# Patient Record
Sex: FEMALE | Race: BLACK OR AFRICAN AMERICAN | Employment: PART TIME | URBAN - METROPOLITAN AREA
[De-identification: names, ages, dates, MRNs, and addresses within clinical notes are randomized per-mention and may not be internally consistent; named-entity substitution may affect disease eponyms.]

---

## 2017-02-16 ENCOUNTER — APPOINTMENT (OUTPATIENT)
Dept: CT IMAGING | Facility: HOSPITAL | Age: 27
DRG: 149 | End: 2017-02-16
Attending: EMERGENCY MEDICINE
Payer: MEDICAID

## 2017-02-16 ENCOUNTER — APPOINTMENT (OUTPATIENT)
Dept: GENERAL RADIOLOGY | Facility: HOSPITAL | Age: 27
DRG: 149 | End: 2017-02-16
Attending: EMERGENCY MEDICINE
Payer: MEDICAID

## 2017-02-16 ENCOUNTER — HOSPITAL ENCOUNTER (INPATIENT)
Facility: HOSPITAL | Age: 27
LOS: 1 days | Discharge: HOME | DRG: 149 | End: 2017-02-17
Attending: EMERGENCY MEDICINE | Admitting: HOSPITALIST
Payer: MEDICAID

## 2017-02-16 DIAGNOSIS — R77.8 ELEVATED TROPONIN: Primary | ICD-10-CM

## 2017-02-16 DIAGNOSIS — R42 DIZZINESS: ICD-10-CM

## 2017-02-16 PROBLEM — R79.89 ELEVATED TROPONIN: Status: ACTIVE | Noted: 2017-02-16

## 2017-02-16 LAB
ANION GAP SERPL CALC-SCNC: 13 MMOL/L (ref 0–18)
APTT PPP: 25 SECONDS (ref 23.2–35.3)
B-HCG UR QL: NEGATIVE
BASOPHILS # BLD: 0.1 K/UL (ref 0–0.2)
BASOPHILS NFR BLD: 1 %
BILIRUB UR QL: NEGATIVE
BUN SERPL-MCNC: 18 MG/DL (ref 8–20)
BUN/CREAT SERPL: 13.2 (ref 10–20)
CALCIUM SERPL-MCNC: 10.2 MG/DL (ref 8.5–10.5)
CHLORIDE SERPL-SCNC: 99 MMOL/L (ref 95–110)
CHOLEST SERPL-MCNC: 209 MG/DL (ref 110–200)
CO2 SERPL-SCNC: 28 MMOL/L (ref 22–32)
COLOR UR: YELLOW
CREAT SERPL-MCNC: 1.36 MG/DL (ref 0.5–1.5)
EOSINOPHIL # BLD: 0.2 K/UL (ref 0–0.7)
EOSINOPHIL NFR BLD: 2 %
ERYTHROCYTE [DISTWIDTH] IN BLOOD BY AUTOMATED COUNT: 14.5 % (ref 11–15)
GLUCOSE SERPL-MCNC: 150 MG/DL (ref 70–99)
GLUCOSE UR-MCNC: NEGATIVE MG/DL
HCT VFR BLD AUTO: 40.9 % (ref 35–48)
HDLC SERPL-MCNC: 39 MG/DL
HGB BLD-MCNC: 13.4 G/DL (ref 12–16)
HGB UR QL STRIP.AUTO: NEGATIVE
KETONES UR-MCNC: NEGATIVE MG/DL
LDLC SERPL CALC-MCNC: 99 MG/DL (ref 0–99)
LEUKOCYTE ESTERASE UR QL STRIP.AUTO: NEGATIVE
LYMPHOCYTES # BLD: 2.2 K/UL (ref 1–4)
LYMPHOCYTES NFR BLD: 28 %
MCH RBC QN AUTO: 26.4 PG (ref 27–32)
MCHC RBC AUTO-ENTMCNC: 32.7 G/DL (ref 32–37)
MCV RBC AUTO: 80.6 FL (ref 80–100)
MONOCYTES # BLD: 0.4 K/UL (ref 0–1)
MONOCYTES NFR BLD: 5 %
NEUTROPHILS # BLD AUTO: 5.1 K/UL (ref 1.8–7.7)
NEUTROPHILS NFR BLD: 64 %
NITRITE UR QL STRIP.AUTO: NEGATIVE
NONHDLC SERPL-MCNC: 170 MG/DL
OSMOLALITY UR CALC.SUM OF ELEC: 295 MOSM/KG (ref 275–295)
PH UR: 6 [PH] (ref 5–8)
PLATELET # BLD AUTO: 324 K/UL (ref 140–400)
PMV BLD AUTO: 8.2 FL (ref 7.4–10.3)
POTASSIUM SERPL-SCNC: 3 MMOL/L (ref 3.3–5.1)
PROT UR-MCNC: 100 MG/DL
RBC # BLD AUTO: 5.08 M/UL (ref 3.7–5.4)
RBC #/AREA URNS AUTO: 2 /HPF
SODIUM SERPL-SCNC: 140 MMOL/L (ref 136–144)
SP GR UR STRIP: 1.01 (ref 1–1.03)
TRIGL SERPL-MCNC: 357 MG/DL (ref 1–149)
TROPONIN I SERPL-MCNC: 0.06 NG/ML (ref ?–0.03)
TROPONIN I SERPL-MCNC: 0.07 NG/ML (ref ?–0.03)
UROBILINOGEN UR STRIP-ACNC: <2
VIT C UR-MCNC: NEGATIVE MG/DL
WBC # BLD AUTO: 8.1 K/UL (ref 4–11)
WBC #/AREA URNS AUTO: 3 /HPF

## 2017-02-16 PROCEDURE — 99223 1ST HOSP IP/OBS HIGH 75: CPT | Performed by: HOSPITALIST

## 2017-02-16 PROCEDURE — 71020 XR CHEST PA + LAT CHEST (CPT=71020): CPT

## 2017-02-16 RX ORDER — MORPHINE SULFATE 2 MG/ML
2 INJECTION, SOLUTION INTRAMUSCULAR; INTRAVENOUS EVERY 2 HOUR PRN
Status: DISCONTINUED | OUTPATIENT
Start: 2017-02-16 | End: 2017-02-17

## 2017-02-16 RX ORDER — ATORVASTATIN CALCIUM 20 MG/1
20 TABLET, FILM COATED ORAL NIGHTLY
Status: DISCONTINUED | OUTPATIENT
Start: 2017-02-16 | End: 2017-02-17

## 2017-02-16 RX ORDER — ARIPIPRAZOLE 2 MG/1
2 TABLET ORAL NIGHTLY
Status: DISCONTINUED | OUTPATIENT
Start: 2017-02-16 | End: 2017-02-17

## 2017-02-16 RX ORDER — KETOROLAC TROMETHAMINE 30 MG/ML
30 INJECTION, SOLUTION INTRAMUSCULAR; INTRAVENOUS ONCE
Status: COMPLETED | OUTPATIENT
Start: 2017-02-16 | End: 2017-02-16

## 2017-02-16 RX ORDER — NIFEDIPINE 10 MG/1
10 CAPSULE ORAL DAILY
Status: ON HOLD | COMMUNITY
End: 2018-03-29

## 2017-02-16 RX ORDER — POTASSIUM CHLORIDE 20 MEQ/1
40 TABLET, EXTENDED RELEASE ORAL ONCE
Status: COMPLETED | OUTPATIENT
Start: 2017-02-16 | End: 2017-02-16

## 2017-02-16 RX ORDER — LOSARTAN POTASSIUM 50 MG/1
50 TABLET ORAL DAILY
Status: DISCONTINUED | OUTPATIENT
Start: 2017-02-17 | End: 2017-02-17

## 2017-02-16 RX ORDER — ASPIRIN 325 MG
325 TABLET ORAL ONCE
Status: COMPLETED | OUTPATIENT
Start: 2017-02-16 | End: 2017-02-16

## 2017-02-16 RX ORDER — ONDANSETRON 2 MG/ML
4 INJECTION INTRAMUSCULAR; INTRAVENOUS ONCE
Status: COMPLETED | OUTPATIENT
Start: 2017-02-16 | End: 2017-02-16

## 2017-02-16 RX ORDER — POTASSIUM CHLORIDE 1.5 G/1.77G
20 POWDER, FOR SOLUTION ORAL DAILY
Status: DISCONTINUED | OUTPATIENT
Start: 2017-02-16 | End: 2017-02-17

## 2017-02-16 RX ORDER — MORPHINE SULFATE 4 MG/ML
4 INJECTION, SOLUTION INTRAMUSCULAR; INTRAVENOUS EVERY 2 HOUR PRN
Status: DISCONTINUED | OUTPATIENT
Start: 2017-02-16 | End: 2017-02-17

## 2017-02-16 RX ORDER — HEPARIN SODIUM 1000 [USP'U]/ML
5000 INJECTION, SOLUTION INTRAVENOUS; SUBCUTANEOUS ONCE
Status: COMPLETED | OUTPATIENT
Start: 2017-02-16 | End: 2017-02-16

## 2017-02-16 RX ORDER — AMLODIPINE BESYLATE 10 MG/1
10 TABLET ORAL DAILY
Status: DISCONTINUED | OUTPATIENT
Start: 2017-02-17 | End: 2017-02-17

## 2017-02-16 RX ORDER — ACETAMINOPHEN 325 MG/1
650 TABLET ORAL EVERY 6 HOURS PRN
Status: DISCONTINUED | OUTPATIENT
Start: 2017-02-16 | End: 2017-02-17

## 2017-02-16 RX ORDER — DEXTROSE MONOHYDRATE 25 G/50ML
50 INJECTION, SOLUTION INTRAVENOUS AS NEEDED
Status: DISCONTINUED | OUTPATIENT
Start: 2017-02-16 | End: 2017-02-17

## 2017-02-16 RX ORDER — HEPARIN SODIUM AND DEXTROSE 10000; 5 [USP'U]/100ML; G/100ML
INJECTION INTRAVENOUS CONTINUOUS
Status: DISCONTINUED | OUTPATIENT
Start: 2017-02-17 | End: 2017-02-17

## 2017-02-16 RX ORDER — HEPARIN SODIUM AND DEXTROSE 10000; 5 [USP'U]/100ML; G/100ML
1000 INJECTION INTRAVENOUS ONCE
Status: COMPLETED | OUTPATIENT
Start: 2017-02-16 | End: 2017-02-17

## 2017-02-16 RX ORDER — ONDANSETRON 2 MG/ML
4 INJECTION INTRAMUSCULAR; INTRAVENOUS EVERY 6 HOURS PRN
Status: DISCONTINUED | OUTPATIENT
Start: 2017-02-16 | End: 2017-02-17

## 2017-02-16 NOTE — ED INITIAL ASSESSMENT (HPI)
Pt reports nasuea x3 days. Pt denies emesis/ diarrhea. Pt denies urinary complaints. Pt denies abd pain. Pt reports dizziness when standing. Pt denies cp. Pt reports sob with exertion.

## 2017-02-17 ENCOUNTER — APPOINTMENT (OUTPATIENT)
Dept: NUCLEAR MEDICINE | Facility: HOSPITAL | Age: 27
DRG: 149 | End: 2017-02-17
Attending: HOSPITALIST
Payer: MEDICAID

## 2017-02-17 VITALS
TEMPERATURE: 98 F | SYSTOLIC BLOOD PRESSURE: 141 MMHG | HEART RATE: 79 BPM | OXYGEN SATURATION: 97 % | RESPIRATION RATE: 20 BRPM | BODY MASS INDEX: 45.35 KG/M2 | WEIGHT: 272.19 LBS | DIASTOLIC BLOOD PRESSURE: 86 MMHG | HEIGHT: 65 IN

## 2017-02-17 LAB
ANION GAP SERPL CALC-SCNC: 7 MMOL/L (ref 0–18)
APTT PPP: 31.8 SECONDS (ref 23.2–35.3)
BUN SERPL-MCNC: 18 MG/DL (ref 8–20)
BUN/CREAT SERPL: 14.1 (ref 10–20)
CALCIUM SERPL-MCNC: 9.1 MG/DL (ref 8.5–10.5)
CHLORIDE SERPL-SCNC: 100 MMOL/L (ref 95–110)
CO2 SERPL-SCNC: 29 MMOL/L (ref 22–32)
CREAT SERPL-MCNC: 1.28 MG/DL (ref 0.5–1.5)
GLUCOSE SERPL-MCNC: 148 MG/DL (ref 70–99)
HBA1C MFR BLD: 7.2 % (ref 4–6)
OSMOLALITY UR CALC.SUM OF ELEC: 287 MOSM/KG (ref 275–295)
POTASSIUM SERPL-SCNC: 2.6 MMOL/L (ref 3.3–5.1)
SODIUM SERPL-SCNC: 136 MMOL/L (ref 136–144)
TROPONIN I SERPL-MCNC: 0.06 NG/ML (ref ?–0.03)

## 2017-02-17 PROCEDURE — 99239 HOSP IP/OBS DSCHRG MGMT >30: CPT | Performed by: HOSPITALIST

## 2017-02-17 PROCEDURE — 78582 LUNG VENTILAT&PERFUS IMAGING: CPT

## 2017-02-17 RX ORDER — HEPARIN SODIUM 1000 [USP'U]/ML
3000 INJECTION, SOLUTION INTRAVENOUS; SUBCUTANEOUS ONCE
Status: COMPLETED | OUTPATIENT
Start: 2017-02-17 | End: 2017-02-17

## 2017-02-17 RX ORDER — POTASSIUM CHLORIDE 20 MEQ/1
40 TABLET, EXTENDED RELEASE ORAL DAILY
Status: DISCONTINUED | OUTPATIENT
Start: 2017-02-17 | End: 2017-02-17

## 2017-02-17 NOTE — PLAN OF CARE
CARDIOVASCULAR - ADULT    • Maintains optimal cardiac output and hemodynamic stability Adequate for Discharge    • Absence of cardiac arrhythmias or at baseline Adequate for Discharge        Patient/Family Goals    • Patient/Family Long Term Goal Adequate

## 2017-02-17 NOTE — CONSULTS
[facility]    PATIENT'S NAME: Savannah Blandon   ATTENDING PHYSICIAN: Hilary Duffy. Davonte Mesa MD   CONSULTING PHYSICIAN: Jonel Loredo MD   PATIENT ACCOUNT#:   [id number]    LOCATION:  [address] [place] [address] #:   [id number]       DATE OF BIRTH: arthritis. There are no new allergies. PHYSICAL EXAMINATION:    GENERAL:  She is a female, currently appears comfortable. VITAL SIGNS:  Temperature 98.2 degrees Fahrenheit, heart rate of 74, respiratory rate 18, blood pressure 133/72.   HEENT:  Scl 09:19:18  t: 02/17/2017 10:11:37  Job [id number]  [facility]/

## 2017-02-17 NOTE — H&P
CHRISTIANO/ Lilliam Chino Patient Status:  Emergency    1990 MRN [id number]   Location [address] Attending Dami Velásquez MD   Hosp Day # 0 PCP PHYSICIAN NONSTAFF     Date:  2 [employer]-Derived P*    Tightness in Throat    Home Medications:  Prior to Admission Medications   Prescriptions Last Dose Informant Patient Reported? Taking? ARIPiprazole 2 MG Oral Tab   No No   Sig: Take 1 tablet (2 mg total) by mouth nightly.    AmLO respirations are non-labored, breath sounds are equal, symmetrical chest wall expansion. Cardiovascular:  Normal rate, regular rhythm, no murmur, no edema. Gastrointestinal:  Soft, non-tender, non-distended, normal bowel sounds, no organomegaly.   Lenny Pride

## 2017-02-17 NOTE — PAYOR COMM NOTE
Analy Lepeean #[id number]  (31 year old F)       [facility] [id number]         Dami Velásquez MD Physician Signed  ED Provider Notes 2/16/2017  7:34 PM      Expand All Collapse All      Patient Seen in: [facility] AND [address] Relation  Age of Onset    •  Hypertension  Father      •  Lipids  Father      •  Obesity  Father      •  Diabetes  Mother      •  Hypertension  Mother      •  Lipids  Mother      •  Obesity  Mother      •  Psychiatric  Sister      •  Psychiatric  Brother  Urine  Hazy (*)        Protein Urine  100  (*)        Bacteria Urine  Few (*)        All other components within normal limits    TROPONIN I, 0 HOUR - Abnormal; Notable for the following:       Troponin  0.06 (*)        All other components within normal l and Plan      Clinical Impression:  Elevated troponin  (primary encounter diagnosis)  Dizziness    Disposition:  Admit    Follow-up:  No follow-up provider specified.     Medications Prescribed:  Current Discharge Medication List          Present on Admissi •  Depression      •  Anxiety state              Past Surgical History      APPENDECTOMY          Family History    Problem  Relation  Age of Onset    •  Hypertension  Father      •  Lipids  Father      •  Obesity  Father      •  Diabetes  Mother      •  Negative. Musculoskeletal:  Negative. Integumentary:  Negative. Neurologic: Headache and dizziness  Psychiatric:  Negative.   ROS reviewed as documented in chart    Physical Exam:  Temp:  [97.3 °F (36.3 °C)] 97.3 °F (36.3 °C)  Pulse:  [77-88] 88  Resp:  drip started per cardiology    Shortness of breath  Saturating well however considering the rather abrupt onset, will get VQ scan to rule out PE, currently on heparin drip    Benign hypertension  Well-controlled at this time, will resume home medication. that was abnormal and subsequently underwent a coronary angiogram, which showed normal coronary arteries.     PAST MEDICAL HISTORY:  Diabetes mellitus, hypertension, hypercholesterolemia.  A coronary angiogram done in October 2016 showed normal coronary art syndrome. 3.      Abnormal EKG, unchanged from those of October 2016.  At that time, she had normal coronary arteries by cardiac catheterization. 4.      Hypertension, currently controlled.   5.      Hypercholesterolemia, on statin, with elevated triglyce

## 2017-02-21 NOTE — DISCHARGE SUMMARY
[facility] HOSPITALIST  DISCHARGE SUMMARY     Rober Atkins Patient Status:  Inpatient    1990 MRN [id number]   Location [facility] 3W/SW Attending No att. providers found   [facility] Day # 1 PCP PHYSICIAN NONSTAFF     DATE OF ADMISSION: 2017 rebound/guarding. Neuro: Normal reflexes, CN. Sensory/motor exams grossly normal deficit. Coordination  and gait normal.   MS: No joint effusions. No peripheral edema. Skin: Skin is warm and dry. No rashes, erythema, diaphoresis.    Psych: Normal mood a Last time this was given:  20 mEq on 2/17/2017 11:35 AM   Commonly known as:  KLOR-CON        Take 20 mEq by mouth daily. Quantity:  30 tablet   Refills:  1       UNKNOWN TO PATIENT - BIRTH CONTROL        daily.     Refills:  0             CONSULTANTS

## 2018-03-28 ENCOUNTER — APPOINTMENT (OUTPATIENT)
Dept: CT IMAGING | Facility: HOSPITAL | Age: 28
End: 2018-03-28
Attending: EMERGENCY MEDICINE
Payer: MEDICAID

## 2018-03-28 ENCOUNTER — HOSPITAL ENCOUNTER (OUTPATIENT)
Facility: HOSPITAL | Age: 28
Setting detail: OBSERVATION
Discharge: HOME | End: 2018-03-30
Attending: EMERGENCY MEDICINE | Admitting: INTERNAL MEDICINE
Payer: MEDICAID

## 2018-03-28 DIAGNOSIS — G43.909 MIGRAINE WITHOUT STATUS MIGRAINOSUS, NOT INTRACTABLE, UNSPECIFIED MIGRAINE TYPE: ICD-10-CM

## 2018-03-28 DIAGNOSIS — R77.8 ELEVATED TROPONIN: ICD-10-CM

## 2018-03-28 DIAGNOSIS — I10 ESSENTIAL HYPERTENSION: ICD-10-CM

## 2018-03-28 DIAGNOSIS — R07.9 ACUTE CHEST PAIN: Primary | ICD-10-CM

## 2018-03-28 PROCEDURE — 70450 CT HEAD/BRAIN W/O DYE: CPT | Performed by: EMERGENCY MEDICINE

## 2018-03-28 RX ORDER — ASPIRIN 81 MG/1
324 TABLET, CHEWABLE ORAL ONCE
Status: COMPLETED | OUTPATIENT
Start: 2018-03-28 | End: 2018-03-28

## 2018-03-28 RX ORDER — DIPHENHYDRAMINE HYDROCHLORIDE 50 MG/ML
25 INJECTION INTRAMUSCULAR; INTRAVENOUS ONCE
Status: COMPLETED | OUTPATIENT
Start: 2018-03-28 | End: 2018-03-28

## 2018-03-28 RX ORDER — LABETALOL HYDROCHLORIDE 5 MG/ML
20 INJECTION, SOLUTION INTRAVENOUS ONCE
Status: COMPLETED | OUTPATIENT
Start: 2018-03-28 | End: 2018-03-28

## 2018-03-28 RX ORDER — METOCLOPRAMIDE HYDROCHLORIDE 5 MG/ML
5 INJECTION INTRAMUSCULAR; INTRAVENOUS ONCE
Status: COMPLETED | OUTPATIENT
Start: 2018-03-28 | End: 2018-03-28

## 2018-03-28 RX ORDER — METHYLPREDNISOLONE SODIUM SUCCINATE 125 MG/2ML
125 INJECTION, POWDER, LYOPHILIZED, FOR SOLUTION INTRAMUSCULAR; INTRAVENOUS ONCE
Status: COMPLETED | OUTPATIENT
Start: 2018-03-28 | End: 2018-03-28

## 2018-03-28 NOTE — ED NOTES
Headache with light and noise sensitivity which worsened over the last week. Reports having a hx of headaches but states this headache feels worse. No other neuro complaints.

## 2018-03-28 NOTE — ED PROVIDER NOTES
Patient Seen in: [facility] AND [facility] Emergency Department    History   Patient presents with:  Headache (neurologic)    Stated Complaint: HA x4 days    HPI    The patient is a 53-year-old female with a history of hypertension who presents with headache cezar appears well-developed and well-nourished. Obese   HENT:   Head: Normocephalic and atraumatic. Mouth/Throat: Oropharynx is clear and moist.   Eyes: Conjunctivae and EOM are normal. Pupils are equal, round, and reactive to light.    Neck: Normal range of  (*)     All other components within normal limits   TROPONIN I - Abnormal; Notable for the following:     Troponin 0.09 (*)     All other components within normal limits   EM POCT PREGNANCY URINE - Normal   CBC WITH DIFFERENTIAL WITH PLATELET    N cardiac rule out. Elevated troponin possibly secondary to blood pressure elevation.   Chest pain resolved aspirin given vital signs stable    Admission disposition: 3/28/2018 11:43 PM           Disposition and Plan     Clinical Impression:  Acute chest chivo

## 2018-03-29 ENCOUNTER — APPOINTMENT (OUTPATIENT)
Dept: GENERAL RADIOLOGY | Facility: HOSPITAL | Age: 28
End: 2018-03-29
Attending: EMERGENCY MEDICINE
Payer: MEDICAID

## 2018-03-29 ENCOUNTER — APPOINTMENT (OUTPATIENT)
Dept: CV DIAGNOSTICS | Facility: HOSPITAL | Age: 28
End: 2018-03-29
Attending: INTERNAL MEDICINE
Payer: MEDICAID

## 2018-03-29 PROBLEM — I10 ESSENTIAL HYPERTENSION: Status: ACTIVE | Noted: 2018-03-29

## 2018-03-29 PROBLEM — G43.909 MIGRAINE WITHOUT STATUS MIGRAINOSUS, NOT INTRACTABLE, UNSPECIFIED MIGRAINE TYPE: Status: ACTIVE | Noted: 2018-03-29

## 2018-03-29 PROCEDURE — 90792 PSYCH DIAG EVAL W/MED SRVCS: CPT | Performed by: OTHER

## 2018-03-29 PROCEDURE — 93306 TTE W/DOPPLER COMPLETE: CPT | Performed by: INTERNAL MEDICINE

## 2018-03-29 PROCEDURE — 71045 X-RAY EXAM CHEST 1 VIEW: CPT | Performed by: EMERGENCY MEDICINE

## 2018-03-29 RX ORDER — POTASSIUM CHLORIDE 20 MEQ/1
20 TABLET, EXTENDED RELEASE ORAL DAILY
COMMUNITY
End: 2021-02-16 | Stop reason: DRUGHIGH

## 2018-03-29 RX ORDER — ATORVASTATIN CALCIUM 40 MG/1
40 TABLET, FILM COATED ORAL NIGHTLY
Status: DISCONTINUED | OUTPATIENT
Start: 2018-03-29 | End: 2018-03-30

## 2018-03-29 RX ORDER — HYDROCHLOROTHIAZIDE 25 MG/1
50 TABLET ORAL DAILY
Status: DISCONTINUED | OUTPATIENT
Start: 2018-03-29 | End: 2018-03-30

## 2018-03-29 RX ORDER — DESOGESTREL AND ETHINYL ESTRADIOL 0.15-0.03
1 KIT ORAL DAILY
COMMUNITY
End: 2021-02-16

## 2018-03-29 RX ORDER — ENOXAPARIN SODIUM 100 MG/ML
30 INJECTION SUBCUTANEOUS 2 TIMES DAILY
Status: DISCONTINUED | OUTPATIENT
Start: 2018-03-29 | End: 2018-03-30

## 2018-03-29 RX ORDER — ESCITALOPRAM OXALATE 10 MG/1
10 TABLET ORAL DAILY
Status: DISCONTINUED | OUTPATIENT
Start: 2018-03-29 | End: 2018-03-29

## 2018-03-29 RX ORDER — LOSARTAN POTASSIUM 100 MG/1
100 TABLET ORAL DAILY
Status: DISCONTINUED | OUTPATIENT
Start: 2018-03-29 | End: 2018-03-30

## 2018-03-29 RX ORDER — AMLODIPINE BESYLATE 10 MG/1
10 TABLET ORAL DAILY
Status: DISCONTINUED | OUTPATIENT
Start: 2018-03-29 | End: 2018-03-30

## 2018-03-29 RX ORDER — CLONAZEPAM 0.5 MG/1
0.5 TABLET ORAL NIGHTLY
Status: DISCONTINUED | OUTPATIENT
Start: 2018-03-29 | End: 2018-03-30

## 2018-03-29 RX ORDER — ESCITALOPRAM OXALATE 10 MG/1
10 TABLET ORAL DAILY
Status: ON HOLD | COMMUNITY
End: 2018-03-30

## 2018-03-29 RX ORDER — SPIRONOLACTONE 25 MG/1
12.5 TABLET ORAL DAILY
Status: DISCONTINUED | OUTPATIENT
Start: 2018-03-29 | End: 2018-03-30

## 2018-03-29 RX ORDER — POTASSIUM CHLORIDE 20 MEQ/1
40 TABLET, EXTENDED RELEASE ORAL EVERY 4 HOURS
Status: DISCONTINUED | OUTPATIENT
Start: 2018-03-29 | End: 2018-03-29

## 2018-03-29 RX ORDER — POTASSIUM CHLORIDE 20 MEQ/1
40 TABLET, EXTENDED RELEASE ORAL EVERY 4 HOURS
Status: COMPLETED | OUTPATIENT
Start: 2018-03-29 | End: 2018-03-30

## 2018-03-29 RX ORDER — POTASSIUM CHLORIDE 20 MEQ/1
40 TABLET, EXTENDED RELEASE ORAL EVERY 4 HOURS
Status: COMPLETED | OUTPATIENT
Start: 2018-03-29 | End: 2018-03-29

## 2018-03-29 RX ORDER — ACETAMINOPHEN 325 MG/1
650 TABLET ORAL EVERY 6 HOURS PRN
Status: DISCONTINUED | OUTPATIENT
Start: 2018-03-29 | End: 2018-03-30

## 2018-03-29 NOTE — CM/SW NOTE
CTL regarding checking price for RX Latuda 20mg po QHS with dinner    CTL called [facility] in [address] [place] [address] p [phone] to ask for price     Per [facility] they will call CTL back with price    [facility] returned call stated \"no charge\" for medication under apple

## 2018-03-29 NOTE — CONSULTS
[facility]    PATIENT'S NAME: Janette Flakito   ATTENDING PHYSICIAN: Shawn Pearson MD   CONSULTING PHYSICIAN: Jonel Holloway MD   PATIENT ACCOUNT#:   [id number]    LOCATION:  [address] [place] [address] [address] #:   [id number]       DATE OF BI headaches. There are no recent vision changes. There is no recent change in hearing. There is no sore throat. There is no shortness of breath. There is chest discomfort as described above. There is some nausea.   There is no abdominal pain, melena, or couple years. 4.   Elevated troponin, likely due to significantly elevated blood pressures. 5.   Hypertension, that certainly may be somewhat exacerbated by her headaches. 6.   Hypercholesterolemia, with markedly elevated triglycerides.   LDL cannot be c

## 2018-03-29 NOTE — PROGRESS NOTES
[facility] Pharmacy Note:  Weight Dose Adjustment for: Enoxaparin     Saint Nailer is a 28year old female who has been prescribed Enoxaparin   40 mg every 24 hours. CrCl is estimated creatinine clearance is 58 mL/min (based on SCr of 1.3 mg/dL).  and pt has a

## 2018-03-30 VITALS
OXYGEN SATURATION: 98 % | TEMPERATURE: 98 F | RESPIRATION RATE: 20 BRPM | HEIGHT: 65 IN | WEIGHT: 268.69 LBS | HEART RATE: 82 BPM | BODY MASS INDEX: 44.77 KG/M2 | DIASTOLIC BLOOD PRESSURE: 95 MMHG | SYSTOLIC BLOOD PRESSURE: 162 MMHG

## 2018-03-30 PROCEDURE — 99233 SBSQ HOSP IP/OBS HIGH 50: CPT | Performed by: OTHER

## 2018-03-30 RX ORDER — LOSARTAN POTASSIUM 100 MG/1
100 TABLET ORAL DAILY
Qty: 30 TABLET | Refills: 0 | Status: SHIPPED | OUTPATIENT
Start: 2018-03-31

## 2018-03-30 RX ORDER — POTASSIUM CHLORIDE 20 MEQ/1
40 TABLET, EXTENDED RELEASE ORAL EVERY 4 HOURS
Status: DISCONTINUED | OUTPATIENT
Start: 2018-03-30 | End: 2018-03-30

## 2018-03-30 RX ORDER — SPIRONOLACTONE 25 MG/1
12.5 TABLET ORAL DAILY
Qty: 30 TABLET | Refills: 0 | Status: SHIPPED | OUTPATIENT
Start: 2018-03-31

## 2018-03-30 RX ORDER — ATORVASTATIN CALCIUM 40 MG/1
40 TABLET, FILM COATED ORAL NIGHTLY
Qty: 30 TABLET | Refills: 0 | Status: SHIPPED | OUTPATIENT
Start: 2018-03-30

## 2018-03-30 NOTE — CONSULTS
[facility]    Report of Consultation    Carl Garcia Patient Status:  Observation    1990 MRN [id number]   Location [facility] 3W/SW Attending [address], [address] [facility] Day # 0 PCP No primary care provider on file. 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   The patient agreed on the need to utilize mood stabilizer foc Oral Nightly   [START ON 3/30/2018] MetFORMIN HCl (GLUCOPHAGE) tab 1,000 mg 1,000 mg Oral BID with meals   Lurasidone HCl (LATUDA) tab 20 mg 20 mg Oral Daily with dinner       Prescriptions Prior to Admission:  escitalopram 10 MG Oral Tab Take 10 mg by sammi Xr Chest Ap Portable  (cpt=71045)    Result Date: 3/29/2018  CONCLUSION:  1. Unchanged chest. No acute appearing disease. Heart size upper limits of normal to mildly prominent. Correlate clinically.      Dictated by (CST): Francesca Vidales MD on 3/29/ CBC With Differential With Platelet      Urinalysis with Culture Reflex STAT      Troponin I      Lipid Panel      Direct LDL      Troponin I      Troponin I      Basic Metabolic Panel (8)      Magnesium      Basic Metabolic Panel (8)      Potassium

## 2018-03-30 NOTE — PLAN OF CARE
Patient Centered Care    • Patient preferences are identified and integrated in the patient's plan of care Progressing        Patient/Family Goals    • Patient/Family Short Term Goal Progressing

## 2018-03-30 NOTE — PROGRESS NOTES
[facility]  Progress Note    Lakisha Primer Patient Status:  Observation    1990 MRN [id number]   Location [facility] 3W/SW Attending [address], [address] Day # 0 PCP No primary care provider on file.      Impr Min:97.8 °F (36.6 °C), Max:98.5 °F (36.9 °C)      Intake/Output Summary (Last 24 hours) at 03/30/18 0916  Last data filed at 03/30/18 0915   Gross per 24 hour   Intake             2220 ml   Output              800 ml   Net             1420 ml     Wt Readin increased in a pattern of moderate LVH. Systolic function was     normal. The estimated ejection fraction was 60%. Wall motion was     normal; there were no regional wall motion abnormalities. CXR:  CONCLUSION:   1.  Unchanged chest. No acute appeari

## 2018-03-30 NOTE — PROGRESS NOTES
[facility] - [facility]    Progress Note    Madalyn Almendarez Patient Status:  Observation    1990 MRN [id number]   Location [facility] 3W/SW Attending [address], [address] [facility] Day # 0 PCP No primary care provider on file.        Ronen Gan HGB 14.1 03/28/2018   HCT 42.3 03/28/2018    03/28/2018   CREATSERUM 1.34 03/30/2018   BUN 23 (H) 03/30/2018    03/30/2018   K 3.3 03/30/2018   K 3.3 03/30/2018    03/30/2018   CO2 25 03/30/2018    (H) 03/30/2018   CA 9.2 03/30/ type  Improved       Severe depressed bipolar I disorder without psychotic features ([facility] [address]  Started on Latuda     DM   Continue metformin     DVT prophylaxis   On Lovenox daily             Leia Zambrano MD  3/30/2018

## 2018-03-30 NOTE — BH PROGRESS NOTE
Behavioral Health SOAP Note  SUBJECTIVE   Patient is a 29 y/o female with bipolar disorder. Patient has a h/o regular suicidal ideation and prior attempts. Patient was seen at bedside today. She is alert and oriented x3 with no cognitive impairment.  To

## 2018-03-31 NOTE — PROGRESS NOTES
Patient is a 29 y/o female with bipolar disorder. Patient has a h/o passive suicidal ideation and prior attempts. Patient seen yesterday and started BahScrogginss.   The patient has been cooperative the treatment plan but interested to be discharge  The patien

## 2018-04-01 NOTE — PROGRESS NOTES
The patient presented calm cooperative reporting \"I am not suicidal and I did not try to kill myself\". The patient reported that she believed people who killed himself and up in [place] and she does not want to go to how she want to go to [place].   The patie received our outpatient program phone number to communicate on Monday. Communicating with RN and social service and proceed into the treatment plan.

## 2018-11-08 ENCOUNTER — APPOINTMENT (OUTPATIENT)
Dept: GENERAL RADIOLOGY | Facility: HOSPITAL | Age: 28
End: 2018-11-08
Attending: EMERGENCY MEDICINE
Payer: MEDICAID

## 2018-11-08 ENCOUNTER — HOSPITAL ENCOUNTER (EMERGENCY)
Facility: HOSPITAL | Age: 28
Discharge: HOME | End: 2018-11-08
Attending: EMERGENCY MEDICINE
Payer: MEDICAID

## 2018-11-08 VITALS
WEIGHT: 260 LBS | TEMPERATURE: 99 F | HEART RATE: 91 BPM | RESPIRATION RATE: 24 BRPM | HEIGHT: 65 IN | OXYGEN SATURATION: 96 % | SYSTOLIC BLOOD PRESSURE: 114 MMHG | BODY MASS INDEX: 43.32 KG/M2 | DIASTOLIC BLOOD PRESSURE: 48 MMHG

## 2018-11-08 DIAGNOSIS — E87.6 HYPOKALEMIA: Primary | ICD-10-CM

## 2018-11-08 DIAGNOSIS — R00.2 PALPITATIONS: ICD-10-CM

## 2018-11-08 DIAGNOSIS — R07.89 CHEST PAIN, ATYPICAL: ICD-10-CM

## 2018-11-08 DIAGNOSIS — T50.905A ADVERSE EFFECT OF DRUG, INITIAL ENCOUNTER: ICD-10-CM

## 2018-11-08 PROCEDURE — 84484 ASSAY OF TROPONIN QUANT: CPT | Performed by: EMERGENCY MEDICINE

## 2018-11-08 PROCEDURE — 93010 ELECTROCARDIOGRAM REPORT: CPT | Performed by: EMERGENCY MEDICINE

## 2018-11-08 PROCEDURE — 93005 ELECTROCARDIOGRAM TRACING: CPT

## 2018-11-08 PROCEDURE — 99285 EMERGENCY DEPT VISIT HI MDM: CPT

## 2018-11-08 PROCEDURE — 80048 BASIC METABOLIC PNL TOTAL CA: CPT | Performed by: EMERGENCY MEDICINE

## 2018-11-08 PROCEDURE — 83880 ASSAY OF NATRIURETIC PEPTIDE: CPT | Performed by: EMERGENCY MEDICINE

## 2018-11-08 PROCEDURE — 85379 FIBRIN DEGRADATION QUANT: CPT | Performed by: EMERGENCY MEDICINE

## 2018-11-08 PROCEDURE — 85025 COMPLETE CBC W/AUTO DIFF WBC: CPT | Performed by: EMERGENCY MEDICINE

## 2018-11-08 PROCEDURE — 71045 X-RAY EXAM CHEST 1 VIEW: CPT | Performed by: EMERGENCY MEDICINE

## 2018-11-08 PROCEDURE — 36415 COLL VENOUS BLD VENIPUNCTURE: CPT

## 2018-11-08 RX ORDER — ONDANSETRON 4 MG/1
4 TABLET, ORALLY DISINTEGRATING ORAL EVERY 4 HOURS PRN
Qty: 10 TABLET | Refills: 0 | Status: SHIPPED | OUTPATIENT
Start: 2018-11-08 | End: 2018-11-15

## 2018-11-08 RX ORDER — ONDANSETRON 4 MG/1
4 TABLET, ORALLY DISINTEGRATING ORAL ONCE
Status: COMPLETED | OUTPATIENT
Start: 2018-11-08 | End: 2018-11-08

## 2018-11-08 RX ORDER — POTASSIUM CHLORIDE 20 MEQ/1
40 TABLET, EXTENDED RELEASE ORAL ONCE
Status: COMPLETED | OUTPATIENT
Start: 2018-11-08 | End: 2018-11-08

## 2018-11-08 RX ORDER — ONDANSETRON 2 MG/ML
4 INJECTION INTRAMUSCULAR; INTRAVENOUS ONCE
Status: DISCONTINUED | OUTPATIENT
Start: 2018-11-08 | End: 2018-11-08

## 2018-11-08 NOTE — ED PROVIDER NOTES
Patient Seen in: [facility] AND [facility] Emergency Department    History   Patient presents with:  Chest Pain Angina (cardiovascular)    Stated Complaint: chest pain     HPI    Pt is 34 yo F who p/w left sided sharp chest pain that started at 130.  No radiation nontender, no distension  Extremities: FROM of all extremities, no cyanosis/clubbing/edema  Neuro: CN intact, normal speech, normal gait, 5/5 motor strength in all extremities, no focal deficits  SKIN: warm, dry, no rashes        ED Course     Labs Reviewe states she always feels nauseous and did feel better after zofran. Will give Rx for home and advised close f/u with her nephrologist to discuss changing her medications given the side effects. Potassium repleted.                Disposition and Plan     Cl

## 2020-03-17 ENCOUNTER — OFFICE VISIT (OUTPATIENT)
Dept: SURGERY | Facility: CLINIC | Age: 30
End: 2020-03-17
Payer: COMMERCIAL

## 2020-03-17 VITALS — HEIGHT: 66 IN | TEMPERATURE: 98 F | BODY MASS INDEX: 41.46 KG/M2 | WEIGHT: 258 LBS

## 2020-03-17 DIAGNOSIS — L73.2 HIDRADENITIS AXILLARIS: Primary | ICD-10-CM

## 2020-03-17 PROCEDURE — 99203 OFFICE O/P NEW LOW 30 MIN: CPT | Performed by: SURGERY

## 2020-03-17 RX ORDER — OXCARBAZEPINE 300 MG/1
300 TABLET, FILM COATED ORAL 2 TIMES DAILY
COMMUNITY
Start: 2019-10-05 | End: 2021-02-16

## 2020-03-17 RX ORDER — CARVEDILOL 6.25 MG/1
6.25 TABLET ORAL 2 TIMES DAILY
COMMUNITY
Start: 2020-03-11

## 2020-03-17 RX ORDER — EPINEPHRINE 0.3 MG/.3ML
INJECTION SUBCUTANEOUS
COMMUNITY
Start: 2020-03-09

## 2020-03-17 RX ORDER — SULFAMETHOXAZOLE AND TRIMETHOPRIM 800; 160 MG/1; MG/1
TABLET ORAL
COMMUNITY
Start: 2020-02-21 | End: 2021-02-16

## 2020-03-17 RX ORDER — POTASSIUM CHLORIDE 1500 MG/1
TABLET, FILM COATED, EXTENDED RELEASE ORAL
COMMUNITY
Start: 2020-02-05

## 2020-03-17 RX ORDER — ATORVASTATIN CALCIUM 40 MG/1
40 TABLET, FILM COATED ORAL
COMMUNITY
End: 2021-02-16

## 2020-03-17 RX ORDER — NIFEDIPINE 90 MG/1
90 TABLET, FILM COATED, EXTENDED RELEASE ORAL NIGHTLY
COMMUNITY
Start: 2019-10-25

## 2020-03-17 RX ORDER — FLUCONAZOLE 150 MG/1
TABLET ORAL
COMMUNITY
Start: 2020-03-11 | End: 2021-02-16

## 2020-03-17 RX ORDER — LOSARTAN POTASSIUM 100 MG/1
100 TABLET ORAL
COMMUNITY
Start: 2016-07-07 | End: 2021-02-16

## 2020-03-17 NOTE — PATIENT INSTRUCTIONS
Use warm compresses to the axilla as needed for pain, ibuprofen.     Plan wide excision axillary hidradenitis at [facility] Thursday, April 2

## 2020-03-17 NOTE — H&P
History and Physical      HPI   Patient presents with:  Referral: Liza Pretty referred from [facility], Dr. Henrry Summers for recurrent skin cyst.  Patient reports she has recurrent skin abscesses and skin tags Bilat axilla.         HPI  Lucie Alexander is a 26year old MG Oral Tab Take 1 tablet (40 mg total) by mouth nightly. 30 tablet 0   • losartan 100 MG Oral Tab Take 1 tablet (100 mg total) by mouth daily. 30 tablet 0   • spironolactone 25 MG Oral Tab Take 0.5 tablets (12.5 mg total) by mouth daily.  30 tablet 0   • D palate is intact mucous membranes are moist no oral lesions are noted  Neck/Thyroid: neck is supple without adenopathy  Respiratory: normal to inspection lungs are clear to auscultation bilaterally normal respiratory effort  Cardiovascular: regular rate an

## 2020-05-05 ENCOUNTER — TELEPHONE (OUTPATIENT)
Dept: SURGERY | Facility: CLINIC | Age: 30
End: 2020-05-05

## 2020-05-06 NOTE — TELEPHONE ENCOUNTER
Phone call to patient. Surgery has been rescheduled to 5-. Patient informed. All instructions provided. Patient will go get PAT, orders are in. Clarified details.     Kinga for Dr. Suresh Valentin

## 2020-05-11 ENCOUNTER — LAB ENCOUNTER (OUTPATIENT)
Dept: LAB | Facility: HOSPITAL | Age: 30
End: 2020-05-11
Attending: SURGERY
Payer: COMMERCIAL

## 2020-05-11 DIAGNOSIS — L73.2 HIDRADENITIS AXILLARIS: ICD-10-CM

## 2020-05-11 PROCEDURE — 81025 URINE PREGNANCY TEST: CPT | Performed by: SURGERY

## 2020-05-11 PROCEDURE — 36415 COLL VENOUS BLD VENIPUNCTURE: CPT

## 2020-05-11 PROCEDURE — 85025 COMPLETE CBC W/AUTO DIFF WBC: CPT

## 2020-05-11 PROCEDURE — 80053 COMPREHEN METABOLIC PANEL: CPT

## 2020-05-12 ENCOUNTER — NURSE ONLY (OUTPATIENT)
Dept: LAB | Facility: HOSPITAL | Age: 30
End: 2020-05-12
Attending: SURGERY
Payer: COMMERCIAL

## 2020-05-12 DIAGNOSIS — Z01.818 PRE-OP TESTING: ICD-10-CM

## 2020-05-12 LAB — SARS-COV-2 RNA RESP QL NAA+PROBE: NOT DETECTED

## 2020-05-13 RX ORDER — CEFAZOLIN SODIUM/WATER 2 G/20 ML
2 SYRINGE (ML) INTRAVENOUS ONCE
Status: CANCELLED | OUTPATIENT
Start: 2020-05-13 | End: 2020-05-13

## 2020-05-14 ENCOUNTER — ANESTHESIA (OUTPATIENT)
Dept: SURGERY | Facility: HOSPITAL | Age: 30
End: 2020-05-14
Payer: COMMERCIAL

## 2020-05-14 ENCOUNTER — HOSPITAL ENCOUNTER (OUTPATIENT)
Facility: HOSPITAL | Age: 30
Setting detail: HOSPITAL OUTPATIENT SURGERY
Discharge: HOME | End: 2020-05-14
Attending: SURGERY | Admitting: SURGERY
Payer: COMMERCIAL

## 2020-05-14 ENCOUNTER — ANESTHESIA EVENT (OUTPATIENT)
Dept: SURGERY | Facility: HOSPITAL | Age: 30
End: 2020-05-14
Payer: COMMERCIAL

## 2020-05-14 VITALS
SYSTOLIC BLOOD PRESSURE: 129 MMHG | HEART RATE: 87 BPM | OXYGEN SATURATION: 94 % | TEMPERATURE: 98 F | BODY MASS INDEX: 42.43 KG/M2 | RESPIRATION RATE: 20 BRPM | HEIGHT: 66 IN | DIASTOLIC BLOOD PRESSURE: 66 MMHG | WEIGHT: 264 LBS

## 2020-05-14 DIAGNOSIS — Z01.818 PRE-OP TESTING: Primary | ICD-10-CM

## 2020-05-14 DIAGNOSIS — L73.2 HIDRADENITIS AXILLARIS: ICD-10-CM

## 2020-05-14 PROCEDURE — 0JBF0ZZ EXCISION OF LEFT UPPER ARM SUBCUTANEOUS TISSUE AND FASCIA, OPEN APPROACH: ICD-10-PCS | Performed by: SURGERY

## 2020-05-14 PROCEDURE — 11450 EXC SKN HDRDNT AX SMPL/NTRM: CPT | Performed by: SURGERY

## 2020-05-14 PROCEDURE — 0JBD0ZZ EXCISION OF RIGHT UPPER ARM SUBCUTANEOUS TISSUE AND FASCIA, OPEN APPROACH: ICD-10-PCS | Performed by: SURGERY

## 2020-05-14 RX ORDER — ROCURONIUM BROMIDE 10 MG/ML
INJECTION, SOLUTION INTRAVENOUS AS NEEDED
Status: DISCONTINUED | OUTPATIENT
Start: 2020-05-14 | End: 2020-05-14 | Stop reason: SURG

## 2020-05-14 RX ORDER — HALOPERIDOL 5 MG/ML
0.25 INJECTION INTRAMUSCULAR ONCE AS NEEDED
Status: DISCONTINUED | OUTPATIENT
Start: 2020-05-14 | End: 2020-05-14

## 2020-05-14 RX ORDER — LIDOCAINE HYDROCHLORIDE 10 MG/ML
INJECTION, SOLUTION EPIDURAL; INFILTRATION; INTRACAUDAL; PERINEURAL AS NEEDED
Status: DISCONTINUED | OUTPATIENT
Start: 2020-05-14 | End: 2020-05-14 | Stop reason: SURG

## 2020-05-14 RX ORDER — METOCLOPRAMIDE 10 MG/1
10 TABLET ORAL ONCE
Status: COMPLETED | OUTPATIENT
Start: 2020-05-14 | End: 2020-05-14

## 2020-05-14 RX ORDER — MORPHINE SULFATE 4 MG/ML
2 INJECTION, SOLUTION INTRAMUSCULAR; INTRAVENOUS EVERY 10 MIN PRN
Status: DISCONTINUED | OUTPATIENT
Start: 2020-05-14 | End: 2020-05-14

## 2020-05-14 RX ORDER — MORPHINE SULFATE 4 MG/ML
4 INJECTION, SOLUTION INTRAMUSCULAR; INTRAVENOUS EVERY 10 MIN PRN
Status: DISCONTINUED | OUTPATIENT
Start: 2020-05-14 | End: 2020-05-14

## 2020-05-14 RX ORDER — CEPHALEXIN 500 MG/1
500 CAPSULE ORAL 2 TIMES DAILY
Qty: 14 CAPSULE | Refills: 0 | Status: SHIPPED | OUTPATIENT
Start: 2020-05-14 | End: 2020-05-21

## 2020-05-14 RX ORDER — CEFAZOLIN SODIUM/WATER 2 G/20 ML
2 SYRINGE (ML) INTRAVENOUS ONCE
Status: COMPLETED | OUTPATIENT
Start: 2020-05-14 | End: 2020-05-14

## 2020-05-14 RX ORDER — MIDAZOLAM HYDROCHLORIDE 1 MG/ML
INJECTION INTRAMUSCULAR; INTRAVENOUS AS NEEDED
Status: DISCONTINUED | OUTPATIENT
Start: 2020-05-14 | End: 2020-05-14 | Stop reason: SURG

## 2020-05-14 RX ORDER — BUPIVACAINE HYDROCHLORIDE AND EPINEPHRINE 2.5; 5 MG/ML; UG/ML
INJECTION, SOLUTION INFILTRATION; PERINEURAL AS NEEDED
Status: DISCONTINUED | OUTPATIENT
Start: 2020-05-14 | End: 2020-05-14 | Stop reason: HOSPADM

## 2020-05-14 RX ORDER — ACETAMINOPHEN 500 MG
1000 TABLET ORAL ONCE
Status: DISCONTINUED | OUTPATIENT
Start: 2020-05-14 | End: 2020-05-14 | Stop reason: HOSPADM

## 2020-05-14 RX ORDER — HYDROMORPHONE HYDROCHLORIDE 1 MG/ML
0.6 INJECTION, SOLUTION INTRAMUSCULAR; INTRAVENOUS; SUBCUTANEOUS EVERY 5 MIN PRN
Status: DISCONTINUED | OUTPATIENT
Start: 2020-05-14 | End: 2020-05-14

## 2020-05-14 RX ORDER — ONDANSETRON 2 MG/ML
4 INJECTION INTRAMUSCULAR; INTRAVENOUS ONCE AS NEEDED
Status: DISCONTINUED | OUTPATIENT
Start: 2020-05-14 | End: 2020-05-14

## 2020-05-14 RX ORDER — GLYCOPYRROLATE 0.2 MG/ML
INJECTION, SOLUTION INTRAMUSCULAR; INTRAVENOUS AS NEEDED
Status: DISCONTINUED | OUTPATIENT
Start: 2020-05-14 | End: 2020-05-14 | Stop reason: SURG

## 2020-05-14 RX ORDER — METOPROLOL TARTRATE 5 MG/5ML
2.5 INJECTION INTRAVENOUS ONCE
Status: COMPLETED | OUTPATIENT
Start: 2020-05-14 | End: 2020-05-14

## 2020-05-14 RX ORDER — MORPHINE SULFATE 10 MG/ML
6 INJECTION, SOLUTION INTRAMUSCULAR; INTRAVENOUS EVERY 10 MIN PRN
Status: DISCONTINUED | OUTPATIENT
Start: 2020-05-14 | End: 2020-05-14

## 2020-05-14 RX ORDER — DEXTROSE MONOHYDRATE 25 G/50ML
50 INJECTION, SOLUTION INTRAVENOUS
Status: DISCONTINUED | OUTPATIENT
Start: 2020-05-14 | End: 2020-05-14

## 2020-05-14 RX ORDER — ONDANSETRON 2 MG/ML
INJECTION INTRAMUSCULAR; INTRAVENOUS AS NEEDED
Status: DISCONTINUED | OUTPATIENT
Start: 2020-05-14 | End: 2020-05-14 | Stop reason: SURG

## 2020-05-14 RX ORDER — SODIUM CHLORIDE, SODIUM LACTATE, POTASSIUM CHLORIDE, CALCIUM CHLORIDE 600; 310; 30; 20 MG/100ML; MG/100ML; MG/100ML; MG/100ML
INJECTION, SOLUTION INTRAVENOUS CONTINUOUS
Status: DISCONTINUED | OUTPATIENT
Start: 2020-05-14 | End: 2020-05-14

## 2020-05-14 RX ORDER — NEOSTIGMINE METHYLSULFATE 1 MG/ML
INJECTION INTRAVENOUS AS NEEDED
Status: DISCONTINUED | OUTPATIENT
Start: 2020-05-14 | End: 2020-05-14 | Stop reason: SURG

## 2020-05-14 RX ORDER — INSULIN ASPART 100 [IU]/ML
6 INJECTION, SOLUTION INTRAVENOUS; SUBCUTANEOUS ONCE
Status: COMPLETED | OUTPATIENT
Start: 2020-05-14 | End: 2020-05-14

## 2020-05-14 RX ORDER — NALOXONE HYDROCHLORIDE 0.4 MG/ML
80 INJECTION, SOLUTION INTRAMUSCULAR; INTRAVENOUS; SUBCUTANEOUS AS NEEDED
Status: DISCONTINUED | OUTPATIENT
Start: 2020-05-14 | End: 2020-05-14

## 2020-05-14 RX ORDER — HYDROMORPHONE HYDROCHLORIDE 1 MG/ML
0.4 INJECTION, SOLUTION INTRAMUSCULAR; INTRAVENOUS; SUBCUTANEOUS EVERY 5 MIN PRN
Status: DISCONTINUED | OUTPATIENT
Start: 2020-05-14 | End: 2020-05-14

## 2020-05-14 RX ORDER — HYDROCODONE BITARTRATE AND ACETAMINOPHEN 5; 325 MG/1; MG/1
1 TABLET ORAL AS NEEDED
Status: COMPLETED | OUTPATIENT
Start: 2020-05-14 | End: 2020-05-14

## 2020-05-14 RX ORDER — FAMOTIDINE 20 MG/1
20 TABLET ORAL ONCE
Status: COMPLETED | OUTPATIENT
Start: 2020-05-14 | End: 2020-05-14

## 2020-05-14 RX ORDER — PROCHLORPERAZINE EDISYLATE 5 MG/ML
5 INJECTION INTRAMUSCULAR; INTRAVENOUS ONCE AS NEEDED
Status: DISCONTINUED | OUTPATIENT
Start: 2020-05-14 | End: 2020-05-14

## 2020-05-14 RX ORDER — HYDROCODONE BITARTRATE AND ACETAMINOPHEN 5; 325 MG/1; MG/1
2 TABLET ORAL AS NEEDED
Status: COMPLETED | OUTPATIENT
Start: 2020-05-14 | End: 2020-05-14

## 2020-05-14 RX ORDER — HYDROMORPHONE HYDROCHLORIDE 1 MG/ML
0.2 INJECTION, SOLUTION INTRAMUSCULAR; INTRAVENOUS; SUBCUTANEOUS EVERY 5 MIN PRN
Status: DISCONTINUED | OUTPATIENT
Start: 2020-05-14 | End: 2020-05-14

## 2020-05-14 RX ORDER — HYDROCODONE BITARTRATE AND ACETAMINOPHEN 5; 325 MG/1; MG/1
1 TABLET ORAL EVERY 4 HOURS PRN
Qty: 15 TABLET | Refills: 0 | Status: SHIPPED | OUTPATIENT
Start: 2020-05-14 | End: 2021-02-16

## 2020-05-14 RX ADMIN — ROCURONIUM BROMIDE 15 MG: 10 INJECTION, SOLUTION INTRAVENOUS at 08:00:00

## 2020-05-14 RX ADMIN — LIDOCAINE HYDROCHLORIDE 50 MG: 10 INJECTION, SOLUTION EPIDURAL; INFILTRATION; INTRACAUDAL; PERINEURAL at 07:49:00

## 2020-05-14 RX ADMIN — NEOSTIGMINE METHYLSULFATE 3 MG: 1 INJECTION INTRAVENOUS at 08:25:00

## 2020-05-14 RX ADMIN — CEFAZOLIN SODIUM/WATER 3 G: 2 G/20 ML SYRINGE (ML) INTRAVENOUS at 08:00:00

## 2020-05-14 RX ADMIN — SODIUM CHLORIDE, SODIUM LACTATE, POTASSIUM CHLORIDE, CALCIUM CHLORIDE: 600; 310; 30; 20 INJECTION, SOLUTION INTRAVENOUS at 08:35:00

## 2020-05-14 RX ADMIN — ONDANSETRON 4 MG: 2 INJECTION INTRAMUSCULAR; INTRAVENOUS at 07:49:00

## 2020-05-14 RX ADMIN — GLYCOPYRROLATE 0.2 MG: 0.2 INJECTION, SOLUTION INTRAMUSCULAR; INTRAVENOUS at 07:49:00

## 2020-05-14 RX ADMIN — GLYCOPYRROLATE 0.4 MG: 0.2 INJECTION, SOLUTION INTRAMUSCULAR; INTRAVENOUS at 08:25:00

## 2020-05-14 RX ADMIN — MIDAZOLAM HYDROCHLORIDE 2 MG: 1 INJECTION INTRAMUSCULAR; INTRAVENOUS at 07:46:00

## 2020-05-14 NOTE — ANESTHESIA POSTPROCEDURE EVALUATION
Patient: Aj Sr    Procedure Summary     Date:  05/14/20 Room / Location:  [facility] OR  / [facility] OR    Anesthesia Start:  6173 Anesthesia Stop:      Procedure:  AXILLARY HIDRADENITIS EXCISION (Bilateral ) Diagnosis:       Hidradenitis axillar

## 2020-05-14 NOTE — H&P
History and Physical      HPI   No chief complaint on file. HPI  Masood Ruiz is a 26year old female who presents with bilateral axillary hidradenitis, limited.   She has tunneled fistula tract and granulation tissue with underlying cyst formation 98.2 °F (36.8 °C) (Oral)   Resp 22   Ht 5' 6\" (1.676 m)   Wt 264 lb (119.7 kg)   LMP 05/09/2020   SpO2 97%   BMI 42.61 kg/m²  Patient's last menstrual period was 05/09/2020. obese  Constitutional: appears well hydrated alert and responsive no acute distre

## 2020-05-14 NOTE — ANESTHESIA PROCEDURE NOTES
Airway  Urgency: Elective      General Information and Staff    Patient location during procedure: OR  Anesthesiologist: Mike Cooley MD  Resident/CRNA: Roshan Holliday CRNA  Performed: CRNA     Indications and Patient Condition  Indications for

## 2020-05-14 NOTE — INTERVAL H&P NOTE
Pre-op Diagnosis: Hidradenitis axillaris [L73.2]    The above referenced H&P was reviewed by Braulio Leonardo MD on 5/14/2020, the patient was examined and no significant changes have occurred in the patient's condition since the H&P was performed.   I discuss

## 2020-05-14 NOTE — ANESTHESIA PREPROCEDURE EVALUATION
Anesthesia PreOp Note    HPI:     Annie Cantor is a [address] old female who presents for preoperative consultation requested by: Dottie Schulte MD    Date of Surgery: 5/14/2020    Procedure(s):  AXILLARY HIDRADENITIS EXCISION  Indication: Hidradenitis a Unknown time  atorvastatin 40 MG Oral Tab, Take 1 tablet (40 mg total) by mouth nightly., Disp: 30 tablet, Rfl: 0, 5/13/2020 at Unknown time  losartan 100 MG Oral Tab, Take 1 tablet (100 mg total) by mouth daily. , Disp: 30 tablet, Rfl: 0, [facility]13/[facility]) tab 1,000 mg, 1,000 mg, Oral, Once, Chong Canales MD  metoprolol Tartrate (LOPRESSOR) tab 25 mg, 25 mg, Oral, Once PRN, Chong Canales MD  ceFAZolin sodium (ANCEF/KEFZOL) 2 GM/20ML premix IV syringe 2 g, 2 g, Intravenous, Once, Chong Canales MD partner violence:        Fear of current or ex partner: Not on file        Emotionally abused: Not on file        Physically abused: Not on file        Forced sexual activity: Not on file    Other Topics      Concerns:        Not on file    Social History Management: IV analgesics and Oral pain medication  Plan Comments: BS-284, will use Humalog SQ, recheck  Informed Consent Plan and Risks Discussed With:  Patient  Use of Blood Products Discussed With:  Patient  Blood Product Use Consented    Discussed plan

## 2020-05-15 ENCOUNTER — TELEPHONE (OUTPATIENT)
Dept: SURGERY | Facility: CLINIC | Age: 30
End: 2020-05-15

## 2020-05-15 NOTE — OPERATIVE REPORT
[facility]    PATIENT'S NAME: Montana Reddy   ATTENDING PHYSICIAN: Hailee Guillen MD   OPERATING PHYSICIAN: Hailee Guillen MD   PATIENT ACCOUNT#:   [de-identified]    LOCATION:  [place] [address]  MEDICAL RECORD #:   [id number]       DATE OF DANICA

## 2020-05-15 NOTE — TELEPHONE ENCOUNTER
PC to patient, appointment made for Tues 5-. Patient asking about taking a shower. No addl action required by provider.     NICKOLAS

## 2020-05-19 ENCOUNTER — OFFICE VISIT (OUTPATIENT)
Dept: SURGERY | Facility: CLINIC | Age: 30
End: 2020-05-19
Payer: COMMERCIAL

## 2020-05-19 VITALS — WEIGHT: 264 LBS | BODY MASS INDEX: 42.43 KG/M2 | HEIGHT: 66 IN

## 2020-05-19 DIAGNOSIS — Z98.890 POST-OPERATIVE STATE: Primary | ICD-10-CM

## 2020-05-19 PROCEDURE — 99024 POSTOP FOLLOW-UP VISIT: CPT | Performed by: SURGERY

## 2020-05-19 NOTE — PROGRESS NOTES
Postoperative Patient Follow-up      5/19/2020    HPI  No chief complaint on file. Isabella Baker is a 26year old female post wide excision bilateral axillary hidradenitis. Drainage of right axillary abscess. Wounds are clean dry and intact.   Sca

## 2020-05-26 ENCOUNTER — OFFICE VISIT (OUTPATIENT)
Dept: SURGERY | Facility: CLINIC | Age: 30
End: 2020-05-26
Payer: COMMERCIAL

## 2020-05-26 VITALS — WEIGHT: 264 LBS | BODY MASS INDEX: 42.43 KG/M2 | HEIGHT: 66 IN | TEMPERATURE: 98 F

## 2020-05-26 DIAGNOSIS — L76.34 POSTOPERATIVE SEROMA OF SKIN AFTER NON-DERMATOLOGIC PROCEDURE: Primary | ICD-10-CM

## 2020-05-26 PROCEDURE — 99024 POSTOP FOLLOW-UP VISIT: CPT | Performed by: SURGERY

## 2020-05-26 NOTE — PATIENT INSTRUCTIONS
Apply topical antibiotic to right axilla and cover with clean gauze every day for next week. Expect scant drainage.

## 2020-05-26 NOTE — PROGRESS NOTES
[facility] [address] [facility] [address] [facility]    Progress Note    Ardyth Heimlich Patient Status:  Outpatient    1990 MRN [id number]   [facility] [address] [facility] No att. providers found   [facility]

## 2020-09-01 ENCOUNTER — OFFICE VISIT (OUTPATIENT)
Dept: SURGERY | Facility: CLINIC | Age: 30
End: 2020-09-01
Payer: COMMERCIAL

## 2020-09-01 DIAGNOSIS — L73.2 HIDRADENITIS AXILLARIS: Primary | ICD-10-CM

## 2020-09-01 PROCEDURE — 99213 OFFICE O/P EST LOW 20 MIN: CPT | Performed by: SURGERY

## 2020-09-01 RX ORDER — CEPHALEXIN 500 MG/1
500 CAPSULE ORAL 2 TIMES DAILY
Qty: 14 CAPSULE | Refills: 0 | Status: SHIPPED | OUTPATIENT
Start: 2020-09-01 | End: 2020-09-08

## 2020-09-01 NOTE — PATIENT INSTRUCTIONS
Instructions given regarding deodorant use. Start Keflex 500 mg p.o. twice daily.   Follow-up as needed

## 2020-09-27 ENCOUNTER — HOSPITAL ENCOUNTER (EMERGENCY)
Facility: HOSPITAL | Age: 30
Discharge: HOME | End: 2020-09-27
Payer: COMMERCIAL

## 2020-09-27 ENCOUNTER — APPOINTMENT (OUTPATIENT)
Dept: GENERAL RADIOLOGY | Facility: HOSPITAL | Age: 30
End: 2020-09-27
Attending: NURSE PRACTITIONER
Payer: COMMERCIAL

## 2020-09-27 VITALS
WEIGHT: 260 LBS | SYSTOLIC BLOOD PRESSURE: 170 MMHG | OXYGEN SATURATION: 99 % | HEART RATE: 88 BPM | DIASTOLIC BLOOD PRESSURE: 98 MMHG | BODY MASS INDEX: 43.32 KG/M2 | RESPIRATION RATE: 22 BRPM | TEMPERATURE: 99 F | HEIGHT: 65 IN

## 2020-09-27 DIAGNOSIS — M54.9 MID BACK PAIN: Primary | ICD-10-CM

## 2020-09-27 LAB
ANION GAP SERPL CALC-SCNC: 10 MMOL/L (ref 0–18)
B-HCG UR QL: NEGATIVE
BASOPHILS # BLD AUTO: 0.05 X10(3) UL (ref 0–0.2)
BASOPHILS NFR BLD AUTO: 0.6 %
BUN BLD-MCNC: 20 MG/DL (ref 7–18)
BUN/CREAT SERPL: 12.3 (ref 10–20)
CALCIUM BLD-MCNC: 9.2 MG/DL (ref 8.5–10.1)
CHLORIDE SERPL-SCNC: 101 MMOL/L (ref 98–112)
CO2 SERPL-SCNC: 28 MMOL/L (ref 21–32)
CREAT BLD-MCNC: 1.62 MG/DL
D DIMER PPP FEU-MCNC: 0.27 UG/ML FEU (ref ?–0.5)
DEPRECATED RDW RBC AUTO: 37.3 FL (ref 35.1–46.3)
EOSINOPHIL # BLD AUTO: 0.19 X10(3) UL (ref 0–0.7)
EOSINOPHIL NFR BLD AUTO: 2.1 %
ERYTHROCYTE [DISTWIDTH] IN BLOOD BY AUTOMATED COUNT: 13 % (ref 11–15)
GLUCOSE BLD-MCNC: 173 MG/DL (ref 70–99)
HCT VFR BLD AUTO: 36.7 %
HGB BLD-MCNC: 12.7 G/DL
IMM GRANULOCYTES # BLD AUTO: 0.04 X10(3) UL (ref 0–1)
IMM GRANULOCYTES NFR BLD: 0.4 %
LYMPHOCYTES # BLD AUTO: 2.65 X10(3) UL (ref 1–4)
LYMPHOCYTES NFR BLD AUTO: 29.2 %
MCH RBC QN AUTO: 27.9 PG (ref 26–34)
MCHC RBC AUTO-ENTMCNC: 34.6 G/DL (ref 31–37)
MCV RBC AUTO: 80.5 FL
MONOCYTES # BLD AUTO: 0.54 X10(3) UL (ref 0.1–1)
MONOCYTES NFR BLD AUTO: 6 %
NEUTROPHILS # BLD AUTO: 5.6 X10 (3) UL (ref 1.5–7.7)
NEUTROPHILS # BLD AUTO: 5.6 X10(3) UL (ref 1.5–7.7)
NEUTROPHILS NFR BLD AUTO: 61.7 %
OSMOLALITY SERPL CALC.SUM OF ELEC: 295 MOSM/KG (ref 275–295)
PLATELET # BLD AUTO: 370 10(3)UL (ref 150–450)
POTASSIUM SERPL-SCNC: 3.2 MMOL/L (ref 3.5–5.1)
RBC # BLD AUTO: 4.56 X10(6)UL
SODIUM SERPL-SCNC: 139 MMOL/L (ref 136–145)
TROPONIN I SERPL-MCNC: <0.045 NG/ML (ref ?–0.04)
WBC # BLD AUTO: 9.1 X10(3) UL (ref 4–11)

## 2020-09-27 PROCEDURE — 84484 ASSAY OF TROPONIN QUANT: CPT | Performed by: NURSE PRACTITIONER

## 2020-09-27 PROCEDURE — 93005 ELECTROCARDIOGRAM TRACING: CPT

## 2020-09-27 PROCEDURE — 36415 COLL VENOUS BLD VENIPUNCTURE: CPT

## 2020-09-27 PROCEDURE — 99284 EMERGENCY DEPT VISIT MOD MDM: CPT

## 2020-09-27 PROCEDURE — 93010 ELECTROCARDIOGRAM REPORT: CPT | Performed by: NURSE PRACTITIONER

## 2020-09-27 PROCEDURE — 80048 BASIC METABOLIC PNL TOTAL CA: CPT | Performed by: NURSE PRACTITIONER

## 2020-09-27 PROCEDURE — 81025 URINE PREGNANCY TEST: CPT

## 2020-09-27 PROCEDURE — 85379 FIBRIN DEGRADATION QUANT: CPT | Performed by: NURSE PRACTITIONER

## 2020-09-27 PROCEDURE — 71045 X-RAY EXAM CHEST 1 VIEW: CPT | Performed by: NURSE PRACTITIONER

## 2020-09-27 PROCEDURE — 85025 COMPLETE CBC W/AUTO DIFF WBC: CPT | Performed by: NURSE PRACTITIONER

## 2020-09-27 RX ORDER — HYDROCODONE BITARTRATE AND ACETAMINOPHEN 5; 325 MG/1; MG/1
1 TABLET ORAL ONCE
Status: COMPLETED | OUTPATIENT
Start: 2020-09-27 | End: 2020-09-27

## 2020-09-27 RX ORDER — HYDROCODONE BITARTRATE AND ACETAMINOPHEN 5; 325 MG/1; MG/1
1-2 TABLET ORAL EVERY 6 HOURS PRN
Qty: 10 TABLET | Refills: 0 | Status: SHIPPED | OUTPATIENT
Start: 2020-09-27 | End: 2020-10-04

## 2020-09-27 NOTE — ED PROVIDER NOTES
Patient Seen in: [facility] AND [facility] Emergency Department      History   Patient presents with:  Back Pain    Stated Complaint: Back Pain    31yo/f with hx of CKDIII, HTN, DM, Anxiety, derpession HLD, PCOS reports to the ED with atraumatic, bilateral upper Constitutional:       General: She is not in acute distress. Appearance: She is well-developed. HENT:      Head: Normocephalic and atraumatic.    Eyes:      Conjunctiva/sclera: Conjunctivae normal.      Pupils: Pupils are equal, round, and reactive orders.    RAINBOW DRAW BLUE   RAINBOW DRAW LAVENDER   RAINBOW DRAW LIGHT GREEN   RAINBOW DRAW GOLD   CBC W/ DIFFERENTIAL     EKG    NSR no kimberly no ectopy normal axis           COMPARISON: [facility], XR CHEST AP PORTABLE (CPT=71045), 11/08/2 tablets by mouth every 6 (six) hours as needed for Pain. Qty: 10 tablet Refills: 0    !! - Potential duplicate medications found. Please discuss with provider.

## 2020-09-27 NOTE — ED NOTES
PATIENT APPROVED FOR DISCHARGE PER ER PROVIDER. patient GIVEN VERBAL AND WRITTEN DISCHARGE INSTRUCTIONS. GIVEN INSTRUCTIONS ON RX X 1, FOLLOW UP WITH pp. VERBALIZES UNDERSTANDING AND SIGNED DISCHARGE INSTRUCTIONS.      PATIENT AOX3 OUT OF ED with steady gai

## 2020-09-27 NOTE — ED NOTES
Patient presents with:  Back Pain    BP (!) 176/110   Pulse 96   Temp 98.7 °F (37.1 °C) (Oral)   Resp 22   Ht 165.1 cm (5' 5\")   Wt 117.9 kg   LMP 06/09/2020 (Approximate)   SpO2 97%   BMI 43.27 kg/m²     PATIENT AOX3 TO ED VIA SELF PATIENT CO OF MID BACK

## 2020-09-27 NOTE — ED INITIAL ASSESSMENT (HPI)
Patient c/o atraumatic upper back pain for 5 days. States she noticed the pain after flying in from [place]. Denies cough, sob.

## 2020-12-14 NOTE — PROGRESS NOTES
Surgery progress note    Patient complains of recurrent boils under both axilla. Currently they are limited on the left outside the area of excision. On the right they are within the lateral aspect. She complains of excessive moisture in this area.   I o Doxepin Counseling:  Patient advised that the medication is sedating and not to drive a car after taking this medication. Patient informed of potential adverse effects including but not limited to dry mouth, urinary retention, and blurry vision.  The patient verbalized understanding of the proper use and possible adverse effects of doxepin.  All of the patient's questions and concerns were addressed.

## 2021-01-05 ENCOUNTER — HOSPITAL ENCOUNTER (EMERGENCY)
Facility: HOSPITAL | Age: 31
Discharge: HOME | End: 2021-01-05
Attending: EMERGENCY MEDICINE
Payer: COMMERCIAL

## 2021-01-05 VITALS
DIASTOLIC BLOOD PRESSURE: 72 MMHG | SYSTOLIC BLOOD PRESSURE: 113 MMHG | WEIGHT: 260 LBS | TEMPERATURE: 97 F | HEIGHT: 65 IN | OXYGEN SATURATION: 97 % | RESPIRATION RATE: 20 BRPM | BODY MASS INDEX: 43.32 KG/M2 | HEART RATE: 87 BPM

## 2021-01-05 DIAGNOSIS — K62.5 RECTAL BLEEDING: Primary | ICD-10-CM

## 2021-01-05 PROCEDURE — 82272 OCCULT BLD FECES 1-3 TESTS: CPT

## 2021-01-05 PROCEDURE — 99283 EMERGENCY DEPT VISIT LOW MDM: CPT

## 2021-01-05 RX ORDER — EMPAGLIFLOZIN 25 MG/1
25 TABLET, FILM COATED ORAL EVERY MORNING
COMMUNITY

## 2021-01-05 NOTE — ED PROVIDER NOTES
Patient Seen in: [facility] AND [facility] Emergency Department      History   Patient presents with:  Anal Problem  GI Bleeding    Stated Complaint: Eval-G / Bleeding    HPI/Subjective:   HPI    Patient is a 65-year-old female presents with rectal bleeding.   Sh SpO2 99 %   O2 Device None (Room air)       Current:/72   Pulse 95   Temp 97.5 °F (36.4 °C) (Temporal)   Resp 18   Ht 165.1 cm (5' 5\")   Wt 117.9 kg   LMP 12/18/2020 (Exact Date)   SpO2 99%   BMI 43.27 kg/m²         Physical Exam    GENERAL: No ac

## 2021-01-05 NOTE — ED INITIAL ASSESSMENT (HPI)
Pt c/o of painful rectal bleeding when wiping while having a bowel movement x a few months. States it has been happening more frequently. Has been seen at [address] x 2 for this issue.  States they did a rectal exam and did not see any blood on exam. To

## 2021-01-05 NOTE — ED NOTES
Patient is here with complain of bleeding with wiping  x 2 months. Patient denied hemorrhoids. Patient states that today she noticed more blood and states that it was dripping from her rectal area.

## 2021-02-09 ENCOUNTER — TELEPHONE (OUTPATIENT)
Dept: SURGERY | Facility: CLINIC | Age: 31
End: 2021-02-09

## 2021-02-09 NOTE — TELEPHONE ENCOUNTER
Spoke to patient she states that she has been developing boils in her axilla. I advised her that hidradenitis can be a chronic condition, and I scheduled her an apt to RTC for evaluation with Dr. Mikhail Man.  Denies fever/chills & I advised her to apply warm com

## 2021-02-09 NOTE — TELEPHONE ENCOUNTER
Per pt she still feels like she has not fully healed from surgery last year, requesting to speak to RN. Please call thank you.

## 2021-02-10 ENCOUNTER — OFFICE VISIT (OUTPATIENT)
Dept: SURGERY | Facility: CLINIC | Age: 31
End: 2021-02-10
Payer: COMMERCIAL

## 2021-02-10 VITALS — BODY MASS INDEX: 42.65 KG/M2 | WEIGHT: 256 LBS | HEIGHT: 65 IN

## 2021-02-10 DIAGNOSIS — L73.2 HIDRADENITIS AXILLARIS: Primary | ICD-10-CM

## 2021-02-10 PROCEDURE — 99213 OFFICE O/P EST LOW 20 MIN: CPT | Performed by: SURGERY

## 2021-02-10 PROCEDURE — 3008F BODY MASS INDEX DOCD: CPT | Performed by: SURGERY

## 2021-02-10 NOTE — PROGRESS NOTES
[facility], [address], [facility] SURGERY    Progress Note    Valerie Salcedo Patient Status:  Specimen    1990 MRN [id number]   Location [facility] Attending No att. providers found   [facility]osp [facility]

## 2021-02-11 ENCOUNTER — TELEPHONE (OUTPATIENT)
Dept: SURGERY | Facility: CLINIC | Age: 31
End: 2021-02-11

## 2021-02-11 NOTE — TELEPHONE ENCOUNTER
Contact with patient. She states she is getting a hard time at work as she works for [employer] and she is going back. She would like to apply for [place]. Patient will bring forms in to the office. Patient does not need a letter.       NICKOLAS

## 2021-02-11 NOTE — TELEPHONE ENCOUNTER
Pt asking for note to be sent to Veronica for her work stating that pt is having surgery on 2/18 and how long she needs to be out of work for

## 2021-02-15 ENCOUNTER — TELEPHONE (OUTPATIENT)
Dept: SURGERY | Facility: CLINIC | Age: 31
End: 2021-02-15

## 2021-02-15 NOTE — TELEPHONE ENCOUNTER
Per pt is needing to speak to Rn. States was supposed to come in to clinic today, but not given time.  Please advise

## 2021-02-16 ENCOUNTER — LABORATORY ENCOUNTER (OUTPATIENT)
Dept: LAB | Age: 31
End: 2021-02-16
Attending: SURGERY
Payer: COMMERCIAL

## 2021-02-16 ENCOUNTER — LAB ENCOUNTER (OUTPATIENT)
Dept: LAB | Age: 31
End: 2021-02-16
Attending: SURGERY
Payer: COMMERCIAL

## 2021-02-16 DIAGNOSIS — Z01.818 PREOP TESTING: ICD-10-CM

## 2021-02-16 DIAGNOSIS — L73.2 HIDRADENITIS AXILLARIS: ICD-10-CM

## 2021-02-16 LAB
ALBUMIN SERPL-MCNC: 3.7 G/DL (ref 3.4–5)
ALBUMIN/GLOB SERPL: 0.8 {RATIO} (ref 1–2)
ALP LIVER SERPL-CCNC: 74 U/L
ALT SERPL-CCNC: 29 U/L
ANION GAP SERPL CALC-SCNC: 10 MMOL/L (ref 0–18)
AST SERPL-CCNC: 18 U/L (ref 15–37)
BASOPHILS # BLD AUTO: 0.07 X10(3) UL (ref 0–0.2)
BASOPHILS NFR BLD AUTO: 0.8 %
BILIRUB SERPL-MCNC: 0.4 MG/DL (ref 0.1–2)
BUN BLD-MCNC: 24 MG/DL (ref 7–18)
BUN/CREAT SERPL: 15.2 (ref 10–20)
CALCIUM BLD-MCNC: 10.3 MG/DL (ref 8.5–10.1)
CHLORIDE SERPL-SCNC: 100 MMOL/L (ref 98–112)
CO2 SERPL-SCNC: 29 MMOL/L (ref 21–32)
CREAT BLD-MCNC: 1.58 MG/DL
DEPRECATED RDW RBC AUTO: 37.8 FL (ref 35.1–46.3)
EOSINOPHIL # BLD AUTO: 0.21 X10(3) UL (ref 0–0.7)
EOSINOPHIL NFR BLD AUTO: 2.3 %
ERYTHROCYTE [DISTWIDTH] IN BLOOD BY AUTOMATED COUNT: 13.2 % (ref 11–15)
GLOBULIN PLAS-MCNC: 4.8 G/DL (ref 2.8–4.4)
GLUCOSE BLD-MCNC: 238 MG/DL (ref 70–99)
HCT VFR BLD AUTO: 41 %
HGB BLD-MCNC: 13.3 G/DL
IMM GRANULOCYTES # BLD AUTO: 0.06 X10(3) UL (ref 0–1)
IMM GRANULOCYTES NFR BLD: 0.6 %
LYMPHOCYTES # BLD AUTO: 2.56 X10(3) UL (ref 1–4)
LYMPHOCYTES NFR BLD AUTO: 27.6 %
M PROTEIN MFR SERPL ELPH: 8.5 G/DL (ref 6.4–8.2)
MCH RBC QN AUTO: 26.3 PG (ref 26–34)
MCHC RBC AUTO-ENTMCNC: 32.4 G/DL (ref 31–37)
MCV RBC AUTO: 81.2 FL
MONOCYTES # BLD AUTO: 0.36 X10(3) UL (ref 0.1–1)
MONOCYTES NFR BLD AUTO: 3.9 %
NEUTROPHILS # BLD AUTO: 6 X10 (3) UL (ref 1.5–7.7)
NEUTROPHILS # BLD AUTO: 6 X10(3) UL (ref 1.5–7.7)
NEUTROPHILS NFR BLD AUTO: 64.8 %
OSMOLALITY SERPL CALC.SUM OF ELEC: 300 MOSM/KG (ref 275–295)
PATIENT FASTING Y/N/NP: YES
PLATELET # BLD AUTO: 411 10(3)UL (ref 150–450)
POTASSIUM SERPL-SCNC: 3.3 MMOL/L (ref 3.5–5.1)
RBC # BLD AUTO: 5.05 X10(6)UL
SARS-COV-2 RNA RESP QL NAA+PROBE: NOT DETECTED
SODIUM SERPL-SCNC: 139 MMOL/L (ref 136–145)
WBC # BLD AUTO: 9.3 X10(3) UL (ref 4–11)

## 2021-02-16 PROCEDURE — 85025 COMPLETE CBC W/AUTO DIFF WBC: CPT

## 2021-02-16 PROCEDURE — 36415 COLL VENOUS BLD VENIPUNCTURE: CPT

## 2021-02-16 PROCEDURE — 80053 COMPREHEN METABOLIC PANEL: CPT

## 2021-02-17 ENCOUNTER — TELEPHONE (OUTPATIENT)
Dept: SURGERY | Facility: CLINIC | Age: 31
End: 2021-02-17

## 2021-02-18 ENCOUNTER — HOSPITAL ENCOUNTER (OUTPATIENT)
Facility: HOSPITAL | Age: 31
Setting detail: HOSPITAL OUTPATIENT SURGERY
Discharge: HOME | End: 2021-02-18
Attending: SURGERY | Admitting: SURGERY
Payer: COMMERCIAL

## 2021-02-18 ENCOUNTER — ANESTHESIA EVENT (OUTPATIENT)
Dept: SURGERY | Facility: HOSPITAL | Age: 31
End: 2021-02-18
Payer: COMMERCIAL

## 2021-02-18 ENCOUNTER — ANESTHESIA (OUTPATIENT)
Dept: SURGERY | Facility: HOSPITAL | Age: 31
End: 2021-02-18
Payer: COMMERCIAL

## 2021-02-18 VITALS
HEART RATE: 84 BPM | HEIGHT: 65 IN | BODY MASS INDEX: 42.9 KG/M2 | SYSTOLIC BLOOD PRESSURE: 134 MMHG | DIASTOLIC BLOOD PRESSURE: 75 MMHG | TEMPERATURE: 98 F | WEIGHT: 257.5 LBS | OXYGEN SATURATION: 94 % | RESPIRATION RATE: 18 BRPM

## 2021-02-18 DIAGNOSIS — L73.2 HIDRADENITIS AXILLARIS: ICD-10-CM

## 2021-02-18 DIAGNOSIS — Z01.818 PREOP TESTING: Primary | ICD-10-CM

## 2021-02-18 LAB
B-HCG UR QL: NEGATIVE
GLUCOSE BLDC GLUCOMTR-MCNC: 161 MG/DL (ref 70–99)
GLUCOSE BLDC GLUCOMTR-MCNC: 162 MG/DL (ref 70–99)

## 2021-02-18 PROCEDURE — 0JBD0ZZ EXCISION OF RIGHT UPPER ARM SUBCUTANEOUS TISSUE AND FASCIA, OPEN APPROACH: ICD-10-PCS | Performed by: SURGERY

## 2021-02-18 PROCEDURE — 11450 EXC SKN HDRDNT AX SMPL/NTRM: CPT | Performed by: SURGERY

## 2021-02-18 RX ORDER — MORPHINE SULFATE 10 MG/ML
6 INJECTION, SOLUTION INTRAMUSCULAR; INTRAVENOUS EVERY 10 MIN PRN
Status: DISCONTINUED | OUTPATIENT
Start: 2021-02-18 | End: 2021-02-18

## 2021-02-18 RX ORDER — GLYCOPYRROLATE 0.2 MG/ML
INJECTION, SOLUTION INTRAMUSCULAR; INTRAVENOUS AS NEEDED
Status: DISCONTINUED | OUTPATIENT
Start: 2021-02-18 | End: 2021-02-18 | Stop reason: SURG

## 2021-02-18 RX ORDER — METOPROLOL TARTRATE 5 MG/5ML
2.5 INJECTION INTRAVENOUS ONCE
Status: DISCONTINUED | OUTPATIENT
Start: 2021-02-18 | End: 2021-02-18

## 2021-02-18 RX ORDER — ONDANSETRON 2 MG/ML
INJECTION INTRAMUSCULAR; INTRAVENOUS AS NEEDED
Status: DISCONTINUED | OUTPATIENT
Start: 2021-02-18 | End: 2021-02-18 | Stop reason: SURG

## 2021-02-18 RX ORDER — CEFAZOLIN SODIUM/WATER 2 G/20 ML
2 SYRINGE (ML) INTRAVENOUS ONCE
Status: COMPLETED | OUTPATIENT
Start: 2021-02-18 | End: 2021-02-18

## 2021-02-18 RX ORDER — ONDANSETRON 2 MG/ML
4 INJECTION INTRAMUSCULAR; INTRAVENOUS ONCE AS NEEDED
Status: DISCONTINUED | OUTPATIENT
Start: 2021-02-18 | End: 2021-02-18

## 2021-02-18 RX ORDER — LIDOCAINE HYDROCHLORIDE 10 MG/ML
INJECTION, SOLUTION EPIDURAL; INFILTRATION; INTRACAUDAL; PERINEURAL AS NEEDED
Status: DISCONTINUED | OUTPATIENT
Start: 2021-02-18 | End: 2021-02-18 | Stop reason: SURG

## 2021-02-18 RX ORDER — HYDROCODONE BITARTRATE AND ACETAMINOPHEN 5; 325 MG/1; MG/1
1 TABLET ORAL AS NEEDED
Status: DISCONTINUED | OUTPATIENT
Start: 2021-02-18 | End: 2021-02-18

## 2021-02-18 RX ORDER — NALOXONE HYDROCHLORIDE 0.4 MG/ML
80 INJECTION, SOLUTION INTRAMUSCULAR; INTRAVENOUS; SUBCUTANEOUS AS NEEDED
Status: DISCONTINUED | OUTPATIENT
Start: 2021-02-18 | End: 2021-02-18

## 2021-02-18 RX ORDER — MORPHINE SULFATE 4 MG/ML
2 INJECTION, SOLUTION INTRAMUSCULAR; INTRAVENOUS EVERY 10 MIN PRN
Status: DISCONTINUED | OUTPATIENT
Start: 2021-02-18 | End: 2021-02-18

## 2021-02-18 RX ORDER — ROCURONIUM BROMIDE 10 MG/ML
INJECTION, SOLUTION INTRAVENOUS AS NEEDED
Status: DISCONTINUED | OUTPATIENT
Start: 2021-02-18 | End: 2021-02-18 | Stop reason: SURG

## 2021-02-18 RX ORDER — IBUPROFEN 600 MG/1
600 TABLET ORAL EVERY 6 HOURS PRN
Qty: 15 TABLET | Refills: 1 | Status: SHIPPED | OUTPATIENT
Start: 2021-02-18 | End: 2021-02-25

## 2021-02-18 RX ORDER — HYDROCODONE BITARTRATE AND ACETAMINOPHEN 5; 325 MG/1; MG/1
1 TABLET ORAL EVERY 6 HOURS PRN
Qty: 20 TABLET | Refills: 0 | Status: SHIPPED | OUTPATIENT
Start: 2021-02-18

## 2021-02-18 RX ORDER — PROCHLORPERAZINE EDISYLATE 5 MG/ML
5 INJECTION INTRAMUSCULAR; INTRAVENOUS ONCE AS NEEDED
Status: DISCONTINUED | OUTPATIENT
Start: 2021-02-18 | End: 2021-02-18

## 2021-02-18 RX ORDER — FAMOTIDINE 20 MG/1
20 TABLET ORAL ONCE
Status: COMPLETED | OUTPATIENT
Start: 2021-02-18 | End: 2021-02-18

## 2021-02-18 RX ORDER — BUPIVACAINE HYDROCHLORIDE AND EPINEPHRINE 2.5; 5 MG/ML; UG/ML
INJECTION, SOLUTION INFILTRATION; PERINEURAL AS NEEDED
Status: DISCONTINUED | OUTPATIENT
Start: 2021-02-18 | End: 2021-02-18 | Stop reason: HOSPADM

## 2021-02-18 RX ORDER — SODIUM CHLORIDE, SODIUM LACTATE, POTASSIUM CHLORIDE, CALCIUM CHLORIDE 600; 310; 30; 20 MG/100ML; MG/100ML; MG/100ML; MG/100ML
INJECTION, SOLUTION INTRAVENOUS CONTINUOUS
Status: DISCONTINUED | OUTPATIENT
Start: 2021-02-18 | End: 2021-02-18

## 2021-02-18 RX ORDER — ACETAMINOPHEN 500 MG
1000 TABLET ORAL ONCE
Status: COMPLETED | OUTPATIENT
Start: 2021-02-18 | End: 2021-02-18

## 2021-02-18 RX ORDER — HYDROMORPHONE HYDROCHLORIDE 1 MG/ML
0.6 INJECTION, SOLUTION INTRAMUSCULAR; INTRAVENOUS; SUBCUTANEOUS EVERY 5 MIN PRN
Status: DISCONTINUED | OUTPATIENT
Start: 2021-02-18 | End: 2021-02-18

## 2021-02-18 RX ORDER — HYDROMORPHONE HYDROCHLORIDE 1 MG/ML
0.2 INJECTION, SOLUTION INTRAMUSCULAR; INTRAVENOUS; SUBCUTANEOUS EVERY 5 MIN PRN
Status: DISCONTINUED | OUTPATIENT
Start: 2021-02-18 | End: 2021-02-18

## 2021-02-18 RX ORDER — HYDROMORPHONE HYDROCHLORIDE 1 MG/ML
0.4 INJECTION, SOLUTION INTRAMUSCULAR; INTRAVENOUS; SUBCUTANEOUS EVERY 5 MIN PRN
Status: DISCONTINUED | OUTPATIENT
Start: 2021-02-18 | End: 2021-02-18

## 2021-02-18 RX ORDER — METOCLOPRAMIDE 10 MG/1
10 TABLET ORAL ONCE
Status: COMPLETED | OUTPATIENT
Start: 2021-02-18 | End: 2021-02-18

## 2021-02-18 RX ORDER — HYDROCODONE BITARTRATE AND ACETAMINOPHEN 5; 325 MG/1; MG/1
2 TABLET ORAL AS NEEDED
Status: DISCONTINUED | OUTPATIENT
Start: 2021-02-18 | End: 2021-02-18

## 2021-02-18 RX ORDER — DEXTROSE MONOHYDRATE 25 G/50ML
50 INJECTION, SOLUTION INTRAVENOUS
Status: DISCONTINUED | OUTPATIENT
Start: 2021-02-18 | End: 2021-02-18

## 2021-02-18 RX ORDER — MORPHINE SULFATE 4 MG/ML
4 INJECTION, SOLUTION INTRAMUSCULAR; INTRAVENOUS EVERY 10 MIN PRN
Status: DISCONTINUED | OUTPATIENT
Start: 2021-02-18 | End: 2021-02-18

## 2021-02-18 RX ORDER — EPHEDRINE SULFATE 50 MG/ML
INJECTION, SOLUTION INTRAVENOUS AS NEEDED
Status: DISCONTINUED | OUTPATIENT
Start: 2021-02-18 | End: 2021-02-18 | Stop reason: SURG

## 2021-02-18 RX ORDER — MIDAZOLAM HYDROCHLORIDE 1 MG/ML
INJECTION INTRAMUSCULAR; INTRAVENOUS AS NEEDED
Status: DISCONTINUED | OUTPATIENT
Start: 2021-02-18 | End: 2021-02-18 | Stop reason: SURG

## 2021-02-18 RX ADMIN — CEFAZOLIN SODIUM/WATER 2 G: 2 G/20 ML SYRINGE (ML) INTRAVENOUS at 14:11:00

## 2021-02-18 RX ADMIN — ONDANSETRON 4 MG: 2 INJECTION INTRAMUSCULAR; INTRAVENOUS at 14:30:00

## 2021-02-18 RX ADMIN — SODIUM CHLORIDE, SODIUM LACTATE, POTASSIUM CHLORIDE, CALCIUM CHLORIDE: 600; 310; 30; 20 INJECTION, SOLUTION INTRAVENOUS at 14:39:00

## 2021-02-18 RX ADMIN — GLYCOPYRROLATE 0.1 MG: 0.2 INJECTION, SOLUTION INTRAMUSCULAR; INTRAVENOUS at 14:07:00

## 2021-02-18 RX ADMIN — EPHEDRINE SULFATE 5 MG: 50 INJECTION, SOLUTION INTRAVENOUS at 14:30:00

## 2021-02-18 RX ADMIN — LIDOCAINE HYDROCHLORIDE 50 MG: 10 INJECTION, SOLUTION EPIDURAL; INFILTRATION; INTRACAUDAL; PERINEURAL at 14:15:00

## 2021-02-18 RX ADMIN — ROCURONIUM BROMIDE 5 MG: 10 INJECTION, SOLUTION INTRAVENOUS at 14:15:00

## 2021-02-18 RX ADMIN — MIDAZOLAM HYDROCHLORIDE 2 MG: 1 INJECTION INTRAMUSCULAR; INTRAVENOUS at 14:07:00

## 2021-02-18 RX ADMIN — GLYCOPYRROLATE 0.1 MG: 0.2 INJECTION, SOLUTION INTRAMUSCULAR; INTRAVENOUS at 14:14:00

## 2021-02-18 NOTE — ANESTHESIA PROCEDURE NOTES
Airway  Urgency: Elective      General Information and Staff    Patient location during procedure: OR  Anesthesiologist: Eulis Boast, MD  Resident/CRNA: Ace Pap, CRNA  Performed: CRNA     Indications and Patient Condition  Indications for ai

## 2021-02-18 NOTE — ANESTHESIA POSTPROCEDURE EVALUATION
Patient: Bhavesh Godfrey    Procedure Summary     Date: 02/18/21 Room / Location: [facility] MAIN OR 02 / [address] [facility] MAIN OR    Anesthesia Start: 6974 Anesthesia Stop: 0097    Procedure: AXILLARY HIDRADENITIS EXCISION (Right Axilla) Diagnosis:       Hidradenitis axilla

## 2021-02-18 NOTE — INTERVAL H&P NOTE
Pre-op Diagnosis: Hidradenitis axillaris [L73.2]    The above referenced H&P was reviewed by Hermes Spicer MD on 2/18/2021, the patient was examined and no significant changes have occurred in the patient's condition since the H&P was performed.   I discuss

## 2021-02-18 NOTE — H&P
Masood Ruiz  2/10/2021 10:15 AM   Office Visit  MRN:  [id number]  Description: 31year old female Provider: Gurmeet Flanagan MD Department: [facility]   Scanning Cover Sheet    Click to print Nicolas Circe 966 for scanning   Offic 09/27/2020     HCT 36.7 09/27/2020     .0 09/27/2020     CREATSERUM 1.62 (H) 09/27/2020     BUN 20 (H) 09/27/2020      09/27/2020     K 3.2 (L) 09/27/2020      09/27/2020     CO2 28.0 09/27/2020      (H) 09/27/2020     CA 9.2 09/2

## 2021-02-18 NOTE — ANESTHESIA PREPROCEDURE EVALUATION
Anesthesia PreOp Note    HPI:     Gunnar Severe is a 31year old female who presents for preoperative consultation requested by: Marisol Ponce MD    Date of Surgery: 2/18/2021    Procedure(s):  AXILLARY HIDRADENITIS EXCISION  Indication: Hidradenitis a 1200    •  carvedilol 6.25 MG Oral Tab, Take 6.25 mg by mouth 2 (two) times daily. , Disp: , Rfl: , 2/17/2021 at 2200    •  EPINEPHrine 0.3 MG/0.3ML Injection Solution Auto-injector, TAKE 1 AUTO PEN AS NEEDED BY INJECTION AS DIRECTED, Disp: , Rfl:     •  NI Throat    Family History   Problem Relation Age of Onset   • Hypertension Father    • Lipids Father    • Obesity Father    • Diabetes Mother    • Hypertension Mother    • Lipids Mother    • Obesity Mother    • Psychiatric Sister    • Psychiatric Brother 411.0 02/16/2021    URINEPREG Negative 02/18/2021     Lab Results   Component Value Date     02/16/2021    K 3.3 (L) 02/16/2021     02/16/2021    CO2 29.0 02/16/2021    BUN 24 (H) 02/16/2021    CREATSERUM 1.58 (H) 02/16/2021     (H) 02/1 Baltazar LAGUNAS  2/18/2021 12:52 PM

## 2021-02-19 ENCOUNTER — TELEPHONE (OUTPATIENT)
Dept: SURGERY | Facility: CLINIC | Age: 31
End: 2021-02-19

## 2021-02-19 NOTE — TELEPHONE ENCOUNTER
Pt is unsure if she is needing a 1 or 2 week post op. Pt has questions in regards to changing dressing.  Please advise

## 2021-02-19 NOTE — OPERATIVE REPORT
[facility]    PATIENT'S NAME: Shawnee Corbin   ATTENDING PHYSICIAN: Albino Pradhan MD   OPERATING PHYSICIAN: Albino Pradhan MD   PATIENT ACCOUNT#:   [id number]    LOCATION:  [place] [address]  MEDICAL RECORD #:   [id number]       DATE OF DANICA

## 2021-02-24 ENCOUNTER — OFFICE VISIT (OUTPATIENT)
Dept: SURGERY | Facility: CLINIC | Age: 31
End: 2021-02-24
Payer: COMMERCIAL

## 2021-02-24 VITALS — TEMPERATURE: 98 F

## 2021-02-24 DIAGNOSIS — Z98.890 POST-OPERATIVE STATE: Primary | ICD-10-CM

## 2021-02-24 PROCEDURE — 99024 POSTOP FOLLOW-UP VISIT: CPT | Performed by: SURGERY

## 2021-02-24 NOTE — PROGRESS NOTES
Postoperative Patient Follow-up      2/24/2021    HPI  Post excision of of axillary hidradenitis    Magui Mei is a 31year old female postop visit #1 after right axillary hidradenitis excision      Exam  Wound is clean dry intact      Assessment/Lorrie

## 2021-03-04 ENCOUNTER — TELEPHONE (OUTPATIENT)
Dept: SURGERY | Facility: CLINIC | Age: 31
End: 2021-03-04

## 2021-03-04 NOTE — TELEPHONE ENCOUNTER
Patient states she wants to see Dr. Noemi Rasmussen. Appointment given for 3/9/2021 at the [facility] office.

## 2021-03-04 NOTE — TELEPHONE ENCOUNTER
Dr. Noni Rodrigez - Had surgery on 2/18/2021. Noticed a hole at the surgical site with a white/yellow substance draining. Pain down to elbow. Incision has a large lump. No fevers. Please advise.

## 2021-03-09 ENCOUNTER — OFFICE VISIT (OUTPATIENT)
Dept: SURGERY | Facility: CLINIC | Age: 31
End: 2021-03-09
Payer: COMMERCIAL

## 2021-03-09 DIAGNOSIS — T14.8XXA OPEN WOUND: Primary | ICD-10-CM

## 2021-03-09 PROCEDURE — 99024 POSTOP FOLLOW-UP VISIT: CPT | Performed by: SURGERY

## 2021-03-09 NOTE — PROGRESS NOTES
Surgery progress note    Patient presents for second visit after excision of recurrent axillary hidradenitis. She complains the wound has open and is draining. Exam-  There is some induration within the incision but overall looks pretty healthy.   Mild

## 2021-03-12 DIAGNOSIS — Z23 NEED FOR VACCINATION: ICD-10-CM

## 2021-12-03 ENCOUNTER — HOSPITAL ENCOUNTER (EMERGENCY)
Facility: HOSPITAL | Age: 31
Discharge: LEFT BEFORE DISCHARGE | End: 2021-12-03
Payer: COMMERCIAL

## 2021-12-03 VITALS
HEIGHT: 65 IN | WEIGHT: 264 LBS | DIASTOLIC BLOOD PRESSURE: 93 MMHG | TEMPERATURE: 98 F | OXYGEN SATURATION: 97 % | SYSTOLIC BLOOD PRESSURE: 167 MMHG | HEART RATE: 95 BPM | RESPIRATION RATE: 18 BRPM | BODY MASS INDEX: 43.99 KG/M2

## 2021-12-03 PROCEDURE — 93005 ELECTROCARDIOGRAM TRACING: CPT

## 2021-12-03 PROCEDURE — 84484 ASSAY OF TROPONIN QUANT: CPT

## 2021-12-03 PROCEDURE — 93010 ELECTROCARDIOGRAM REPORT: CPT | Performed by: INTERNAL MEDICINE

## 2021-12-03 PROCEDURE — 85025 COMPLETE CBC W/AUTO DIFF WBC: CPT

## 2021-12-03 PROCEDURE — 80048 BASIC METABOLIC PNL TOTAL CA: CPT

## 2021-12-03 NOTE — ED INITIAL ASSESSMENT (HPI)
Pt c/o CP a few days ago, swelling in the hands and legs that started a week ago. Pt also reports CP when laying down since having COVID in September this year.

## 2022-02-21 ENCOUNTER — APPOINTMENT (OUTPATIENT)
Dept: CT IMAGING | Facility: HOSPITAL | Age: 32
DRG: 682 | End: 2022-02-21
Attending: INTERNAL MEDICINE
Payer: COMMERCIAL

## 2022-02-21 ENCOUNTER — APPOINTMENT (OUTPATIENT)
Dept: GENERAL RADIOLOGY | Facility: HOSPITAL | Age: 32
DRG: 682 | End: 2022-02-21
Attending: STUDENT IN AN ORGANIZED HEALTH CARE EDUCATION/TRAINING PROGRAM
Payer: COMMERCIAL

## 2022-02-21 ENCOUNTER — HOSPITAL ENCOUNTER (INPATIENT)
Facility: HOSPITAL | Age: 32
LOS: 3 days | Discharge: HOME | DRG: 682 | End: 2022-02-24
Attending: STUDENT IN AN ORGANIZED HEALTH CARE EDUCATION/TRAINING PROGRAM | Admitting: INTERNAL MEDICINE
Payer: COMMERCIAL

## 2022-02-21 DIAGNOSIS — I21.4 NSTEMI (NON-ST ELEVATED MYOCARDIAL INFARCTION) (HCC): ICD-10-CM

## 2022-02-21 DIAGNOSIS — I10 HYPERTENSION, UNSPECIFIED TYPE: ICD-10-CM

## 2022-02-21 DIAGNOSIS — N17.9 AKI (ACUTE KIDNEY INJURY) (HCC): Primary | ICD-10-CM

## 2022-02-21 DIAGNOSIS — E87.6 HYPOKALEMIA: ICD-10-CM

## 2022-02-21 LAB
ANION GAP SERPL CALC-SCNC: 7 MMOL/L (ref 0–18)
B-HCG UR QL: NEGATIVE
BASOPHILS # BLD AUTO: 0.04 X10(3) UL (ref 0–0.2)
BASOPHILS NFR BLD AUTO: 0.4 %
BILIRUB UR QL: NEGATIVE
BUN BLD-MCNC: 31 MG/DL (ref 7–18)
BUN/CREAT SERPL: 14.4 (ref 10–20)
CALCIUM BLD-MCNC: 10.7 MG/DL (ref 8.5–10.1)
CHLORIDE SERPL-SCNC: 107 MMOL/L (ref 98–112)
CHOLEST SERPL-MCNC: 224 MG/DL (ref ?–200)
CO2 SERPL-SCNC: 23 MMOL/L (ref 21–32)
COLOR UR: YELLOW
CREAT BLD-MCNC: 2.15 MG/DL
D DIMER PPP FEU-MCNC: 0.37 UG/ML FEU (ref ?–0.5)
DEPRECATED RDW RBC AUTO: 39.8 FL (ref 35.1–46.3)
EOSINOPHIL # BLD AUTO: 0.2 X10(3) UL (ref 0–0.7)
EOSINOPHIL NFR BLD AUTO: 1.8 %
ERYTHROCYTE [DISTWIDTH] IN BLOOD BY AUTOMATED COUNT: 13.2 % (ref 11–15)
EST. AVERAGE GLUCOSE BLD GHB EST-MCNC: 163 MG/DL (ref 68–126)
GLUCOSE BLD-MCNC: 131 MG/DL (ref 70–99)
GLUCOSE BLDC GLUCOMTR-MCNC: 205 MG/DL (ref 70–99)
GLUCOSE UR-MCNC: >=500 MG/DL
HBA1C MFR BLD: 7.3 % (ref ?–5.7)
HCT VFR BLD AUTO: 38 %
HDLC SERPL-MCNC: 45 MG/DL (ref 40–59)
HGB BLD-MCNC: 12.5 G/DL
HGB UR QL STRIP.AUTO: NEGATIVE
IMM GRANULOCYTES # BLD AUTO: 0.04 X10(3) UL (ref 0–1)
IMM GRANULOCYTES NFR BLD: 0.4 %
KETONES UR-MCNC: NEGATIVE MG/DL
LDLC SERPL CALC-MCNC: 92 MG/DL (ref ?–100)
LEUKOCYTE ESTERASE UR QL STRIP.AUTO: NEGATIVE
LYMPHOCYTES # BLD AUTO: 3.57 X10(3) UL (ref 1–4)
LYMPHOCYTES NFR BLD AUTO: 31.8 %
MAGNESIUM SERPL-MCNC: 2.3 MG/DL (ref 1.6–2.6)
MCH RBC QN AUTO: 27.5 PG (ref 26–34)
MCHC RBC AUTO-ENTMCNC: 32.9 G/DL (ref 31–37)
MCV RBC AUTO: 83.7 FL
MONOCYTES # BLD AUTO: 0.66 X10(3) UL (ref 0.1–1)
MONOCYTES NFR BLD AUTO: 5.9 %
NEUTROPHILS # BLD AUTO: 6.72 X10 (3) UL (ref 1.5–7.7)
NEUTROPHILS # BLD AUTO: 6.72 X10(3) UL (ref 1.5–7.7)
NEUTROPHILS NFR BLD AUTO: 59.7 %
NITRITE UR QL STRIP.AUTO: NEGATIVE
NONHDLC SERPL-MCNC: 179 MG/DL (ref ?–130)
NT-PROBNP SERPL-MCNC: 3566 PG/ML (ref ?–125)
OSMOLALITY SERPL CALC.SUM OF ELEC: 292 MOSM/KG (ref 275–295)
PH UR: 6 [PH] (ref 5–8)
PLATELET # BLD AUTO: 370 10(3)UL (ref 150–450)
POTASSIUM SERPL-SCNC: 2.9 MMOL/L (ref 3.5–5.1)
PROT UR-MCNC: >=500 MG/DL
RBC # BLD AUTO: 4.54 X10(6)UL
SARS-COV-2 RNA RESP QL NAA+PROBE: NOT DETECTED
SODIUM SERPL-SCNC: 137 MMOL/L (ref 136–145)
SP GR UR STRIP: 1.01 (ref 1–1.03)
TROPONIN I HIGH SENSITIVITY: 571 NG/L
TSI SER-ACNC: 2.91 MIU/ML (ref 0.36–3.74)
UROBILINOGEN UR STRIP-ACNC: <2
VLDLC SERPL CALC-MCNC: 88 MG/DL (ref 0–30)
WBC # BLD AUTO: 11.2 X10(3) UL (ref 4–11)

## 2022-02-21 PROCEDURE — 84443 ASSAY THYROID STIM HORMONE: CPT | Performed by: INTERNAL MEDICINE

## 2022-02-21 PROCEDURE — 36415 COLL VENOUS BLD VENIPUNCTURE: CPT

## 2022-02-21 PROCEDURE — 99285 EMERGENCY DEPT VISIT HI MDM: CPT

## 2022-02-21 PROCEDURE — 80061 LIPID PANEL: CPT | Performed by: STUDENT IN AN ORGANIZED HEALTH CARE EDUCATION/TRAINING PROGRAM

## 2022-02-21 PROCEDURE — 81001 URINALYSIS AUTO W/SCOPE: CPT | Performed by: INTERNAL MEDICINE

## 2022-02-21 PROCEDURE — 80048 BASIC METABOLIC PNL TOTAL CA: CPT | Performed by: STUDENT IN AN ORGANIZED HEALTH CARE EDUCATION/TRAINING PROGRAM

## 2022-02-21 PROCEDURE — 85025 COMPLETE CBC W/AUTO DIFF WBC: CPT | Performed by: STUDENT IN AN ORGANIZED HEALTH CARE EDUCATION/TRAINING PROGRAM

## 2022-02-21 PROCEDURE — 71046 X-RAY EXAM CHEST 2 VIEWS: CPT | Performed by: STUDENT IN AN ORGANIZED HEALTH CARE EDUCATION/TRAINING PROGRAM

## 2022-02-21 PROCEDURE — 84540 ASSAY OF URINE/UREA-N: CPT | Performed by: INTERNAL MEDICINE

## 2022-02-21 PROCEDURE — 83735 ASSAY OF MAGNESIUM: CPT | Performed by: STUDENT IN AN ORGANIZED HEALTH CARE EDUCATION/TRAINING PROGRAM

## 2022-02-21 PROCEDURE — 70490 CT SOFT TISSUE NECK W/O DYE: CPT | Performed by: INTERNAL MEDICINE

## 2022-02-21 PROCEDURE — 93010 ELECTROCARDIOGRAM REPORT: CPT | Performed by: STUDENT IN AN ORGANIZED HEALTH CARE EDUCATION/TRAINING PROGRAM

## 2022-02-21 PROCEDURE — 83880 ASSAY OF NATRIURETIC PEPTIDE: CPT | Performed by: STUDENT IN AN ORGANIZED HEALTH CARE EDUCATION/TRAINING PROGRAM

## 2022-02-21 PROCEDURE — 81025 URINE PREGNANCY TEST: CPT

## 2022-02-21 PROCEDURE — 82570 ASSAY OF URINE CREATININE: CPT | Performed by: INTERNAL MEDICINE

## 2022-02-21 PROCEDURE — 83036 HEMOGLOBIN GLYCOSYLATED A1C: CPT | Performed by: INTERNAL MEDICINE

## 2022-02-21 PROCEDURE — 93005 ELECTROCARDIOGRAM TRACING: CPT

## 2022-02-21 PROCEDURE — 82962 GLUCOSE BLOOD TEST: CPT

## 2022-02-21 PROCEDURE — 84484 ASSAY OF TROPONIN QUANT: CPT | Performed by: STUDENT IN AN ORGANIZED HEALTH CARE EDUCATION/TRAINING PROGRAM

## 2022-02-21 PROCEDURE — 85379 FIBRIN DEGRADATION QUANT: CPT | Performed by: STUDENT IN AN ORGANIZED HEALTH CARE EDUCATION/TRAINING PROGRAM

## 2022-02-21 PROCEDURE — 84156 ASSAY OF PROTEIN URINE: CPT | Performed by: INTERNAL MEDICINE

## 2022-02-21 PROCEDURE — 84300 ASSAY OF URINE SODIUM: CPT | Performed by: INTERNAL MEDICINE

## 2022-02-21 RX ORDER — LOSARTAN POTASSIUM 100 MG/1
100 TABLET ORAL DAILY
Status: DISCONTINUED | OUTPATIENT
Start: 2022-02-22 | End: 2022-02-24

## 2022-02-21 RX ORDER — PROCHLORPERAZINE EDISYLATE 5 MG/ML
5 INJECTION INTRAMUSCULAR; INTRAVENOUS EVERY 8 HOURS PRN
Status: DISCONTINUED | OUTPATIENT
Start: 2022-02-21 | End: 2022-02-24

## 2022-02-21 RX ORDER — ONDANSETRON 2 MG/ML
4 INJECTION INTRAMUSCULAR; INTRAVENOUS EVERY 6 HOURS PRN
Status: DISCONTINUED | OUTPATIENT
Start: 2022-02-21 | End: 2022-02-24

## 2022-02-21 RX ORDER — NIFEDIPINE 30 MG/1
90 TABLET, EXTENDED RELEASE ORAL NIGHTLY
Status: DISCONTINUED | OUTPATIENT
Start: 2022-02-21 | End: 2022-02-24

## 2022-02-21 RX ORDER — ASPIRIN 81 MG/1
81 TABLET ORAL DAILY
Status: DISCONTINUED | OUTPATIENT
Start: 2022-02-21 | End: 2022-02-24

## 2022-02-21 RX ORDER — FUROSEMIDE 40 MG/1
40 TABLET ORAL DAILY
COMMUNITY

## 2022-02-21 RX ORDER — HEPARIN SODIUM 5000 [USP'U]/ML
5000 INJECTION, SOLUTION INTRAVENOUS; SUBCUTANEOUS EVERY 8 HOURS SCHEDULED
Status: DISCONTINUED | OUTPATIENT
Start: 2022-02-21 | End: 2022-02-23

## 2022-02-21 RX ORDER — NICOTINE POLACRILEX 4 MG
15 LOZENGE BUCCAL
Status: DISCONTINUED | OUTPATIENT
Start: 2022-02-21 | End: 2022-02-24

## 2022-02-21 RX ORDER — POLYETHYLENE GLYCOL 3350 17 G/17G
17 POWDER, FOR SOLUTION ORAL DAILY PRN
Status: DISCONTINUED | OUTPATIENT
Start: 2022-02-21 | End: 2022-02-24

## 2022-02-21 RX ORDER — ACETAMINOPHEN 500 MG
TABLET ORAL
Status: COMPLETED
Start: 2022-02-21 | End: 2022-02-21

## 2022-02-21 RX ORDER — DEXTROSE MONOHYDRATE 25 G/50ML
50 INJECTION, SOLUTION INTRAVENOUS
Status: DISCONTINUED | OUTPATIENT
Start: 2022-02-21 | End: 2022-02-24

## 2022-02-21 RX ORDER — ACETAMINOPHEN 325 MG/1
650 TABLET ORAL EVERY 6 HOURS PRN
Status: DISCONTINUED | OUTPATIENT
Start: 2022-02-21 | End: 2022-02-24

## 2022-02-21 RX ORDER — BISACODYL 10 MG
10 SUPPOSITORY, RECTAL RECTAL
Status: DISCONTINUED | OUTPATIENT
Start: 2022-02-21 | End: 2022-02-24

## 2022-02-21 RX ORDER — SODIUM PHOSPHATE, DIBASIC AND SODIUM PHOSPHATE, MONOBASIC 7; 19 G/133ML; G/133ML
1 ENEMA RECTAL ONCE AS NEEDED
Status: DISCONTINUED | OUTPATIENT
Start: 2022-02-21 | End: 2022-02-24

## 2022-02-21 RX ORDER — NICOTINE POLACRILEX 4 MG
30 LOZENGE BUCCAL
Status: DISCONTINUED | OUTPATIENT
Start: 2022-02-21 | End: 2022-02-24

## 2022-02-21 RX ORDER — ACETAMINOPHEN 500 MG
1000 TABLET ORAL ONCE
Status: COMPLETED | OUTPATIENT
Start: 2022-02-21 | End: 2022-02-21

## 2022-02-21 RX ORDER — AMLODIPINE BESYLATE 10 MG/1
10 TABLET ORAL DAILY
Status: DISCONTINUED | OUTPATIENT
Start: 2022-02-22 | End: 2022-02-21

## 2022-02-21 RX ORDER — ASPIRIN 81 MG/1
324 TABLET, CHEWABLE ORAL ONCE
Status: COMPLETED | OUTPATIENT
Start: 2022-02-21 | End: 2022-02-21

## 2022-02-21 RX ORDER — SPIRONOLACTONE 25 MG/1
12.5 TABLET ORAL DAILY
Status: DISCONTINUED | OUTPATIENT
Start: 2022-02-22 | End: 2022-02-21

## 2022-02-21 RX ORDER — POTASSIUM CHLORIDE 20 MEQ/1
40 TABLET, EXTENDED RELEASE ORAL ONCE
Status: COMPLETED | OUTPATIENT
Start: 2022-02-21 | End: 2022-02-21

## 2022-02-21 RX ORDER — SENNOSIDES 8.6 MG
17.2 TABLET ORAL NIGHTLY PRN
Status: DISCONTINUED | OUTPATIENT
Start: 2022-02-21 | End: 2022-02-24

## 2022-02-21 RX ORDER — HYDROCHLOROTHIAZIDE 25 MG/1
50 TABLET ORAL DAILY
Status: DISCONTINUED | OUTPATIENT
Start: 2022-02-22 | End: 2022-02-24

## 2022-02-21 RX ORDER — FUROSEMIDE 40 MG/1
40 TABLET ORAL DAILY
Status: DISCONTINUED | OUTPATIENT
Start: 2022-02-22 | End: 2022-02-22

## 2022-02-21 RX ORDER — ATORVASTATIN CALCIUM 40 MG/1
40 TABLET, FILM COATED ORAL NIGHTLY
Status: DISCONTINUED | OUTPATIENT
Start: 2022-02-21 | End: 2022-02-24

## 2022-02-21 RX ORDER — POTASSIUM CHLORIDE 20 MEQ/1
40 TABLET, EXTENDED RELEASE ORAL DAILY
Status: DISCONTINUED | OUTPATIENT
Start: 2022-02-22 | End: 2022-02-24

## 2022-02-21 RX ORDER — CARVEDILOL 12.5 MG/1
12.5 TABLET ORAL 2 TIMES DAILY
Status: DISCONTINUED | OUTPATIENT
Start: 2022-02-21 | End: 2022-02-24

## 2022-02-21 RX ORDER — SPIRONOLACTONE 25 MG/1
25 TABLET ORAL DAILY
Status: DISCONTINUED | OUTPATIENT
Start: 2022-02-22 | End: 2022-02-24

## 2022-02-21 NOTE — ED INITIAL ASSESSMENT (HPI)
patient states she had to sit up to sleep all night because she was short of breath. States since she has had covid she has been short of breath. Denies chest pain.

## 2022-02-21 NOTE — ED QUICK NOTES
Orders for admission, patient is aware of plan and ready to go upstairs. Any questions, please call ED RN Liborio Mcnamara  at [address] [place] [address].    Type of COVID test sent:Rapid  COVID Suspicion level: Low    Titratable drug(s) infusing:  Rate:none    LOC at time of transport:A/O x4    Other pertinent information    CIWA score=  NIH score=

## 2022-02-22 ENCOUNTER — APPOINTMENT (OUTPATIENT)
Dept: ULTRASOUND IMAGING | Facility: HOSPITAL | Age: 32
DRG: 682 | End: 2022-02-22
Attending: INTERNAL MEDICINE
Payer: COMMERCIAL

## 2022-02-22 ENCOUNTER — APPOINTMENT (OUTPATIENT)
Dept: CV DIAGNOSTICS | Facility: HOSPITAL | Age: 32
DRG: 682 | End: 2022-02-22
Attending: INTERNAL MEDICINE
Payer: COMMERCIAL

## 2022-02-22 LAB
ALBUMIN SERPL-MCNC: 2.9 G/DL (ref 3.4–5)
ALP LIVER SERPL-CCNC: 79 U/L
ALT SERPL-CCNC: 20 U/L
ANION GAP SERPL CALC-SCNC: 7 MMOL/L (ref 0–18)
ANION GAP SERPL CALC-SCNC: 7 MMOL/L (ref 0–18)
AST SERPL-CCNC: 24 U/L (ref 15–37)
BILIRUB SERPL-MCNC: 0.3 MG/DL (ref 0.1–2)
BUN BLD-MCNC: 26 MG/DL (ref 7–18)
BUN BLD-MCNC: 26 MG/DL (ref 7–18)
BUN/CREAT SERPL: 12.9 (ref 10–20)
BUN/CREAT SERPL: 12.9 (ref 10–20)
CALCIUM BLD-MCNC: 9.4 MG/DL (ref 8.5–10.1)
CALCIUM BLD-MCNC: 9.4 MG/DL (ref 8.5–10.1)
CHLORIDE SERPL-SCNC: 109 MMOL/L (ref 98–112)
CHLORIDE SERPL-SCNC: 109 MMOL/L (ref 98–112)
CO2 SERPL-SCNC: 23 MMOL/L (ref 21–32)
CO2 SERPL-SCNC: 23 MMOL/L (ref 21–32)
CREAT BLD-MCNC: 2.01 MG/DL
CREAT BLD-MCNC: 2.01 MG/DL
CREAT UR-SCNC: 63.9 MG/DL
DEPRECATED RDW RBC AUTO: 39.5 FL (ref 35.1–46.3)
ERYTHROCYTE [DISTWIDTH] IN BLOOD BY AUTOMATED COUNT: 13.2 % (ref 11–15)
GLOBULIN PLAS-MCNC: 4.3 G/DL (ref 2.8–4.4)
GLUCOSE BLD-MCNC: 163 MG/DL (ref 70–99)
GLUCOSE BLD-MCNC: 163 MG/DL (ref 70–99)
GLUCOSE BLDC GLUCOMTR-MCNC: 163 MG/DL (ref 70–99)
GLUCOSE BLDC GLUCOMTR-MCNC: 163 MG/DL (ref 70–99)
GLUCOSE BLDC GLUCOMTR-MCNC: 191 MG/DL (ref 70–99)
GLUCOSE BLDC GLUCOMTR-MCNC: 214 MG/DL (ref 70–99)
HCT VFR BLD AUTO: 37.7 %
HGB BLD-MCNC: 12.4 G/DL
MAGNESIUM SERPL-MCNC: 2.2 MG/DL (ref 1.6–2.6)
MCH RBC QN AUTO: 27.3 PG (ref 26–34)
MCHC RBC AUTO-ENTMCNC: 32.9 G/DL (ref 31–37)
OSMOLALITY SERPL CALC.SUM OF ELEC: 296 MOSM/KG (ref 275–295)
OSMOLALITY SERPL CALC.SUM OF ELEC: 296 MOSM/KG (ref 275–295)
PHOSPHATE SERPL-MCNC: 3 MG/DL (ref 2.5–4.9)
PLATELET # BLD AUTO: 368 10(3)UL (ref 150–450)
POTASSIUM SERPL-SCNC: 3 MMOL/L (ref 3.5–5.1)
POTASSIUM SERPL-SCNC: 3 MMOL/L (ref 3.5–5.1)
POTASSIUM SERPL-SCNC: 3.1 MMOL/L (ref 3.5–5.1)
POTASSIUM SERPL-SCNC: 3.7 MMOL/L (ref 3.5–5.1)
PROT SERPL-MCNC: 7.2 G/DL (ref 6.4–8.2)
PROT UR-MCNC: 346.6 MG/DL
RBC # BLD AUTO: 4.55 X10(6)UL
SODIUM SERPL-SCNC: 139 MMOL/L (ref 136–145)
SODIUM SERPL-SCNC: 139 MMOL/L (ref 136–145)
TROPONIN I HIGH SENSITIVITY: 1204 NG/L
TROPONIN I HIGH SENSITIVITY: 1269 NG/L
UUN UR-MCNC: 498 MG/DL
WBC # BLD AUTO: 9 X10(3) UL (ref 4–11)

## 2022-02-22 PROCEDURE — 80053 COMPREHEN METABOLIC PANEL: CPT | Performed by: INTERNAL MEDICINE

## 2022-02-22 PROCEDURE — 83735 ASSAY OF MAGNESIUM: CPT | Performed by: INTERNAL MEDICINE

## 2022-02-22 PROCEDURE — 93306 TTE W/DOPPLER COMPLETE: CPT | Performed by: INTERNAL MEDICINE

## 2022-02-22 PROCEDURE — 85027 COMPLETE CBC AUTOMATED: CPT | Performed by: INTERNAL MEDICINE

## 2022-02-22 PROCEDURE — 82962 GLUCOSE BLOOD TEST: CPT

## 2022-02-22 PROCEDURE — 76770 US EXAM ABDO BACK WALL COMP: CPT | Performed by: INTERNAL MEDICINE

## 2022-02-22 PROCEDURE — 84100 ASSAY OF PHOSPHORUS: CPT | Performed by: INTERNAL MEDICINE

## 2022-02-22 PROCEDURE — 84484 ASSAY OF TROPONIN QUANT: CPT | Performed by: INTERNAL MEDICINE

## 2022-02-22 PROCEDURE — 84132 ASSAY OF SERUM POTASSIUM: CPT | Performed by: INTERNAL MEDICINE

## 2022-02-22 RX ORDER — FUROSEMIDE 40 MG/5ML
40 SOLUTION ORAL ONCE
Status: DISCONTINUED | OUTPATIENT
Start: 2022-02-22 | End: 2022-02-22

## 2022-02-22 RX ORDER — FUROSEMIDE 10 MG/ML
40 INJECTION INTRAMUSCULAR; INTRAVENOUS ONCE
Status: COMPLETED | OUTPATIENT
Start: 2022-02-22 | End: 2022-02-22

## 2022-02-22 RX ORDER — FUROSEMIDE 40 MG/1
40 TABLET ORAL ONCE
Status: COMPLETED | OUTPATIENT
Start: 2022-02-22 | End: 2022-02-22

## 2022-02-22 RX ORDER — POTASSIUM CHLORIDE 20 MEQ/1
40 TABLET, EXTENDED RELEASE ORAL ONCE
Status: COMPLETED | OUTPATIENT
Start: 2022-02-22 | End: 2022-02-22

## 2022-02-22 NOTE — PLAN OF CARE
Abisai Armendariz, cardiologist on call informed about troponin result 1.269, and another troponin was ordered for this am.

## 2022-02-22 NOTE — PLAN OF CARE
Tylenol for right neck pain with relief. Ct of neck done. MD aware of Troponin results. 2 d echo, US kidney/ bladder today. Lungs with crackles. Placed o2 at 2 l n/c- sats 94%. No sob nor chest pain. Lasix p.o given. Will continue to monitor. Problem: Diabetes/Glucose Control  Goal: Glucose maintained within prescribed range  Description: INTERVENTIONS:  - Monitor Blood Glucose as ordered  - Assess for signs and symptoms of hyperglycemia and hypoglycemia  - Administer ordered medications to maintain glucose within target range  - Assess barriers to adequate nutritional intake and initiate nutrition consult as needed  - Instruct patient on self management of diabetes  Outcome: Progressing     Problem: Patient Centered Care  Goal: Patient preferences are identified and integrated in the patient's plan of care  Description: Interventions:  - What would you like us to know as we care for you?  Home alone    - Provide timely, complete, and accurate information to patient/family  - Incorporate patient and family knowledge, values, beliefs, and cultural backgrounds into the planning and delivery of care  - Encourage patient/family to participate in care and decision-making at the level they choose  - Honor patient and family perspectives and choices  Outcome: Progressing     Problem: PAIN - ADULT  Goal: Verbalizes/displays adequate comfort level or patient's stated pain goal  Description: INTERVENTIONS:  - Encourage pt to monitor pain and request assistance  - Assess pain using appropriate pain scale  - Administer analgesics based on type and severity of pain and evaluate response  - Implement non-pharmacological measures as appropriate and evaluate response  - Consider cultural and social influences on pain and pain management  - Manage/alleviate anxiety  - Utilize distraction and/or relaxation techniques  - Monitor for opioid side effects  - Notify MD/LIP if interventions unsuccessful or patient reports new pain  - Anticipate increased pain with activity and pre-medicate as appropriate  Outcome: Progressing     Problem: SAFETY ADULT - FALL  Goal: Free from fall injury  Description: INTERVENTIONS:  - Assess pt frequently for physical needs  - Identify cognitive and physical deficits and behaviors that affect risk of falls.   - [place] fall precautions as indicated by assessment.  - Educate pt/family on patient safety including physical limitations  - Instruct pt to call for assistance with activity based on assessment  - Modify environment to reduce risk of injury  - Provide assistive devices as appropriate  - Consider OT/PT consult to assist with strengthening/mobility  - Encourage toileting schedule  Outcome: Progressing     Problem: DISCHARGE PLANNING  Goal: Discharge to home or other facility with appropriate resources  Description: INTERVENTIONS:  - Identify barriers to discharge w/pt and caregiver  - Include patient/family/discharge partner in discharge planning  - Arrange for needed discharge resources and transportation as appropriate  - Identify discharge learning needs (meds, wound care, etc)  - Arrange for interpreters to assist at discharge as needed  - Consider post-discharge preferences of patient/family/discharge partner  - Complete POLST form as appropriate  - Assess patient's ability to be responsible for managing their own health  - Refer to Case Management Department for coordinating discharge planning if the patient needs post-hospital services based on physician/LIP order or complex needs related to functional status, cognitive ability or social support system  Outcome: Progressing     Problem: CARDIOVASCULAR - ADULT  Goal: Maintains optimal cardiac output and hemodynamic stability  Description: INTERVENTIONS:  - Monitor vital signs, rhythm, and trends  - Monitor for bleeding, hypotension and signs of decreased cardiac output  - Evaluate effectiveness of vasoactive medications to optimize hemodynamic stability  - Monitor arterial and/or venous puncture sites for bleeding and/or hematoma  - Assess quality of pulses, skin color and temperature  - Assess for signs of decreased coronary artery perfusion - ex.  Angina  - Evaluate fluid balance, assess for edema, trend weights  Outcome: Progressing  Goal: Absence of cardiac arrhythmias or at baseline  Description: INTERVENTIONS:  - Continuous cardiac monitoring, monitor vital signs, obtain 12 lead EKG if indicated  - Evaluate effectiveness of antiarrhythmic and heart rate control medications as ordered  - Initiate emergency measures for life threatening arrhythmias  - Monitor electrolytes and administer replacement therapy as ordered  Outcome: Progressing

## 2022-02-22 NOTE — SPIRITUAL CARE NOTE
[facility]- Pt was lying in upright position in a darkened room, where she was on her phone.  introduced herself and SCT, while offering radical hospitality, where the pt requested prayer and the  provided. The  provided a compassionate presence as the pt shared her fears and anxiety in being hospitalized with a heart ailment (she stated). She was sad that she would miss her Mom's birthday today.  left pt with a blessing and will revisit tomorrow.

## 2022-02-22 NOTE — PLAN OF CARE
Pt alert and oriented x4 on 2L NC. On IV lasix and po aldactone. 2D ECHO  completed as well as US of Kidney and bladder. Potassium replaced. Refuses heparin subcutaneous. Good urine output. Call button and pt belongings within reach.      Problem: Diabetes/Glucose Control  Goal: Glucose maintained within prescribed range  Description: INTERVENTIONS:  - Monitor Blood Glucose as ordered  - Assess for signs and symptoms of hyperglycemia and hypoglycemia  - Administer ordered medications to maintain glucose within target range  - Assess barriers to adequate nutritional intake and initiate nutrition consult as needed  - Instruct patient on self management of diabetes  Outcome: Progressing     Problem: Patient Centered Care  Goal: Patient preferences are identified and integrated in the patient's plan of care  Description: Interventions:  - What would you like us to know as we care for you?   - Provide timely, complete, and accurate information to patient/family  - Incorporate patient and family knowledge, values, beliefs, and cultural backgrounds into the planning and delivery of care  - Encourage patient/family to participate in care and decision-making at the level they choose  - Honor patient and family perspectives and choices  Outcome: Progressing     Problem: PAIN - ADULT  Goal: Verbalizes/displays adequate comfort level or patient's stated pain goal  Description: INTERVENTIONS:  - Encourage pt to monitor pain and request assistance  - Assess pain using appropriate pain scale  - Administer analgesics based on type and severity of pain and evaluate response  - Implement non-pharmacological measures as appropriate and evaluate response  - Consider cultural and social influences on pain and pain management  - Manage/alleviate anxiety  - Utilize distraction and/or relaxation techniques  - Monitor for opioid side effects  - Notify MD/LIP if interventions unsuccessful or patient reports new pain  - Anticipate increased pain with activity and pre-medicate as appropriate  Outcome: Progressing     Problem: SAFETY ADULT - FALL  Goal: Free from fall injury  Description: INTERVENTIONS:  - Assess pt frequently for physical needs  - Identify cognitive and physical deficits and behaviors that affect risk of falls.   - [place] fall precautions as indicated by assessment.  - Educate pt/family on patient safety including physical limitations  - Instruct pt to call for assistance with activity based on assessment  - Modify environment to reduce risk of injury  - Provide assistive devices as appropriate  - Consider OT/PT consult to assist with strengthening/mobility  - Encourage toileting schedule  Outcome: Progressing     Problem: DISCHARGE PLANNING  Goal: Discharge to home or other facility with appropriate resources  Description: INTERVENTIONS:  - Identify barriers to discharge w/pt and caregiver  - Include patient/family/discharge partner in discharge planning  - Arrange for needed discharge resources and transportation as appropriate  - Identify discharge learning needs (meds, wound care, etc)  - Arrange for interpreters to assist at discharge as needed  - Consider post-discharge preferences of patient/family/discharge partner  - Complete POLST form as appropriate  - Assess patient's ability to be responsible for managing their own health  - Refer to Case Management Department for coordinating discharge planning if the patient needs post-hospital services based on physician/LIP order or complex needs related to functional status, cognitive ability or social support system  Outcome: Progressing     Problem: CARDIOVASCULAR - ADULT  Goal: Maintains optimal cardiac output and hemodynamic stability  Description: INTERVENTIONS:  - Monitor vital signs, rhythm, and trends  - Monitor for bleeding, hypotension and signs of decreased cardiac output  - Evaluate effectiveness of vasoactive medications to optimize hemodynamic stability  - Monitor arterial and/or venous puncture sites for bleeding and/or hematoma  - Assess quality of pulses, skin color and temperature  - Assess for signs of decreased coronary artery perfusion - ex.  Angina  - Evaluate fluid balance, assess for edema, trend weights  Outcome: Progressing  Goal: Absence of cardiac arrhythmias or at baseline  Description: INTERVENTIONS:  - Continuous cardiac monitoring, monitor vital signs, obtain 12 lead EKG if indicated  - Evaluate effectiveness of antiarrhythmic and heart rate control medications as ordered  - Initiate emergency measures for life threatening arrhythmias  - Monitor electrolytes and administer replacement therapy as ordered  Outcome: Progressing

## 2022-02-23 LAB
ALBUMIN SERPL-MCNC: 2.8 G/DL (ref 3.4–5)
ANION GAP SERPL CALC-SCNC: 9 MMOL/L (ref 0–18)
BUN BLD-MCNC: 27 MG/DL (ref 7–18)
BUN/CREAT SERPL: 12.9 (ref 10–20)
BUN/CREAT SERPL: 12.9 (ref 10–20)
CALCIUM BLD-MCNC: 8.7 MG/DL (ref 8.5–10.1)
CALCIUM BLD-MCNC: 8.7 MG/DL (ref 8.5–10.1)
CHLORIDE SERPL-SCNC: 106 MMOL/L (ref 98–112)
CO2 SERPL-SCNC: 24 MMOL/L (ref 21–32)
CO2 SERPL-SCNC: 24 MMOL/L (ref 21–32)
CREAT BLD-MCNC: 2.09 MG/DL
CREAT BLD-MCNC: 2.09 MG/DL
DEPRECATED RDW RBC AUTO: 39.7 FL (ref 35.1–46.3)
ERYTHROCYTE [DISTWIDTH] IN BLOOD BY AUTOMATED COUNT: 13.2 % (ref 11–15)
GLUCOSE BLD-MCNC: 182 MG/DL (ref 70–99)
GLUCOSE BLD-MCNC: 182 MG/DL (ref 70–99)
GLUCOSE BLDC GLUCOMTR-MCNC: 174 MG/DL (ref 70–99)
GLUCOSE BLDC GLUCOMTR-MCNC: 183 MG/DL (ref 70–99)
GLUCOSE BLDC GLUCOMTR-MCNC: 271 MG/DL (ref 70–99)
HCT VFR BLD AUTO: 35.7 %
HGB BLD-MCNC: 11.6 G/DL
MAGNESIUM SERPL-MCNC: 2.1 MG/DL (ref 1.6–2.6)
MCH RBC QN AUTO: 27.2 PG (ref 26–34)
MCHC RBC AUTO-ENTMCNC: 32.5 G/DL (ref 31–37)
MCV RBC AUTO: 83.8 FL
OSMOLALITY SERPL CALC.SUM OF ELEC: 298 MOSM/KG (ref 275–295)
OSMOLALITY SERPL CALC.SUM OF ELEC: 298 MOSM/KG (ref 275–295)
PHOSPHATE SERPL-MCNC: 3.8 MG/DL (ref 2.5–4.9)
PLATELET # BLD AUTO: 340 10(3)UL (ref 150–450)
POTASSIUM SERPL-SCNC: 3.2 MMOL/L (ref 3.5–5.1)
POTASSIUM SERPL-SCNC: 3.2 MMOL/L (ref 3.5–5.1)
POTASSIUM SERPL-SCNC: 3.4 MMOL/L (ref 3.5–5.1)
POTASSIUM SERPL-SCNC: 3.6 MMOL/L (ref 3.5–5.1)
RBC # BLD AUTO: 4.26 X10(6)UL
SODIUM SERPL-SCNC: 139 MMOL/L (ref 136–145)
SODIUM SERPL-SCNC: 139 MMOL/L (ref 136–145)
WBC # BLD AUTO: 9 X10(3) UL (ref 4–11)

## 2022-02-23 PROCEDURE — 85027 COMPLETE CBC AUTOMATED: CPT | Performed by: INTERNAL MEDICINE

## 2022-02-23 PROCEDURE — 80069 RENAL FUNCTION PANEL: CPT | Performed by: INTERNAL MEDICINE

## 2022-02-23 PROCEDURE — 84132 ASSAY OF SERUM POTASSIUM: CPT | Performed by: INTERNAL MEDICINE

## 2022-02-23 PROCEDURE — 83735 ASSAY OF MAGNESIUM: CPT | Performed by: INTERNAL MEDICINE

## 2022-02-23 PROCEDURE — 82962 GLUCOSE BLOOD TEST: CPT

## 2022-02-23 RX ORDER — POTASSIUM CHLORIDE 20 MEQ/1
40 TABLET, EXTENDED RELEASE ORAL ONCE
Status: DISCONTINUED | OUTPATIENT
Start: 2022-02-23 | End: 2022-02-23

## 2022-02-23 RX ORDER — HEPARIN SODIUM 5000 [USP'U]/ML
7500 INJECTION, SOLUTION INTRAVENOUS; SUBCUTANEOUS EVERY 8 HOURS SCHEDULED
Status: DISCONTINUED | OUTPATIENT
Start: 2022-02-23 | End: 2022-02-24

## 2022-02-23 RX ORDER — POTASSIUM CHLORIDE 20 MEQ/1
40 TABLET, EXTENDED RELEASE ORAL ONCE
Status: COMPLETED | OUTPATIENT
Start: 2022-02-23 | End: 2022-02-23

## 2022-02-23 NOTE — PLAN OF CARE
Patient A&Ox4, self care. K replaced per protocol. Plan for stress test in AM and hopeful DC tomorrow.       Problem: Diabetes/Glucose Control  Goal: Glucose maintained within prescribed range  Description: INTERVENTIONS:  - Monitor Blood Glucose as ordered  - Assess for signs and symptoms of hyperglycemia and hypoglycemia  - Administer ordered medications to maintain glucose within target range  - Assess barriers to adequate nutritional intake and initiate nutrition consult as needed  - Instruct patient on self management of diabetes  Outcome: Progressing     Problem: Patient Centered Care  Goal: Patient preferences are identified and integrated in the patient's plan of care  Description: Interventions:  - What would you like us to know as we care for you?   - Provide timely, complete, and accurate information to patient/family  - Incorporate patient and family knowledge, values, beliefs, and cultural backgrounds into the planning and delivery of care  - Encourage patient/family to participate in care and decision-making at the level they choose  - Honor patient and family perspectives and choices  Outcome: Progressing     Problem: PAIN - ADULT  Goal: Verbalizes/displays adequate comfort level or patient's stated pain goal  Description: INTERVENTIONS:  - Encourage pt to monitor pain and request assistance  - Assess pain using appropriate pain scale  - Administer analgesics based on type and severity of pain and evaluate response  - Implement non-pharmacological measures as appropriate and evaluate response  - Consider cultural and social influences on pain and pain management  - Manage/alleviate anxiety  - Utilize distraction and/or relaxation techniques  - Monitor for opioid side effects  - Notify MD/LIP if interventions unsuccessful or patient reports new pain  - Anticipate increased pain with activity and pre-medicate as appropriate  Outcome: Progressing     Problem: SAFETY ADULT - FALL  Goal: Free from fall injury  Description: INTERVENTIONS:  - Assess pt frequently for physical needs  - Identify cognitive and physical deficits and behaviors that affect risk of falls.   - [place] fall precautions as indicated by assessment.  - Educate pt/family on patient safety including physical limitations  - Instruct pt to call for assistance with activity based on assessment  - Modify environment to reduce risk of injury  - Provide assistive devices as appropriate  - Consider OT/PT consult to assist with strengthening/mobility  - Encourage toileting schedule  Outcome: Progressing     Problem: DISCHARGE PLANNING  Goal: Discharge to home or other facility with appropriate resources  Description: INTERVENTIONS:  - Identify barriers to discharge w/pt and caregiver  - Include patient/family/discharge partner in discharge planning  - Arrange for needed discharge resources and transportation as appropriate  - Identify discharge learning needs (meds, wound care, etc)  - Arrange for interpreters to assist at discharge as needed  - Consider post-discharge preferences of patient/family/discharge partner  - Complete POLST form as appropriate  - Assess patient's ability to be responsible for managing their own health  - Refer to Case Management Department for coordinating discharge planning if the patient needs post-hospital services based on physician/LIP order or complex needs related to functional status, cognitive ability or social support system  Outcome: Progressing     Problem: CARDIOVASCULAR - ADULT  Goal: Maintains optimal cardiac output and hemodynamic stability  Description: INTERVENTIONS:  - Monitor vital signs, rhythm, and trends  - Monitor for bleeding, hypotension and signs of decreased cardiac output  - Evaluate effectiveness of vasoactive medications to optimize hemodynamic stability  - Monitor arterial and/or venous puncture sites for bleeding and/or hematoma  - Assess quality of pulses, skin color and temperature  - Assess for signs of decreased coronary artery perfusion - ex.  Angina  - Evaluate fluid balance, assess for edema, trend weights  Outcome: Progressing  Goal: Absence of cardiac arrhythmias or at baseline  Description: INTERVENTIONS:  - Continuous cardiac monitoring, monitor vital signs, obtain 12 lead EKG if indicated  - Evaluate effectiveness of antiarrhythmic and heart rate control medications as ordered  - Initiate emergency measures for life threatening arrhythmias  - Monitor electrolytes and administer replacement therapy as ordered  Outcome: Progressing

## 2022-02-24 ENCOUNTER — APPOINTMENT (OUTPATIENT)
Dept: CV DIAGNOSTICS | Facility: HOSPITAL | Age: 32
DRG: 682 | End: 2022-02-24
Attending: INTERNAL MEDICINE
Payer: COMMERCIAL

## 2022-02-24 ENCOUNTER — APPOINTMENT (OUTPATIENT)
Dept: NUCLEAR MEDICINE | Facility: HOSPITAL | Age: 32
DRG: 682 | End: 2022-02-24
Attending: INTERNAL MEDICINE
Payer: COMMERCIAL

## 2022-02-24 VITALS
HEART RATE: 95 BPM | SYSTOLIC BLOOD PRESSURE: 135 MMHG | TEMPERATURE: 98 F | RESPIRATION RATE: 18 BRPM | BODY MASS INDEX: 42 KG/M2 | WEIGHT: 254.19 LBS | DIASTOLIC BLOOD PRESSURE: 82 MMHG | OXYGEN SATURATION: 93 %

## 2022-02-24 LAB
ALBUMIN SERPL-MCNC: 2.8 G/DL (ref 3.4–5)
ANION GAP SERPL CALC-SCNC: 7 MMOL/L (ref 0–18)
ANION GAP SERPL CALC-SCNC: 7 MMOL/L (ref 0–18)
BUN BLD-MCNC: 30 MG/DL (ref 7–18)
BUN BLD-MCNC: 30 MG/DL (ref 7–18)
BUN/CREAT SERPL: 14.2 (ref 10–20)
BUN/CREAT SERPL: 14.2 (ref 10–20)
CALCIUM BLD-MCNC: 8.7 MG/DL (ref 8.5–10.1)
CALCIUM BLD-MCNC: 8.7 MG/DL (ref 8.5–10.1)
CHLORIDE SERPL-SCNC: 105 MMOL/L (ref 98–112)
CHLORIDE SERPL-SCNC: 105 MMOL/L (ref 98–112)
CO2 SERPL-SCNC: 25 MMOL/L (ref 21–32)
CO2 SERPL-SCNC: 25 MMOL/L (ref 21–32)
CREAT BLD-MCNC: 2.11 MG/DL
CREAT BLD-MCNC: 2.11 MG/DL
DEPRECATED RDW RBC AUTO: 39 FL (ref 35.1–46.3)
ERYTHROCYTE [DISTWIDTH] IN BLOOD BY AUTOMATED COUNT: 12.9 % (ref 11–15)
GLUCOSE BLD-MCNC: 182 MG/DL (ref 70–99)
GLUCOSE BLD-MCNC: 182 MG/DL (ref 70–99)
GLUCOSE BLDC GLUCOMTR-MCNC: 175 MG/DL (ref 70–99)
HCT VFR BLD AUTO: 36.6 %
HGB BLD-MCNC: 12 G/DL
MAGNESIUM SERPL-MCNC: 2.2 MG/DL (ref 1.6–2.6)
MCH RBC QN AUTO: 27.4 PG (ref 26–34)
MCHC RBC AUTO-ENTMCNC: 32.8 G/DL (ref 31–37)
MCV RBC AUTO: 83.6 FL
OSMOLALITY SERPL CALC.SUM OF ELEC: 295 MOSM/KG (ref 275–295)
OSMOLALITY SERPL CALC.SUM OF ELEC: 295 MOSM/KG (ref 275–295)
PHOSPHATE SERPL-MCNC: 3.8 MG/DL (ref 2.5–4.9)
PLATELET # BLD AUTO: 325 10(3)UL (ref 150–450)
POTASSIUM SERPL-SCNC: 3.3 MMOL/L (ref 3.5–5.1)
POTASSIUM SERPL-SCNC: 3.3 MMOL/L (ref 3.5–5.1)
RBC # BLD AUTO: 4.38 X10(6)UL
SODIUM SERPL-SCNC: 137 MMOL/L (ref 136–145)
SODIUM SERPL-SCNC: 137 MMOL/L (ref 136–145)
WBC # BLD AUTO: 9 X10(3) UL (ref 4–11)

## 2022-02-24 PROCEDURE — 93016 CV STRESS TEST SUPVJ ONLY: CPT | Performed by: INTERNAL MEDICINE

## 2022-02-24 PROCEDURE — 80069 RENAL FUNCTION PANEL: CPT | Performed by: INTERNAL MEDICINE

## 2022-02-24 PROCEDURE — 85027 COMPLETE CBC AUTOMATED: CPT | Performed by: INTERNAL MEDICINE

## 2022-02-24 PROCEDURE — 93017 CV STRESS TEST TRACING ONLY: CPT | Performed by: INTERNAL MEDICINE

## 2022-02-24 PROCEDURE — 93018 CV STRESS TEST I&R ONLY: CPT | Performed by: INTERNAL MEDICINE

## 2022-02-24 PROCEDURE — 82962 GLUCOSE BLOOD TEST: CPT

## 2022-02-24 PROCEDURE — 78452 HT MUSCLE IMAGE SPECT MULT: CPT | Performed by: INTERNAL MEDICINE

## 2022-02-24 PROCEDURE — 83735 ASSAY OF MAGNESIUM: CPT | Performed by: INTERNAL MEDICINE

## 2022-02-24 RX ORDER — POTASSIUM CHLORIDE 20 MEQ/1
40 TABLET, EXTENDED RELEASE ORAL ONCE
Status: COMPLETED | OUTPATIENT
Start: 2022-02-24 | End: 2022-02-24

## 2022-02-24 NOTE — PLAN OF CARE
Stress test came back negative. Cleared for DC. Instructions discussed at the bedside. All questions addressed and answered. Tele and IV removed.      Problem: Diabetes/Glucose Control  Goal: Glucose maintained within prescribed range  Description: INTERVENTIONS:  - Monitor Blood Glucose as ordered  - Assess for signs and symptoms of hyperglycemia and hypoglycemia  - Administer ordered medications to maintain glucose within target range  - Assess barriers to adequate nutritional intake and initiate nutrition consult as needed  - Instruct patient on self management of diabetes  Outcome: Adequate for Discharge     Problem: Patient Centered Care  Goal: Patient preferences are identified and integrated in the patient's plan of care  Description: Interventions:  - What would you like us to know as we care for you?  - Provide timely, complete, and accurate information to patient/family  - Incorporate patient and family knowledge, values, beliefs, and cultural backgrounds into the planning and delivery of care  - Encourage patient/family to participate in care and decision-making at the level they choose  - Honor patient and family perspectives and choices  Outcome: Adequate for Discharge     Problem: PAIN - ADULT  Goal: Verbalizes/displays adequate comfort level or patient's stated pain goal  Description: INTERVENTIONS:  - Encourage pt to monitor pain and request assistance  - Assess pain using appropriate pain scale  - Administer analgesics based on type and severity of pain and evaluate response  - Implement non-pharmacological measures as appropriate and evaluate response  - Consider cultural and social influences on pain and pain management  - Manage/alleviate anxiety  - Utilize distraction and/or relaxation techniques  - Monitor for opioid side effects  - Notify MD/LIP if interventions unsuccessful or patient reports new pain  - Anticipate increased pain with activity and pre-medicate as appropriate  Outcome: Adequate for Discharge     Problem: SAFETY ADULT - FALL  Goal: Free from fall injury  Description: INTERVENTIONS:  - Assess pt frequently for physical needs  - Identify cognitive and physical deficits and behaviors that affect risk of falls.   - [place] fall precautions as indicated by assessment.  - Educate pt/family on patient safety including physical limitations  - Instruct pt to call for assistance with activity based on assessment  - Modify environment to reduce risk of injury  - Provide assistive devices as appropriate  - Consider OT/PT consult to assist with strengthening/mobility  - Encourage toileting schedule  Outcome: Adequate for Discharge     Problem: DISCHARGE PLANNING  Goal: Discharge to home or other facility with appropriate resources  Description: INTERVENTIONS:  - Identify barriers to discharge w/pt and caregiver  - Include patient/family/discharge partner in discharge planning  - Arrange for needed discharge resources and transportation as appropriate  - Identify discharge learning needs (meds, wound care, etc)  - Arrange for interpreters to assist at discharge as needed  - Consider post-discharge preferences of patient/family/discharge partner  - Complete POLST form as appropriate  - Assess patient's ability to be responsible for managing their own health  - Refer to Case Management Department for coordinating discharge planning if the patient needs post-hospital services based on physician/LIP order or complex needs related to functional status, cognitive ability or social support system  Outcome: Adequate for Discharge     Problem: CARDIOVASCULAR - ADULT  Goal: Maintains optimal cardiac output and hemodynamic stability  Description: INTERVENTIONS:  - Monitor vital signs, rhythm, and trends  - Monitor for bleeding, hypotension and signs of decreased cardiac output  - Evaluate effectiveness of vasoactive medications to optimize hemodynamic stability  - Monitor arterial and/or venous puncture sites for bleeding and/or hematoma  - Assess quality of pulses, skin color and temperature  - Assess for signs of decreased coronary artery perfusion - ex.  Angina  - Evaluate fluid balance, assess for edema, trend weights  Outcome: Adequate for Discharge  Goal: Absence of cardiac arrhythmias or at baseline  Description: INTERVENTIONS:  - Continuous cardiac monitoring, monitor vital signs, obtain 12 lead EKG if indicated  - Evaluate effectiveness of antiarrhythmic and heart rate control medications as ordered  - Initiate emergency measures for life threatening arrhythmias  - Monitor electrolytes and administer replacement therapy as ordered  Outcome: Adequate for Discharge     Problem: METABOLIC/FLUID AND ELECTROLYTES - ADULT  Goal: Hemodynamic stability and optimal renal function maintained  Description: INTERVENTIONS:  - Monitor labs and assess for signs and symptoms of volume excess or deficit  - Monitor intake, output and patient weight  - Monitor urine specific gravity, serum osmolarity and serum sodium as indicated or ordered  - Monitor response to interventions for patient's volume status, including labs, urine output, blood pressure (other measures as available)  - Encourage oral intake as appropriate  - Instruct patient on fluid and nutrition restrictions as appropriate  Outcome: Adequate for Discharge

## 2022-02-24 NOTE — PLAN OF CARE
Problem: CARDIOVASCULAR - ADULT  Goal: Maintains optimal cardiac output and hemodynamic stability  Description: INTERVENTIONS:  - Monitor vital signs, rhythm, and trends  - Monitor for bleeding, hypotension and signs of decreased cardiac output  - Evaluate effectiveness of vasoactive medications to optimize hemodynamic stability  - Monitor arterial and/or venous puncture sites for bleeding and/or hematoma  - Assess quality of pulses, skin color and temperature  - Assess for signs of decreased coronary artery perfusion - ex.  Angina  - Evaluate fluid balance, assess for edema, trend weights  Outcome: Progressing  Goal: Absence of cardiac arrhythmias or at baseline  Description: INTERVENTIONS:  - Continuous cardiac monitoring, monitor vital signs, obtain 12 lead EKG if indicated  - Evaluate effectiveness of antiarrhythmic and heart rate control medications as ordered  - Initiate emergency measures for life threatening arrhythmias  - Monitor electrolytes and administer replacement therapy as ordered  Outcome: Progressing     Problem: METABOLIC/FLUID AND ELECTROLYTES - ADULT  Goal: Hemodynamic stability and optimal renal function maintained  Description: INTERVENTIONS:  - Monitor labs and assess for signs and symptoms of volume excess or deficit  - Monitor intake, output and patient weight  - Monitor urine specific gravity, serum osmolarity and serum sodium as indicated or ordered  - Monitor response to interventions for patient's volume status, including labs, urine output, blood pressure (other measures as available)  - Encourage oral intake as appropriate  - Instruct patient on fluid and nutrition restrictions as appropriate  Outcome: Progressing     Problem: Diabetes/Glucose Control  Goal: Glucose maintained within prescribed range  Description: INTERVENTIONS:  - Monitor Blood Glucose as ordered  - Assess for signs and symptoms of hyperglycemia and hypoglycemia  - Administer ordered medications to maintain glucose within target range  - Assess barriers to adequate nutritional intake and initiate nutrition consult as needed  - Instruct patient on self management of diabetes  Outcome: Progressing     Problem: PAIN - ADULT  Goal: Verbalizes/displays adequate comfort level or patient's stated pain goal  Description: INTERVENTIONS:  - Encourage pt to monitor pain and request assistance  - Assess pain using appropriate pain scale  - Administer analgesics based on type and severity of pain and evaluate response  - Implement non-pharmacological measures as appropriate and evaluate response  - Consider cultural and social influences on pain and pain management  - Manage/alleviate anxiety  - Utilize distraction and/or relaxation techniques  - Monitor for opioid side effects  - Notify MD/LIP if interventions unsuccessful or patient reports new pain  - Anticipate increased pain with activity and pre-medicate as appropriate  Outcome: Progressing

## 2022-03-16 NOTE — CDS QUERY
Heart Failure  CLINICAL DOCUMENTATION CLARIFICATION FORM    How To Answer This Query:  1)  Click \"Edit Button\" on the toolbar. 2)  Type an \"X\" in the bracket for the diagnosis that applies. (You may also add additional clinical details as you feel necessary to substantiate your response). 3)  Finally, click \"Sign\" to complete response. Thank you! Dear Doctor:  Lili Cox information (provided below) in Cardiology Consult includes a diagnosis of HFpEF. For accurate ICD-10-CM code assignment to reflect severity of illness and risk of mortality, please specify acuity as:  PLEASE (X) ALL DIAGNOSES THAT APPLY. (  X  ) Acute Diastolic Heart Failure    (    ) Acute on Chronic Diastolic Heart Failure     (    ) Chronic Diastolic Heart Failure       (    ) Other - please specify:     (    ) Unable to determine - please comment:         Documentation from the Medical Record:     02/21 Card Consult:  HPI:   Sherry Levine is a 27year old female who is referred by Dr. Raúl Crurie for evaluation and management of HFpEF, mildly elevated troponin. She is morbidly obese with hypertension, hypertensive heart disease, chronic kidney disease.e on CKD    02/21 Card Assessment & Plan:  HFpEF/? Volume overload  Difficult to assess volume status clinically  CXR does not look like congestion  Mildly elevated troponin  Acute on CKD    02/21 pro-BNP 3,566    02/21 CXR - CONCLUSION:   1. No acute cardiopulmonary disease     02/2 Echo - Study Conclusions   1. Left ventricle: The cavity size was normal. There was concentric      hypertrophy. Systolic function was normal. The estimated      ejection fraction was 60-65%, by biplane method of disks. Wall      motion was normal; there were no regional wall motion      abnormalities. Left ventricular diastolic function parameters      were indeterminate. 2. Aortic valve: Peak velocity (S): 1.73m/sec. Mean gradient (S):      7mm Hg. Peak gradient (S): 12mm Hg.  Valve area (VTI): 4.12cm^2. Valve area (Vmax): 3.56cm^2. 3. Left atrium: The atrium was mildly dilated. 4. Pericardium, extracardiac: A small pericardial effusion was      identified. No significant change since prior study. 02/22 - Lasix 40 mg IV once  02/22 - Lasix 40 mg po once  Discharge Medication:  Lasix 40 mg    PMH:  HTN, CKD, Morbid Obesity      If you have any questions, please contact Clinical :  Carla Reece at [phone]     Thank You!      THIS FORM IS A PERMANENT PART OF THE MEDICAL RECORD

## 2022-04-21 ENCOUNTER — HOSPITAL ENCOUNTER (EMERGENCY)
Facility: HOSPITAL | Age: 32
Discharge: HOME | End: 2022-04-21
Attending: EMERGENCY MEDICINE
Payer: COMMERCIAL

## 2022-04-21 ENCOUNTER — APPOINTMENT (OUTPATIENT)
Dept: GENERAL RADIOLOGY | Facility: HOSPITAL | Age: 32
End: 2022-04-21
Attending: EMERGENCY MEDICINE
Payer: COMMERCIAL

## 2022-04-21 VITALS
DIASTOLIC BLOOD PRESSURE: 74 MMHG | RESPIRATION RATE: 23 BRPM | BODY MASS INDEX: 40.82 KG/M2 | SYSTOLIC BLOOD PRESSURE: 132 MMHG | HEART RATE: 81 BPM | HEIGHT: 66 IN | TEMPERATURE: 98 F | OXYGEN SATURATION: 97 % | WEIGHT: 254 LBS

## 2022-04-21 DIAGNOSIS — N30.00 ACUTE CYSTITIS WITHOUT HEMATURIA: ICD-10-CM

## 2022-04-21 DIAGNOSIS — R07.89 CHEST PAIN, ATYPICAL: Primary | ICD-10-CM

## 2022-04-21 LAB
ANION GAP SERPL CALC-SCNC: 8 MMOL/L (ref 0–18)
B-HCG UR QL: NEGATIVE
BASOPHILS # BLD AUTO: 0.04 X10(3) UL (ref 0–0.2)
BASOPHILS NFR BLD AUTO: 0.5 %
BILIRUB UR QL: NEGATIVE
BUN BLD-MCNC: 30 MG/DL (ref 7–18)
BUN/CREAT SERPL: 12.4 (ref 10–20)
CALCIUM BLD-MCNC: 9.7 MG/DL (ref 8.5–10.1)
CHLORIDE SERPL-SCNC: 107 MMOL/L (ref 98–112)
CO2 SERPL-SCNC: 25 MMOL/L (ref 21–32)
COLOR UR: YELLOW
CREAT BLD-MCNC: 2.41 MG/DL
DEPRECATED RDW RBC AUTO: 39.6 FL (ref 35.1–46.3)
EOSINOPHIL # BLD AUTO: 0.17 X10(3) UL (ref 0–0.7)
EOSINOPHIL NFR BLD AUTO: 2.1 %
ERYTHROCYTE [DISTWIDTH] IN BLOOD BY AUTOMATED COUNT: 13 % (ref 11–15)
GLUCOSE BLD-MCNC: 147 MG/DL (ref 70–99)
GLUCOSE UR-MCNC: >=500 MG/DL
HCT VFR BLD AUTO: 38.9 %
HGB BLD-MCNC: 12.4 G/DL
HGB UR QL STRIP.AUTO: NEGATIVE
HYALINE CASTS #/AREA URNS AUTO: PRESENT /LPF
IMM GRANULOCYTES # BLD AUTO: 0.02 X10(3) UL (ref 0–1)
IMM GRANULOCYTES NFR BLD: 0.2 %
KETONES UR-MCNC: NEGATIVE MG/DL
LEUKOCYTE ESTERASE UR QL STRIP.AUTO: NEGATIVE
LYMPHOCYTES # BLD AUTO: 2.51 X10(3) UL (ref 1–4)
LYMPHOCYTES NFR BLD AUTO: 30.6 %
MCH RBC QN AUTO: 26.8 PG (ref 26–34)
MCHC RBC AUTO-ENTMCNC: 31.9 G/DL (ref 31–37)
MCV RBC AUTO: 84.2 FL
MONOCYTES # BLD AUTO: 0.38 X10(3) UL (ref 0.1–1)
MONOCYTES NFR BLD AUTO: 4.6 %
NEUTROPHILS # BLD AUTO: 5.09 X10 (3) UL (ref 1.5–7.7)
NEUTROPHILS # BLD AUTO: 5.09 X10(3) UL (ref 1.5–7.7)
NEUTROPHILS NFR BLD AUTO: 62 %
NITRITE UR QL STRIP.AUTO: NEGATIVE
OSMOLALITY SERPL CALC.SUM OF ELEC: 299 MOSM/KG (ref 275–295)
PH UR: 5 [PH] (ref 5–8)
PLATELET # BLD AUTO: 393 10(3)UL (ref 150–450)
POTASSIUM SERPL-SCNC: 4 MMOL/L (ref 3.5–5.1)
PROT UR-MCNC: >=500 MG/DL
RBC # BLD AUTO: 4.62 X10(6)UL
SODIUM SERPL-SCNC: 140 MMOL/L (ref 136–145)
SP GR UR STRIP: 1.02 (ref 1–1.03)
TROPONIN I HIGH SENSITIVITY: 22 NG/L
UROBILINOGEN UR STRIP-ACNC: <2
VIT C UR-MCNC: NEGATIVE MG/DL
WBC # BLD AUTO: 8.2 X10(3) UL (ref 4–11)

## 2022-04-21 PROCEDURE — 85025 COMPLETE CBC W/AUTO DIFF WBC: CPT

## 2022-04-21 PROCEDURE — 99284 EMERGENCY DEPT VISIT MOD MDM: CPT

## 2022-04-21 PROCEDURE — 85025 COMPLETE CBC W/AUTO DIFF WBC: CPT | Performed by: EMERGENCY MEDICINE

## 2022-04-21 PROCEDURE — 96375 TX/PRO/DX INJ NEW DRUG ADDON: CPT

## 2022-04-21 PROCEDURE — 80048 BASIC METABOLIC PNL TOTAL CA: CPT | Performed by: EMERGENCY MEDICINE

## 2022-04-21 PROCEDURE — 81025 URINE PREGNANCY TEST: CPT

## 2022-04-21 PROCEDURE — 81001 URINALYSIS AUTO W/SCOPE: CPT | Performed by: EMERGENCY MEDICINE

## 2022-04-21 PROCEDURE — 93005 ELECTROCARDIOGRAM TRACING: CPT

## 2022-04-21 PROCEDURE — 96374 THER/PROPH/DIAG INJ IV PUSH: CPT

## 2022-04-21 PROCEDURE — 93010 ELECTROCARDIOGRAM REPORT: CPT | Performed by: INTERNAL MEDICINE

## 2022-04-21 PROCEDURE — 71045 X-RAY EXAM CHEST 1 VIEW: CPT | Performed by: EMERGENCY MEDICINE

## 2022-04-21 PROCEDURE — 84484 ASSAY OF TROPONIN QUANT: CPT | Performed by: EMERGENCY MEDICINE

## 2022-04-21 RX ORDER — CEPHALEXIN 500 MG/1
500 CAPSULE ORAL 2 TIMES DAILY
Qty: 10 CAPSULE | Refills: 0 | Status: SHIPPED | OUTPATIENT
Start: 2022-04-21 | End: 2022-04-26

## 2022-04-21 RX ORDER — MORPHINE SULFATE 4 MG/ML
4 INJECTION, SOLUTION INTRAMUSCULAR; INTRAVENOUS ONCE
Status: COMPLETED | OUTPATIENT
Start: 2022-04-21 | End: 2022-04-21

## 2022-04-21 RX ORDER — ONDANSETRON 2 MG/ML
4 INJECTION INTRAMUSCULAR; INTRAVENOUS ONCE
Status: COMPLETED | OUTPATIENT
Start: 2022-04-21 | End: 2022-04-21

## 2022-04-21 NOTE — ED INITIAL ASSESSMENT (HPI)
Right sided chest pains intermittent over the last few days. She states on her way to work today , she developed nausea. Denies vomiting. Describes chest pain as \"pressure. \"  Patient with extensive medical hx.

## 2022-04-21 NOTE — ED QUICK NOTES
Patient given discharge instructions, work note and prescriptions sent to pharmacy. Patient verbalized understanding. Ambulated out of ED with steady gait.

## 2022-05-13 ENCOUNTER — APPOINTMENT (OUTPATIENT)
Dept: ULTRASOUND IMAGING | Facility: HOSPITAL | Age: 32
End: 2022-05-13
Attending: EMERGENCY MEDICINE
Payer: COMMERCIAL

## 2022-05-13 ENCOUNTER — APPOINTMENT (OUTPATIENT)
Dept: GENERAL RADIOLOGY | Facility: HOSPITAL | Age: 32
End: 2022-05-13
Attending: EMERGENCY MEDICINE
Payer: COMMERCIAL

## 2022-05-13 ENCOUNTER — HOSPITAL ENCOUNTER (EMERGENCY)
Facility: HOSPITAL | Age: 32
Discharge: HOME | End: 2022-05-13
Attending: EMERGENCY MEDICINE
Payer: COMMERCIAL

## 2022-05-13 VITALS
OXYGEN SATURATION: 95 % | TEMPERATURE: 98 F | HEIGHT: 65 IN | BODY MASS INDEX: 40.32 KG/M2 | RESPIRATION RATE: 20 BRPM | SYSTOLIC BLOOD PRESSURE: 175 MMHG | DIASTOLIC BLOOD PRESSURE: 96 MMHG | HEART RATE: 81 BPM | WEIGHT: 242 LBS

## 2022-05-13 DIAGNOSIS — R60.0 BILATERAL LEG EDEMA: Primary | ICD-10-CM

## 2022-05-13 LAB
ANION GAP SERPL CALC-SCNC: 8 MMOL/L (ref 0–18)
B-HCG UR QL: NEGATIVE
BASOPHILS # BLD AUTO: 0.05 X10(3) UL (ref 0–0.2)
BASOPHILS NFR BLD AUTO: 0.6 %
BILIRUB UR QL: NEGATIVE
BUN BLD-MCNC: 29 MG/DL (ref 7–18)
BUN/CREAT SERPL: 15.3 (ref 10–20)
CALCIUM BLD-MCNC: 9 MG/DL (ref 8.5–10.1)
CHLORIDE SERPL-SCNC: 111 MMOL/L (ref 98–112)
CLARITY UR: CLEAR
CO2 SERPL-SCNC: 21 MMOL/L (ref 21–32)
CREAT BLD-MCNC: 1.89 MG/DL
DEPRECATED RDW RBC AUTO: 38.4 FL (ref 35.1–46.3)
EOSINOPHIL # BLD AUTO: 0.18 X10(3) UL (ref 0–0.7)
EOSINOPHIL NFR BLD AUTO: 2.3 %
ERYTHROCYTE [DISTWIDTH] IN BLOOD BY AUTOMATED COUNT: 12.7 % (ref 11–15)
GLUCOSE BLD-MCNC: 101 MG/DL (ref 70–99)
GLUCOSE UR-MCNC: 500 MG/DL
HCT VFR BLD AUTO: 34.6 %
HGB BLD-MCNC: 11.3 G/DL
IMM GRANULOCYTES # BLD AUTO: 0.02 X10(3) UL (ref 0–1)
IMM GRANULOCYTES NFR BLD: 0.3 %
KETONES UR-MCNC: NEGATIVE MG/DL
LEUKOCYTE ESTERASE UR QL STRIP.AUTO: NEGATIVE
LYMPHOCYTES # BLD AUTO: 2.75 X10(3) UL (ref 1–4)
LYMPHOCYTES NFR BLD AUTO: 35.7 %
MCH RBC QN AUTO: 27.1 PG (ref 26–34)
MCHC RBC AUTO-ENTMCNC: 32.7 G/DL (ref 31–37)
MCV RBC AUTO: 83 FL
MONOCYTES # BLD AUTO: 0.48 X10(3) UL (ref 0.1–1)
MONOCYTES NFR BLD AUTO: 6.2 %
NEUTROPHILS # BLD AUTO: 4.22 X10 (3) UL (ref 1.5–7.7)
NEUTROPHILS # BLD AUTO: 4.22 X10(3) UL (ref 1.5–7.7)
NEUTROPHILS NFR BLD AUTO: 54.9 %
NITRITE UR QL STRIP.AUTO: NEGATIVE
NT-PROBNP SERPL-MCNC: 236 PG/ML (ref ?–125)
OSMOLALITY SERPL CALC.SUM OF ELEC: 296 MOSM/KG (ref 275–295)
PH UR: 5.5 [PH] (ref 5–8)
PLATELET # BLD AUTO: 373 10(3)UL (ref 150–450)
POTASSIUM SERPL-SCNC: 3.9 MMOL/L (ref 3.5–5.1)
PROT UR-MCNC: >=300 MG/DL
RBC # BLD AUTO: 4.17 X10(6)UL
SARS-COV-2 RNA RESP QL NAA+PROBE: NOT DETECTED
SODIUM SERPL-SCNC: 140 MMOL/L (ref 136–145)
SP GR UR STRIP: 1.02 (ref 1–1.03)
TROPONIN I HIGH SENSITIVITY: 20 NG/L
UROBILINOGEN UR STRIP-ACNC: 0.2
WBC # BLD AUTO: 7.7 X10(3) UL (ref 4–11)

## 2022-05-13 PROCEDURE — 85025 COMPLETE CBC W/AUTO DIFF WBC: CPT | Performed by: EMERGENCY MEDICINE

## 2022-05-13 PROCEDURE — 71045 X-RAY EXAM CHEST 1 VIEW: CPT | Performed by: EMERGENCY MEDICINE

## 2022-05-13 PROCEDURE — 93005 ELECTROCARDIOGRAM TRACING: CPT

## 2022-05-13 PROCEDURE — 80048 BASIC METABOLIC PNL TOTAL CA: CPT | Performed by: EMERGENCY MEDICINE

## 2022-05-13 PROCEDURE — 93010 ELECTROCARDIOGRAM REPORT: CPT | Performed by: EMERGENCY MEDICINE

## 2022-05-13 PROCEDURE — 84484 ASSAY OF TROPONIN QUANT: CPT | Performed by: EMERGENCY MEDICINE

## 2022-05-13 PROCEDURE — 81025 URINE PREGNANCY TEST: CPT

## 2022-05-13 PROCEDURE — 94640 AIRWAY INHALATION TREATMENT: CPT

## 2022-05-13 PROCEDURE — 81015 MICROSCOPIC EXAM OF URINE: CPT | Performed by: EMERGENCY MEDICINE

## 2022-05-13 PROCEDURE — 93970 EXTREMITY STUDY: CPT | Performed by: EMERGENCY MEDICINE

## 2022-05-13 PROCEDURE — 83880 ASSAY OF NATRIURETIC PEPTIDE: CPT | Performed by: EMERGENCY MEDICINE

## 2022-05-13 PROCEDURE — 99285 EMERGENCY DEPT VISIT HI MDM: CPT

## 2022-05-13 PROCEDURE — 96374 THER/PROPH/DIAG INJ IV PUSH: CPT

## 2022-05-13 RX ORDER — IPRATROPIUM BROMIDE AND ALBUTEROL SULFATE 2.5; .5 MG/3ML; MG/3ML
3 SOLUTION RESPIRATORY (INHALATION) ONCE
Status: COMPLETED | OUTPATIENT
Start: 2022-05-13 | End: 2022-05-13

## 2022-05-13 RX ORDER — FUROSEMIDE 10 MG/ML
20 INJECTION INTRAMUSCULAR; INTRAVENOUS ONCE
Status: COMPLETED | OUTPATIENT
Start: 2022-05-13 | End: 2022-05-13

## 2022-05-13 RX ORDER — ALBUTEROL SULFATE 90 UG/1
2 AEROSOL, METERED RESPIRATORY (INHALATION) EVERY 4 HOURS PRN
Qty: 1 EACH | Refills: 0 | Status: SHIPPED | OUTPATIENT
Start: 2022-05-13 | End: 2022-06-12

## 2022-05-13 NOTE — ED QUICK NOTES
Pt states yesterday and today had episode of shortness of breath. States was given breathing treatment which seemed to help. States today began having swelling to bilateral legs. Pt has h/o diabetes, heart failure, and hypertension. No resp distress noted at this time.

## 2022-05-13 NOTE — ED INITIAL ASSESSMENT (HPI)
Patient here with bilateral leg swelling. Patient states she was having chest pain and difficulty breathing yesterday. Patient had a breathing treatment while at work yesterday and felt that symptoms improved.

## 2022-07-08 ENCOUNTER — APPOINTMENT (OUTPATIENT)
Dept: GENERAL RADIOLOGY | Facility: HOSPITAL | Age: 32
End: 2022-07-08
Attending: EMERGENCY MEDICINE
Payer: COMMERCIAL

## 2022-07-08 ENCOUNTER — HOSPITAL ENCOUNTER (INPATIENT)
Facility: HOSPITAL | Age: 32
LOS: 3 days | Discharge: HOME | End: 2022-07-11
Attending: EMERGENCY MEDICINE | Admitting: INTERNAL MEDICINE
Payer: COMMERCIAL

## 2022-07-08 DIAGNOSIS — N28.9 ACUTE ON CHRONIC RENAL INSUFFICIENCY: Primary | ICD-10-CM

## 2022-07-08 DIAGNOSIS — N18.9 ACUTE ON CHRONIC RENAL INSUFFICIENCY: Primary | ICD-10-CM

## 2022-07-08 DIAGNOSIS — R60.9 PERIPHERAL EDEMA: ICD-10-CM

## 2022-07-08 PROBLEM — R60.0 PERIPHERAL EDEMA: Status: ACTIVE | Noted: 2022-07-08

## 2022-07-08 LAB
ALBUMIN SERPL-MCNC: 3.5 G/DL (ref 3.4–5)
ALBUMIN/GLOB SERPL: 0.9 {RATIO} (ref 1–2)
ALP LIVER SERPL-CCNC: 71 U/L
ALT SERPL-CCNC: 14 U/L
ANION GAP SERPL CALC-SCNC: 11 MMOL/L (ref 0–18)
AST SERPL-CCNC: 16 U/L (ref 15–37)
B-HCG UR QL: NEGATIVE
BASOPHILS # BLD AUTO: 0.05 X10(3) UL (ref 0–0.2)
BASOPHILS NFR BLD AUTO: 0.6 %
BILIRUB SERPL-MCNC: 0.3 MG/DL (ref 0.1–2)
BILIRUB UR QL: NEGATIVE
BUN BLD-MCNC: 32 MG/DL (ref 7–18)
BUN/CREAT SERPL: 12.5 (ref 10–20)
CALCIUM BLD-MCNC: 8.7 MG/DL (ref 8.5–10.1)
CHLORIDE SERPL-SCNC: 107 MMOL/L (ref 98–112)
CO2 SERPL-SCNC: 21 MMOL/L (ref 21–32)
COLOR UR: YELLOW
CREAT BLD-MCNC: 2.57 MG/DL
DEPRECATED RDW RBC AUTO: 39.5 FL (ref 35.1–46.3)
EOSINOPHIL # BLD AUTO: 0.2 X10(3) UL (ref 0–0.7)
EOSINOPHIL NFR BLD AUTO: 2.2 %
ERYTHROCYTE [DISTWIDTH] IN BLOOD BY AUTOMATED COUNT: 13 % (ref 11–15)
GLOBULIN PLAS-MCNC: 4.1 G/DL (ref 2.8–4.4)
GLUCOSE BLD-MCNC: 192 MG/DL (ref 70–99)
GLUCOSE BLDC GLUCOMTR-MCNC: 149 MG/DL (ref 70–99)
GLUCOSE UR-MCNC: >=500 MG/DL
HCT VFR BLD AUTO: 34.7 %
HGB BLD-MCNC: 11.2 G/DL
IMM GRANULOCYTES # BLD AUTO: 0.04 X10(3) UL (ref 0–1)
IMM GRANULOCYTES NFR BLD: 0.4 %
KETONES UR-MCNC: NEGATIVE MG/DL
LEUKOCYTE ESTERASE UR QL STRIP.AUTO: NEGATIVE
LYMPHOCYTES # BLD AUTO: 2.77 X10(3) UL (ref 1–4)
LYMPHOCYTES NFR BLD AUTO: 30.8 %
MCH RBC QN AUTO: 27 PG (ref 26–34)
MCHC RBC AUTO-ENTMCNC: 32.3 G/DL (ref 31–37)
MCV RBC AUTO: 83.6 FL
MONOCYTES # BLD AUTO: 0.51 X10(3) UL (ref 0.1–1)
MONOCYTES NFR BLD AUTO: 5.7 %
NEUTROPHILS # BLD AUTO: 5.43 X10 (3) UL (ref 1.5–7.7)
NEUTROPHILS # BLD AUTO: 5.43 X10(3) UL (ref 1.5–7.7)
NEUTROPHILS NFR BLD AUTO: 60.3 %
NITRITE UR QL STRIP.AUTO: NEGATIVE
NT-PROBNP SERPL-MCNC: 281 PG/ML (ref ?–125)
OSMOLALITY SERPL CALC.SUM OF ELEC: 300 MOSM/KG (ref 275–295)
PH UR: 5 [PH] (ref 5–8)
PLATELET # BLD AUTO: 362 10(3)UL (ref 150–450)
POTASSIUM SERPL-SCNC: 3 MMOL/L (ref 3.5–5.1)
PROT SERPL-MCNC: 7.6 G/DL (ref 6.4–8.2)
PROT UR-MCNC: >=500 MG/DL
RBC # BLD AUTO: 4.15 X10(6)UL
SARS-COV-2 RNA RESP QL NAA+PROBE: NOT DETECTED
SODIUM SERPL-SCNC: 139 MMOL/L (ref 136–145)
SODIUM SERPL-SCNC: 30 MMOL/L
SP GR UR STRIP: 1.01 (ref 1–1.03)
TROPONIN I HIGH SENSITIVITY: 16 NG/L
UROBILINOGEN UR STRIP-ACNC: <2
UUN UR-MCNC: 501 MG/DL
VIT C UR-MCNC: NEGATIVE MG/DL
WBC # BLD AUTO: 9 X10(3) UL (ref 4–11)

## 2022-07-08 PROCEDURE — 94640 AIRWAY INHALATION TREATMENT: CPT

## 2022-07-08 PROCEDURE — 81025 URINE PREGNANCY TEST: CPT

## 2022-07-08 PROCEDURE — 81001 URINALYSIS AUTO W/SCOPE: CPT | Performed by: EMERGENCY MEDICINE

## 2022-07-08 PROCEDURE — S0171 BUMETANIDE 0.5 MG: HCPCS | Performed by: EMERGENCY MEDICINE

## 2022-07-08 PROCEDURE — 71045 X-RAY EXAM CHEST 1 VIEW: CPT | Performed by: EMERGENCY MEDICINE

## 2022-07-08 PROCEDURE — 85025 COMPLETE CBC W/AUTO DIFF WBC: CPT | Performed by: EMERGENCY MEDICINE

## 2022-07-08 PROCEDURE — 84300 ASSAY OF URINE SODIUM: CPT | Performed by: EMERGENCY MEDICINE

## 2022-07-08 PROCEDURE — 82570 ASSAY OF URINE CREATININE: CPT | Performed by: EMERGENCY MEDICINE

## 2022-07-08 PROCEDURE — 82962 GLUCOSE BLOOD TEST: CPT

## 2022-07-08 PROCEDURE — 93005 ELECTROCARDIOGRAM TRACING: CPT

## 2022-07-08 PROCEDURE — 93010 ELECTROCARDIOGRAM REPORT: CPT | Performed by: EMERGENCY MEDICINE

## 2022-07-08 PROCEDURE — 80053 COMPREHEN METABOLIC PANEL: CPT | Performed by: EMERGENCY MEDICINE

## 2022-07-08 PROCEDURE — 84540 ASSAY OF URINE/UREA-N: CPT | Performed by: EMERGENCY MEDICINE

## 2022-07-08 PROCEDURE — 96374 THER/PROPH/DIAG INJ IV PUSH: CPT

## 2022-07-08 PROCEDURE — 93010 ELECTROCARDIOGRAM REPORT: CPT

## 2022-07-08 PROCEDURE — 99285 EMERGENCY DEPT VISIT HI MDM: CPT

## 2022-07-08 PROCEDURE — 84484 ASSAY OF TROPONIN QUANT: CPT | Performed by: EMERGENCY MEDICINE

## 2022-07-08 PROCEDURE — 83880 ASSAY OF NATRIURETIC PEPTIDE: CPT | Performed by: EMERGENCY MEDICINE

## 2022-07-08 RX ORDER — BUMETANIDE 0.25 MG/ML
2 INJECTION, SOLUTION INTRAMUSCULAR; INTRAVENOUS ONCE
Status: COMPLETED | OUTPATIENT
Start: 2022-07-08 | End: 2022-07-08

## 2022-07-08 RX ORDER — POTASSIUM CHLORIDE 20 MEQ/1
40 TABLET, EXTENDED RELEASE ORAL ONCE
Status: COMPLETED | OUTPATIENT
Start: 2022-07-08 | End: 2022-07-08

## 2022-07-08 RX ORDER — IPRATROPIUM BROMIDE AND ALBUTEROL SULFATE 2.5; .5 MG/3ML; MG/3ML
3 SOLUTION RESPIRATORY (INHALATION) ONCE
Status: COMPLETED | OUTPATIENT
Start: 2022-07-08 | End: 2022-07-08

## 2022-07-09 LAB
ALBUMIN SERPL-MCNC: 3.4 G/DL (ref 3.4–5)
ANION GAP SERPL CALC-SCNC: 10 MMOL/L (ref 0–18)
BUN BLD-MCNC: 28 MG/DL (ref 7–18)
BUN/CREAT SERPL: 13 (ref 10–20)
CALCIUM BLD-MCNC: 9.1 MG/DL (ref 8.5–10.1)
CHLORIDE SERPL-SCNC: 111 MMOL/L (ref 98–112)
CO2 SERPL-SCNC: 21 MMOL/L (ref 21–32)
CREAT BLD-MCNC: 2.15 MG/DL
CREAT UR-SCNC: 44.5 MG/DL
CREAT UR-SCNC: 44.5 MG/DL
CREAT UR-SCNC: 94.8 MG/DL
EST. AVERAGE GLUCOSE BLD GHB EST-MCNC: 146 MG/DL (ref 68–126)
GLUCOSE BLD-MCNC: 146 MG/DL (ref 70–99)
GLUCOSE BLDC GLUCOMTR-MCNC: 130 MG/DL (ref 70–99)
GLUCOSE BLDC GLUCOMTR-MCNC: 136 MG/DL (ref 70–99)
GLUCOSE BLDC GLUCOMTR-MCNC: 157 MG/DL (ref 70–99)
GLUCOSE BLDC GLUCOMTR-MCNC: 179 MG/DL (ref 70–99)
HBA1C MFR BLD: 6.7 % (ref ?–5.7)
MAGNESIUM SERPL-MCNC: 2.5 MG/DL (ref 1.6–2.6)
MICROALBUMIN UR-MCNC: 161 MG/DL
MICROALBUMIN/CREAT 24H UR-RTO: 3618 UG/MG (ref ?–30)
OSMOLALITY SERPL CALC.SUM OF ELEC: 302 MOSM/KG (ref 275–295)
PHOSPHATE SERPL-MCNC: 3.5 MG/DL (ref 2.5–4.9)
POTASSIUM SERPL-SCNC: 3.3 MMOL/L (ref 3.5–5.1)
POTASSIUM SERPL-SCNC: 3.6 MMOL/L (ref 3.5–5.1)
PROT UR-MCNC: 193.1 MG/DL
PROT/CREAT UR-RTO: 4.34
SODIUM SERPL-SCNC: 142 MMOL/L (ref 136–145)

## 2022-07-09 PROCEDURE — 84156 ASSAY OF PROTEIN URINE: CPT | Performed by: INTERNAL MEDICINE

## 2022-07-09 PROCEDURE — 83036 HEMOGLOBIN GLYCOSYLATED A1C: CPT | Performed by: INTERNAL MEDICINE

## 2022-07-09 PROCEDURE — 82570 ASSAY OF URINE CREATININE: CPT | Performed by: INTERNAL MEDICINE

## 2022-07-09 PROCEDURE — 80069 RENAL FUNCTION PANEL: CPT | Performed by: INTERNAL MEDICINE

## 2022-07-09 PROCEDURE — 82043 UR ALBUMIN QUANTITATIVE: CPT | Performed by: INTERNAL MEDICINE

## 2022-07-09 PROCEDURE — 84132 ASSAY OF SERUM POTASSIUM: CPT | Performed by: INTERNAL MEDICINE

## 2022-07-09 PROCEDURE — 82962 GLUCOSE BLOOD TEST: CPT

## 2022-07-09 PROCEDURE — 83735 ASSAY OF MAGNESIUM: CPT | Performed by: INTERNAL MEDICINE

## 2022-07-09 PROCEDURE — S0171 BUMETANIDE 0.5 MG: HCPCS | Performed by: INTERNAL MEDICINE

## 2022-07-09 RX ORDER — SPIRONOLACTONE 50 MG/1
50 TABLET, FILM COATED ORAL DAILY
Status: DISCONTINUED | OUTPATIENT
Start: 2022-07-10 | End: 2022-07-11

## 2022-07-09 RX ORDER — SPIRONOLACTONE 25 MG/1
25 TABLET ORAL DAILY
Status: DISCONTINUED | OUTPATIENT
Start: 2022-07-09 | End: 2022-07-09

## 2022-07-09 RX ORDER — BUMETANIDE 0.25 MG/ML
2 INJECTION, SOLUTION INTRAMUSCULAR; INTRAVENOUS ONCE
Status: COMPLETED | OUTPATIENT
Start: 2022-07-09 | End: 2022-07-09

## 2022-07-09 RX ORDER — AMLODIPINE BESYLATE 10 MG/1
10 TABLET ORAL DAILY
Status: DISCONTINUED | OUTPATIENT
Start: 2022-07-09 | End: 2022-07-09

## 2022-07-09 RX ORDER — SPIRONOLACTONE 25 MG/1
25 TABLET ORAL ONCE
Status: COMPLETED | OUTPATIENT
Start: 2022-07-09 | End: 2022-07-09

## 2022-07-09 RX ORDER — POTASSIUM CHLORIDE 20 MEQ/1
40 TABLET, EXTENDED RELEASE ORAL ONCE
Status: COMPLETED | OUTPATIENT
Start: 2022-07-09 | End: 2022-07-09

## 2022-07-09 RX ORDER — FUROSEMIDE 40 MG/1
40 TABLET ORAL DAILY
Status: DISCONTINUED | OUTPATIENT
Start: 2022-07-09 | End: 2022-07-10

## 2022-07-09 RX ORDER — ACETAMINOPHEN 325 MG/1
650 TABLET ORAL EVERY 6 HOURS PRN
Status: DISCONTINUED | OUTPATIENT
Start: 2022-07-09 | End: 2022-07-11

## 2022-07-09 RX ORDER — CARVEDILOL 12.5 MG/1
12.5 TABLET ORAL 2 TIMES DAILY
Status: DISCONTINUED | OUTPATIENT
Start: 2022-07-09 | End: 2022-07-11

## 2022-07-09 RX ORDER — NICOTINE POLACRILEX 4 MG
30 LOZENGE BUCCAL
Status: DISCONTINUED | OUTPATIENT
Start: 2022-07-09 | End: 2022-07-11

## 2022-07-09 RX ORDER — DEXTROSE MONOHYDRATE 25 G/50ML
50 INJECTION, SOLUTION INTRAVENOUS
Status: DISCONTINUED | OUTPATIENT
Start: 2022-07-09 | End: 2022-07-11

## 2022-07-09 RX ORDER — ALBUTEROL SULFATE 90 UG/1
2 AEROSOL, METERED RESPIRATORY (INHALATION) EVERY 4 HOURS PRN
Status: DISCONTINUED | OUTPATIENT
Start: 2022-07-09 | End: 2022-07-11

## 2022-07-09 RX ORDER — NICOTINE POLACRILEX 4 MG
15 LOZENGE BUCCAL
Status: DISCONTINUED | OUTPATIENT
Start: 2022-07-09 | End: 2022-07-11

## 2022-07-09 RX ORDER — ATORVASTATIN CALCIUM 40 MG/1
40 TABLET, FILM COATED ORAL NIGHTLY
Status: DISCONTINUED | OUTPATIENT
Start: 2022-07-09 | End: 2022-07-11

## 2022-07-09 RX ORDER — NIFEDIPINE 90 MG/1
90 TABLET, EXTENDED RELEASE ORAL NIGHTLY
Status: DISCONTINUED | OUTPATIENT
Start: 2022-07-09 | End: 2022-07-11

## 2022-07-09 RX ORDER — HYDRALAZINE HYDROCHLORIDE 20 MG/ML
10 INJECTION INTRAMUSCULAR; INTRAVENOUS ONCE
Status: DISCONTINUED | OUTPATIENT
Start: 2022-07-09 | End: 2022-07-11

## 2022-07-09 NOTE — ED QUICK NOTES
Breathing easy post duo neb, iv inserted, blood and covid swab  and urine sent to lab. ekg  And cxr done after ucg neg, afebrile , vs wnl.  Pt talking in full sentences, le edema noted, on lasix, hx niddm, htn, asthma, per pt

## 2022-07-09 NOTE — PLAN OF CARE
Pt came to the ER for edema and complaint of SOB. Alert and oriented x4. RA. Denies pain overnight. Nifedipine was given for bp management. Plan of care was discussed by Dr Jovan [address]. Pt complained of headache and had elevated bp. MD notified. Order for tylenol prn and given amlodipine early. Report was given to the day nurse. Nephrologist and Cardiologist and consult. Call light within each and monitoring will continue. Problem: Patient Centered Care  Goal: Patient preferences are identified and integrated in the patient's plan of care  Description: Interventions:  - What would you like us to know as we care for you?  Lives home alone  - Provide timely, complete, and accurate information to patient/family  - Incorporate patient and family knowledge, values, beliefs, and cultural backgrounds into the planning and delivery of care  - Encourage patient/family to participate in care and decision-making at the level they choose  - Honor patient and family perspectives and choices  Outcome: Progressing     Problem: PAIN - ADULT  Goal: Verbalizes/displays adequate comfort level or patient's stated pain goal  Description: INTERVENTIONS:  - Encourage pt to monitor pain and request assistance  - Assess pain using appropriate pain scale  - Administer analgesics based on type and severity of pain and evaluate response  - Implement non-pharmacological measures as appropriate and evaluate response  - Consider cultural and social influences on pain and pain management  - Manage/alleviate anxiety  - Utilize distraction and/or relaxation techniques  - Monitor for opioid side effects  - Notify MD/LIP if interventions unsuccessful or patient reports new pain  - Anticipate increased pain with activity and pre-medicate as appropriate  Outcome: Progressing     Problem: RISK FOR INFECTION - ADULT  Goal: Absence of fever/infection during anticipated neutropenic period  Description: INTERVENTIONS  - Monitor WBC  - Administer growth factors as ordered  - Implement neutropenic guidelines  Outcome: Progressing     Problem: SAFETY ADULT - FALL  Goal: Free from fall injury  Description: INTERVENTIONS:  - Assess pt frequently for physical needs  - Identify cognitive and physical deficits and behaviors that affect risk of falls.   - [place] fall precautions as indicated by assessment.  - Educate pt/family on patient safety including physical limitations  - Instruct pt to call for assistance with activity based on assessment  - Modify environment to reduce risk of injury  - Provide assistive devices as appropriate  - Consider OT/PT consult to assist with strengthening/mobility  - Encourage toileting schedule  Outcome: Progressing     Problem: DISCHARGE PLANNING  Goal: Discharge to home or other facility with appropriate resources  Description: INTERVENTIONS:  - Identify barriers to discharge w/pt and caregiver  - Include patient/family/discharge partner in discharge planning  - Arrange for needed discharge resources and transportation as appropriate  - Identify discharge learning needs (meds, wound care, etc)  - Arrange for interpreters to assist at discharge as needed  - Consider post-discharge preferences of patient/family/discharge partner  - Complete POLST form as appropriate  - Assess patient's ability to be responsible for managing their own health  - Refer to Case Management Department for coordinating discharge planning if the patient needs post-hospital services based on physician/LIP order or complex needs related to functional status, cognitive ability or social support system  Outcome: Progressing     Problem: Altered Communication/Language Barrier  Goal: Patient/Family is able to understand and participate in their care  Description: Interventions:  - Assess communication ability and preferred communication style  - Implement communication aides and strategies  - Use visual cues when possible  - Listen attentively, be patient, do not interrupt  - Minimize distractions  - Allow time for understanding and response  - Establish method for patient to ask for assistance (call light)  - Provide an  as needed  - Communicate barriers and strategies to overcome with those who interact with patient  Outcome: Progressing     Problem: Patient/Family Goals  Goal: Patient/Family Long Term Goal  Description: Patient's Long Term Goal:     Interventions:  -   - See additional Care Plan goals for specific interventions  Outcome: Progressing  Goal: Patient/Family Short Term Goal  Description: Patient's Short Term Goal:     Interventions:   -  - See additional Care Plan goals for specific interventions  Outcome: Progressing

## 2022-07-09 NOTE — ED QUICK NOTES
Orders for admission, patient is aware of plan and ready to go upstairs. Any questions, please call ED darwin ROCHA at extension [phone]  Patient Covid vaccination status: Unvaccinated     COVID Test Ordered in ED: Rapid SARS-CoV-2 by PCR    COVID Suspicion at Admission: Low clinical suspicion for COVID    Running Infusions:  None    Mental Status/LOC at time of transport: x 4    Other pertinent information:   bumex iv  and kdur  Po given 2115  . ..   CIWA score: N/A   NIH score:  N/A

## 2022-07-09 NOTE — H&P
[facility]    PATIENT'S NAME: Fernando Delgado   ATTENDING PHYSICIAN: Elisa Lemus MD   PATIENT ACCOUNT#:   [de-identified]    LOCATION:  [address] [place] [address] #:   [id number]       YOB: 1990  ADMISSION DATE:       07/08/2022    HISTORY AND PHYSICAL EXAMINATION    HISTORY OF PRESENT ILLNESS:  The patient is a 40-year-old female with a history of hypertension, hyperlipidemia, type 2 diabetes mellitus, chronic kidney disease, presented to the emergency room with a chief complaint of progressive lower extremity swelling for the last few days and also with exertional shortness of breath. Denies chest pain, palpitations. Denies fever, chills, cough. Patient was on Lasix daily. Because of the worsening of the symptoms, she came to the ER and was given a dose of diuretic and admitted for further treatment. Nephrology and Cardiology were consulted. PAST MEDICAL HISTORY:  Significant for hypertension, hyperlipidemia, type 2 diabetes mellitus, obesity, chronic kidney disease. PAST SURGICAL HISTORY:  Nothing significant. MEDICATIONS:  See MAR. ALLERGIES:  Iodine, latex, shellfish. FAMILY HISTORY:  Nothing significant. SOCIAL HISTORY:  No history of smoking, alcohol, or drugs. REVIEW OF SYSTEMS:  Denies any chest pain. Denies any abdominal pain, nausea, vomiting. Complains of shortness of breath. Denies any fever or chills. Denies any headache, dizziness, or syncope. PHYSICAL EXAMINATION:    GENERAL:  Awake and alert, not in acute distress. VITAL SIGNS:  Afebrile, pulse 89, blood pressure 167/88. HEENT:  Normocephalic. Pupils reactive to light. NECK:  Supple. No JVD. LUNGS:    Coarse breath sounds bilaterally. Basilar crackles. HEART:  S1 and S2 heard normally. ABDOMEN:  Soft, nondistended. Bowel sounds present. EXTREMITIES:  Edema 1 to 2+ of bilateral lower extremity. LABORATORY DATA:  Reviewed. ASSESSMENT AND PLAN:    1.    Acute kidney injury on chronic kidney disease. Continue diuretics. Nephrology was consulted. 2.   Hypertension, uncontrolled. Continue with the blood pressure medications and titrate the dose. Increase Coreg. Continue Coreg, and we will titrate the dose of blood pressure medications. 3.   Type 2 diabetes mellitus. Do Accu-Cheks before meals and at bedtime and sliding scale insulin. 4.   Morbid obesity. BMI of 42.  5.   DVT prophylaxis. Heparin subcutaneously q.8 h.  6.   Hypokalemia. Potassium is supplemented. 7.   Nutrition. On cardiac and renal diet. Restrict the carbohydrate diet.     Dictated By Sonal Armenta MD  d: 07/09/2022 00:05:48  t: 07/09/2022 00:24:44  Fadi March [id number]  WG/

## 2022-07-09 NOTE — ED INITIAL ASSESSMENT (HPI)
Patient presents to ED with bilat ankle swelling that started 5 days ago. Per patient she is SOB on and off.  Patient does take lasix and took a double dose per her PCP and the swelling has not gone down
Statement Selected

## 2022-07-10 LAB
ALBUMIN SERPL-MCNC: 3.1 G/DL (ref 3.4–5)
ANION GAP SERPL CALC-SCNC: 10 MMOL/L (ref 0–18)
BUN BLD-MCNC: 26 MG/DL (ref 7–18)
BUN/CREAT SERPL: 12.5 (ref 10–20)
CALCIUM BLD-MCNC: 8.7 MG/DL (ref 8.5–10.1)
CHLORIDE SERPL-SCNC: 110 MMOL/L (ref 98–112)
CO2 SERPL-SCNC: 22 MMOL/L (ref 21–32)
CREAT BLD-MCNC: 2.08 MG/DL
EST. AVERAGE GLUCOSE BLD GHB EST-MCNC: 146 MG/DL (ref 68–126)
GLUCOSE BLD-MCNC: 141 MG/DL (ref 70–99)
GLUCOSE BLDC GLUCOMTR-MCNC: 137 MG/DL (ref 70–99)
GLUCOSE BLDC GLUCOMTR-MCNC: 155 MG/DL (ref 70–99)
GLUCOSE BLDC GLUCOMTR-MCNC: 159 MG/DL (ref 70–99)
GLUCOSE BLDC GLUCOMTR-MCNC: 238 MG/DL (ref 70–99)
HBA1C MFR BLD: 6.7 % (ref ?–5.7)
MAGNESIUM SERPL-MCNC: 2.5 MG/DL (ref 1.6–2.6)
OSMOLALITY SERPL CALC.SUM OF ELEC: 301 MOSM/KG (ref 275–295)
PHOSPHATE SERPL-MCNC: 3.3 MG/DL (ref 2.5–4.9)
POTASSIUM SERPL-SCNC: 3.4 MMOL/L (ref 3.5–5.1)
POTASSIUM SERPL-SCNC: 3.5 MMOL/L (ref 3.5–5.1)
SODIUM SERPL-SCNC: 142 MMOL/L (ref 136–145)

## 2022-07-10 PROCEDURE — 83036 HEMOGLOBIN GLYCOSYLATED A1C: CPT | Performed by: INTERNAL MEDICINE

## 2022-07-10 PROCEDURE — 82962 GLUCOSE BLOOD TEST: CPT

## 2022-07-10 PROCEDURE — 84132 ASSAY OF SERUM POTASSIUM: CPT | Performed by: INTERNAL MEDICINE

## 2022-07-10 PROCEDURE — 83735 ASSAY OF MAGNESIUM: CPT | Performed by: INTERNAL MEDICINE

## 2022-07-10 PROCEDURE — 80069 RENAL FUNCTION PANEL: CPT | Performed by: INTERNAL MEDICINE

## 2022-07-10 RX ORDER — FUROSEMIDE 40 MG/1
40 TABLET ORAL
Status: DISCONTINUED | OUTPATIENT
Start: 2022-07-10 | End: 2022-07-11

## 2022-07-10 NOTE — PLAN OF CARE
According to patient the swelling of the legs is \"much better\" - no edema noted. Possible discharge tomorrow. Cardiology monitoring elevated BP, see epic. Problem: Patient Centered Care  Goal: Patient preferences are identified and integrated in the patient's plan of care  Description: Interventions:  - What would you like us to know as we care for you?  From home alone  - Provide timely, complete, and accurate information to patient/family  - Incorporate patient and family knowledge, values, beliefs, and cultural backgrounds into the planning and delivery of care  - Encourage patient/family to participate in care and decision-making at the level they choose  - Honor patient and family perspectives and choices  Outcome: Progressing

## 2022-07-10 NOTE — DIETARY NOTE
NUTRITION EDUCATION NOTE     Received consult for \"education and ptwas discussing gastric sleeve\" per nephrology. Pt reports has been following with RD at [facility] where she will be having a gastric sleeve placed next month. Pt reports she has to follow a strict regimen in preparation for her surgery. 2 weeks pre-op she will switch over to an all liquid diet of High protein Slim Fast.     Briefly reviewed current renal diet restrictions. Noted K low on 7/9 requiring replacement and phosphorus level 3.5 WNL - renal restrictions not warranted at this time. Reviewed recommendations to prevent further progression of kidney disease with emphasis on good glycemic control. Diet adjusted appropriately. Would benefit from outpt f/u with her RD at [facility]. Receptive to instruction. Expect good compliance. Orders Placed This Encounter      Carbohydrate controlled diet 1800 kcal/60 grams; Sodium Restriction: 2 GM NA; Is Patient on Accuchecks?  Yes     [facility] [address], [address] [place] [address], [address] [facility], [address] [facility]

## 2022-07-10 NOTE — PLAN OF CARE
Bed low and locked, side rails up x2, call light within reach and pt educated to call when needing assistance. Pt denied any complaints today. Will continue to monitor. Problem: Patient Centered Care  Goal: Patient preferences are identified and integrated in the patient's plan of care  Description: Interventions:  - What would you like us to know as we care for you? I live alone at home.    - Provide timely, complete, and accurate information to patient/family  - Incorporate patient and family knowledge, values, beliefs, and cultural backgrounds into the planning and delivery of care  - Encourage patient/family to participate in care and decision-making at the level they choose  - Honor patient and family perspectives and choices  Outcome: Progressing     Problem: PAIN - ADULT  Goal: Verbalizes/displays adequate comfort level or patient's stated pain goal  Description: INTERVENTIONS:  - Encourage pt to monitor pain and request assistance  - Assess pain using appropriate pain scale  - Administer analgesics based on type and severity of pain and evaluate response  - Implement non-pharmacological measures as appropriate and evaluate response  - Consider cultural and social influences on pain and pain management  - Manage/alleviate anxiety  - Utilize distraction and/or relaxation techniques  - Monitor for opioid side effects  - Notify MD/LIP if interventions unsuccessful or patient reports new pain  - Anticipate increased pain with activity and pre-medicate as appropriate  Outcome: Progressing     Problem: RISK FOR INFECTION - ADULT  Goal: Absence of fever/infection during anticipated neutropenic period  Description: INTERVENTIONS  - Monitor WBC  - Administer growth factors as ordered  - Implement neutropenic guidelines  Outcome: Progressing     Problem: SAFETY ADULT - FALL  Goal: Free from fall injury  Description: INTERVENTIONS:  - Assess pt frequently for physical needs  - Identify cognitive and physical deficits and behaviors that affect risk of falls.   - [place] fall precautions as indicated by assessment.  - Educate pt/family on patient safety including physical limitations  - Instruct pt to call for assistance with activity based on assessment  - Modify environment to reduce risk of injury  - Provide assistive devices as appropriate  - Consider OT/PT consult to assist with strengthening/mobility  - Encourage toileting schedule  Outcome: Progressing     Problem: DISCHARGE PLANNING  Goal: Discharge to home or other facility with appropriate resources  Description: INTERVENTIONS:  - Identify barriers to discharge w/pt and caregiver  - Include patient/family/discharge partner in discharge planning  - Arrange for needed discharge resources and transportation as appropriate  - Identify discharge learning needs (meds, wound care, etc)  - Arrange for interpreters to assist at discharge as needed  - Consider post-discharge preferences of patient/family/discharge partner  - Complete POLST form as appropriate  - Assess patient's ability to be responsible for managing their own health  - Refer to Case Management Department for coordinating discharge planning if the patient needs post-hospital services based on physician/LIP order or complex needs related to functional status, cognitive ability or social support system  Outcome: Progressing     Problem: Altered Communication/Language Barrier  Goal: Patient/Family is able to understand and participate in their care  Description: Interventions:  - Assess communication ability and preferred communication style  - Implement communication aides and strategies  - Use visual cues when possible  - Listen attentively, be patient, do not interrupt  - Minimize distractions  - Allow time for understanding and response  - Establish method for patient to ask for assistance (call light)  - Provide an  as needed  - Communicate barriers and strategies to overcome with those who interact with patient  Outcome: Progressing     Problem: Patient/Family Goals  Goal: Patient/Family Long Term Goal  Description: Patient's Long Term Goal: Not come back    Interventions:  - See additional Care Plan goals for specific interventions  Outcome: Progressing  Goal: Patient/Family Short Term Goal  Description: Patient's Short Term Goal: Get the fluid off    Interventions:   - See additional Care Plan goals for specific interventions  Outcome: Progressing

## 2022-07-10 NOTE — PLAN OF CARE
Pt A&Ox4, on RA. PO Lasix to start this AM. Safety measures in place, call light w/in reach. Problem: Patient Centered Care  Goal: Patient preferences are identified and integrated in the patient's plan of care  Description: Interventions:  - What would you like us to know as we care for you?  From home alone  - Provide timely, complete, and accurate information to patient/family  - Incorporate patient and family knowledge, values, beliefs, and cultural backgrounds into the planning and delivery of care  - Encourage patient/family to participate in care and decision-making at the level they choose  - Honor patient and family perspectives and choices  7/9/2022 2242 by Genet Noe RN  Outcome: Progressing  7/9/2022 2241 by Genet Noe RN  Outcome: Progressing     Problem: PAIN - ADULT  Goal: Verbalizes/displays adequate comfort level or patient's stated pain goal  Description: INTERVENTIONS:  - Encourage pt to monitor pain and request assistance  - Assess pain using appropriate pain scale  - Administer analgesics based on type and severity of pain and evaluate response  - Implement non-pharmacological measures as appropriate and evaluate response  - Consider cultural and social influences on pain and pain management  - Manage/alleviate anxiety  - Utilize distraction and/or relaxation techniques  - Monitor for opioid side effects  - Notify MD/LIP if interventions unsuccessful or patient reports new pain  - Anticipate increased pain with activity and pre-medicate as appropriate  7/9/2022 2242 by Genet Noe RN  Outcome: Progressing  7/9/2022 2241 by Genet Noe RN  Outcome: Progressing     Problem: RISK FOR INFECTION - ADULT  Goal: Absence of fever/infection during anticipated neutropenic period  Description: INTERVENTIONS  - Monitor WBC  - Administer growth factors as ordered  - Implement neutropenic guidelines  7/9/2022 2242 by Genet Noe RN  Outcome: Progressing  7/9/2022 2241 by Conor Fu RN  Outcome: Progressing     Problem: SAFETY ADULT - FALL  Goal: Free from fall injury  Description: INTERVENTIONS:  - Assess pt frequently for physical needs  - Identify cognitive and physical deficits and behaviors that affect risk of falls.   - [place] fall precautions as indicated by assessment.  - Educate pt/family on patient safety including physical limitations  - Instruct pt to call for assistance with activity based on assessment  - Modify environment to reduce risk of injury  - Provide assistive devices as appropriate  - Consider OT/PT consult to assist with strengthening/mobility  - Encourage toileting schedule  7/9/2022 2242 by Conor Fu RN  Outcome: Progressing  7/9/2022 2241 by Conor Fu RN  Outcome: Progressing     Problem: DISCHARGE PLANNING  Goal: Discharge to home or other facility with appropriate resources  Description: INTERVENTIONS:  - Identify barriers to discharge w/pt and caregiver  - Include patient/family/discharge partner in discharge planning  - Arrange for needed discharge resources and transportation as appropriate  - Identify discharge learning needs (meds, wound care, etc)  - Arrange for interpreters to assist at discharge as needed  - Consider post-discharge preferences of patient/family/discharge partner  - Complete POLST form as appropriate  - Assess patient's ability to be responsible for managing their own health  - Refer to Case Management Department for coordinating discharge planning if the patient needs post-hospital services based on physician/LIP order or complex needs related to functional status, cognitive ability or social support system  7/9/2022 2242 by Conor Fu RN  Outcome: Progressing  7/9/2022 2241 by Conor Fu RN  Outcome: Progressing     Problem: Altered Communication/Language Barrier  Goal: Patient/Family is able to understand and participate in their care  Description: Interventions:  - Assess communication ability and preferred communication style  - Implement communication aides and strategies  - Use visual cues when possible  - Listen attentively, be patient, do not interrupt  - Minimize distractions  - Allow time for understanding and response  - Establish method for patient to ask for assistance (call light)  - Provide an  as needed  - Communicate barriers and strategies to overcome with those who interact with patient  7/9/2022 2242 by Cesar Rodríguez RN  Outcome: Progressing  7/9/2022 2241 by Cesar Rodríguez RN  Outcome: Progressing     Problem: Patient/Family Goals  Goal: Patient/Family Long Term Goal  Description: Patient's Long Term Goal: To go home    Interventions:  - Monitor VS  - Monitor labs  - Diurese  - Follow POC  - See additional Care Plan goals for specific interventions  7/9/2022 2242 by Cesar Rodríguez RN  Outcome: Progressing  7/9/2022 2241 by Cesar Rodríguez RN  Outcome: Progressing  Goal: Patient/Family Short Term Goal  Description: Patient's Short Term Goal: To feel better    Interventions:   - Monitor VS  - Monitor labs  - Diurese  - Follow POC  - See additional Care Plan goals for specific interventions  7/9/2022 2242 by Cesar Rodríguez RN  Outcome: Progressing  7/9/2022 2241 by Cesar Rodríguez RN  Outcome: Progressing     Problem: METABOLIC/FLUID AND ELECTROLYTES - ADULT  Goal: Glucose maintained within prescribed range  Description: INTERVENTIONS:  - Monitor Blood Glucose as ordered  - Assess for signs and symptoms of hyperglycemia and hypoglycemia  - Administer ordered medications to maintain glucose within target range  - Assess barriers to adequate nutritional intake and initiate nutrition consult as needed  - Instruct patient on self management of diabetes  Outcome: Progressing  Goal: Electrolytes maintained within normal limits  Description: INTERVENTIONS:  - Monitor labs and rhythm and assess patient for signs and symptoms of electrolyte imbalances  - Administer electrolyte replacement as ordered  - Monitor response to electrolyte replacements, including rhythm and repeat lab results as appropriate  - Fluid restriction as ordered  - Instruct patient on fluid and nutrition restrictions as appropriate  Outcome: Progressing  Goal: Hemodynamic stability and optimal renal function maintained  Description: INTERVENTIONS:  - Monitor labs and assess for signs and symptoms of volume excess or deficit  - Monitor intake, output and patient weight  - Monitor urine specific gravity, serum osmolarity and serum sodium as indicated or ordered  - Monitor response to interventions for patient's volume status, including labs, urine output, blood pressure (other measures as available)  - Encourage oral intake as appropriate  - Instruct patient on fluid and nutrition restrictions as appropriate  Outcome: Progressing

## 2022-07-11 VITALS
WEIGHT: 257 LBS | OXYGEN SATURATION: 99 % | RESPIRATION RATE: 18 BRPM | HEART RATE: 84 BPM | SYSTOLIC BLOOD PRESSURE: 143 MMHG | DIASTOLIC BLOOD PRESSURE: 87 MMHG | HEIGHT: 66 IN | BODY MASS INDEX: 41.3 KG/M2 | TEMPERATURE: 98 F

## 2022-07-11 LAB
ALBUMIN SERPL-MCNC: 3 G/DL (ref 3.4–5)
ANION GAP SERPL CALC-SCNC: 9 MMOL/L (ref 0–18)
BUN BLD-MCNC: 27 MG/DL (ref 7–18)
BUN/CREAT SERPL: 12.4 (ref 10–20)
CALCIUM BLD-MCNC: 8.5 MG/DL (ref 8.5–10.1)
CHLORIDE SERPL-SCNC: 108 MMOL/L (ref 98–112)
CO2 SERPL-SCNC: 23 MMOL/L (ref 21–32)
CREAT BLD-MCNC: 2.18 MG/DL
GLUCOSE BLD-MCNC: 150 MG/DL (ref 70–99)
GLUCOSE BLDC GLUCOMTR-MCNC: 153 MG/DL (ref 70–99)
GLUCOSE BLDC GLUCOMTR-MCNC: 170 MG/DL (ref 70–99)
MAGNESIUM SERPL-MCNC: 2.5 MG/DL (ref 1.6–2.6)
OSMOLALITY SERPL CALC.SUM OF ELEC: 298 MOSM/KG (ref 275–295)
PHOSPHATE SERPL-MCNC: 3.5 MG/DL (ref 2.5–4.9)
POTASSIUM SERPL-SCNC: 3.3 MMOL/L (ref 3.5–5.1)
POTASSIUM SERPL-SCNC: 3.6 MMOL/L (ref 3.5–5.1)
SODIUM SERPL-SCNC: 140 MMOL/L (ref 136–145)

## 2022-07-11 PROCEDURE — 84132 ASSAY OF SERUM POTASSIUM: CPT | Performed by: INTERNAL MEDICINE

## 2022-07-11 PROCEDURE — 82962 GLUCOSE BLOOD TEST: CPT

## 2022-07-11 PROCEDURE — 80069 RENAL FUNCTION PANEL: CPT | Performed by: INTERNAL MEDICINE

## 2022-07-11 PROCEDURE — 83735 ASSAY OF MAGNESIUM: CPT | Performed by: INTERNAL MEDICINE

## 2022-07-11 RX ORDER — LOSARTAN POTASSIUM 100 MG/1
100 TABLET ORAL EVERY MORNING
Status: DISCONTINUED | OUTPATIENT
Start: 2022-07-11 | End: 2022-07-11

## 2022-07-11 RX ORDER — POTASSIUM CHLORIDE 20 MEQ/1
40 TABLET, EXTENDED RELEASE ORAL ONCE
Status: COMPLETED | OUTPATIENT
Start: 2022-07-11 | End: 2022-07-11

## 2022-07-11 NOTE — PLAN OF CARE
Pt A&Ox4, on RA. Pt denies pain. Plan: PO lasix BID, possibly DC back home today. Safety measures in place, call llight w/in reach. Problem: Patient Centered Care  Goal: Patient preferences are identified and integrated in the patient's plan of care  Description: Interventions:  - What would you like us to know as we care for you? From home alone  - Provide timely, complete, and accurate information to patient/family  - Incorporate patient and family knowledge, values, beliefs, and cultural backgrounds into the planning and delivery of care  - Encourage patient/family to participate in care and decision-making at the level they choose  - Honor patient and family perspectives and choices  Outcome: Progressing     Problem: PAIN - ADULT  Goal: Verbalizes/displays adequate comfort level or patient's stated pain goal  Description: INTERVENTIONS:  - Encourage pt to monitor pain and request assistance  - Assess pain using appropriate pain scale  - Administer analgesics based on type and severity of pain and evaluate response  - Implement non-pharmacological measures as appropriate and evaluate response  - Consider cultural and social influences on pain and pain management  - Manage/alleviate anxiety  - Utilize distraction and/or relaxation techniques  - Monitor for opioid side effects  - Notify MD/LIP if interventions unsuccessful or patient reports new pain  - Anticipate increased pain with activity and pre-medicate as appropriate  Outcome: Progressing     Problem: RISK FOR INFECTION - ADULT  Goal: Absence of fever/infection during anticipated neutropenic period  Description: INTERVENTIONS  - Monitor WBC  - Administer growth factors as ordered  - Implement neutropenic guidelines  Outcome: Progressing     Problem: SAFETY ADULT - FALL  Goal: Free from fall injury  Description: INTERVENTIONS:  - Assess pt frequently for physical needs  - Identify cognitive and physical deficits and behaviors that affect risk of falls.   - [place] fall precautions as indicated by assessment.  - Educate pt/family on patient safety including physical limitations  - Instruct pt to call for assistance with activity based on assessment  - Modify environment to reduce risk of injury  - Provide assistive devices as appropriate  - Consider OT/PT consult to assist with strengthening/mobility  - Encourage toileting schedule  Outcome: Progressing     Problem: DISCHARGE PLANNING  Goal: Discharge to home or other facility with appropriate resources  Description: INTERVENTIONS:  - Identify barriers to discharge w/pt and caregiver  - Include patient/family/discharge partner in discharge planning  - Arrange for needed discharge resources and transportation as appropriate  - Identify discharge learning needs (meds, wound care, etc)  - Arrange for interpreters to assist at discharge as needed  - Consider post-discharge preferences of patient/family/discharge partner  - Complete POLST form as appropriate  - Assess patient's ability to be responsible for managing their own health  - Refer to Case Management Department for coordinating discharge planning if the patient needs post-hospital services based on physician/LIP order or complex needs related to functional status, cognitive ability or social support system  Outcome: Progressing     Problem: Altered Communication/Language Barrier  Goal: Patient/Family is able to understand and participate in their care  Description: Interventions:  - Assess communication ability and preferred communication style  - Implement communication aides and strategies  - Use visual cues when possible  - Listen attentively, be patient, do not interrupt  - Minimize distractions  - Allow time for understanding and response  - Establish method for patient to ask for assistance (call light)  - Provide an  as needed  - Communicate barriers and strategies to overcome with those who interact with patient  Outcome: Progressing Problem: Patient/Family Goals  Goal: Patient/Family Long Term Goal  Description: Patient's Long Term Goal: To go home    Interventions:  - Monitor VS  - Monitor labs  - Diurese  - Follow POC  - See additional Care Plan goals for specific interventions  Outcome: Progressing     Problem: Patient/Family Goals  Goal: Patient/Family Short Term Goal  Description: Patient's Short Term Goal: To feel better    Interventions:   - Monitor VS  - Monitor labs  - Diurese  - Follow POC  - See additional Care Plan goals for specific interventions  Outcome: Progressing     Problem: METABOLIC/FLUID AND ELECTROLYTES - ADULT  Goal: Glucose maintained within prescribed range  Description: INTERVENTIONS:  - Monitor Blood Glucose as ordered  - Assess for signs and symptoms of hyperglycemia and hypoglycemia  - Administer ordered medications to maintain glucose within target range  - Assess barriers to adequate nutritional intake and initiate nutrition consult as needed  - Instruct patient on self management of diabetes  Outcome: Progressing  Goal: Electrolytes maintained within normal limits  Description: INTERVENTIONS:  - Monitor labs and rhythm and assess patient for signs and symptoms of electrolyte imbalances  - Administer electrolyte replacement as ordered  - Monitor response to electrolyte replacements, including rhythm and repeat lab results as appropriate  - Fluid restriction as ordered  - Instruct patient on fluid and nutrition restrictions as appropriate  Outcome: Progressing  Goal: Hemodynamic stability and optimal renal function maintained  Description: INTERVENTIONS:  - Monitor labs and assess for signs and symptoms of volume excess or deficit  - Monitor intake, output and patient weight  - Monitor urine specific gravity, serum osmolarity and serum sodium as indicated or ordered  - Monitor response to interventions for patient's volume status, including labs, urine output, blood pressure (other measures as available)  - Encourage oral intake as appropriate  - Instruct patient on fluid and nutrition restrictions as appropriate  Outcome: Progressing

## 2022-07-11 NOTE — PLAN OF CARE
ID band, tele and IV removed. Discharge papers handed to patient and explained, patient verbalized understanding. Problem: Patient Centered Care  Goal: Patient preferences are identified and integrated in the patient's plan of care  Description: Interventions:  - What would you like us to know as we care for you?  From home alone  - Provide timely, complete, and accurate information to patient/family  - Incorporate patient and family knowledge, values, beliefs, and cultural backgrounds into the planning and delivery of care  - Encourage patient/family to participate in care and decision-making at the level they choose  - Honor patient and family perspectives and choices  Outcome: Completed

## 2022-07-12 NOTE — PAYOR COMM NOTE
Discharge Notification    Patient Name: Rem January: Katlyn P[address]  Subscriber #: [id number]  Authorization Number: [id number]  Admit Date/Time: 7/8/2022 7:27 PM  Discharge Date/Time: 7/11/2022 5:15 PM

## 2023-10-18 ENCOUNTER — HOSPITAL ENCOUNTER (INPATIENT)
Facility: HOSPITAL | Age: 33
LOS: 2 days | Discharge: HOME | DRG: 683 | End: 2023-10-20
Attending: EMERGENCY MEDICINE | Admitting: HOSPITALIST

## 2023-10-18 ENCOUNTER — APPOINTMENT (OUTPATIENT)
Dept: CT IMAGING | Facility: HOSPITAL | Age: 33
DRG: 683 | End: 2023-10-18
Attending: EMERGENCY MEDICINE

## 2023-10-18 DIAGNOSIS — N17.9 AKI (ACUTE KIDNEY INJURY) (HCC): Primary | ICD-10-CM

## 2023-10-18 LAB
ALBUMIN SERPL-MCNC: 3.1 G/DL (ref 3.4–5)
ALP LIVER SERPL-CCNC: 67 U/L
ALT SERPL-CCNC: 25 U/L
ANION GAP SERPL CALC-SCNC: 9 MMOL/L (ref 0–18)
AST SERPL-CCNC: 19 U/L (ref 15–37)
B-HCG UR QL: NEGATIVE
BASOPHILS # BLD AUTO: 0.04 X10(3) UL (ref 0–0.2)
BASOPHILS NFR BLD AUTO: 0.6 %
BILIRUB DIRECT SERPL-MCNC: <0.1 MG/DL (ref 0–0.2)
BILIRUB SERPL-MCNC: 0.2 MG/DL (ref 0.1–2)
BILIRUB UR QL: NEGATIVE
BUN BLD-MCNC: 58 MG/DL (ref 7–18)
BUN/CREAT SERPL: 9.9 (ref 10–20)
CALCIUM BLD-MCNC: 8.2 MG/DL (ref 8.5–10.1)
CHLORIDE SERPL-SCNC: 110 MMOL/L (ref 98–112)
CLARITY UR: CLEAR
CO2 SERPL-SCNC: 23 MMOL/L (ref 21–32)
CREAT BLD-MCNC: 5.84 MG/DL
DEPRECATED RDW RBC AUTO: 39.7 FL (ref 35.1–46.3)
EGFRCR SERPLBLD CKD-EPI 2021: 9 ML/MIN/1.73M2 (ref 60–?)
EOSINOPHIL # BLD AUTO: 0.21 X10(3) UL (ref 0–0.7)
EOSINOPHIL NFR BLD AUTO: 3.3 %
ERYTHROCYTE [DISTWIDTH] IN BLOOD BY AUTOMATED COUNT: 13.2 % (ref 11–15)
GLUCOSE BLD-MCNC: 113 MG/DL (ref 70–99)
GLUCOSE BLDC GLUCOMTR-MCNC: 119 MG/DL (ref 70–99)
GLUCOSE UR-MCNC: 1000 MG/DL
HCT VFR BLD AUTO: 28.6 %
HGB BLD-MCNC: 9.8 G/DL
IMM GRANULOCYTES # BLD AUTO: 0.01 X10(3) UL (ref 0–1)
IMM GRANULOCYTES NFR BLD: 0.2 %
KETONES UR-MCNC: NEGATIVE MG/DL
LEUKOCYTE ESTERASE UR QL STRIP.AUTO: NEGATIVE
LYMPHOCYTES # BLD AUTO: 2.5 X10(3) UL (ref 1–4)
LYMPHOCYTES NFR BLD AUTO: 39.4 %
MCH RBC QN AUTO: 28.2 PG (ref 26–34)
MCHC RBC AUTO-ENTMCNC: 34.3 G/DL (ref 31–37)
MCV RBC AUTO: 82.4 FL
MONOCYTES # BLD AUTO: 0.34 X10(3) UL (ref 0.1–1)
MONOCYTES NFR BLD AUTO: 5.4 %
NEUTROPHILS # BLD AUTO: 3.25 X10 (3) UL (ref 1.5–7.7)
NEUTROPHILS # BLD AUTO: 3.25 X10(3) UL (ref 1.5–7.7)
NEUTROPHILS NFR BLD AUTO: 51.1 %
NITRITE UR QL STRIP.AUTO: NEGATIVE
OSMOLALITY SERPL CALC.SUM OF ELEC: 311 MOSM/KG (ref 275–295)
PH UR: 6 [PH] (ref 5–8)
PLATELET # BLD AUTO: 278 10(3)UL (ref 150–450)
POTASSIUM SERPL-SCNC: 3.5 MMOL/L (ref 3.5–5.1)
PROT SERPL-MCNC: 7.3 G/DL (ref 6.4–8.2)
PROT UR-MCNC: 600 MG/DL
RBC # BLD AUTO: 3.47 X10(6)UL
SODIUM SERPL-SCNC: 142 MMOL/L (ref 136–145)
SP GR UR STRIP: 1.01 (ref 1–1.03)
UROBILINOGEN UR STRIP-ACNC: NORMAL
WBC # BLD AUTO: 6.4 X10(3) UL (ref 4–11)

## 2023-10-18 PROCEDURE — 74176 CT ABD & PELVIS W/O CONTRAST: CPT | Performed by: EMERGENCY MEDICINE

## 2023-10-18 PROCEDURE — 99285 EMERGENCY DEPT VISIT HI MDM: CPT

## 2023-10-18 PROCEDURE — 81025 URINE PREGNANCY TEST: CPT

## 2023-10-18 PROCEDURE — 81001 URINALYSIS AUTO W/SCOPE: CPT | Performed by: EMERGENCY MEDICINE

## 2023-10-18 PROCEDURE — 80048 BASIC METABOLIC PNL TOTAL CA: CPT

## 2023-10-18 PROCEDURE — 80048 BASIC METABOLIC PNL TOTAL CA: CPT | Performed by: EMERGENCY MEDICINE

## 2023-10-18 PROCEDURE — 82962 GLUCOSE BLOOD TEST: CPT

## 2023-10-18 PROCEDURE — 80076 HEPATIC FUNCTION PANEL: CPT | Performed by: EMERGENCY MEDICINE

## 2023-10-18 PROCEDURE — 85025 COMPLETE CBC W/AUTO DIFF WBC: CPT

## 2023-10-18 PROCEDURE — 96360 HYDRATION IV INFUSION INIT: CPT

## 2023-10-18 PROCEDURE — 85025 COMPLETE CBC W/AUTO DIFF WBC: CPT | Performed by: EMERGENCY MEDICINE

## 2023-10-18 PROCEDURE — 83036 HEMOGLOBIN GLYCOSYLATED A1C: CPT | Performed by: HOSPITALIST

## 2023-10-18 RX ORDER — SODIUM CHLORIDE, SODIUM LACTATE, POTASSIUM CHLORIDE, CALCIUM CHLORIDE 600; 310; 30; 20 MG/100ML; MG/100ML; MG/100ML; MG/100ML
INJECTION, SOLUTION INTRAVENOUS CONTINUOUS
Status: DISCONTINUED | OUTPATIENT
Start: 2023-10-18 | End: 2023-10-20

## 2023-10-18 RX ORDER — SENNOSIDES 8.6 MG
17.2 TABLET ORAL NIGHTLY PRN
Status: DISCONTINUED | OUTPATIENT
Start: 2023-10-18 | End: 2023-10-20

## 2023-10-18 RX ORDER — ONDANSETRON 2 MG/ML
4 INJECTION INTRAMUSCULAR; INTRAVENOUS EVERY 6 HOURS PRN
Status: DISCONTINUED | OUTPATIENT
Start: 2023-10-18 | End: 2023-10-20

## 2023-10-18 RX ORDER — FINERENONE 10 MG/1
10 TABLET, FILM COATED ORAL DAILY
COMMUNITY
End: 2023-10-20

## 2023-10-18 RX ORDER — MORPHINE SULFATE 2 MG/ML
2 INJECTION, SOLUTION INTRAMUSCULAR; INTRAVENOUS EVERY 2 HOUR PRN
Status: DISCONTINUED | OUTPATIENT
Start: 2023-10-18 | End: 2023-10-20

## 2023-10-18 RX ORDER — MORPHINE SULFATE 2 MG/ML
1 INJECTION, SOLUTION INTRAMUSCULAR; INTRAVENOUS EVERY 2 HOUR PRN
Status: DISCONTINUED | OUTPATIENT
Start: 2023-10-18 | End: 2023-10-20

## 2023-10-18 RX ORDER — PROCHLORPERAZINE EDISYLATE 5 MG/ML
5 INJECTION INTRAMUSCULAR; INTRAVENOUS EVERY 8 HOURS PRN
Status: DISCONTINUED | OUTPATIENT
Start: 2023-10-18 | End: 2023-10-20

## 2023-10-18 RX ORDER — POLYETHYLENE GLYCOL 3350 17 G/17G
17 POWDER, FOR SOLUTION ORAL DAILY PRN
Status: DISCONTINUED | OUTPATIENT
Start: 2023-10-18 | End: 2023-10-20

## 2023-10-18 RX ORDER — ATORVASTATIN CALCIUM 40 MG/1
40 TABLET, FILM COATED ORAL NIGHTLY
Status: DISCONTINUED | OUTPATIENT
Start: 2023-10-18 | End: 2023-10-20

## 2023-10-18 RX ORDER — SODIUM CHLORIDE, SODIUM LACTATE, POTASSIUM CHLORIDE, CALCIUM CHLORIDE 600; 310; 30; 20 MG/100ML; MG/100ML; MG/100ML; MG/100ML
INJECTION, SOLUTION INTRAVENOUS CONTINUOUS
Status: DISCONTINUED | OUTPATIENT
Start: 2023-10-18 | End: 2023-10-18

## 2023-10-18 RX ORDER — NIFEDIPINE 30 MG/1
90 TABLET, EXTENDED RELEASE ORAL NIGHTLY
Status: DISCONTINUED | OUTPATIENT
Start: 2023-10-18 | End: 2023-10-20

## 2023-10-18 RX ORDER — BISACODYL 10 MG
10 SUPPOSITORY, RECTAL RECTAL
Status: DISCONTINUED | OUTPATIENT
Start: 2023-10-18 | End: 2023-10-20

## 2023-10-18 RX ORDER — HEPARIN SODIUM 5000 [USP'U]/ML
5000 INJECTION, SOLUTION INTRAVENOUS; SUBCUTANEOUS EVERY 8 HOURS SCHEDULED
Status: DISCONTINUED | OUTPATIENT
Start: 2023-10-19 | End: 2023-10-20

## 2023-10-18 RX ORDER — CARVEDILOL 12.5 MG/1
12.5 TABLET ORAL 2 TIMES DAILY
Status: DISCONTINUED | OUTPATIENT
Start: 2023-10-18 | End: 2023-10-20

## 2023-10-18 RX ORDER — MORPHINE SULFATE 4 MG/ML
4 INJECTION, SOLUTION INTRAMUSCULAR; INTRAVENOUS EVERY 2 HOUR PRN
Status: DISCONTINUED | OUTPATIENT
Start: 2023-10-18 | End: 2023-10-20

## 2023-10-18 RX ORDER — SODIUM CHLORIDE 9 MG/ML
INJECTION, SOLUTION INTRAVENOUS CONTINUOUS
Status: ACTIVE | OUTPATIENT
Start: 2023-10-18 | End: 2023-10-19

## 2023-10-18 RX ORDER — HYDROCODONE BITARTRATE AND ACETAMINOPHEN 5; 325 MG/1; MG/1
1 TABLET ORAL EVERY 4 HOURS PRN
Status: DISCONTINUED | OUTPATIENT
Start: 2023-10-18 | End: 2023-10-20

## 2023-10-18 RX ORDER — ACETAMINOPHEN 325 MG/1
650 TABLET ORAL EVERY 4 HOURS PRN
Status: DISCONTINUED | OUTPATIENT
Start: 2023-10-18 | End: 2023-10-20

## 2023-10-18 RX ORDER — HYDROCODONE BITARTRATE AND ACETAMINOPHEN 5; 325 MG/1; MG/1
2 TABLET ORAL EVERY 4 HOURS PRN
Status: DISCONTINUED | OUTPATIENT
Start: 2023-10-18 | End: 2023-10-20

## 2023-10-18 NOTE — ED INITIAL ASSESSMENT (HPI)
Patient arrives with complaints of abdominal pain, flank pain, and nausea of the last few days. Recent dx and treatment for UTI.

## 2023-10-19 LAB
ALBUMIN SERPL-MCNC: 2.7 G/DL (ref 3.4–5)
ALBUMIN/GLOB SERPL: 0.7 {RATIO} (ref 1–2)
ALP LIVER SERPL-CCNC: 57 U/L
ALT SERPL-CCNC: 18 U/L
ANION GAP SERPL CALC-SCNC: 8 MMOL/L (ref 0–18)
AST SERPL-CCNC: 13 U/L (ref 15–37)
BASOPHILS # BLD AUTO: 0.04 X10(3) UL (ref 0–0.2)
BASOPHILS NFR BLD AUTO: 0.7 %
BILIRUB SERPL-MCNC: 0.3 MG/DL (ref 0.1–2)
BUN BLD-MCNC: 51 MG/DL (ref 7–18)
BUN/CREAT SERPL: 9.8 (ref 10–20)
CALCIUM BLD-MCNC: 7.8 MG/DL (ref 8.5–10.1)
CHLORIDE SERPL-SCNC: 113 MMOL/L (ref 98–112)
CO2 SERPL-SCNC: 23 MMOL/L (ref 21–32)
CREAT BLD-MCNC: 5.23 MG/DL
CREAT UR-SCNC: 29.7 MG/DL
CREAT UR-SCNC: 29.7 MG/DL
DEPRECATED RDW RBC AUTO: 39.3 FL (ref 35.1–46.3)
EGFRCR SERPLBLD CKD-EPI 2021: 10 ML/MIN/1.73M2 (ref 60–?)
EOSINOPHIL # BLD AUTO: 0.24 X10(3) UL (ref 0–0.7)
EOSINOPHIL NFR BLD AUTO: 4.2 %
ERYTHROCYTE [DISTWIDTH] IN BLOOD BY AUTOMATED COUNT: 13.1 % (ref 11–15)
EST. AVERAGE GLUCOSE BLD GHB EST-MCNC: 97 MG/DL (ref 68–126)
GLOBULIN PLAS-MCNC: 3.8 G/DL (ref 2.8–4.4)
GLUCOSE BLD-MCNC: 71 MG/DL (ref 70–99)
GLUCOSE BLDC GLUCOMTR-MCNC: 80 MG/DL (ref 70–99)
GLUCOSE BLDC GLUCOMTR-MCNC: 87 MG/DL (ref 70–99)
GLUCOSE BLDC GLUCOMTR-MCNC: 94 MG/DL (ref 70–99)
GLUCOSE BLDC GLUCOMTR-MCNC: 95 MG/DL (ref 70–99)
HBA1C MFR BLD: 5 % (ref ?–5.7)
HCT VFR BLD AUTO: 27.5 %
HGB BLD-MCNC: 9.2 G/DL
IMM GRANULOCYTES # BLD AUTO: 0.02 X10(3) UL (ref 0–1)
IMM GRANULOCYTES NFR BLD: 0.3 %
LYMPHOCYTES # BLD AUTO: 2.42 X10(3) UL (ref 1–4)
LYMPHOCYTES NFR BLD AUTO: 41.9 %
MAGNESIUM SERPL-MCNC: 2.6 MG/DL (ref 1.6–2.6)
MCH RBC QN AUTO: 27.7 PG (ref 26–34)
MCHC RBC AUTO-ENTMCNC: 33.5 G/DL (ref 31–37)
MCV RBC AUTO: 82.8 FL
MICROALBUMIN UR-MCNC: 179 MG/DL
MICROALBUMIN/CREAT 24H UR-RTO: 6026.9 UG/MG (ref ?–30)
MONOCYTES # BLD AUTO: 0.39 X10(3) UL (ref 0.1–1)
MONOCYTES NFR BLD AUTO: 6.7 %
NEUTROPHILS # BLD AUTO: 2.67 X10 (3) UL (ref 1.5–7.7)
NEUTROPHILS # BLD AUTO: 2.67 X10(3) UL (ref 1.5–7.7)
NEUTROPHILS NFR BLD AUTO: 46.2 %
OSMOLALITY SERPL CALC.SUM OF ELEC: 310 MOSM/KG (ref 275–295)
PHOSPHATE SERPL-MCNC: 4.1 MG/DL (ref 2.5–4.9)
PLATELET # BLD AUTO: 248 10(3)UL (ref 150–450)
POTASSIUM SERPL-SCNC: 3.3 MMOL/L (ref 3.5–5.1)
PROT SERPL-MCNC: 6.5 G/DL (ref 6.4–8.2)
PROT UR-MCNC: 223.5 MG/DL
PROT/CREAT UR-RTO: 7.53
RBC # BLD AUTO: 3.32 X10(6)UL
SODIUM SERPL-SCNC: 144 MMOL/L (ref 136–145)
SODIUM SERPL-SCNC: 88 MMOL/L
UUN UR-MCNC: 235 MG/DL
WBC # BLD AUTO: 5.8 X10(3) UL (ref 4–11)

## 2023-10-19 PROCEDURE — 80053 COMPREHEN METABOLIC PANEL: CPT | Performed by: HOSPITALIST

## 2023-10-19 PROCEDURE — 84540 ASSAY OF URINE/UREA-N: CPT | Performed by: INTERNAL MEDICINE

## 2023-10-19 PROCEDURE — 84100 ASSAY OF PHOSPHORUS: CPT | Performed by: INTERNAL MEDICINE

## 2023-10-19 PROCEDURE — 83735 ASSAY OF MAGNESIUM: CPT | Performed by: HOSPITALIST

## 2023-10-19 PROCEDURE — 84156 ASSAY OF PROTEIN URINE: CPT | Performed by: INTERNAL MEDICINE

## 2023-10-19 PROCEDURE — 84300 ASSAY OF URINE SODIUM: CPT | Performed by: INTERNAL MEDICINE

## 2023-10-19 PROCEDURE — 82962 GLUCOSE BLOOD TEST: CPT

## 2023-10-19 PROCEDURE — 82570 ASSAY OF URINE CREATININE: CPT | Performed by: INTERNAL MEDICINE

## 2023-10-19 PROCEDURE — 85025 COMPLETE CBC W/AUTO DIFF WBC: CPT | Performed by: HOSPITALIST

## 2023-10-19 PROCEDURE — 82043 UR ALBUMIN QUANTITATIVE: CPT | Performed by: INTERNAL MEDICINE

## 2023-10-19 RX ORDER — NICOTINE POLACRILEX 4 MG
15 LOZENGE BUCCAL
Status: DISCONTINUED | OUTPATIENT
Start: 2023-10-19 | End: 2023-10-20

## 2023-10-19 RX ORDER — POTASSIUM CHLORIDE 20 MEQ/1
20 TABLET, EXTENDED RELEASE ORAL ONCE
Status: COMPLETED | OUTPATIENT
Start: 2023-10-19 | End: 2023-10-19

## 2023-10-19 RX ORDER — DEXTROSE MONOHYDRATE 25 G/50ML
50 INJECTION, SOLUTION INTRAVENOUS
Status: DISCONTINUED | OUTPATIENT
Start: 2023-10-19 | End: 2023-10-20

## 2023-10-19 RX ORDER — NICOTINE POLACRILEX 4 MG
30 LOZENGE BUCCAL
Status: DISCONTINUED | OUTPATIENT
Start: 2023-10-19 | End: 2023-10-20

## 2023-10-19 NOTE — ED QUICK NOTES
Orders for admission, patient is aware of plan and ready to go upstairs. Any questions, please call ED RN Savage Lund at extension [phone]. Patient Covid vaccination status: Unvaccinated     COVID Test Ordered in ED: None    COVID Suspicion at Admission: N/A    Running Infusions:     Mental Status/LOC at time of transport: A&Ox4    Other pertinent information: Able to complete ADLs.    CIWA score: N/A   NIH score:  N/A

## 2023-10-19 NOTE — ED QUICK NOTES
Spoke with ED doctor about pt's night medications because of pt's high blood pressure. Upon ordering night medications, floor MD put in sign and held orders for night time medications. Pt will receive night medications when she is transferred to the floor.

## 2023-10-19 NOTE — PLAN OF CARE
Patient is Aox4,vital signs stable, room air, stable gait, ambulating in the hallway, strict I and O. Provided patient with diabetes education handout. Call light within reach.      Problem: PAIN - ADULT  Goal: Verbalizes/displays adequate comfort level or patient's stated pain goal  Description: INTERVENTIONS:  - Encourage pt to monitor pain and request assistance  - Assess pain using appropriate pain scale  - Administer analgesics based on type and severity of pain and evaluate response  - Implement non-pharmacological measures as appropriate and evaluate response  - Consider cultural and social influences on pain and pain management  - Manage/alleviate anxiety  - Utilize distraction and/or relaxation techniques  - Monitor for opioid side effects  - Notify MD/LIP if interventions unsuccessful or patient reports new pain  - Anticipate increased pain with activity and pre-medicate as appropriate  Outcome: Progressing     Problem: GASTROINTESTINAL - ADULT  Goal: Minimal or absence of nausea and vomiting  Description: INTERVENTIONS:  - Maintain adequate hydration with IV or PO as ordered and tolerated  - Nasogastric tube to low intermittent suction as ordered  - Evaluate effectiveness of ordered antiemetic medications  - Provide nonpharmacologic comfort measures as appropriate  - Advance diet as tolerated, if ordered  - Obtain nutritional consult as needed  - Evaluate fluid balance  Outcome: Progressing

## 2023-10-19 NOTE — PLAN OF CARE
Problem: Patient Centered Care  Goal: Patient preferences are identified and integrated in the patient's plan of care  Description: Interventions:  - What would you like us to know as we care for you?  \"I recently lost my father so I'm still grieving \"  - Provide timely, complete, and accurate information to patient/family  - Incorporate patient and family knowledge, values, beliefs, and cultural backgrounds into the planning and delivery of care  - Encourage patient/family to participate in care and decision-making at the level they choose  - Honor patient and family perspectives and choices  Outcome: Progressing     Problem: Diabetes/Glucose Control  Goal: Glucose maintained within prescribed range  Description: INTERVENTIONS:  - Monitor Blood Glucose as ordered  - Assess for signs and symptoms of hyperglycemia and hypoglycemia  - Administer ordered medications to maintain glucose within target range  - Assess barriers to adequate nutritional intake and initiate nutrition consult as needed  - Instruct patient on self management of diabetes  Outcome: Progressing     Problem: Patient/Family Goals  Goal: Patient/Family Long Term Goal  Description: Patient's Long Term Goal: kidney treatment - on the verge of dialysis         Interventions:  - -Monitor vitals  - Monitor appropriate labs  - Administer medications as ordered  - Follow MD's orders  - Update patient on plan of care   - Discharge planning     - See additional Care Plan goals for specific interventions  Outcome: Progressing  Goal: Patient/Family Short Term Goal  Description: Patient's Short Term Goal: pain management     Interventions:   - Monitor vitals  - Monitor appropriate labs  - Administer medications as ordered  - Follow MD's orders  - Update patient on plan of care   - Discharge planning     - See additional Care Plan goals for specific interventions  Outcome: Progressing     Problem: PAIN - ADULT  Goal: Verbalizes/displays adequate comfort level or patient's stated pain goal  Description: INTERVENTIONS:  - Encourage pt to monitor pain and request assistance  - Assess pain using appropriate pain scale  - Administer analgesics based on type and severity of pain and evaluate response  - Implement non-pharmacological measures as appropriate and evaluate response  - Consider cultural and social influences on pain and pain management  - Manage/alleviate anxiety  - Utilize distraction and/or relaxation techniques  - Monitor for opioid side effects  - Notify MD/LIP if interventions unsuccessful or patient reports new pain  - Anticipate increased pain with activity and pre-medicate as appropriate  Outcome: Progressing     Problem: RISK FOR INFECTION - ADULT  Goal: Absence of fever/infection during anticipated neutropenic period  Description: INTERVENTIONS  - Monitor WBC  - Administer growth factors as ordered  - Implement neutropenic guidelines  Outcome: Progressing     Problem: GASTROINTESTINAL - ADULT  Goal: Minimal or absence of nausea and vomiting  Description: INTERVENTIONS:  - Maintain adequate hydration with IV or PO as ordered and tolerated  - Nasogastric tube to low intermittent suction as ordered  - Evaluate effectiveness of ordered antiemetic medications  - Provide nonpharmacologic comfort measures as appropriate  - Advance diet as tolerated, if ordered  - Obtain nutritional consult as needed  - Evaluate fluid balance  Outcome: Progressing     Problem: METABOLIC/FLUID AND ELECTROLYTES - ADULT  Goal: Glucose maintained within prescribed range  Description: INTERVENTIONS:  - Monitor Blood Glucose as ordered  - Assess for signs and symptoms of hyperglycemia and hypoglycemia  - Administer ordered medications to maintain glucose within target range  - Assess barriers to adequate nutritional intake and initiate nutrition consult as needed  - Instruct patient on self management of diabetes  Outcome: Progressing  Goal: Electrolytes maintained within normal limits  Description: INTERVENTIONS:  - Monitor labs and rhythm and assess patient for signs and symptoms of electrolyte imbalances  - Administer electrolyte replacement as ordered  - Monitor response to electrolyte replacements, including rhythm and repeat lab results as appropriate  - Fluid restriction as ordered  - Instruct patient on fluid and nutrition restrictions as appropriate  Outcome: Progressing  Goal: Hemodynamic stability and optimal renal function maintained  Description: INTERVENTIONS:  - Monitor labs and assess for signs and symptoms of volume excess or deficit  - Monitor intake, output and patient weight  - Monitor urine specific gravity, serum osmolarity and serum sodium as indicated or ordered  - Monitor response to interventions for patient's volume status, including labs, urine output, blood pressure (other measures as available)  - Encourage oral intake as appropriate  - Instruct patient on fluid and nutrition restrictions as appropriate  Outcome: Progressing     Problem: SKIN/TISSUE INTEGRITY - ADULT  Goal: Skin integrity remains intact  Description: INTERVENTIONS  - Assess and document risk factors for pressure ulcer development  - Assess and document skin integrity  - Monitor for areas of redness and/or skin breakdown  - Initiate interventions, skin care algorithm/standards of care as needed  Outcome: Progressing    Received patient from ED. Monitoring vitals, BP elevated, bedtime meds administered. Monitoring blood glucose. Snack provided. IV fluids infusing. Nephro to see. Safety measures in place. Call light within reach.

## 2023-10-20 VITALS
OXYGEN SATURATION: 99 % | HEIGHT: 65 IN | DIASTOLIC BLOOD PRESSURE: 85 MMHG | BODY MASS INDEX: 35.82 KG/M2 | TEMPERATURE: 99 F | RESPIRATION RATE: 18 BRPM | SYSTOLIC BLOOD PRESSURE: 137 MMHG | WEIGHT: 215 LBS | HEART RATE: 77 BPM

## 2023-10-20 LAB
ANION GAP SERPL CALC-SCNC: 9 MMOL/L (ref 0–18)
BUN BLD-MCNC: 48 MG/DL (ref 7–18)
BUN/CREAT SERPL: 10 (ref 10–20)
CALCIUM BLD-MCNC: 8.7 MG/DL (ref 8.5–10.1)
CHLORIDE SERPL-SCNC: 112 MMOL/L (ref 98–112)
CO2 SERPL-SCNC: 22 MMOL/L (ref 21–32)
CREAT BLD-MCNC: 4.81 MG/DL
EGFRCR SERPLBLD CKD-EPI 2021: 12 ML/MIN/1.73M2 (ref 60–?)
GLUCOSE BLD-MCNC: 88 MG/DL (ref 70–99)
GLUCOSE BLDC GLUCOMTR-MCNC: 91 MG/DL (ref 70–99)
OSMOLALITY SERPL CALC.SUM OF ELEC: 308 MOSM/KG (ref 275–295)
POTASSIUM SERPL-SCNC: 3.6 MMOL/L (ref 3.5–5.1)
SODIUM SERPL-SCNC: 143 MMOL/L (ref 136–145)

## 2023-10-20 PROCEDURE — 80048 BASIC METABOLIC PNL TOTAL CA: CPT | Performed by: HOSPITALIST

## 2023-10-20 PROCEDURE — 82962 GLUCOSE BLOOD TEST: CPT

## 2023-10-20 RX ORDER — FUROSEMIDE 40 MG/1
40 TABLET ORAL DAILY
Status: SHIPPED | COMMUNITY
Start: 2023-10-20

## 2023-10-20 NOTE — PLAN OF CARE
Problem: Patient Centered Care  Goal: Patient preferences are identified and integrated in the patient's plan of care  Description: Interventions:  - What would you like us to know as we care for you?  \"I recently lost my father so I'm still grieving \"  - Provide timely, complete, and accurate information to patient/family  - Incorporate patient and family knowledge, values, beliefs, and cultural backgrounds into the planning and delivery of care  - Encourage patient/family to participate in care and decision-making at the level they choose  - Honor patient and family perspectives and choices  Outcome: Progressing     Problem: Diabetes/Glucose Control  Goal: Glucose maintained within prescribed range  Description: INTERVENTIONS:  - Monitor Blood Glucose as ordered  - Assess for signs and symptoms of hyperglycemia and hypoglycemia  - Administer ordered medications to maintain glucose within target range  - Assess barriers to adequate nutritional intake and initiate nutrition consult as needed  - Instruct patient on self management of diabetes  Outcome: Progressing     Problem: Patient/Family Goals  Goal: Patient/Family Long Term Goal  Description: Patient's Long Term Goal: kidney treatment - on the verge of dialysis         Interventions:  - -Monitor vitals  - Monitor appropriate labs  - Administer medications as ordered  - Follow MD's orders  - Update patient on plan of care   - Discharge planning     - See additional Care Plan goals for specific interventions  Outcome: Progressing  Goal: Patient/Family Short Term Goal  Description: Patient's Short Term Goal: pain management     Interventions:   - Monitor vitals  - Monitor appropriate labs  - Administer medications as ordered  - Follow MD's orders  - Update patient on plan of care   - Discharge planning     - See additional Care Plan goals for specific interventions  Outcome: Progressing     Problem: PAIN - ADULT  Goal: Verbalizes/displays adequate comfort level or patient's stated pain goal  Description: INTERVENTIONS:  - Encourage pt to monitor pain and request assistance  - Assess pain using appropriate pain scale  - Administer analgesics based on type and severity of pain and evaluate response  - Implement non-pharmacological measures as appropriate and evaluate response  - Consider cultural and social influences on pain and pain management  - Manage/alleviate anxiety  - Utilize distraction and/or relaxation techniques  - Monitor for opioid side effects  - Notify MD/LIP if interventions unsuccessful or patient reports new pain  - Anticipate increased pain with activity and pre-medicate as appropriate  Outcome: Progressing     Problem: RISK FOR INFECTION - ADULT  Goal: Absence of fever/infection during anticipated neutropenic period  Description: INTERVENTIONS  - Monitor WBC  - Administer growth factors as ordered  - Implement neutropenic guidelines  Outcome: Progressing     Problem: GASTROINTESTINAL - ADULT  Goal: Minimal or absence of nausea and vomiting  Description: INTERVENTIONS:  - Maintain adequate hydration with IV or PO as ordered and tolerated  - Nasogastric tube to low intermittent suction as ordered  - Evaluate effectiveness of ordered antiemetic medications  - Provide nonpharmacologic comfort measures as appropriate  - Advance diet as tolerated, if ordered  - Obtain nutritional consult as needed  - Evaluate fluid balance  Outcome: Progressing     Problem: METABOLIC/FLUID AND ELECTROLYTES - ADULT  Goal: Glucose maintained within prescribed range  Description: INTERVENTIONS:  - Monitor Blood Glucose as ordered  - Assess for signs and symptoms of hyperglycemia and hypoglycemia  - Administer ordered medications to maintain glucose within target range  - Assess barriers to adequate nutritional intake and initiate nutrition consult as needed  - Instruct patient on self management of diabetes  Outcome: Progressing  Goal: Electrolytes maintained within normal limits  Description: INTERVENTIONS:  - Monitor Blood Glucose as ordered  - Assess for signs and symptoms of hyperglycemia and hypoglycemia  - Administer ordered medications to maintain glucose within target range  - Assess barriers to adequate nutritional intake and initiate nutrition consult as needed  - Instruct patient on self management of diabetes  Outcome: Progressing  Goal: Hemodynamic stability and optimal renal function maintained  Description: INTERVENTIONS:  - Monitor labs and rhythm and assess patient for signs and symptoms of electrolyte imbalances  - Administer electrolyte replacement as ordered  - Monitor response to electrolyte replacements, including rhythm and repeat lab results as appropriate  - Fluid restriction as ordered  - Instruct patient on fluid and nutrition restrictions as appropriate  Outcome: Progressing     Problem: SKIN/TISSUE INTEGRITY - ADULT  Goal: Skin integrity remains intact  Description: INTERVENTIONS  - Assess and document risk factors for pressure ulcer development  - Assess and document skin integrity  - Monitor for areas of redness and/or skin breakdown  - Initiate interventions, skin care algorithm/standards of care as needed  Outcome: Progressing     Strict I&Os . Monitoring vital signs- stable at this time. IVF per order.

## 2023-10-20 NOTE — PROGRESS NOTES
Patient okay to be discharged per MD order, all discharge instructions discussed with pt at bedside, all questions answered, peripheral IV removed. Pt denied wheelchair assistance, pt discharged with all belongings and to drive self home, stable at time of discharge.

## 2023-10-20 NOTE — DISCHARGE SUMMARY
General Medicine Discharge Summary     Patient ID:  Genita Rubinstein  28year old  9/9/1990    Admit date: 10/18/2023    Discharge date and time: 10/20/23    Attending Physician: Domenico Looney MD     Primary Care Physician: Jovana Lopez     Reason for admission: LETHA    Discharge Diagnoses: LETHA (acute kidney injury) [facility]) [N17.9]    Discharged Condition: stable    Disposition: home    Consults:   Consultants         Provider   Role Specialty     Rodolfo Hudson MD  Consulting Physician NEPHROLOGY              HPI: Ms. Manny Paulino is a 27 yo F with PMH of CKD, hypertensive cardiomyopathy who presented with abdominal pain. Patient was recently treated for a UTI with Bactrim. Developed worsening abdominal pain, worse with eating and movement over the past several days. Some nausea, no vomiting. No fevers. In the ED, vitals stable. Labs with Cr 5.8 from prior labs on file from 2022, Cr 2.1. CT A/P without acute findings. Patient follows with [facility] nephrology. States Cr has been up and down lately, Cr 4.9 most recently. She was hospitalized at [facility] a few weeks ago for fluid overload and home lasix daily was increased to BID. Hospital Course:     Ms. Manny Paulino is a 27 yo F with PMH of CKD, hypertensive cardiomyopathy who presented with abdominal pain, found to have LETHA on CKD. Renal consulted, improved with IVF, holding home lasix, kerendia, jardiance. Cr 4.8 on day of discharge. Discontinue Fredy East, has f/u with renal in 10 days.  F/u with bariatric surgery due to fullness after eating     LETHA on CKD  Nephrotic range proteinuria  - Cr 5.5 on admission, prior Cr 2.1 about 1 year ago  - renal US ordered  - Urine electrolytes ordered,   - renal consulted     HTN cardiomyopathy  - BP stable  - currently euvolemic, monitor for fluid overload with IVF  - last TTE 2/2022 with normal EF  - hold home finerenone, losartan 100 mg daily, lasix  40 mg daily  - continue carvedilol 6.25 mg BID    Early satiety  Hx gastric lap band   - f/u bariatric surgery, may need further imaging vs. Endoscopy  - CT A/P without acute findings    Adrenal adenoma  - f/u PCP for endo work-up    Exam  GEN: female in NAD  HEENT: EOMI  Pulm: CTAB, no crackles or wheezes  CV: RRR, no murmurs  ABD: Soft, non-tender, non-distended, +BS  SKIN: warm, dry  EXT: no edema    Operative Procedures:      Imaging: CT ABDOMEN+PELVIS(CPT=74176)    Result Date: 10/18/2023  CONCLUSION:   No bowel obstruction, acute diverticulitis, or abnormal bowel wall thickening. Postoperative changes from prior sleeve gastrectomy and appendectomy. Mild circumferential bladder wall thickening, which may be secondary to underdistention or cystitis. A 1.8 cm right adrenal adenoma. Lesser incidental findings described above. Dictated by (CST): Mathieu Park MD on 10/18/2023 at 8:57 PM     Finalized by (CST): Mathieu Park MD on 10/18/2023 at 9:04 PM           Home Medication Changes:     I reconciled current and discharge medications on day of discharge. These medication changes have been made as below         Medication List        CHANGE how you take these medications      furosemide 40 MG Tabs  Commonly known as: Lasix  What changed: when to take this     losartan 100 MG Tabs  Commonly known as: Cozaar  Take 1 tablet (100 mg total) by mouth daily. What changed: when to take this            CONTINUE taking these medications      atorvastatin 40 MG Tabs  Commonly known as: Lipitor  Take 1 tablet (40 mg total) by mouth nightly.      carvedilol 6.25 MG Tabs  Commonly known as: Coreg     EPINEPHrine 0.3 MG/0.3ML Soaj  Commonly known as: EpiPen     NIFEdipine ER 90 MG Tb24  Commonly known as: ADALAT CC            STOP taking these medications      Jardiance 25 MG Tabs  Generic drug: Empagliflozin     Kerendia 10 MG Tabs  Generic drug: Finerenone     metFORMIN 500 MG Tabs  Commonly known as: Glucophage     Potassium Chloride ER 20 MEQ Tbcr     spironolactone 25 MG Tabs  Commonly known as: Aldactone              Activity: activity as tolerated  Diet: renal diet  Wound Care: none needed  Code Status: Full Code  O2: n/a    Follow-up with:    PCP   Specialist renal, bariatric surgeon    LAURA COFFMAN instructions:         Follow-up with labs: none    Total Time Coordinating Care: 31 minutes    Patient had opportunity to ask questions and state understand and agree with therapeutic plan as outlined      Keke Coronado MD  [facility]

## 2023-10-23 NOTE — PAYOR COMM NOTE
--------------  DISCHARGE REVIEW    Payor: [facility]  Subscriber #:  [id number]  Authorization Number: [id number]    Admit date: 10/18/23  Admit time:  10:08 PM  Discharge Date: 10/20/2023  2:21 PM     Admitting Physician: Holley Jimenez MD  Attending Physician:  No att. providers found  Primary Care Physician: Duane Quiros          Discharge Summary Notes        Discharge Summary signed by Andrey Ansari MD at 10/20/2023  1:57 PM       Author: Andrey Ansari MD Specialty: HOSPITALIST Author Type: Physician    Filed: 10/20/2023  1:57 PM Date of Service: 10/20/2023  1:48 PM Status: Signed    : Andrey Ansari MD (Physician)           General Medicine Discharge Summary     Patient ID:  Jannette Rushing  28year old  9/9/1990    Admit date: 10/18/2023    Discharge date and time: 10/20/23    Attending Physician: Andrey Ansari MD     Primary Care Physician: Tabitha Pickens     Reason for admission: LETHA    Discharge Diagnoses: LETHA (acute kidney injury) [facility]) [N17.9]    Discharged Condition: stable    Disposition: home    Consults:   Consultants         Provider   Role Specialty     Radha Morales MD  Consulting Physician NEPHROLOGY              HPI: Ms. Yves Anand is a 27 yo F with PMH of CKD, hypertensive cardiomyopathy who presented with abdominal pain. Patient was recently treated for a UTI with Bactrim. Developed worsening abdominal pain, worse with eating and movement over the past several days. Some nausea, no vomiting. No fevers. In the ED, vitals stable. Labs with Cr 5.8 from prior labs on file from 2022, Cr 2.1. CT A/P without acute findings. Patient follows with [facility] nephrology. States Cr has been up and down lately, Cr 4.9 most recently. She was hospitalized at [facility] a few weeks ago for fluid overload and home lasix daily was increased to BID.      Hospital Course:     Ms. Yves Anand is a 27 yo F with PMH of CKD, hypertensive cardiomyopathy who presented with abdominal pain, found to have LETHA on CKD. Renal consulted, improved with IVF, holding home lasix, kerendia, jardiance. Cr 4.8 on day of discharge. Discontinue Familia Boot, has f/u with renal in 10 days. F/u with bariatric surgery due to fullness after eating     LETHA on CKD  Nephrotic range proteinuria  - Cr 5.5 on admission, prior Cr 2.1 about 1 year ago  - renal US ordered  - Urine electrolytes ordered,   - renal consulted     HTN cardiomyopathy  - BP stable  - currently euvolemic, monitor for fluid overload with IVF  - last TTE 2/2022 with normal EF  - hold home finerenone, losartan 100 mg daily, lasix  40 mg daily  - continue carvedilol 6.25 mg BID    Early satiety  Hx gastric lap band   - f/u bariatric surgery, may need further imaging vs. Endoscopy  - CT A/P without acute findings    Adrenal adenoma  - f/u PCP for endo work-up    Exam  GEN: female in NAD  HEENT: EOMI  Pulm: CTAB, no crackles or wheezes  CV: RRR, no murmurs  ABD: Soft, non-tender, non-distended, +BS  SKIN: warm, dry  EXT: no edema    Operative Procedures:      Imaging: CT ABDOMEN+PELVIS(CPT=74176)    Result Date: 10/18/2023  CONCLUSION:   No bowel obstruction, acute diverticulitis, or abnormal bowel wall thickening. Postoperative changes from prior sleeve gastrectomy and appendectomy. Mild circumferential bladder wall thickening, which may be secondary to underdistention or cystitis. A 1.8 cm right adrenal adenoma. Lesser incidental findings described above. Dictated by (CST): Kel Merrill MD on 10/18/2023 at 8:57 PM     Finalized by (CST): Kel Merrill MD on 10/18/2023 at 9:04 PM           Home Medication Changes:     I reconciled current and discharge medications on day of discharge.  These medication changes have been made as below         Medication List        CHANGE how you take these medications      furosemide 40 MG Tabs  Commonly known as: Lasix  What changed: when to take this     losartan 100 MG Tabs  Commonly known as: Cozaar  Take 1 tablet (100 mg total) by mouth daily. What changed: when to take this            CONTINUE taking these medications      atorvastatin 40 MG Tabs  Commonly known as: Lipitor  Take 1 tablet (40 mg total) by mouth nightly. carvedilol 6.25 MG Tabs  Commonly known as: Coreg     EPINEPHrine 0.3 MG/0.3ML Soaj  Commonly known as: EpiPen     NIFEdipine ER 90 MG Tb24  Commonly known as: ADALAT CC            STOP taking these medications      Jardiance 25 MG Tabs  Generic drug: Empagliflozin     Kerendia 10 MG Tabs  Generic drug: Finerenone     metFORMIN 500 MG Tabs  Commonly known as: Glucophage     Potassium Chloride ER 20 MEQ Tbcr     spironolactone 25 MG Tabs  Commonly known as: Aldactone              Activity: activity as tolerated  Diet: renal diet  Wound Care: none needed  Code Status: Full Code  O2: n/a    Follow-up with:    PCP   Specialist renal, bariatric surgeon    LAURA COFFMAN instructions:         Follow-up with labs: none    Total Time Coordinating Care: 31 minutes    Patient had opportunity to ask questions and state understand and agree with therapeutic plan as outlined      Keke Coronado MD  [facility] Hospitalist          Electronically signed by Siria Florian MD on 10/20/2023  1:57 PM         REVIEWER COMMENTS

## 2023-12-30 ENCOUNTER — HOSPITAL ENCOUNTER (INPATIENT)
Facility: HOSPITAL | Age: 33
LOS: 2 days | Discharge: HOME | End: 2024-01-01
Attending: EMERGENCY MEDICINE | Admitting: INTERNAL MEDICINE
Payer: COMMERCIAL

## 2023-12-30 ENCOUNTER — APPOINTMENT (OUTPATIENT)
Dept: CT IMAGING | Facility: HOSPITAL | Age: 33
End: 2023-12-30
Attending: EMERGENCY MEDICINE
Payer: COMMERCIAL

## 2023-12-30 DIAGNOSIS — R31.9 URINARY TRACT INFECTION WITH HEMATURIA, SITE UNSPECIFIED: ICD-10-CM

## 2023-12-30 DIAGNOSIS — N39.0 URINARY TRACT INFECTION WITH HEMATURIA, SITE UNSPECIFIED: ICD-10-CM

## 2023-12-30 DIAGNOSIS — E87.6 HYPOKALEMIA: ICD-10-CM

## 2023-12-30 DIAGNOSIS — N17.9 AKI (ACUTE KIDNEY INJURY) (HCC): Primary | ICD-10-CM

## 2023-12-30 DIAGNOSIS — R10.9 ABDOMINAL PAIN OF UNKNOWN ETIOLOGY: ICD-10-CM

## 2023-12-30 PROBLEM — D64.9 ANEMIA: Status: ACTIVE | Noted: 2023-12-30

## 2023-12-30 LAB
ALBUMIN SERPL-MCNC: 3.6 G/DL (ref 3.2–4.8)
ALBUMIN/GLOB SERPL: 1.2 {RATIO} (ref 1–2)
ALP LIVER SERPL-CCNC: 66 U/L
ALT SERPL-CCNC: 10 U/L
ANION GAP SERPL CALC-SCNC: 7 MMOL/L (ref 0–18)
AST SERPL-CCNC: 14 U/L (ref ?–34)
B-HCG SERPL-ACNC: <2.6 MIU/ML
B-HCG UR QL: NEGATIVE
BASOPHILS # BLD AUTO: 0.04 X10(3) UL (ref 0–0.2)
BASOPHILS NFR BLD AUTO: 0.5 %
BILIRUB SERPL-MCNC: 0.3 MG/DL (ref 0.3–1.2)
BILIRUB UR QL: NEGATIVE
BUN BLD-MCNC: 64 MG/DL (ref 9–23)
BUN/CREAT SERPL: 7.2 (ref 10–20)
CALCIUM BLD-MCNC: 7.7 MG/DL (ref 8.7–10.4)
CHLORIDE SERPL-SCNC: 111 MMOL/L (ref 98–112)
CO2 SERPL-SCNC: 23 MMOL/L (ref 21–32)
CREAT BLD-MCNC: 8.89 MG/DL
DEPRECATED RDW RBC AUTO: 41.9 FL (ref 35.1–46.3)
EGFRCR SERPLBLD CKD-EPI 2021: 6 ML/MIN/1.73M2 (ref 60–?)
EOSINOPHIL # BLD AUTO: 0.25 X10(3) UL (ref 0–0.7)
EOSINOPHIL NFR BLD AUTO: 3.4 %
ERYTHROCYTE [DISTWIDTH] IN BLOOD BY AUTOMATED COUNT: 14.5 % (ref 11–15)
GLOBULIN PLAS-MCNC: 2.9 G/DL (ref 2.8–4.4)
GLUCOSE BLD-MCNC: 97 MG/DL (ref 70–99)
GLUCOSE UR-MCNC: 70 MG/DL
HCT VFR BLD AUTO: 23.8 %
HGB BLD-MCNC: 8.5 G/DL
IMM GRANULOCYTES # BLD AUTO: 0.02 X10(3) UL (ref 0–1)
IMM GRANULOCYTES NFR BLD: 0.3 %
KETONES UR-MCNC: NEGATIVE MG/DL
LEUKOCYTE ESTERASE UR QL STRIP.AUTO: 75
LIPASE SERPL-CCNC: 69 U/L (ref 13–75)
LYMPHOCYTES # BLD AUTO: 1.96 X10(3) UL (ref 1–4)
LYMPHOCYTES NFR BLD AUTO: 26.5 %
MCH RBC QN AUTO: 28.7 PG (ref 26–34)
MCHC RBC AUTO-ENTMCNC: 35.7 G/DL (ref 31–37)
MCV RBC AUTO: 80.4 FL
MONOCYTES # BLD AUTO: 0.38 X10(3) UL (ref 0.1–1)
MONOCYTES NFR BLD AUTO: 5.1 %
NEUTROPHILS # BLD AUTO: 4.76 X10 (3) UL (ref 1.5–7.7)
NEUTROPHILS # BLD AUTO: 4.76 X10(3) UL (ref 1.5–7.7)
NEUTROPHILS NFR BLD AUTO: 64.2 %
NITRITE UR QL STRIP.AUTO: NEGATIVE
OSMOLALITY SERPL CALC.SUM OF ELEC: 310 MOSM/KG (ref 275–295)
PH UR: 6.5 [PH] (ref 5–8)
PLATELET # BLD AUTO: 252 10(3)UL (ref 150–450)
POTASSIUM SERPL-SCNC: 3.1 MMOL/L (ref 3.5–5.1)
PROT SERPL-MCNC: 6.5 G/DL (ref 5.7–8.2)
PROT UR-MCNC: 300 MG/DL
RBC # BLD AUTO: 2.96 X10(6)UL
SODIUM SERPL-SCNC: 141 MMOL/L (ref 136–145)
SP GR UR STRIP: 1.01 (ref 1–1.03)
UROBILINOGEN UR STRIP-ACNC: NORMAL
WBC # BLD AUTO: 7.4 X10(3) UL (ref 4–11)

## 2023-12-30 PROCEDURE — 99223 1ST HOSP IP/OBS HIGH 75: CPT | Performed by: INTERNAL MEDICINE

## 2023-12-30 PROCEDURE — 74176 CT ABD & PELVIS W/O CONTRAST: CPT | Performed by: EMERGENCY MEDICINE

## 2023-12-30 RX ORDER — BISACODYL 10 MG
10 SUPPOSITORY, RECTAL RECTAL
Status: DISCONTINUED | OUTPATIENT
Start: 2023-12-30 | End: 2024-01-01

## 2023-12-30 RX ORDER — NIFEDIPINE 90 MG/1
90 TABLET, EXTENDED RELEASE ORAL NIGHTLY
Status: DISCONTINUED | OUTPATIENT
Start: 2023-12-30 | End: 2024-01-01

## 2023-12-30 RX ORDER — HYDROMORPHONE HYDROCHLORIDE 1 MG/ML
0.4 INJECTION, SOLUTION INTRAMUSCULAR; INTRAVENOUS; SUBCUTANEOUS EVERY 2 HOUR PRN
Status: DISCONTINUED | OUTPATIENT
Start: 2023-12-30 | End: 2024-01-01

## 2023-12-30 RX ORDER — CARVEDILOL 25 MG/1
25 TABLET ORAL 2 TIMES DAILY
Status: DISCONTINUED | OUTPATIENT
Start: 2023-12-30 | End: 2024-01-01

## 2023-12-30 RX ORDER — HYDROMORPHONE HYDROCHLORIDE 1 MG/ML
0.8 INJECTION, SOLUTION INTRAMUSCULAR; INTRAVENOUS; SUBCUTANEOUS EVERY 2 HOUR PRN
Status: DISCONTINUED | OUTPATIENT
Start: 2023-12-30 | End: 2024-01-01

## 2023-12-30 RX ORDER — CARVEDILOL 6.25 MG/1
12.5 TABLET ORAL 2 TIMES DAILY
Status: DISCONTINUED | OUTPATIENT
Start: 2023-12-30 | End: 2023-12-30

## 2023-12-30 RX ORDER — SODIUM CHLORIDE 9 MG/ML
INJECTION, SOLUTION INTRAVENOUS CONTINUOUS
Status: DISCONTINUED | OUTPATIENT
Start: 2023-12-30 | End: 2023-12-31

## 2023-12-30 RX ORDER — HYDRALAZINE HYDROCHLORIDE 20 MG/ML
10 INJECTION INTRAMUSCULAR; INTRAVENOUS ONCE
Status: COMPLETED | OUTPATIENT
Start: 2023-12-30 | End: 2023-12-30

## 2023-12-30 RX ORDER — SODIUM CHLORIDE 9 MG/ML
125 INJECTION, SOLUTION INTRAVENOUS CONTINUOUS
Status: DISCONTINUED | OUTPATIENT
Start: 2023-12-30 | End: 2023-12-30 | Stop reason: ALTCHOICE

## 2023-12-30 RX ORDER — ACETAMINOPHEN 325 MG/1
650 TABLET ORAL EVERY 4 HOURS PRN
Status: DISCONTINUED | OUTPATIENT
Start: 2023-12-30 | End: 2024-01-01

## 2023-12-30 RX ORDER — HEPARIN SODIUM 5000 [USP'U]/ML
5000 INJECTION, SOLUTION INTRAVENOUS; SUBCUTANEOUS EVERY 8 HOURS SCHEDULED
Status: DISCONTINUED | OUTPATIENT
Start: 2023-12-30 | End: 2024-01-01

## 2023-12-30 RX ORDER — HYDROCODONE BITARTRATE AND ACETAMINOPHEN 5; 325 MG/1; MG/1
2 TABLET ORAL EVERY 4 HOURS PRN
Status: DISCONTINUED | OUTPATIENT
Start: 2023-12-30 | End: 2024-01-01

## 2023-12-30 RX ORDER — HYDROCODONE BITARTRATE AND ACETAMINOPHEN 5; 325 MG/1; MG/1
1 TABLET ORAL EVERY 4 HOURS PRN
Status: DISCONTINUED | OUTPATIENT
Start: 2023-12-30 | End: 2024-01-01

## 2023-12-30 RX ORDER — MORPHINE SULFATE 4 MG/ML
4 INJECTION, SOLUTION INTRAMUSCULAR; INTRAVENOUS ONCE
Status: COMPLETED | OUTPATIENT
Start: 2023-12-30 | End: 2023-12-30

## 2023-12-30 RX ORDER — HYDROMORPHONE HYDROCHLORIDE 1 MG/ML
0.2 INJECTION, SOLUTION INTRAMUSCULAR; INTRAVENOUS; SUBCUTANEOUS EVERY 2 HOUR PRN
Status: DISCONTINUED | OUTPATIENT
Start: 2023-12-30 | End: 2024-01-01

## 2023-12-30 RX ORDER — POLYETHYLENE GLYCOL 3350 17 G/17G
17 POWDER, FOR SOLUTION ORAL DAILY PRN
Status: DISCONTINUED | OUTPATIENT
Start: 2023-12-30 | End: 2024-01-01

## 2023-12-30 RX ORDER — ATORVASTATIN CALCIUM 40 MG/1
40 TABLET, FILM COATED ORAL NIGHTLY
Status: DISCONTINUED | OUTPATIENT
Start: 2023-12-30 | End: 2024-01-01

## 2023-12-30 RX ORDER — ONDANSETRON 2 MG/ML
4 INJECTION INTRAMUSCULAR; INTRAVENOUS EVERY 6 HOURS PRN
Status: DISCONTINUED | OUTPATIENT
Start: 2023-12-30 | End: 2024-01-01

## 2023-12-30 RX ORDER — SENNOSIDES 8.6 MG
17.2 TABLET ORAL NIGHTLY PRN
Status: DISCONTINUED | OUTPATIENT
Start: 2023-12-30 | End: 2024-01-01

## 2023-12-30 RX ORDER — ACETAMINOPHEN 500 MG
500 TABLET ORAL EVERY 4 HOURS PRN
Status: DISCONTINUED | OUTPATIENT
Start: 2023-12-30 | End: 2024-01-01

## 2023-12-30 NOTE — ED PROVIDER NOTES
Patient Seen in: [facility] Emergency Department      History   No chief complaint on file.    Stated Complaint: Abdominal Pain    Subjective:   33-year-old female with polycystic ovarian disease, diabetes, hypertension, gastric bypass last year states yesterday she started having some left inguinal groin type pain.  She went to bed but then woke up this morning and said the pain was everywhere in her abdomen.  It increases with movement.  She had a small bowel movement yesterday but she was having no bowel movement today and decreased urine today.  When she still on the bed she said she has no pain.  No vomiting.  No fevers or cough.  When she hits bumps in the car she has pain throughout her abdomen.  No rash.  She has 1 partner and last had intercourse 2 weeks ago.  She always uses condoms she says.  LMP was normal on December 15.  She has had appendectomy.  No cough or congestion.            Objective:   Past Medical History:   Diagnosis Date    Anxiety state     Arrhythmia     Depression     Diabetes (HCC)     Esophageal reflux     Essential hypertension     Hidradenitis 05/14/2020    bilateral axilla    High blood pressure     High cholesterol     Migraines     Polycystic ovarian syndrome     treated with Metformin    PONV (postoperative nausea and vomiting)     Renal disorder     Visual impairment     Wears glasses              Past Surgical History:   Procedure Laterality Date    APPENDECTOMY      OTHER Bilateral     excision of axilla hydradenitis                Social History     Socioeconomic History    Marital status: Single    Number of children: 0   Occupational History    Occupation: [employer]    Tobacco Use    Smoking status: Never    Smokeless tobacco: Never   Vaping Use    Vaping Use: Former   Substance and Sexual Activity    Alcohol use: No    Drug use: No     Social Determinants of Health     Food Insecurity: No Food Insecurity (10/18/2023)    Food Insecurity     Food Insecurity:  Never true   Transportation Needs: No Transportation Needs (10/18/2023)    Transportation Needs     Lack of Transportation: No   Housing Stability: Low Risk  (10/18/2023)    Housing Stability     Housing Instability: No              Review of Systems    Positive for stated complaint: Abdominal Pain  Other systems are as noted in HPI.  Constitutional and vital signs reviewed.      All other systems reviewed and negative except as noted above.    Physical Exam     ED Triage Vitals [12/30/23 1357]   BP (!) 209/105   Pulse 78   Resp 18   Temp 97.6 °F (36.4 °C)   Temp src Temporal   SpO2 99 %   O2 Device None (Room air)       Current:BP (!) 191/96   Pulse 84   Temp 97.6 °F (36.4 °C) (Temporal)   Resp 17   Ht 165.1 cm (5' 5\")   Wt 90.7 kg   LMP 12/11/2023   SpO2 96%   BMI 33.28 kg/m²         Physical Exam  HENT:      Head: Normocephalic and atraumatic.      Right Ear: External ear normal.      Left Ear: External ear normal.      Mouth/Throat:      Mouth: Mucous membranes are dry.   Eyes:      Extraocular Movements: Extraocular movements intact.   Abdominal:      Palpations: Abdomen is soft.      Comments: There are some tenderness in the left upper and epigastric region with some rebound.  No significant tender in the low abdomen.  No guarding however.   Musculoskeletal:         General: Normal range of motion.      Cervical back: Normal range of motion.   Skin:     General: Skin is warm.      Capillary Refill: Capillary refill takes less than 2 seconds.   Neurological:      Mental Status: She is alert.      Sensory: No sensory deficit.      Motor: No weakness.               ED Course     Labs Reviewed   COMP METABOLIC PANEL (14) - Abnormal; Notable for the following components:       Result Value    Potassium 3.1 (*)     BUN 64 (*)     Creatinine 8.89 (*)     BUN/CREA Ratio 7.2 (*)     Calcium, Total 7.7 (*)     Calculated Osmolality 310 (*)     eGFR-Cr 6 (*)     All other components within normal limits    URINALYSIS, ROUTINE - Abnormal; Notable for the following components:    Clarity Urine Turbid (*)     Glucose Urine 70 (*)     Blood Urine 1+ (*)     Protein Urine 300 (*)     Leukocyte Esterase Urine 75 (*)     WBC Urine 21-50 (*)     RBC Urine 3-5 (*)     Squamous Epi. Cells Many (*)     All other components within normal limits   CBC W/ DIFFERENTIAL - Abnormal; Notable for the following components:    RBC 2.96 (*)     HGB 8.5 (*)     HCT 23.8 (*)     All other components within normal limits   LIPASE - Normal   HCG, BETA SUBUNIT (QUANT PREGNANCY TEST) - Normal   POCT PREGNANCY URINE - Normal   CBC WITH DIFFERENTIAL WITH PLATELET    Narrative:     The following orders were created for panel order CBC With Differential With Platelet.  Procedure                               Abnormality         Status                     ---------                               -----------         ------                     CBC W/ DIFFERENTIAL[[id number]]          Abnormal            Final result                 Please view results for these tests on the individual orders.          ED Course as of 12/30/23 1925  ------------------------------------------------------------  Time: 12/30 1824  Comment: Labs independently interpreted by me.  Patient has acute on chronic kidney injury, negative Prag, lipase normal, urine does look contaminated but had some pyuria.  CBC shows chronic anemia but normal white count.  Awaiting CT.  Discussed with nephrology and hospitalist for admission.  ------------------------------------------------------------  Time: 12/30 1923  Comment: Independently interpreted by me.  No hydronephrosis.  Possible cystitis and ascending urinary tract infection.  Patient seen by the hospitalist Dr. Fuentes.  UA shows possible UTI will give ceftriaxone and admit the patient.  Still unclear what the pain is coming from              MDM      CT ABDOMEN+PELVIS(CPT=74176)    Result Date: 12/30/2023  CONCLUSION:  1. Diffuse  bladder wall thickening and perivesical fat stranding, concerning for cystitis. Correlation with urinalysis results is recommended.   2. Subtle bilateral perinephric and periureteral fat stranding, which may reflect ascending urinary tract infection in the appropriate clinical setting. Please note that pyelonephritis is a largely clinical diagnosis, and imaging studies have a limited role and clinical utility for this particular indication.  3. No evidence of nephroureterolithiasis or obstructive uropathy.  4. Small volume intraperitoneal ascites.  5. Small bilateral pleural effusions and associated compressive atelectasis, with or without superimposed pneumonia.  6. Indeterminate left adnexal cystic lesion, perhaps functional/physiologic in etiology.  7. Stable right adrenal adenoma.  8. Status post appendectomy.  9. Postoperative changes of sleeve gastrectomy.   10. Low-density appearance of the intracardiac blood pool raises the possibility of underlying anemia. Correlate with hematologic parameters.  11. Lesser incidental findings as above.    Dictated by (CST): Natanael Burnette MD on 12/30/2023 at 7:13 PM     Finalized by (CST): Natanael Burnette MD on 12/30/2023 at 7:20 PM           Admission disposition: 12/30/2023  7:24 PM                                        Medical Decision Making  Patient had an inguinal pain yesterday and now it is diffuse in her abdomen.  Differential clinically but is not limited to ruptured ovarian cyst, torsion, PID, Jakob-Claus Elliot, bowel obstruction, cholecystitis, pancreatitis and other intra-abdominal process.  Pain medication fluids and CT ordered along with lab work    Amount and/or Complexity of Data Reviewed  External Data Reviewed: notes.     Details: Discharge summary from October reviewed where she was admitted for acute on chronic kidney injury  Labs: ordered. Decision-making details documented in ED Course.  Radiology: ordered and independent interpretation performed.  Decision-making details documented in ED Course.  Discussion of management or test interpretation with external provider(s): Please see ED course.  Patient given blood pressure medication as well as fluids.    Risk  Decision regarding hospitalization.  Risk Details: MH of CKD, hypertensive cardiomyopathy        Disposition and Plan     Clinical Impression:  1. LETHA (acute kidney injury) (HCC)    2. Hypokalemia    3. Abdominal pain of unknown etiology    4. Urinary tract infection with hematuria, site unspecified         Disposition:  Admit  12/30/2023  7:24 pm    Follow-up:  No follow-up provider specified.        Medications Prescribed:  Current Discharge Medication List                            Hospital Problems       Present on Admission  Date Reviewed: 3/9/2021            ICD-10-CM Noted POA    * (Principal) LETHA (acute kidney injury) (HCC) N17.9 2/21/2022 Unknown    Acute kidney injury (HCC) N17.9 12/30/2023 Yes    Acute renal failure (ARF) (HCC) N17.9 12/30/2023 Yes    Anemia D64.9 12/30/2023 Yes

## 2023-12-30 NOTE — ED INITIAL ASSESSMENT (HPI)
Patient arrives with reports of abdominal pain/ pelvic pain since last night. - N/V/D. Denies fevers and dysuria.   Denies CP/SOB. Hx. HTN, states took BP meds today.

## 2023-12-30 NOTE — H&P
[facility]  HISTORY AND PHYSICAL       Liu Walker Patient Status:  Emergency    1990  33 year old [facility] [id number]   Location  Attending Jay Choe MD     PCP Ashleigh Murray         DATE OF ADMISSION: 2023     CHIEF COMPLAINT: Abdominal pain    HISTORY OF PRESENT ILLNESS  This is a 33 year oldfemale presented complaining of abdominal pain.  Patient stated symptom started around 1 day prior to admission.  Patient described the pain in her lower abdomen/pelvic area.  She did note some radiation around to the right side of her abdomen.  Patient noted some associated nausea, but denied emesis.  Patient denies subjective fevers or chills.  Patient denied pain with urination.  Patient did note decreased urine output volume over the past day or so.  Of note, patient with known history of chronic renal failure.  Patient states she has been planning to have fistula placed in the next month or so.  Patient denied diarrhea, but did note some constipation.  Patient otherwise denied chest pain, shortness of breath.    PAST MEDICAL HISTORY  Past Medical History:   Diagnosis Date    Anxiety state     Arrhythmia     Depression     Diabetes (HCC)     Esophageal reflux     Essential hypertension     Hidradenitis 2020    bilateral axilla    High blood pressure     High cholesterol     Migraines     Polycystic ovarian syndrome     treated with Metformin    PONV (postoperative nausea and vomiting)     Renal disorder     Visual impairment     Wears glasses        PAST SURGICAL HISTORY  Past Surgical History:   Procedure Laterality Date    APPENDECTOMY      OTHER Bilateral     excision of axilla hydradenitis       ALLERGIES   Other, Citrus c vit [ascorbate], Iodine (topical), Latex, and Shellfish-derived products    MEDICATIONS  Patient's Medications   New Prescriptions    No medications on file   Previous Medications    ATORVASTATIN 40 MG ORAL TAB    Take 1 tablet (40 mg total)  by mouth nightly.    CARVEDILOL 6.25 MG ORAL TAB    Take 2 tablets (12.5 mg total) by mouth 2 (two) times daily.    EPINEPHRINE 0.3 MG/0.3ML INJECTION SOLUTION AUTO-INJECTOR    TAKE 1 AUTO PEN AS NEEDED BY INJECTION AS DIRECTED    FUROSEMIDE 40 MG ORAL TAB    Take 1 tablet (40 mg total) by mouth daily.    LOSARTAN 100 MG ORAL TAB    Take 1 tablet (100 mg total) by mouth daily.    NIFEDIPINE ER 90 MG ORAL TABLET 24 HR    Take 1 tablet (90 mg total) by mouth nightly.   Modified Medications    No medications on file   Discontinued Medications    No medications on file       SOCIAL HISTORY  Social History     Socioeconomic History    Marital status: Single    Number of children: 0   Occupational History    Occupation: [employer]    Tobacco Use    Smoking status: Never    Smokeless tobacco: Never   Vaping Use    Vaping Use: Former   Substance and Sexual Activity    Alcohol use: No    Drug use: No       FAMILY HISTORY  Family History   Problem Relation Age of Onset    Hypertension Father     Lipids Father     Obesity Father     Diabetes Mother     Hypertension Mother     Lipids Mother     Obesity Mother     Psychiatric Sister     Psychiatric Brother        PHYSICAL EXAM  Vital signs:  BP (!) 198/107   Pulse 72   Temp 97.6 °F (36.4 °C) (Temporal)   Resp 18   Ht 5' 5\" (1.651 m)   Wt 200 lb (90.7 kg)   LMP 12/11/2023   SpO2 98%   BMI 33.28 kg/m²      GENERAL:  Awake and alert, in no acute distress.  HEART:  S1 and S2 heard.  RRR   LUNGS:  Air entry was good.  No crackles or wheezes   ABDOMEN: Soft and tenderness to palpation in lower abdomen.    EXTREMITIES: No edema appreciated  PSYCHIATRIC: Normal mood      IMAGING  No results found.    Data:  Recent Labs   Lab 12/30/23  1446   RBC 2.96*   HGB 8.5*   HCT 23.8*   MCV 80.4   MCH 28.7   MCHC 35.7   RDW 14.5   NEPRELIM 4.76   WBC 7.4   .0     Recent Labs   Lab 12/30/23  1446   GLU 97   BUN 64*   CREATSERUM 8.89*   CA 7.7*   ALB 3.6      K 3.1*   CL  111   CO2 23.0   ALKPHO 66   AST 14   ALT 10   BILT 0.3   TP 6.5       ASSESSMENT/PLAN      Acute Kidney Injury on CKD  V  -History of known nephrotic range proteinuria  -Patient previously seen by nephrology and has been planning for transition to dialysis.  Patient states she is planning for fistula placement in the near future.  -Patient presented with abdominal pain with decreased urine output and noted to have worsening kidney function  - Starting IVF  - Avoiding Nephrotoxic agents  - Cont to monitor  -Nephrology consulted, appreciate further recommendations    Possible pyelonephritis/acute cystitis  -Patient presenting with abd pain  -UA with signs of infection  -CT abdomen pelvis reviewed.  Noted signs of diffuse bladder wall thickening, perinephric fat stranding.  -Starting broad-spectrum antibiotics and IV fluids  -Follow-up urine culture  -Continue to monitor    Hypokalemia  -Conservative repletion due to LETHA/CKD as above    History of hypertensive cardiomyopathy  -Without current signs of fluid overload  - monitor for fluid overload with IVF as above  - last TTE 2/2022 with normal EF  - hold losartan, lasix due to LETHA/CKD as above  - continue carvedilol     Accelerated hypertension  -BP grossly elevated in the ED  -Restart home carvedilol and nifedipine  -Holding ACE/ARB and diuretics due to LETHA on CKD as above  -IV hydralazine as needed  -Continue to monitor    Anemia of chronic disease  -No acute blood loss noted  -Likely secondary to kidney disease as above  -Continue to monitor       Plan of care discussed with patient at bedside.  Discussed with ED physician and RN. Decision made that pt needs hospitalization for further management/monitoring.      Chava Torres MD    This note was prepared using Dragon Medical voice recognition dictation software. As a result errors may occur. When identified these errors have been corrected. While every attempt is made to correct errors during dictation  discrepancies may still exist

## 2023-12-30 NOTE — ED QUICK NOTES
Orders for admission, patient is aware of plan and ready to go upstairs. Any questions, please call ED RN arvind at extension [phone].     Patient Covid vaccination status: Unvaccinated     COVID Test Ordered in ED: None    COVID Suspicion at Admission: N/A    Running Infusions:    sodium chloride 125 mL/hr (12/30/23 1601)        Mental Status/LOC at time of transport: x4    Other pertinent information:   CIWA score: N/A   NIH score:  N/A

## 2023-12-31 LAB
ANION GAP SERPL CALC-SCNC: 8 MMOL/L (ref 0–18)
BASOPHILS # BLD AUTO: 0.04 X10(3) UL (ref 0–0.2)
BASOPHILS NFR BLD AUTO: 0.5 %
BUN BLD-MCNC: 62 MG/DL (ref 9–23)
BUN/CREAT SERPL: 7 (ref 10–20)
CALCIUM BLD-MCNC: 7.8 MG/DL (ref 8.7–10.4)
CHLORIDE SERPL-SCNC: 112 MMOL/L (ref 98–112)
CO2 SERPL-SCNC: 21 MMOL/L (ref 21–32)
CREAT BLD-MCNC: 8.88 MG/DL
DEPRECATED HBV CORE AB SER IA-ACNC: 85.5 NG/ML
DEPRECATED RDW RBC AUTO: 44.1 FL (ref 35.1–46.3)
EGFRCR SERPLBLD CKD-EPI 2021: 6 ML/MIN/1.73M2 (ref 60–?)
EOSINOPHIL # BLD AUTO: 0.24 X10(3) UL (ref 0–0.7)
EOSINOPHIL NFR BLD AUTO: 3.3 %
ERYTHROCYTE [DISTWIDTH] IN BLOOD BY AUTOMATED COUNT: 14.8 % (ref 11–15)
GLUCOSE BLD-MCNC: 88 MG/DL (ref 70–99)
HCT VFR BLD AUTO: 23.7 %
HGB BLD-MCNC: 8 G/DL
IMM GRANULOCYTES # BLD AUTO: 0.03 X10(3) UL (ref 0–1)
IMM GRANULOCYTES NFR BLD: 0.4 %
IRON SATN MFR SERPL: 18 %
IRON SERPL-MCNC: 38 UG/DL
LYMPHOCYTES # BLD AUTO: 2.07 X10(3) UL (ref 1–4)
LYMPHOCYTES NFR BLD AUTO: 28 %
MAGNESIUM SERPL-MCNC: 2 MG/DL (ref 1.6–2.6)
MCH RBC QN AUTO: 27.6 PG (ref 26–34)
MCHC RBC AUTO-ENTMCNC: 33.8 G/DL (ref 31–37)
MCV RBC AUTO: 81.7 FL
MONOCYTES # BLD AUTO: 0.37 X10(3) UL (ref 0.1–1)
MONOCYTES NFR BLD AUTO: 5 %
NEUTROPHILS # BLD AUTO: 4.63 X10 (3) UL (ref 1.5–7.7)
NEUTROPHILS # BLD AUTO: 4.63 X10(3) UL (ref 1.5–7.7)
NEUTROPHILS NFR BLD AUTO: 62.8 %
OSMOLALITY SERPL CALC.SUM OF ELEC: 309 MOSM/KG (ref 275–295)
PHOSPHATE SERPL-MCNC: 6.6 MG/DL (ref 2.4–5.1)
PLATELET # BLD AUTO: 243 10(3)UL (ref 150–450)
POTASSIUM SERPL-SCNC: 2.8 MMOL/L (ref 3.5–5.1)
RBC # BLD AUTO: 2.9 X10(6)UL
SODIUM SERPL-SCNC: 141 MMOL/L (ref 136–145)
TIBC SERPL-MCNC: 213 UG/DL (ref 250–425)
TRANSFERRIN SERPL-MCNC: 143 MG/DL (ref 250–380)
WBC # BLD AUTO: 7.4 X10(3) UL (ref 4–11)

## 2023-12-31 PROCEDURE — 99233 SBSQ HOSP IP/OBS HIGH 50: CPT | Performed by: INTERNAL MEDICINE

## 2023-12-31 RX ORDER — POTASSIUM CHLORIDE 20 MEQ/1
40 TABLET, EXTENDED RELEASE ORAL ONCE
Status: COMPLETED | OUTPATIENT
Start: 2023-12-31 | End: 2023-12-31

## 2023-12-31 RX ORDER — HYDRALAZINE HYDROCHLORIDE 25 MG/1
25 TABLET, FILM COATED ORAL 2 TIMES DAILY
Status: DISCONTINUED | OUTPATIENT
Start: 2023-12-31 | End: 2024-01-01

## 2023-12-31 RX ORDER — SODIUM CHLORIDE, SODIUM LACTATE, POTASSIUM CHLORIDE, CALCIUM CHLORIDE 600; 310; 30; 20 MG/100ML; MG/100ML; MG/100ML; MG/100ML
INJECTION, SOLUTION INTRAVENOUS CONTINUOUS
Status: DISCONTINUED | OUTPATIENT
Start: 2023-12-31 | End: 2024-01-01

## 2023-12-31 RX ORDER — SODIUM BICARBONATE 650 MG/1
650 TABLET ORAL 3 TIMES DAILY
Status: DISCONTINUED | OUTPATIENT
Start: 2023-12-31 | End: 2024-01-01

## 2023-12-31 NOTE — PROGRESS NOTES
[facility]     Hospitalist Progress Note     Liu Walker Patient Status:  Inpatient    1990 MRN [id number]   Location [facility] 1W Attending Chava Torres MD   Hosp Day # 1 PCP Ashleigh Murray     Chief Complaint: Abdominal pain    Subjective:     Patient seen sitting in bed.  No family bedside.  Denies acute events overnight.  Reports abdominal pain improving today.    Objective:      Vital signs:  Vitals:    23 0006 23 0517 23 0747   BP: (!) 219/107 157/86 131/71 152/86   BP Location: Left arm Right arm Left arm Left arm   Pulse: 87  80 82   Resp: 18  18 18   Temp: 97.8 °F (36.6 °C)  98.1 °F (36.7 °C) 98.7 °F (37.1 °C)   TempSrc: Oral  Oral Oral   SpO2: 99%  99% 94%   Weight: 216 lb (98 kg)  216 lb (98 kg)    Height: 5' 5\" (1.651 m)        No intake or output data in the 24 hours ending 23 1118        Physical Exam:    GENERAL:  Awake and alert, in no acute distress.  HEART:  S1 and S2 heard.  RRR   LUNGS:  Air entry was good.  No crackles or wheezes   ABDOMEN: Soft and mild tenderness to palpation in lower abdomen.    EXTREMITIES: No edema appreciated  PSYCHIATRIC: Normal mood    Diagnostic Data:    Labs:    Recent Labs   Lab 23  1446 23  0711   WBC 7.4 7.4   HGB 8.5* 8.0*   MCV 80.4 81.7   .0 243.0       Recent Labs   Lab 23  1446 23  0711   GLU 97 88   BUN 64* 62*   CREATSERUM 8.89* 8.88*   CA 7.7* 7.8*   ALB 3.6  --     141   K 3.1* 2.8*    112   CO2 23.0 21.0   ALKPHO 66  --    AST 14  --    ALT 10  --    BILT 0.3  --    TP 6.5  --            Estimated Creatinine Clearance: 8 mL/min (A) (based on SCr of 8.88 mg/dL (H)).    No results for input(s): \"PTP\", \"INR\" in the last 168 hours.         COVID-19  Lab Results   Component Value Date    COVID19 Not Detected 2022    COVID19 Not Detected 2022    COVID19 Not Detected 2022       Pro-Calcitonin  No results for  input(s): \"PCT\" in the last 168 hours.    Cardiac  No results for input(s): \"TROP\", \"PBNP\" in the last 168 hours.    Inflammatory Markers  No results for input(s): \"CRP\", \"GENTRY\", \"LDH\", \"DDIMER\" in the last 168 hours.    Culture:  No results found for this visit on 12/30/23.    CT ABDOMEN+PELVIS(CPT=74176)    Result Date: 12/30/2023  CONCLUSION:  1. Diffuse bladder wall thickening and perivesical fat stranding, concerning for cystitis. Correlation with urinalysis results is recommended.   2. Subtle bilateral perinephric and periureteral fat stranding, which may reflect ascending urinary tract infection in the appropriate clinical setting. Please note that pyelonephritis is a largely clinical diagnosis, and imaging studies have a limited role and clinical utility for this particular indication.  3. No evidence of nephroureterolithiasis or obstructive uropathy.  4. Small volume intraperitoneal ascites.  5. Small bilateral pleural effusions and associated compressive atelectasis, with or without superimposed pneumonia.  6. Indeterminate left adnexal cystic lesion, perhaps functional/physiologic in etiology.  7. Stable right adrenal adenoma.  8. Status post appendectomy.  9. Postoperative changes of sleeve gastrectomy.   10. Low-density appearance of the intracardiac blood pool raises the possibility of underlying anemia. Correlate with hematologic parameters.  11. Lesser incidental findings as above.    Dictated by (CST): Natanael Burnette MD on 12/30/2023 at 7:13 PM     Finalized by (CST): Natanael Burnette MD on 12/30/2023 at 7:20 PM                 Medications:    potassium chloride  40 mEq Oral Once    atorvastatin  40 mg Oral Nightly    NIFEdipine ER  90 mg Oral Nightly    heparin  5,000 Units Subcutaneous Q8H MANISHA    cefTRIAXone  1 g Intravenous Q24H    carvedilol  25 mg Oral BID       Assessment & Plan:        Acute Kidney Injury on CKD  V  -History of known nephrotic range proteinuria  -Patient previously seen by  nephrology and has been planning for transition to dialysis.  Patient states she is planning for fistula placement in the near future.  -Patient presented with abdominal pain with decreased urine output and noted to have worsening kidney function  -Continue IV fluids, transition to LR  - Avoiding Nephrotoxic agents  - Cont to monitor  -Nephrology consulted, recommend no acute indication for emergent dialysis.  Added sodium bicarb tablets.  Appreciate further recommendations     Possible pyelonephritis/acute cystitis  -Patient presenting with abd pain  -UA with signs of infection  -CT abdomen pelvis reviewed.  Noted signs of diffuse bladder wall thickening, perinephric fat stranding.  -Continue on broad-spectrum antibiotics and IV fluids  -Follow-up urine culture  -Continue to monitor     Hypokalemia  -Conservative repletion due to LETHA/CKD as above     History of hypertensive cardiomyopathy  -Without current signs of fluid overload  - monitor for fluid overload with IVF as above  - last TTE 2/2022 with normal EF  - hold losartan, lasix due to LETHA/CKD as above  - continue carvedilol      Accelerated hypertension  -BP grossly elevated in the ED  -Improved control  -Continue carvedilol and nifedipine  -Add low-dose hydralazine  -Holding ACE/ARB and diuretics due to LETHA on CKD as above  -Continue to monitor     Anemia of chronic disease  -No acute blood loss noted  -Likely secondary to kidney disease as above  -Continue to monitor      Plan of care discussed with patient at bedside and with Rn. Discussed management/test result(s) with nephrology consultant    Quality:  DVT Prophylaxis: Heparin  CODE status: Full  Estimated date of discharge: TBD  Discharge is dependent on: clinical stability      Chava Torres MD        This note was prepared using Dragon Medical voice recognition dictation software. As a result errors may occur. When identified these errors have been corrected. While every attempt is made to  correct errors during dictation discrepancies may still exist

## 2023-12-31 NOTE — ED QUICK NOTES
Patient transported to floor in stable condition. Belongings with patient. Continuation of care endorsed to floor RN.

## 2023-12-31 NOTE — PLAN OF CARE
Problem: Diabetes/Glucose Control  Goal: Glucose maintained within prescribed range  Description: INTERVENTIONS:  - Monitor Blood Glucose as ordered  - Assess for signs and symptoms of hyperglycemia and hypoglycemia  - Administer ordered medications to maintain glucose within target range  - Assess barriers to adequate nutritional intake and initiate nutrition consult as needed  - Instruct patient on self management of diabetes  Outcome: Progressing     Problem: CARDIOVASCULAR - ADULT  Goal: Maintains optimal cardiac output and hemodynamic stability  Description: INTERVENTIONS:  - Monitor vital signs, rhythm, and trends  - Monitor for bleeding, hypotension and signs of decreased cardiac output  - Evaluate effectiveness of vasoactive medications to optimize hemodynamic stability  - Monitor arterial and/or venous puncture sites for bleeding and/or hematoma  - Assess quality of pulses, skin color and temperature  - Assess for signs of decreased coronary artery perfusion - ex. Angina  - Evaluate fluid balance, assess for edema, trend weights  Outcome: Progressing  Goal: Absence of cardiac arrhythmias or at baseline  Description: INTERVENTIONS:  - Continuous cardiac monitoring, monitor vital signs, obtain 12 lead EKG if indicated  - Evaluate effectiveness of antiarrhythmic and heart rate control medications as ordered  - Initiate emergency measures for life threatening arrhythmias  - Monitor electrolytes and administer replacement therapy as ordered  Outcome: Progressing     Problem: METABOLIC/FLUID AND ELECTROLYTES - ADULT  Goal: Glucose maintained within prescribed range  Description: INTERVENTIONS:  - Monitor Blood Glucose as ordered  - Assess for signs and symptoms of hyperglycemia and hypoglycemia  - Administer ordered medications to maintain glucose within target range  - Assess barriers to adequate nutritional intake and initiate nutrition consult as needed  - Instruct patient on self management of  diabetes  Outcome: Progressing  Goal: Hemodynamic stability and optimal renal function maintained  Description: INTERVENTIONS:  - Monitor labs and assess for signs and symptoms of volume excess or deficit  - Monitor intake, output and patient weight  - Monitor urine specific gravity, serum osmolarity and serum sodium as indicated or ordered  - Monitor response to interventions for patient's volume status, including labs, urine output, blood pressure (other measures as available)  - Encourage oral intake as appropriate  - Instruct patient on fluid and nutrition restrictions as appropriate  Outcome: Not Progressing   Patients b/p improved monitor renal function, ivf fluid

## 2023-12-31 NOTE — PLAN OF CARE
IVF changed to LR, started on hydralazine and sodium bicarb, getting Rocephin for UTI. Call light within reach and bed in low position.  Problem: Patient Centered Care  Goal: Patient preferences are identified and integrated in the patient's plan of care  Description: Interventions:  - What would you like us to know as we care for you?   - Provide timely, complete, and accurate information to patient/family  - Incorporate patient and family knowledge, values, beliefs, and cultural backgrounds into the planning and delivery of care  - Encourage patient/family to participate in care and decision-making at the level they choose  - Honor patient and family perspectives and choices  Outcome: Progressing     Problem: Diabetes/Glucose Control  Goal: Glucose maintained within prescribed range  Description: INTERVENTIONS:  - Monitor Blood Glucose as ordered  - Assess for signs and symptoms of hyperglycemia and hypoglycemia  - Administer ordered medications to maintain glucose within target range  - Assess barriers to adequate nutritional intake and initiate nutrition consult as needed  - Instruct patient on self management of diabetes  Outcome: Progressing     Problem: Patient/Family Goals  Goal: Patient/Family Long Term Goal  Description: Patient's Long Term Goal:     Interventions:  -   - See additional Care Plan goals for specific interventions  Outcome: Progressing  Goal: Patient/Family Short Term Goal  Description: Patient's Short Term Goal:     Interventions:   -   - See additional Care Plan goals for specific interventions  Outcome: Progressing     Problem: CARDIOVASCULAR - ADULT  Goal: Maintains optimal cardiac output and hemodynamic stability  Description: INTERVENTIONS:  - Monitor vital signs, rhythm, and trends  - Monitor for bleeding, hypotension and signs of decreased cardiac output  - Evaluate effectiveness of vasoactive medications to optimize hemodynamic stability  - Monitor arterial and/or venous puncture  sites for bleeding and/or hematoma  - Assess quality of pulses, skin color and temperature  - Assess for signs of decreased coronary artery perfusion - ex. Angina  - Evaluate fluid balance, assess for edema, trend weights  Outcome: Progressing  Goal: Absence of cardiac arrhythmias or at baseline  Description: INTERVENTIONS:  - Continuous cardiac monitoring, monitor vital signs, obtain 12 lead EKG if indicated  - Evaluate effectiveness of antiarrhythmic and heart rate control medications as ordered  - Initiate emergency measures for life threatening arrhythmias  - Monitor electrolytes and administer replacement therapy as ordered  Outcome: Progressing     Problem: METABOLIC/FLUID AND ELECTROLYTES - ADULT  Goal: Glucose maintained within prescribed range  Description: INTERVENTIONS:  - Monitor Blood Glucose as ordered  - Assess for signs and symptoms of hyperglycemia and hypoglycemia  - Administer ordered medications to maintain glucose within target range  - Assess barriers to adequate nutritional intake and initiate nutrition consult as needed  - Instruct patient on self management of diabetes  Outcome: Progressing  Goal: Hemodynamic stability and optimal renal function maintained  Description: INTERVENTIONS:  - Monitor labs and assess for signs and symptoms of volume excess or deficit  - Monitor intake, output and patient weight  - Monitor urine specific gravity, serum osmolarity and serum sodium as indicated or ordered  - Monitor response to interventions for patient's volume status, including labs, urine output, blood pressure (other measures as available)  - Encourage oral intake as appropriate  - Instruct patient on fluid and nutrition restrictions as appropriate  Outcome: Progressing

## 2023-12-31 NOTE — CONSULTS
[facility] - Nephrology  Inpatient Consultation    Liu Walker Patient Status:  Inpatient    1990 MRN [id number]   Location [facility] 1W Attending Chava Torres MD   Hosp Day # 1 PCP Ashleigh Murray     Reason for consult: CKD 5  Requesting physician: Jay Choe MD  Date of consultation: 2023     HISTORY OF PRESENT ILLNESS:     Liu Walker is a 33 year old female with a medical history notable for CKD Stage 5 with nephrotic range proteinuria in the setting of diabetes mellitus type 2, essential hypertension, and obesity. Her primary Nephrologist is Dr. Jenkins at [facility] with whom she has a follow up appointment 2024 . She was scheduled to have an AVF created in 2023. This was rescheduled to February however. She reports intermittent abdominal and pelvic pain. The pain seemed to get worse with eating so she was limiting her food intake. She also endorses a general loss of appetite from the past 3 weeks however. She on , Lasix was increased to BID due to increased leg swelling. She reports that this worked well and her leg swelling resolved. She developed developed worsening abdominal/pelvic pain and presented to the ED yesterday. She was found to have a creatinine of 8.8. CT scan of the abdomen was negative for hydronephrosis though did show bladder wall thickening and perivesical fat stranding, consistent with cystitis. There was also bilateral perinephric fat stranding. UA was notable for positive leukocyte esterase, pyuria, and hematuria. Liu was admitted for further evaluation and management. She was started on IV antibiotics and IV fluids. She is feeling a little better today.     ALLERGIES:     Allergies   Allergen Reactions    Grapefruit HIVES    Other OTHER (SEE COMMENTS)     Muscle relaxants-paralysis    Citrus C Vit [Ascorbate] HIVES    Iodine (Topical) UNKNOWN    Latex RASH     Shellfish-Derived Products Tightness in Throat       MEDICATIONS:       Current Facility-Administered Medications:     hydrALAZINE (Apresoline) tab 25 mg, 25 mg, Oral, BID    sodium bicarbonate tab 650 mg, 650 mg, Oral, TID    lactated ringers infusion, , Intravenous, Continuous    atorvastatin (Lipitor) tab 40 mg, 40 mg, Oral, Nightly    NIFEdipine ER (Procardia-XL) 24 hr tab 90 mg, 90 mg, Oral, Nightly    heparin (Porcine) 5000 UNIT/ML injection 5,000 Units, 5,000 Units, Subcutaneous, Q8H MANISHA    acetaminophen (Tylenol Extra Strength) tab 500 mg, 500 mg, Oral, Q4H PRN    acetaminophen (Tylenol) tab 650 mg, 650 mg, Oral, Q4H PRN **OR** HYDROcodone-acetaminophen (Norco) 5-325 MG per tab 1 tablet, 1 tablet, Oral, Q4H PRN **OR** HYDROcodone-acetaminophen (Norco) 5-325 MG per tab 2 tablet, 2 tablet, Oral, Q4H PRN    HYDROmorphone (Dilaudid) 1 MG/ML injection 0.2 mg, 0.2 mg, Intravenous, Q2H PRN **OR** HYDROmorphone (Dilaudid) 1 MG/ML injection 0.4 mg, 0.4 mg, Intravenous, Q2H PRN **OR** HYDROmorphone (Dilaudid) 1 MG/ML injection 0.8 mg, 0.8 mg, Intravenous, Q2H PRN    ondansetron (Zofran) 4 MG/2ML injection 4 mg, 4 mg, Intravenous, Q6H PRN    polyethylene glycol (PEG 3350) (Miralax) 17 g oral packet 17 g, 17 g, Oral, Daily PRN    sennosides (Senokot) tab 17.2 mg, 17.2 mg, Oral, Nightly PRN    bisacodyl (Dulcolax) 10 MG rectal suppository 10 mg, 10 mg, Rectal, Daily PRN    cefTRIAXone (Rocephin) 1 g in D5W 100 mL IVPB-ADD, 1 g, Intravenous, Q24H    carvedilol (Coreg) tab 25 mg, 25 mg, Oral, BID    Medications Prior to Admission   Medication Sig Dispense Refill Last Dose    furosemide 40 MG Oral Tab Take 1 tablet (40 mg total) by mouth 2 (two) times daily.   12/29/2023    carvedilol 25 MG Oral Tab Take 1 tablet (25 mg total) by mouth 2 (two) times daily.   12/29/2023    NIFEdipine ER 90 MG Oral Tablet 24 Hr Take 1 tablet (90 mg total) by mouth nightly.   12/29/2023    atorvastatin 40 MG Oral Tab Take 1 tablet (40 mg  total) by mouth nightly. 30 tablet 0 12/29/2023    losartan 100 MG Oral Tab Take 1 tablet (100 mg total) by mouth daily. (Patient taking differently: Take 1 tablet (100 mg total) by mouth every morning.) 30 tablet 0 12/29/2023    EPINEPHrine 0.3 MG/0.3ML Injection Solution Auto-injector TAKE 1 AUTO PEN AS NEEDED BY INJECTION AS DIRECTED   Unknown        HISTORY:     Past Medical History:   Diagnosis Date    Anxiety state     Arrhythmia     Depression     Diabetes (HCC)     Esophageal reflux     Essential hypertension     Hidradenitis 05/14/2020    bilateral axilla    High blood pressure     High cholesterol     Migraines     Polycystic ovarian syndrome     treated with Metformin    PONV (postoperative nausea and vomiting)     Renal disorder     Visual impairment     Wears glasses     Past Surgical History:   Procedure Laterality Date    APPENDECTOMY      OTHER Bilateral     excision of axilla hydradenitis     Family History   Problem Relation Age of Onset    Hypertension Father     Lipids Father     Obesity Father     Diabetes Mother     Hypertension Mother     Lipids Mother     Obesity Mother     Psychiatric Sister     Psychiatric Brother       reports that she has never smoked. She has never used smokeless tobacco. She reports that she does not drink alcohol and does not use drugs.    REVIEW OF SYSTEMS:   Review of Systems   Constitutional:  Positive for malaise/fatigue. Negative for chills and fever.   HENT: Negative.     Respiratory: Negative.  Negative for shortness of breath.    Cardiovascular:  Positive for leg swelling.   Gastrointestinal:  Positive for abdominal pain.   Genitourinary: Negative.  Negative for dysuria and hematuria.   Musculoskeletal: Negative.  Negative for myalgias.   Skin: Negative.  Negative for rash.   Neurological: Negative.    All other systems reviewed and are negative.      PHYSICAL EXAM:     Vital Signs: /86 (BP Location: Left arm)   Pulse 82   Temp 98.7 °F (37.1 °C) (Oral)    Resp 18   Ht 5' 5\" (1.651 m)   Wt 216 lb (98 kg)   LMP 12/15/2023   SpO2 94%   BMI 35.94 kg/m²   Temp (24hrs), Av.1 °F (36.7 °C), Min:97.6 °F (36.4 °C), Max:98.7 °F (37.1 °C)       Intake/Output Summary (Last 24 hours) at 2023 1342  Last data filed at 2023 1100  Gross per 24 hour   Intake 240 ml   Output --   Net 240 ml     Wt Readings from Last 3 Encounters:   23 216 lb (98 kg)   10/18/23 215 lb (97.5 kg)   22 257 lb (116.6 kg)       Physical Exam  Vitals and nursing note reviewed.   Constitutional:       General: She is not in acute distress.     Appearance: Normal appearance. She is obese. She is not toxic-appearing or diaphoretic.   HENT:      Head: Normocephalic and atraumatic.   Eyes:      Extraocular Movements: Extraocular movements intact.      Pupils: Pupils are equal, round, and reactive to light.   Cardiovascular:      Rate and Rhythm: Normal rate and regular rhythm.      Heart sounds: No murmur heard.  Pulmonary:      Effort: Pulmonary effort is normal. No respiratory distress.      Breath sounds: No wheezing or rales.   Abdominal:      General: Abdomen is flat. Bowel sounds are normal. There is no distension.      Palpations: Abdomen is soft. There is no mass.      Tenderness: There is no abdominal tenderness.      Hernia: No hernia is present.   Musculoskeletal:      Cervical back: Normal range of motion.      Right lower leg: No edema.      Left lower leg: No edema.   Skin:     General: Skin is warm.      Findings: No erythema or rash.   Neurological:      General: No focal deficit present.      Mental Status: She is alert and oriented to person, place, and time.      Cranial Nerves: No cranial nerve deficit.         LABORATORY DATA:     Lab Results   Component Value Date    GLU 88 2023    BUN 62 (H) 2023    BUNCREA 7.0 (L) 2023    CREATSERUM 8.88 (H) 2023    ANIONGAP 8 2023    GFRNAA 29 (L) 2022    GFRAA 34 (L) 2022    CA  7.8 (L) 12/31/2023    OSMOCALC 309 (H) 12/31/2023    ALKPHO 66 12/30/2023    AST 14 12/30/2023    ALT 10 12/30/2023    BILT 0.3 12/30/2023    TP 6.5 12/30/2023    ALB 3.6 12/30/2023    GLOBULIN 2.9 12/30/2023     12/31/2023    K 2.8 (LL) 12/31/2023     12/31/2023    CO2 21.0 12/31/2023     Lab Results   Component Value Date    WBC 7.4 12/31/2023    RBC 2.90 (L) 12/31/2023    HGB 8.0 (L) 12/31/2023    HCT 23.7 (L) 12/31/2023    .0 12/31/2023    MPV 7.7 11/08/2018    MCV 81.7 12/31/2023    MCH 27.6 12/31/2023    MCHC 33.8 12/31/2023    RDW 14.8 12/31/2023    NEPRELIM 4.63 12/31/2023    NEPERCENT 62.8 12/31/2023    LYPERCENT 28.0 12/31/2023    MOPERCENT 5.0 12/31/2023    EOPERCENT 3.3 12/31/2023    BAPERCENT 0.5 12/31/2023    NE 4.63 12/31/2023    LYMABS 2.07 12/31/2023    MOABSO 0.37 12/31/2023    EOABSO 0.24 12/31/2023    BAABSO 0.04 12/31/2023     Lab Results   Component Value Date    MALBP 179.00 10/19/2023    CREUR 29.70 10/19/2023    CREUR 29.70 10/19/2023     Lab Results   Component Value Date    COLORUR Light-Yellow 12/30/2023    CLARITY Turbid (A) 12/30/2023    SPECGRAVITY 1.011 12/30/2023    GLUUR 70 (A) 12/30/2023    BILUR Negative 12/30/2023    KETUR Negative 12/30/2023    BLOODURINE 1+ (A) 12/30/2023    PHURINE 6.5 12/30/2023    PROUR 300 (A) 12/30/2023    UROBILINOGEN Normal 12/30/2023    NITRITE Negative 12/30/2023    LEUUR 75 (A) 12/30/2023    WBCUR 21-50 (A) 12/30/2023    RBCUR 3-5 (A) 12/30/2023    EPIUR Many (A) 12/30/2023    BACUR None Seen 12/30/2023    HYLUR Present (A) 04/21/2022         IMAGING:     Study Result    Narrative   PROCEDURE:   CT ABDOMEN+PELVIS(CPT=74176)     COMPARISON:   [facility], CT ABDOMEN+PELVIS (CPT=74176), 10/18/2023, 8:20 PM.     INDICATIONS:   Generalized abdominal and pelvic pain.     TECHNIQUE: Multidetector CT images of the abdomen and pelvis were obtained without non-ionic intravenous contrast material. Automated exposure control for  dose reduction was used. Adjustment of the mA and/or kV was done based on the patient's size.  Iterative reconstruction technique for dose reduction was employed. Dose information was transmitted to the ACR (American College of Radiology) NRDR (National Radiology Data Registry), which includes the Dose Index Registry.       FINDINGS:  COMMENT: Evaluation of the vasculature and of the abdominal viscera for the presence of intraparenchymal pathology is suboptimal in the absence of contrast infusion.  LUNG BASES: The heart is normal in size. Hypodensity of the intracardiac blood pool relative to the myocardium may relate to underlying anemia. Trace pericardial fluid is noted. Small bilateral pleural effusions are seen with associated compressive  atelectasis, with or without superimposed airspace consolidation. Additional scattered ground-glass and reticular opacities are present and may be atelectatic in origin.  LIVER: The liver is enlarged, measuring 22.5 cm. No atrophy, abnormal density, or significant focal lesion is identified within the parameters of this noncontrast study.    BILIARY: The gallbladder is present.  PANCREAS: Negative unenhanced appearance for fluid collection, ductal dilatation, or atrophy.    SPLEEN: No enlargement.    ADRENALS:   A 2.0 x 2.3 cm right adrenal adenoma is observed.    KIDNEYS:   No renal or ureteral calculi are identified. There is no hydronephrosis or hydroureter. Subtle bilateral perinephric and periureteric fat stranding is appreciated.    GI/MESENTERY: A small hiatal hernia is evident. Postoperative changes of sleeve gastrectomy are a perceived. There is no evidence of bowel obstruction. The appendix is not seen, and suture material is present at the base of the cecum, consistent with  the provided history of appendectomy.    Small volume perihepatic ascites is seen.  URINARY BLADDER: No visible calculus. There is circumferential bladder wall thickening out of proportion to  the degree of distention. There is subtle perivesical fat stranding.  PELVIC NODES: No lymphadenopathy.    PELVIC ORGANS: The uterus is present. There is a left adnexal indeterminate cystic lesion measuring 5.0 x 3.3 x 3.6 cm. Small volume free pelvic fluid is seen. Deep pelvic calcifications likely represent phleboliths.    VASCULATURE:   No aneurysm is detected.  RETROPERITONEUM: No mass or lymphadenopathy is apparent. Nonenlarged para-aortic and interaortocaval lymph nodes are likely reactive.  BONES:   Multilevel degenerative changes of the spine are present.  ABDOMINAL WALL: There is a minute fat-containing umbilical hernia. Dependent subcutaneous edema is appreciated.  OTHER: No free air is seen in the abdomen or pelvis.                    Impression   CONCLUSION:  1. Diffuse bladder wall thickening and perivesical fat stranding, concerning for cystitis. Correlation with urinalysis results is recommended.       2. Subtle bilateral perinephric and periureteral fat stranding, which may reflect ascending urinary tract infection in the appropriate clinical setting. Please note that pyelonephritis is a largely clinical diagnosis, and imaging studies have a limited  role and clinical utility for this particular indication.     3. No evidence of nephroureterolithiasis or obstructive uropathy.     4. Small volume intraperitoneal ascites.     5. Small bilateral pleural effusions and associated compressive atelectasis, with or without superimposed pneumonia.     6. Indeterminate left adnexal cystic lesion, perhaps functional/physiologic in etiology.     7. Stable right adrenal adenoma.     8. Status post appendectomy.     9. Postoperative changes of sleeve gastrectomy.       10. Low-density appearance of the intracardiac blood pool raises the possibility of underlying anemia. Correlate with hematologic parameters.     11. Lesser incidental findings as above.           Dictated by (CST): Natanael Burnette MD on 12/30/2023  at 7:13 PM      Finalized by (CST): Natanael Burnette MD on 12/30/2023 at 7:20 PM           IMPRESSIONS:   #. Non-Oliguric LETHA  #. CKD Stage 5  - The recent decline in renal function may be related to hypovolemia from the recent increase in diuretics as well as complex UTI. There is also likely a component of CKD progression.  - Serum creatinine 8.8 today (4.8 10/20/2023).     #. Essential hypertension  - Prior home regimen: losartan 100 mg at bedtime, furosemide 40 mg every day (recently increased to BID), carvedilol 25 mg bid, Nifedipine ER 90 mg daily.     #. Hypokalemia  - Due to diuretics and decreased oral intake.    #. Anemia of CKD.    #. CKD bone-mineral disorder    #. Metabolic acidosis  - Mild    RECOMMENDATIONS:   - No absolute indication for dialysis currently, though it is not clear if her decreased appetite is being driven by UTI or uremic symptoms. Likely, a combination of both.   - Potassium was replaced with two doses of 40 mEq today. Hold further potassium replacement.  - Checking phosphorus  - Checking iron studies.  - Stopping NS.  - Starting LR at 75 ml/hr.  - Adding sodium bicarbonate tablets TID.   - Hold losartan and furosemide.    - Avoid nephrotoxins such as NSAIDs, iodinated IV contrast (unless emergent), Fleets enema.  - Renally dose all medications.  - Strict intake and output  - Daily weights.     Regan Seipp, MD  Nephrology  [facility]  [phone]

## 2024-01-01 VITALS
DIASTOLIC BLOOD PRESSURE: 79 MMHG | OXYGEN SATURATION: 96 % | WEIGHT: 224.19 LBS | SYSTOLIC BLOOD PRESSURE: 167 MMHG | HEART RATE: 85 BPM | HEIGHT: 65 IN | TEMPERATURE: 99 F | RESPIRATION RATE: 20 BRPM | BODY MASS INDEX: 37.35 KG/M2

## 2024-01-01 LAB
ANION GAP SERPL CALC-SCNC: 7 MMOL/L (ref 0–18)
BASOPHILS # BLD AUTO: 0.04 X10(3) UL (ref 0–0.2)
BASOPHILS NFR BLD AUTO: 0.6 %
BUN BLD-MCNC: 63 MG/DL (ref 9–23)
BUN/CREAT SERPL: 7.3 (ref 10–20)
CALCIUM BLD-MCNC: 7.6 MG/DL (ref 8.7–10.4)
CHLORIDE SERPL-SCNC: 114 MMOL/L (ref 98–112)
CO2 SERPL-SCNC: 20 MMOL/L (ref 21–32)
CREAT BLD-MCNC: 8.66 MG/DL
DEPRECATED RDW RBC AUTO: 42.5 FL (ref 35.1–46.3)
EGFRCR SERPLBLD CKD-EPI 2021: 6 ML/MIN/1.73M2 (ref 60–?)
EOSINOPHIL # BLD AUTO: 0.32 X10(3) UL (ref 0–0.7)
EOSINOPHIL NFR BLD AUTO: 4.7 %
ERYTHROCYTE [DISTWIDTH] IN BLOOD BY AUTOMATED COUNT: 14.6 % (ref 11–15)
GLUCOSE BLD-MCNC: 85 MG/DL (ref 70–99)
HCT VFR BLD AUTO: 22.5 %
HGB BLD-MCNC: 7.8 G/DL
IMM GRANULOCYTES # BLD AUTO: 0.02 X10(3) UL (ref 0–1)
IMM GRANULOCYTES NFR BLD: 0.3 %
LYMPHOCYTES # BLD AUTO: 2.41 X10(3) UL (ref 1–4)
LYMPHOCYTES NFR BLD AUTO: 35.7 %
MCH RBC QN AUTO: 28.3 PG (ref 26–34)
MCHC RBC AUTO-ENTMCNC: 34.7 G/DL (ref 31–37)
MCV RBC AUTO: 81.5 FL
MONOCYTES # BLD AUTO: 0.34 X10(3) UL (ref 0.1–1)
MONOCYTES NFR BLD AUTO: 5 %
NEUTROPHILS # BLD AUTO: 3.62 X10 (3) UL (ref 1.5–7.7)
NEUTROPHILS # BLD AUTO: 3.62 X10(3) UL (ref 1.5–7.7)
NEUTROPHILS NFR BLD AUTO: 53.7 %
OSMOLALITY SERPL CALC.SUM OF ELEC: 309 MOSM/KG (ref 275–295)
PLATELET # BLD AUTO: 222 10(3)UL (ref 150–450)
POTASSIUM SERPL-SCNC: 3 MMOL/L (ref 3.5–5.1)
RBC # BLD AUTO: 2.76 X10(6)UL
SODIUM SERPL-SCNC: 141 MMOL/L (ref 136–145)
WBC # BLD AUTO: 6.8 X10(3) UL (ref 4–11)

## 2024-01-01 PROCEDURE — 99233 SBSQ HOSP IP/OBS HIGH 50: CPT | Performed by: INTERNAL MEDICINE

## 2024-01-01 RX ORDER — POTASSIUM CHLORIDE 20 MEQ/1
40 TABLET, EXTENDED RELEASE ORAL ONCE
Status: COMPLETED | OUTPATIENT
Start: 2024-01-01 | End: 2024-01-01

## 2024-01-01 RX ORDER — HYDRALAZINE HYDROCHLORIDE 50 MG/1
50 TABLET, FILM COATED ORAL EVERY 8 HOURS SCHEDULED
Status: DISCONTINUED | OUTPATIENT
Start: 2024-01-01 | End: 2024-01-01

## 2024-01-01 RX ORDER — HYDROCODONE BITARTRATE AND ACETAMINOPHEN 5; 325 MG/1; MG/1
1 TABLET ORAL EVERY 4 HOURS PRN
Qty: 5 TABLET | Refills: 0 | Status: ON HOLD | OUTPATIENT
Start: 2024-01-01 | End: 2024-01-06

## 2024-01-01 RX ORDER — SODIUM BICARBONATE 650 MG/1
650 TABLET ORAL 3 TIMES DAILY
Qty: 90 TABLET | Refills: 0 | Status: SHIPPED | OUTPATIENT
Start: 2024-01-01 | End: 2024-01-06

## 2024-01-01 RX ORDER — CEFADROXIL 500 MG/1
500 CAPSULE ORAL DAILY
Qty: 3 CAPSULE | Refills: 0 | Status: SHIPPED | OUTPATIENT
Start: 2024-01-01 | End: 2024-01-06

## 2024-01-01 RX ORDER — HYDRALAZINE HYDROCHLORIDE 50 MG/1
50 TABLET, FILM COATED ORAL EVERY 8 HOURS SCHEDULED
Qty: 90 TABLET | Refills: 0 | Status: SHIPPED | OUTPATIENT
Start: 2024-01-01 | End: 2024-01-31

## 2024-01-01 NOTE — DISCHARGE SUMMARY
[facility]    Discharge Summary    Liu Walker Patient Status:  Inpatient    1990 MRN [id number]   Location [facility] 1W Attending Chava Torres MD   Hosp Day # 2 PCP Ashleigh Murray     Date of Admission: 2023     Date of Discharge: 24      Lace+ Score: 53  59-90 High Risk  29-58 Medium Risk  0-28   Low Risk.    TCM Follow-Up Recommendation:  LACE 29-58: Moderate Risk of readmission after discharge from the hospital.    DISCHARGE DX: Principal Problem:    LETHA (acute kidney injury) (HCC)  Active Problems:    Hypokalemia    Anemia    Acute renal failure (ARF) (HCC)    Acute kidney injury (HCC)    Abdominal pain of unknown etiology    Urinary tract infection with hematuria, site unspecified       The patient was seen and examined on day of discharge and this discharge summary is in conjunction with any daily progress note from day of discharge.    HPI per admitting physician: \"This is a 33 year oldfemale presented complaining of abdominal pain.  Patient stated symptom started around 1 day prior to admission.  Patient described the pain in her lower abdomen/pelvic area.  She did note some radiation around to the right side of her abdomen.  Patient noted some associated nausea, but denied emesis.  Patient denies subjective fevers or chills.  Patient denied pain with urination.  Patient did note decreased urine output volume over the past day or so.  Of note, patient with known history of chronic renal failure.  Patient states she has been planning to have fistula placed in the next month or so.  Patient denied diarrhea, but did note some constipation.  Patient otherwise denied chest pain, shortness of breath. \"    Hospital Course:        Acute Kidney Injury on CKD  V  -History of known nephrotic range proteinuria  -Patient previously seen by nephrology and has been planning for transition to dialysis.  Patient states she is planning for fistula placement in  the near future.  -Patient presented with abdominal pain with decreased urine output and noted to have worsening kidney function  - Avoiding Nephrotoxic agents  - Cont to monitor  -Nephrology consulted, recommend no acute indication for emergent dialysis.    -Continue sodium bicarb tablets.    -Pt to follow up with nephrologist as outpt     Possible pyelonephritis/acute cystitis  -Patient presenting with abd pain  -UA with signs of infection  -CT abdomen pelvis reviewed.  Noted signs of diffuse bladder wall thickening, perinephric fat stranding.  -Complete course of antibiotics   -urine culture no growth to date, was noted to be sent after initiation of antibiotics.  -Continue to monitor     Hypokalemia  -Careful repletion due to LETHA/CKD as above     History of hypertensive cardiomyopathy  -Without current signs of fluid overload  - monitor for fluid overload with IVF as above  - last TTE 2/2022 with normal EF  - hold losartan, lasix due to LETHA/CKD as above  - continue carvedilol      Accelerated hypertension  -BP grossly elevated in the ED  -Elevations noted today.  -Continue carvedilol and nifedipine  -Increase hydralazine  -Holding ACE/ARB and diuretics due to LETHA on CKD as above     Anemia of chronic disease  -No acute blood loss noted  -Likely secondary to kidney disease as above  -Continue to monitor      Vitals:    01/01/24 0218 01/01/24 0608 01/01/24 1100 01/01/24 1555   BP: 130/70  (!) 161/88 (!) 167/79   BP Location: Left arm  Left arm Left arm   Pulse: 84  83 85   Resp: 18   20   Temp: 98.5 °F (36.9 °C)  98.3 °F (36.8 °C) 98.7 °F (37.1 °C)   TempSrc: Oral  Oral Oral   SpO2: 95%  96% 96%   Weight:  224 lb 3.3 oz (101.7 kg)     Height:         Patient Weight for the past 72 hrs:   Weight   12/30/23 1357 200 lb (90.7 kg)   12/30/23 2014 216 lb (98 kg)   12/31/23 0517 216 lb (98 kg)   01/01/24 0608 224 lb 3.3 oz (101.7 kg)       Intake/Output Summary (Last 24 hours) at 1/1/2024 6551  Last data filed at  1/1/2024 1555  Gross per 24 hour   Intake 2060 ml   Output 1400 ml   Net 660 ml         CULTURE:   Hospital Encounter on 12/30/23   1. Urine Culture, Routine     Status: None    Collection Time: 12/31/23  2:24 PM    Specimen: Urine, clean catch   Result Value Ref Range    Urine Culture No Growth at 18-24 hrs. N/A       IMAGING STUDIES: SOME MAY NEED FOLLOW UP WITH PCP   CT ABDOMEN+PELVIS(CPT=74176)    Result Date: 12/30/2023  CONCLUSION:  1. Diffuse bladder wall thickening and perivesical fat stranding, concerning for cystitis. Correlation with urinalysis results is recommended.   2. Subtle bilateral perinephric and periureteral fat stranding, which may reflect ascending urinary tract infection in the appropriate clinical setting. Please note that pyelonephritis is a largely clinical diagnosis, and imaging studies have a limited role and clinical utility for this particular indication.  3. No evidence of nephroureterolithiasis or obstructive uropathy.  4. Small volume intraperitoneal ascites.  5. Small bilateral pleural effusions and associated compressive atelectasis, with or without superimposed pneumonia.  6. Indeterminate left adnexal cystic lesion, perhaps functional/physiologic in etiology.  7. Stable right adrenal adenoma.  8. Status post appendectomy.  9. Postoperative changes of sleeve gastrectomy.   10. Low-density appearance of the intracardiac blood pool raises the possibility of underlying anemia. Correlate with hematologic parameters.  11. Lesser incidental findings as above.    Dictated by (CST): Natanael Burnette MD on 12/30/2023 at 7:13 PM     Finalized by (CST): Natanael Burnette MD on 12/30/2023 at 7:20 PM           LABS :     Lab Results   Component Value Date    WBC 6.8 01/01/2024    HGB 7.8 (L) 01/01/2024    HCT 22.5 (L) 01/01/2024    .0 01/01/2024    CREATSERUM 8.66 (H) 01/01/2024    BUN 63 (H) 01/01/2024     01/01/2024    K 3.0 (L) 01/01/2024     (H) 01/01/2024    CO2 20.0 (L)  01/01/2024    GLU 85 01/01/2024    CA 7.6 (L) 01/01/2024    ALB 3.6 12/30/2023    ALKPHO 66 12/30/2023    BILT 0.3 12/30/2023    TP 6.5 12/30/2023    AST 14 12/30/2023    ALT 10 12/30/2023    PTT 31.8 02/17/2017    TSH 2.910 02/21/2022    LIP 69 12/30/2023    DDIMER 0.37 02/21/2022    MG 2.0 12/31/2023    PHOS 6.6 (H) 12/31/2023    TROP <0.045 12/03/2021       Recent Labs   Lab 12/30/23  1446 12/31/23  0711 01/01/24  0602   RBC 2.96* 2.90* 2.76*   HGB 8.5* 8.0* 7.8*   HCT 23.8* 23.7* 22.5*   MCV 80.4 81.7 81.5   MCH 28.7 27.6 28.3   MCHC 35.7 33.8 34.7   RDW 14.5 14.8 14.6   NEPRELIM 4.76 4.63 3.62   WBC 7.4 7.4 6.8   .0 243.0 222.0     Recent Labs   Lab 12/30/23  1446 12/31/23  0711 01/01/24  0602   GLU 97 88 85   BUN 64* 62* 63*   CREATSERUM 8.89* 8.88* 8.66*   CA 7.7* 7.8* 7.6*   ALB 3.6  --   --     141 141   K 3.1* 2.8* 3.0*    112 114*   CO2 23.0 21.0 20.0*   ALKPHO 66  --   --    AST 14  --   --    ALT 10  --   --    BILT 0.3  --   --    TP 6.5  --   --      No results found for: \"PT\", \"INR\"    Disposition: Discharge to Home    Condition at Discharge: Stable     Discharge Medications:      Discharge Medications        START taking these medications        Instructions Prescription details   cefadroxil 500 MG Caps  Commonly known as: DURICEF      Take 1 capsule (500 mg total) by mouth daily for 3 days.   Stop taking on: January 4, 2024  Quantity: 3 capsule  Refills: 0     hydrALAZINE 50 MG Tabs  Commonly known as: Apresoline      Take 1 tablet (50 mg total) by mouth every 8 (eight) hours.   Stop taking on: January 31, 2024  Quantity: 90 tablet  Refills: 0     HYDROcodone-acetaminophen 5-325 MG Tabs  Commonly known as: Norco      Take 1 tablet by mouth every 4 (four) hours as needed.   Quantity: 5 tablet  Refills: 0     sodium bicarbonate 650 MG Tabs      Take 1 tablet (650 mg total) by mouth 3 (three) times daily.   Stop taking on: January 31, 2024  Quantity: 90 tablet  Refills: 0             CONTINUE taking these medications        Instructions Prescription details   atorvastatin 40 MG Tabs  Commonly known as: Lipitor      Take 1 tablet (40 mg total) by mouth nightly.   Quantity: 30 tablet  Refills: 0     carvedilol 25 MG Tabs  Commonly known as: Coreg      Take 1 tablet (25 mg total) by mouth 2 (two) times daily.   Refills: 0     EPINEPHrine 0.3 MG/0.3ML Soaj  Commonly known as: EpiPen      TAKE 1 AUTO PEN AS NEEDED BY INJECTION AS DIRECTED   Refills: 0     NIFEdipine ER 90 MG Tb24  Commonly known as: ADALAT CC      Take 1 tablet (90 mg total) by mouth nightly.   Refills: 0            STOP taking these medications      furosemide 40 MG Tabs  Commonly known as: Lasix        losartan 100 MG Tabs  Commonly known as: Cozaar                  Where to Get Your Medications        These medications were sent to [facility] [address] [place] [address] [phone], [phone] [address] [facility] [address]      Phone: [phone]   cefadroxil 500 MG Caps  hydrALAZINE 50 MG Tabs  HYDROcodone-acetaminophen 5-325 MG Tabs  sodium bicarbonate 650 MG Tabs         Follow up Visits  No follow-up provider specified.  Ashleigh Murray    Consultants         Provider   Role Specialty     Estefania Ponce DO  Consulting Physician NEPHROLOGY              Other Discharge Instructions:       ----------------------------------------------------  37 MIN SPENT ON THIS DC   Chava Torres MD    1/1/2024

## 2024-01-01 NOTE — PROGRESS NOTES
[facility]    Nephrology Progress Note    Liu Walker Attending:  Chava Torres MD       SUBJECTIVE:   Liu Walker is feeling better today. Reporting some constipation.     PHYSICAL EXAM:     Vital Signs: BP (!) 161/88 (BP Location: Left arm)   Pulse 83   Temp 98.3 °F (36.8 °C) (Oral)   Resp 18   Ht 5' 5\" (1.651 m)   Wt 224 lb 3.3 oz (101.7 kg)   LMP 12/15/2023   SpO2 96%   BMI 37.31 kg/m²   Temp (24hrs), Av.6 °F (37 °C), Min:98.3 °F (36.8 °C), Max:98.9 °F (37.2 °C)       Intake/Output Summary (Last 24 hours) at 2024 1257  Last data filed at 2024 0608  Gross per 24 hour   Intake 3260 ml   Output 1380 ml   Net 1880 ml     Wt Readings from Last 3 Encounters:   24 224 lb 3.3 oz (101.7 kg)   10/18/23 215 lb (97.5 kg)   22 257 lb (116.6 kg)       Physical Exam  Vitals and nursing note reviewed.   Constitutional:       General: She is not in acute distress.     Appearance: Normal appearance. She is not toxic-appearing or diaphoretic.   HENT:      Head: Normocephalic and atraumatic.   Eyes:      Extraocular Movements: Extraocular movements intact.      Pupils: Pupils are equal, round, and reactive to light.   Cardiovascular:      Rate and Rhythm: Normal rate and regular rhythm.      Heart sounds: No murmur heard.  Pulmonary:      Effort: Pulmonary effort is normal. No respiratory distress.      Breath sounds: No wheezing or rales.   Abdominal:      General: Abdomen is flat. Bowel sounds are normal. There is no distension.      Palpations: Abdomen is soft. There is no mass.      Tenderness: There is no abdominal tenderness.      Hernia: No hernia is present.   Musculoskeletal:      Cervical back: Normal range of motion.      Right lower leg: No edema.      Left lower leg: No edema.   Skin:     General: Skin is warm.      Findings: No erythema or rash.   Neurological:      General: No focal deficit present.      Mental Status: She is alert and oriented to  person, place, and time.      Cranial Nerves: No cranial nerve deficit.            LABORATORY DATA:     Lab Results   Component Value Date    GLU 85 01/01/2024    BUN 63 (H) 01/01/2024    BUNCREA 7.3 (L) 01/01/2024    CREATSERUM 8.66 (H) 01/01/2024    ANIONGAP 7 01/01/2024    GFRNAA 29 (L) 07/11/2022    GFRAA 34 (L) 07/11/2022    CA 7.6 (L) 01/01/2024    OSMOCALC 309 (H) 01/01/2024    ALKPHO 66 12/30/2023    AST 14 12/30/2023    ALT 10 12/30/2023    BILT 0.3 12/30/2023    TP 6.5 12/30/2023    ALB 3.6 12/30/2023    GLOBULIN 2.9 12/30/2023     01/01/2024    K 3.0 (L) 01/01/2024     (H) 01/01/2024    CO2 20.0 (L) 01/01/2024     Lab Results   Component Value Date    WBC 6.8 01/01/2024    RBC 2.76 (L) 01/01/2024    HGB 7.8 (L) 01/01/2024    HCT 22.5 (L) 01/01/2024    .0 01/01/2024    MPV 7.7 11/08/2018    MCV 81.5 01/01/2024    MCH 28.3 01/01/2024    MCHC 34.7 01/01/2024    RDW 14.6 01/01/2024    NEPRELIM 3.62 01/01/2024    NEPERCENT 53.7 01/01/2024    LYPERCENT 35.7 01/01/2024    MOPERCENT 5.0 01/01/2024    EOPERCENT 4.7 01/01/2024    BAPERCENT 0.6 01/01/2024    NE 3.62 01/01/2024    LYMABS 2.41 01/01/2024    MOABSO 0.34 01/01/2024    EOABSO 0.32 01/01/2024    BAABSO 0.04 01/01/2024     Lab Results   Component Value Date    MALBP 179.00 10/19/2023    CREUR 29.70 10/19/2023    CREUR 29.70 10/19/2023     Lab Results   Component Value Date    COLORUR Light-Yellow 12/30/2023    CLARITY Turbid (A) 12/30/2023    SPECGRAVITY 1.011 12/30/2023    GLUUR 70 (A) 12/30/2023    BILUR Negative 12/30/2023    KETUR Negative 12/30/2023    BLOODURINE 1+ (A) 12/30/2023    PHURINE 6.5 12/30/2023    PROUR 300 (A) 12/30/2023    UROBILINOGEN Normal 12/30/2023    NITRITE Negative 12/30/2023    LEUUR 75 (A) 12/30/2023    WBCUR 21-50 (A) 12/30/2023    RBCUR 3-5 (A) 12/30/2023    EPIUR Many (A) 12/30/2023    BACUR None Seen 12/30/2023    HYLUR Present (A) 04/21/2022         IMAGING:     Narrative   PROCEDURE:   CT  ABDOMEN+PELVIS(CPT=74176)     COMPARISON:   [facility], CT ABDOMEN+PELVIS (CPT=74176), 10/18/2023, 8:20 PM.     INDICATIONS:   Generalized abdominal and pelvic pain.     TECHNIQUE: Multidetector CT images of the abdomen and pelvis were obtained without non-ionic intravenous contrast material. Automated exposure control for dose reduction was used. Adjustment of the mA and/or kV was done based on the patient's size.  Iterative reconstruction technique for dose reduction was employed. Dose information was transmitted to the ACR (American College of Radiology) NRDR (National Radiology Data Registry), which includes the Dose Index Registry.       FINDINGS:  COMMENT: Evaluation of the vasculature and of the abdominal viscera for the presence of intraparenchymal pathology is suboptimal in the absence of contrast infusion.  LUNG BASES: The heart is normal in size. Hypodensity of the intracardiac blood pool relative to the myocardium may relate to underlying anemia. Trace pericardial fluid is noted. Small bilateral pleural effusions are seen with associated compressive  atelectasis, with or without superimposed airspace consolidation. Additional scattered ground-glass and reticular opacities are present and may be atelectatic in origin.  LIVER: The liver is enlarged, measuring 22.5 cm. No atrophy, abnormal density, or significant focal lesion is identified within the parameters of this noncontrast study.    BILIARY: The gallbladder is present.  PANCREAS: Negative unenhanced appearance for fluid collection, ductal dilatation, or atrophy.    SPLEEN: No enlargement.    ADRENALS:   A 2.0 x 2.3 cm right adrenal adenoma is observed.    KIDNEYS:   No renal or ureteral calculi are identified. There is no hydronephrosis or hydroureter. Subtle bilateral perinephric and periureteric fat stranding is appreciated.    GI/MESENTERY: A small hiatal hernia is evident. Postoperative changes of sleeve gastrectomy are a  perceived. There is no evidence of bowel obstruction. The appendix is not seen, and suture material is present at the base of the cecum, consistent with  the provided history of appendectomy.    Small volume perihepatic ascites is seen.  URINARY BLADDER: No visible calculus. There is circumferential bladder wall thickening out of proportion to the degree of distention. There is subtle perivesical fat stranding.  PELVIC NODES: No lymphadenopathy.    PELVIC ORGANS: The uterus is present. There is a left adnexal indeterminate cystic lesion measuring 5.0 x 3.3 x 3.6 cm. Small volume free pelvic fluid is seen. Deep pelvic calcifications likely represent phleboliths.    VASCULATURE:   No aneurysm is detected.  RETROPERITONEUM: No mass or lymphadenopathy is apparent. Nonenlarged para-aortic and interaortocaval lymph nodes are likely reactive.  BONES:   Multilevel degenerative changes of the spine are present.  ABDOMINAL WALL: There is a minute fat-containing umbilical hernia. Dependent subcutaneous edema is appreciated.  OTHER: No free air is seen in the abdomen or pelvis.                    Impression   CONCLUSION:  1. Diffuse bladder wall thickening and perivesical fat stranding, concerning for cystitis. Correlation with urinalysis results is recommended.       2. Subtle bilateral perinephric and periureteral fat stranding, which may reflect ascending urinary tract infection in the appropriate clinical setting. Please note that pyelonephritis is a largely clinical diagnosis, and imaging studies have a limited  role and clinical utility for this particular indication.     3. No evidence of nephroureterolithiasis or obstructive uropathy.     4. Small volume intraperitoneal ascites.     5. Small bilateral pleural effusions and associated compressive atelectasis, with or without superimposed pneumonia.     6. Indeterminate left adnexal cystic lesion, perhaps functional/physiologic in etiology.     7. Stable right adrenal  adenoma.     8. Status post appendectomy.     9. Postoperative changes of sleeve gastrectomy.       10. Low-density appearance of the intracardiac blood pool raises the possibility of underlying anemia. Correlate with hematologic parameters.     11. Lesser incidental findings as above.           Dictated by (CST): Natanael Burnette MD on 12/30/2023 at 7:13 PM      Finalized by (CST): Natanael Burnette MD on 12/30/2023 at 7:20 PM         MEDICATIONS:     Current Facility-Administered Medications   Medication Dose Route Frequency    hydrALAZINE (Apresoline) tab 50 mg  50 mg Oral Q8H MANISHA    potassium chloride (K-Dur) tab 40 mEq  40 mEq Oral Once    sodium bicarbonate tab 650 mg  650 mg Oral TID    atorvastatin (Lipitor) tab 40 mg  40 mg Oral Nightly    NIFEdipine ER (Procardia-XL) 24 hr tab 90 mg  90 mg Oral Nightly    heparin (Porcine) 5000 UNIT/ML injection 5,000 Units  5,000 Units Subcutaneous Q8H MANISHA    acetaminophen (Tylenol Extra Strength) tab 500 mg  500 mg Oral Q4H PRN    acetaminophen (Tylenol) tab 650 mg  650 mg Oral Q4H PRN    Or    HYDROcodone-acetaminophen (Norco) 5-325 MG per tab 1 tablet  1 tablet Oral Q4H PRN    Or    HYDROcodone-acetaminophen (Norco) 5-325 MG per tab 2 tablet  2 tablet Oral Q4H PRN    HYDROmorphone (Dilaudid) 1 MG/ML injection 0.2 mg  0.2 mg Intravenous Q2H PRN    Or    HYDROmorphone (Dilaudid) 1 MG/ML injection 0.4 mg  0.4 mg Intravenous Q2H PRN    Or    HYDROmorphone (Dilaudid) 1 MG/ML injection 0.8 mg  0.8 mg Intravenous Q2H PRN    ondansetron (Zofran) 4 MG/2ML injection 4 mg  4 mg Intravenous Q6H PRN    polyethylene glycol (PEG 3350) (Miralax) 17 g oral packet 17 g  17 g Oral Daily PRN    sennosides (Senokot) tab 17.2 mg  17.2 mg Oral Nightly PRN    bisacodyl (Dulcolax) 10 MG rectal suppository 10 mg  10 mg Rectal Daily PRN    cefTRIAXone (Rocephin) 1 g in D5W 100 mL IVPB-ADD  1 g Intravenous Q24H    carvedilol (Coreg) tab 25 mg  25 mg Oral BID       IMPRESSIONS:   #. Non-Oliguric  LETHA  #. CKD Stage 5  - The recent decline in renal function may be related to hypovolemia from the recent increase in diuretics as well as complex UTI. There is also likely a component of CKD progression.  - Serum creatinine 8.6 today (4.8 10/20/2023).      #. Essential hypertension  - Prior home regimen: losartan 100 mg at bedtime, furosemide 40 mg every day (recently increased to BID), carvedilol 25 mg bid, Nifedipine ER 90 mg daily.      #. Hypokalemia  - Due to diuretics and decreased oral intake.     #. Anemia of CKD.  - Ferritin 85; Iron saturation 18%.      #. CKD bone-mineral disorder     #. Metabolic acidosis  - Mild     RECOMMENDATIONS:   - No indication for dialysis today. Though will likely require initiation of dialysis in the coming weeks. Recommend close follow up with primary Nephrologist at [facility] for further dialysis planning. She has an appointment with them tomorrow.   - Reviewed symptoms of uremia in detail.   - Stopping IV fluids.   - Giving 40 mEq Kcl PO  - Unable to give IV iron in the setting of infection.   - Continue sodium bicarbonate tablets TID.   - Hold furosemide on discharge.   - Resume losartan on discharge.   - Avoid nephrotoxins such as NSAIDs, iodinated IV contrast (unless emergent), Fleets enema.  - Renally dose all medications.  - Strict intake and output  - Daily weights.   - Acceptable to discharge from a Nephrology standpoint.     Regan Seipp, MD   Nephrology  [facility]  [phone]

## 2024-01-01 NOTE — PROGRESS NOTES
[facility]     Hospitalist Progress Note     Liu Walker Patient Status:  Inpatient    1990 MRN [id number]   Location [facility] 1W Attending Chava Torres MD   Hosp Day # 2 PCP Ashleigh Murray     Chief Complaint: Abdominal pain    Subjective:     Patient seen sitting in bed.   Denies acute events overnight.  Reports abdominal pain continues to improve.    Objective:      Vital signs:  Vitals:    23 0218 24 0608 24 0826   BP: (!) 169/92 130/70     BP Location: Left arm Left arm  Left arm   Pulse:  84     Resp: 18 18     Temp: 98.8 °F (37.1 °C) 98.5 °F (36.9 °C)     TempSrc: Oral Oral  Oral   SpO2: 98% 95%     Weight:   224 lb 3.3 oz (101.7 kg)    Height:           Intake/Output Summary (Last 24 hours) at 2024 0951  Last data filed at 2024 0608  Gross per 24 hour   Intake 3500 ml   Output 1380 ml   Net 2120 ml           Physical Exam:    GENERAL:  Awake and alert, in no acute distress.  HEART:  S1 and S2 heard.  RRR   LUNGS:  Air entry was good.  No crackles or wheezes   ABDOMEN: Soft and mild tenderness to palpation in lower abdomen.    EXTREMITIES: No edema appreciated  PSYCHIATRIC: Normal mood    Diagnostic Data:    Labs:    Recent Labs   Lab 23  1446 23  0711 24  0602   WBC 7.4 7.4 6.8   HGB 8.5* 8.0* 7.8*   MCV 80.4 81.7 81.5   .0 243.0 222.0       Recent Labs   Lab 23  1446 23  0711 24  0602   GLU 97 88 85   BUN 64* 62* 63*   CREATSERUM 8.89* 8.88* 8.66*   CA 7.7* 7.8* 7.6*   ALB 3.6  --   --     141 141   K 3.1* 2.8* 3.0*    112 114*   CO2 23.0 21.0 20.0*   ALKPHO 66  --   --    AST 14  --   --    ALT 10  --   --    BILT 0.3  --   --    TP 6.5  --   --            Estimated Creatinine Clearance: 8.2 mL/min (A) (based on SCr of 8.66 mg/dL (H)).    No results for input(s): \"PTP\", \"INR\" in the last 168 hours.         COVID-19  Lab Results   Component Value Date    COVID19  Not Detected 07/08/2022    COVID19 Not Detected 05/13/2022    COVID19 Not Detected 02/21/2022       Pro-Calcitonin  No results for input(s): \"PCT\" in the last 168 hours.    Cardiac  No results for input(s): \"TROP\", \"PBNP\" in the last 168 hours.    Inflammatory Markers  Recent Labs   Lab 12/31/23  0711   GENTRY 85.5       Culture:  No results found for this visit on 12/30/23.    CT ABDOMEN+PELVIS(CPT=74176)    Result Date: 12/30/2023  CONCLUSION:  1. Diffuse bladder wall thickening and perivesical fat stranding, concerning for cystitis. Correlation with urinalysis results is recommended.   2. Subtle bilateral perinephric and periureteral fat stranding, which may reflect ascending urinary tract infection in the appropriate clinical setting. Please note that pyelonephritis is a largely clinical diagnosis, and imaging studies have a limited role and clinical utility for this particular indication.  3. No evidence of nephroureterolithiasis or obstructive uropathy.  4. Small volume intraperitoneal ascites.  5. Small bilateral pleural effusions and associated compressive atelectasis, with or without superimposed pneumonia.  6. Indeterminate left adnexal cystic lesion, perhaps functional/physiologic in etiology.  7. Stable right adrenal adenoma.  8. Status post appendectomy.  9. Postoperative changes of sleeve gastrectomy.   10. Low-density appearance of the intracardiac blood pool raises the possibility of underlying anemia. Correlate with hematologic parameters.  11. Lesser incidental findings as above.    Dictated by (CST): Natanael Burnette MD on 12/30/2023 at 7:13 PM     Finalized by (CST): Natanael Burnette MD on 12/30/2023 at 7:20 PM                 Medications:    hydrALAZINE  25 mg Oral BID    sodium bicarbonate  650 mg Oral TID    atorvastatin  40 mg Oral Nightly    NIFEdipine ER  90 mg Oral Nightly    heparin  5,000 Units Subcutaneous Q8H MANISHA    cefTRIAXone  1 g Intravenous Q24H    carvedilol  25 mg Oral BID        Assessment & Plan:        Acute Kidney Injury on CKD  V  -History of known nephrotic range proteinuria  -Patient previously seen by nephrology and has been planning for transition to dialysis.  Patient states she is planning for fistula placement in the near future.  -Patient presented with abdominal pain with decreased urine output and noted to have worsening kidney function  -Continue IV fluids  - Avoiding Nephrotoxic agents  - Cont to monitor  -Nephrology consulted, recommend no acute indication for emergent dialysis.    -Continue sodium bicarb tablets.    -Appreciate further recommendations     Possible pyelonephritis/acute cystitis  -Patient presenting with abd pain  -UA with signs of infection  -CT abdomen pelvis reviewed.  Noted signs of diffuse bladder wall thickening, perinephric fat stranding.  -Continue on broad-spectrum antibiotics and IV fluids  -urine culture no growth to date, was noted to be sent after initiation of antibiotics.  -Continue to monitor     Hypokalemia  -Careful repletion due to LETHA/CKD as above     History of hypertensive cardiomyopathy  -Without current signs of fluid overload  - monitor for fluid overload with IVF as above  - last TTE 2/2022 with normal EF  - hold losartan, lasix due to LETHA/CKD as above  - continue carvedilol      Accelerated hypertension  -BP grossly elevated in the ED  -Elevations noted today.  -Continue carvedilol and nifedipine  -Increase hydralazine  -Holding ACE/ARB and diuretics due to LETHA on CKD as above  -Continue to monitor     Anemia of chronic disease  -No acute blood loss noted  -Likely secondary to kidney disease as above  -Continue to monitor      Plan of care discussed with patient at bedside . Discussed management/test result(s) with RN.      Quality:  DVT Prophylaxis: Heparin  CODE status: Full  Estimated date of discharge: TBD  Discharge is dependent on: clinical stability    53 minutes spent discussing with other providers, examining  patient, obtaining history, reviewing medical records, interpreting and communicating test results/imaging, ordering tests/medications, discussing plan of care and documenting information.      Chava Torres MD        This note was prepared using Dragon Medical voice recognition dictation software. As a result errors may occur. When identified these errors have been corrected. While every attempt is made to correct errors during dictation discrepancies may still exist

## 2024-01-01 NOTE — PLAN OF CARE
Problem: Patient Centered Care  Goal: Patient preferences are identified and integrated in the patient's plan of care  Description: Interventions:  - What would you like us to know as we care for you? Pt from home with mother  - Provide timely, complete, and accurate information to patient/family  - Incorporate patient and family knowledge, values, beliefs, and cultural backgrounds into the planning and delivery of care  - Encourage patient/family to participate in care and decision-making at the level they choose  - Honor patient and family perspectives and choices  Outcome: Progressing     Problem: Diabetes/Glucose Control  Goal: Glucose maintained within prescribed range  Description: INTERVENTIONS:  - Monitor Blood Glucose as ordered  - Assess for signs and symptoms of hyperglycemia and hypoglycemia  - Administer ordered medications to maintain glucose within target range  - Assess barriers to adequate nutritional intake and initiate nutrition consult as needed  - Instruct patient on self management of diabetes  Outcome: Progressing     Problem: Patient/Family Goals  Goal: Patient/Family Long Term Goal  Description: Patient's Long Term Goal:to get BUN and Creat within normal limits    Interventions:  - See additional Care Plan goals for specific interventions  Outcome: Progressing  Goal: Patient/Family Short Term Goal  Description: Patient's Short Term Goal: return home    Interventions:   - See additional Care Plan goals for specific interventions  Outcome: Progressing     Problem: CARDIOVASCULAR - ADULT  Goal: Maintains optimal cardiac output and hemodynamic stability  Description: INTERVENTIONS:  - Monitor vital signs, rhythm, and trends  - Monitor for bleeding, hypotension and signs of decreased cardiac output  - Evaluate effectiveness of vasoactive medications to optimize hemodynamic stability  - Monitor arterial and/or venous puncture sites for bleeding and/or hematoma  - Assess quality of pulses, skin  color and temperature  - Assess for signs of decreased coronary artery perfusion - ex. Angina  - Evaluate fluid balance, assess for edema, trend weights  Outcome: Progressing  Goal: Absence of cardiac arrhythmias or at baseline  Description: INTERVENTIONS:  - Continuous cardiac monitoring, monitor vital signs, obtain 12 lead EKG if indicated  - Evaluate effectiveness of antiarrhythmic and heart rate control medications as ordered  - Initiate emergency measures for life threatening arrhythmias  - Monitor electrolytes and administer replacement therapy as ordered  Outcome: Progressing     Problem: METABOLIC/FLUID AND ELECTROLYTES - ADULT  Goal: Glucose maintained within prescribed range  Description: INTERVENTIONS:  - Monitor Blood Glucose as ordered  - Assess for signs and symptoms of hyperglycemia and hypoglycemia  - Administer ordered medications to maintain glucose within target range  - Assess barriers to adequate nutritional intake and initiate nutrition consult as needed  - Instruct patient on self management of diabetes  Outcome: Progressing  Goal: Hemodynamic stability and optimal renal function maintained  Description: INTERVENTIONS:  - Monitor labs and assess for signs and symptoms of volume excess or deficit  - Monitor intake, output and patient weight  - Monitor urine specific gravity, serum osmolarity and serum sodium as indicated or ordered  - Monitor response to interventions for patient's volume status, including labs, urine output, blood pressure (other measures as available)  - Encourage oral intake as appropriate  - Instruct patient on fluid and nutrition restrictions as appropriate  Outcome: Progressing     Problem: SAFETY ADULT - FALL  Goal: Free from fall injury  Description: INTERVENTIONS:  - Assess pt frequently for physical needs  - Identify cognitive and physical deficits and behaviors that affect risk of falls.  - [place] fall precautions as indicated by assessment.  - Educate pt/family  on patient safety including physical limitations  - Instruct pt to call for assistance with activity based on assessment  - Modify environment to reduce risk of injury  - Provide assistive devices as appropriate  - Consider OT/PT consult to assist with strengthening/mobility  - Encourage toileting schedule  Outcome: Progressing     Problem: DISCHARGE PLANNING  Goal: Discharge to home or other facility with appropriate resources  Description: INTERVENTIONS:  - Identify barriers to discharge w/pt and caregiver  - Include patient/family/discharge partner in discharge planning  - Arrange for needed discharge resources and transportation as appropriate  - Identify discharge learning needs (meds, wound care, etc)  - Arrange for interpreters to assist at discharge as needed  - Consider post-discharge preferences of patient/family/discharge partner  - Complete POLST form as appropriate  - Assess patient's ability to be responsible for managing their own health  - Refer to Case Management Department for coordinating discharge planning if the patient needs post-hospital services based on physician/LIP order or complex needs related to functional status, cognitive ability or social support system  Outcome: Progressing

## 2024-01-02 NOTE — PAYOR COMM NOTE
--------------  ADMISSION REVIEW     Payor: CARY KATZ  Subscriber #:  [id number]  Authorization Number: [id number]    Admit date: 12/30/23  Admit time:  8:11 PM       REVIEW DOCUMENTATION:  ED Provider Notes signed by Jay Choe MD at 12/30/2023  7:25 PM    Patient Seen in: [facility] Emergency Department    History   Stated Complaint: Abdominal Pain    Subjective:   33-year-old female with polycystic ovarian disease, diabetes, hypertension, gastric bypass last year states yesterday she started having some left inguinal groin type pain.  She went to bed but then woke up this morning and said the pain was everywhere in her abdomen.  It increases with movement.  She had a small bowel movement yesterday but she was having no bowel movement today and decreased urine today.  When she still on the bed she said she has no pain.  No vomiting.  No fevers or cough.  When she hits bumps in the car she has pain throughout her abdomen.  No rash.  She has 1 partner and last had intercourse 2 weeks ago.  She always uses condoms she says.  LMP was normal on December 15.  She has had appendectomy.  No cough or congestion.    Positive for stated complaint: Abdominal Pain  Other systems are as noted in HPI.  Constitutional and vital signs reviewed.      All other systems reviewed and negative except as noted above.    Physical Exam     ED Triage Vitals [12/30/23 1357]   BP (!) 209/105   Pulse 78   Resp 18   Temp 97.6 °F (36.4 °C)   Temp src Temporal   SpO2 99 %   O2 Device None (Room air)     Current:BP (!) 191/96   Pulse 84   Temp 97.6 °F (36.4 °C) (Temporal)   Resp 17   Ht 165.1 cm (5' 5\")   Wt 90.7 kg   LMP 12/11/2023   SpO2 96%   BMI 33.28 kg/m²     Physical Exam  HENT:      Head: Normocephalic and atraumatic.      Right Ear: External ear normal.      Left Ear: External ear normal.      Mouth/Throat:      Mouth: Mucous membranes are dry.   Eyes:      Extraocular Movements: Extraocular movements intact.    Abdominal:      Palpations: Abdomen is soft.      Comments: There are some tenderness in the left upper and epigastric region with some rebound.  No significant tender in the low abdomen.  No guarding however.   Musculoskeletal:         General: Normal range of motion.      Cervical back: Normal range of motion.   Skin:     General: Skin is warm.      Capillary Refill: Capillary refill takes less than 2 seconds.   Neurological:      Mental Status: She is alert.      Sensory: No sensory deficit.      Motor: No weakness.     Labs Reviewed   COMP METABOLIC PANEL (14) - Abnormal; Notable for the following components:       Result Value    Potassium 3.1 (*)     BUN 64 (*)     Creatinine 8.89 (*)     BUN/CREA Ratio 7.2 (*)     Calcium, Total 7.7 (*)     Calculated Osmolality 310 (*)     eGFR-Cr 6 (*)     All other components within normal limits   URINALYSIS, ROUTINE - Abnormal; Notable for the following components:    Clarity Urine Turbid (*)     Glucose Urine 70 (*)     Blood Urine 1+ (*)     Protein Urine 300 (*)     Leukocyte Esterase Urine 75 (*)     WBC Urine 21-50 (*)     RBC Urine 3-5 (*)     Squamous Epi. Cells Many (*)     All other components within normal limits   CBC W/ DIFFERENTIAL - Abnormal; Notable for the following components:    RBC 2.96 (*)     HGB 8.5 (*)     HCT 23.8 (*)     All other components within normal limits   LIPASE - Normal   HCG, BETA SUBUNIT (QUANT PREGNANCY TEST) - Normal   POCT PREGNANCY URINE - Normal   CBC WITH DIFFERENTIAL WITH PLATELET    Narrative:     The following orders were created for panel order CBC With Differential With Platelet.  Procedure                               Abnormality         Status                     ---------                               -----------         ------                     CBC W/ DIFFERENTIAL[[id number]]          Abnormal            Final result                 Please view results for these tests on the individual orders.      ED Course as of  12/30/23 1925  ------------------------------------------------------------  Time: 12/30 1824  Comment: Labs independently interpreted by me.  Patient has acute on chronic kidney injury, negative Prag, lipase normal, urine does look contaminated but had some pyuria.  CBC shows chronic anemia but normal white count.  Awaiting CT.  Discussed with nephrology and hospitalist for admission.  ------------------------------------------------------------  Time: 12/30 1923  Comment: Independently interpreted by me.  No hydronephrosis.  Possible cystitis and ascending urinary tract infection.  Patient seen by the hospitalist Dr. Fuentes.  UA shows possible UTI will give ceftriaxone and admit the patient.  Still unclear what the pain is coming from      CT ABDOMEN+PELVIS(CPT=74176)    Result Date: 12/30/2023  CONCLUSION:  1. Diffuse bladder wall thickening and perivesical fat stranding, concerning for cystitis. Correlation with urinalysis results is recommended.   2. Subtle bilateral perinephric and periureteral fat stranding, which may reflect ascending urinary tract infection in the appropriate clinical setting. Please note that pyelonephritis is a largely clinical diagnosis, and imaging studies have a limited role and clinical utility for this particular indication.  3. No evidence of nephroureterolithiasis or obstructive uropathy.  4. Small volume intraperitoneal ascites.  5. Small bilateral pleural effusions and associated compressive atelectasis, with or without superimposed pneumonia.  6. Indeterminate left adnexal cystic lesion, perhaps functional/physiologic in etiology.  7. Stable right adrenal adenoma.  8. Status post appendectomy.  9. Postoperative changes of sleeve gastrectomy.   10. Low-density appearance of the intracardiac blood pool raises the possibility of underlying anemia. Correlate with hematologic parameters.  11. Lesser incidental findings as above.    Dictated by (CST): Natanael Burnette MD on 12/30/2023  at 7:13 PM     Finalized by (CST): Natanael Burnette MD on 12/30/2023 at 7:20 PM           Admission disposition: 12/30/2023  7:24 PM    Medical Decision Making  Patient had an inguinal pain yesterday and now it is diffuse in her abdomen.  Differential clinically but is not limited to ruptured ovarian cyst, torsion, PID, Jakob-Claus Elliot, bowel obstruction, cholecystitis, pancreatitis and other intra-abdominal process.  Pain medication fluids and CT ordered along with lab work    Amount and/or Complexity of Data Reviewed  External Data Reviewed: notes.     Details: Discharge summary from October reviewed where she was admitted for acute on chronic kidney injury  Labs: ordered. Decision-making details documented in ED Course.  Radiology: ordered and independent interpretation performed. Decision-making details documented in ED Course.  Discussion of management or test interpretation with external provider(s): Please see ED course.  Patient given blood pressure medication as well as fluids.    Risk  Decision regarding hospitalization.  Risk Details: MH of CKD, hypertensive cardiomyopathy    Disposition and Plan     Clinical Impression:  1. LETHA (acute kidney injury) (HCC)    2. Hypokalemia    3. Abdominal pain of unknown etiology    4. Urinary tract infection with hematuria, site unspecified     Disposition:  Admit  12/30/2023  7:24 pm  Hospital Problems       Present on Admission  Date Reviewed: 3/9/2021            ICD-10-CM Noted POA    * (Principal) LETHA (acute kidney injury) (HCC) N17.9 2/21/2022 Unknown    Acute kidney injury (HCC) N17.9 12/30/2023 Yes    Acute renal failure (ARF) (HCC) N17.9 12/30/2023 Yes    Anemia D64.9 12/30/2023 Yes       Signed by Jay Choe MD on 12/30/2023  7:25 PM       H&P signed by Chava Torres MD at 12/30/2023  7:27 PM    DATE OF ADMISSION: 12/30/2023     CHIEF COMPLAINT: Abdominal pain    HISTORY OF PRESENT ILLNESS  This is a 33 year oldfemale presented complaining of  abdominal pain.  Patient stated symptom started around 1 day prior to admission.  Patient described the pain in her lower abdomen/pelvic area.  She did note some radiation around to the right side of her abdomen.  Patient noted some associated nausea, but denied emesis.  Patient denies subjective fevers or chills.  Patient denied pain with urination.  Patient did note decreased urine output volume over the past day or so.  Of note, patient with known history of chronic renal failure.  Patient states she has been planning to have fistula placed in the next month or so.  Patient denied diarrhea, but did note some constipation.  Patient otherwise denied chest pain, shortness of breath.  PHYSICAL EXAM  Vital signs:  BP (!) 198/107   Pulse 72   Temp 97.6 °F (36.4 °C) (Temporal)   Resp 18   Ht 5' 5\" (1.651 m)   Wt 200 lb (90.7 kg)   LMP 12/11/2023   SpO2 98%   BMI 33.28 kg/m²      GENERAL:  Awake and alert, in no acute distress.  HEART:  S1 and S2 heard.  RRR   LUNGS:  Air entry was good.  No crackles or wheezes   ABDOMEN: Soft and tenderness to palpation in lower abdomen.    EXTREMITIES: No edema appreciated  PSYCHIATRIC: Normal mood  Data:  Recent Labs   Lab 12/30/23  1446   RBC 2.96*   HGB 8.5*   HCT 23.8*   MCV 80.4   MCH 28.7   MCHC 35.7   RDW 14.5   NEPRELIM 4.76   WBC 7.4   .0     Recent Labs   Lab 12/30/23  1446   GLU 97   BUN 64*   CREATSERUM 8.89*   CA 7.7*   ALB 3.6      K 3.1*      CO2 23.0   ALKPHO 66   AST 14   ALT 10   BILT 0.3   TP 6.5       ASSESSMENT/PLAN      Acute Kidney Injury on CKD  V  -History of known nephrotic range proteinuria  -Patient previously seen by nephrology and has been planning for transition to dialysis.  Patient states she is planning for fistula placement in the near future.  -Patient presented with abdominal pain with decreased urine output and noted to have worsening kidney function  - Starting IVF  - Avoiding Nephrotoxic agents  - Cont to  monitor  -Nephrology consulted, appreciate further recommendations    Possible pyelonephritis/acute cystitis  -Patient presenting with abd pain  -UA with signs of infection  -CT abdomen pelvis reviewed.  Noted signs of diffuse bladder wall thickening, perinephric fat stranding.  -Starting broad-spectrum antibiotics and IV fluids  -Follow-up urine culture  -Continue to monitor    Hypokalemia  -Conservative repletion due to LETHA/CKD as above    History of hypertensive cardiomyopathy  -Without current signs of fluid overload  - monitor for fluid overload with IVF as above  - last TTE 2/2022 with normal EF  - hold losartan, lasix due to LETHA/CKD as above  - continue carvedilol     Accelerated hypertension  -BP grossly elevated in the ED  -Restart home carvedilol and nifedipine  -Holding ACE/ARB and diuretics due to LETHA on CKD as above  -IV hydralazine as needed  -Continue to monitor    Anemia of chronic disease  -No acute blood loss noted  -Likely secondary to kidney disease as above  -Continue to monitor   Electronically signed by Chava Torres MD on 12/30/2023  7:27 PM           12/31 IM  ABDOMEN: Soft and mild tenderness to palpation in lower abdomen.     Lab 12/30/23  1446 12/31/23  0711   WBC 7.4 7.4   HGB 8.5* 8.0*   MCV 80.4 81.7   .0 243.0              Recent Labs   Lab 12/30/23  1446 12/31/23  0711   GLU 97 88   BUN 64* 62*   CREATSERUM 8.89* 8.88*   CA 7.7* 7.8*   ALB 3.6  --     141   K 3.1* 2.8*    112   CO2 23.0 21.0   ALKPHO 66  --    AST 14  --    ALT 10  --    BILT 0.3  --    TP 6.5  --      Acute Kidney Injury on CKD  V  -History of known nephrotic range proteinuria  -Patient previously seen by nephrology and has been planning for transition to dialysis.  Patient states she is planning for fistula placement in the near future.  -Patient presented with abdominal pain with decreased urine output and noted to have worsening kidney function  -Continue IV fluids, transition to LR  -  Avoiding Nephrotoxic agents  - Cont to monitor  -Nephrology consulted, recommend no acute indication for emergent dialysis.  Added sodium bicarb tablets.  Appreciate further recommendations     Possible pyelonephritis/acute cystitis  -Patient presenting with abd pain  -UA with signs of infection  -CT abdomen pelvis reviewed.  Noted signs of diffuse bladder wall thickening, perinephric fat stranding.  -Continue on broad-spectrum antibiotics and IV fluids  -Follow-up urine culture  -Continue to monitor     Hypokalemia  -Conservative repletion due to LETHA/CKD as above     History of hypertensive cardiomyopathy  -Without current signs of fluid overload  - monitor for fluid overload with IVF as above  - last TTE 2/2022 with normal EF  - hold losartan, lasix due to LETHA/CKD as above  - continue carvedilol      Accelerated hypertension  -BP grossly elevated in the ED  -Improved control  -Continue carvedilol and nifedipine  -Add low-dose hydralazine  -Holding ACE/ARB and diuretics due to LETHA on CKD as above  -Continue to monitor     Anemia of chronic disease  -No acute blood loss noted  -Likely secondary to kidney disease as above  -Continue to monitor        12/31 Nephrology  IMPRESSIONS:   #. Non-Oliguric LETHA  #. CKD Stage 5  - The recent decline in renal function may be related to hypovolemia from the recent increase in diuretics as well as complex UTI. There is also likely a component of CKD progression.  - Serum creatinine 8.8 today (4.8 10/20/2023).      #. Essential hypertension  - Prior home regimen: losartan 100 mg at bedtime, furosemide 40 mg every day (recently increased to BID), carvedilol 25 mg bid, Nifedipine ER 90 mg daily.      #. Hypokalemia  - Due to diuretics and decreased oral intake.     #. Anemia of CKD.     #. CKD bone-mineral disorder     #. Metabolic acidosis  - Mild     RECOMMENDATIONS:   - No absolute indication for dialysis currently, though it is not clear if her decreased appetite is being driven  by UTI or uremic symptoms. Likely, a combination of both.   - Potassium was replaced with two doses of 40 mEq today. Hold further potassium replacement.  - Checking phosphorus  - Checking iron studies.  - Stopping NS.  - Starting LR at 75 ml/hr.  - Adding sodium bicarbonate tablets TID.   - Hold losartan and furosemide.    - Avoid nephrotoxins such as NSAIDs, iodinated IV contrast (unless emergent), Fleets enema.  - Renally dose all medications.  - Strict intake and output  - Daily weights.       1/1  Lab 12/30/23  1446 12/31/23  0711 01/01/24  0602   WBC 7.4 7.4 6.8   HGB 8.5* 8.0* 7.8*   MCV 80.4 81.7 81.5   .0 243.0 222.0               Recent Labs   Lab 12/30/23  1446 12/31/23  0711 01/01/24  0602   GLU 97 88 85   BUN 64* 62* 63*   CREATSERUM 8.89* 8.88* 8.66*   CA 7.7* 7.8* 7.6*   ALB 3.6  --   --     141 141   K 3.1* 2.8* 3.0*    112 114*   CO2 23.0 21.0 20.0*     Acute Kidney Injury on CKD  V  -History of known nephrotic range proteinuria  -Patient previously seen by nephrology and has been planning for transition to dialysis.  Patient states she is planning for fistula placement in the near future.  -Patient presented with abdominal pain with decreased urine output and noted to have worsening kidney function  -Continue IV fluids  - Avoiding Nephrotoxic agents  - Cont to monitor  -Nephrology consulted, recommend no acute indication for emergent dialysis.    -Continue sodium bicarb tablets.    -Appreciate further recommendations     Possible pyelonephritis/acute cystitis  -Patient presenting with abd pain  -UA with signs of infection  -CT abdomen pelvis reviewed.  Noted signs of diffuse bladder wall thickening, perinephric fat stranding.  -Continue on broad-spectrum antibiotics and IV fluids  -urine culture no growth to date, was noted to be sent after initiation of antibiotics.  -Continue to monitor     Hypokalemia  -Careful repletion due to LETHA/CKD as above     History of hypertensive  cardiomyopathy  -Without current signs of fluid overload  - monitor for fluid overload with IVF as above  - last TTE 2/2022 with normal EF  - hold losartan, lasix due to LETHA/CKD as above  - continue carvedilol      Accelerated hypertension  -BP grossly elevated in the ED  -Elevations noted today.  -Continue carvedilol and nifedipine  -Increase hydralazine  -Holding ACE/ARB and diuretics due to LETHA on CKD as above  -Continue to monitor     Anemia of chronic disease  -No acute blood loss noted  -Likely secondary to kidney disease as above  -Continue to monitor      1/1 Nephrology  IMPRESSIONS:   #. Non-Oliguric LTEHA  #. CKD Stage 5  - The recent decline in renal function may be related to hypovolemia from the recent increase in diuretics as well as complex UTI. There is also likely a component of CKD progression.  - Serum creatinine 8.6 today (4.8 10/20/2023).      #. Essential hypertension  - Prior home regimen: losartan 100 mg at bedtime, furosemide 40 mg every day (recently increased to BID), carvedilol 25 mg bid, Nifedipine ER 90 mg daily.      #. Hypokalemia  - Due to diuretics and decreased oral intake.     #. Anemia of CKD.  - Ferritin 85; Iron saturation 18%.      #. CKD bone-mineral disorder     #. Metabolic acidosis  - Mild     RECOMMENDATIONS:   - No indication for dialysis today. Though will likely require initiation of dialysis in the coming weeks. Recommend close follow up with primary Nephrologist at [facility] for further dialysis planning. She has an appointment with them tomorrow.   - Reviewed symptoms of uremia in detail.   - Stopping IV fluids.   - Giving 40 mEq Kcl PO  - Unable to give IV iron in the setting of infection.   - Continue sodium bicarbonate tablets TID.   - Hold furosemide on discharge.   - Resume losartan on discharge.   - Avoid nephrotoxins such as NSAIDs, iodinated IV contrast (unless emergent), Fleets enema.  - Renally dose all medications.  - Strict intake and output  - Daily weights.    - Acceptable to discharge from a Nephrology standpoint.         Medications 12/30/23 12/31/23 01/01/24   cefTRIAXone (Rocephin) 1 g in D5W 100 mL IVPB-ADD  Dose: 1 g  Freq: Every 24 hours Route: IV  Last Dose: 1 g (12/31/23 2012)  Start: 12/31/23 2000 End: 01/01/24 1947 2012 SW-New Bag        1947-D/C'd        cefTRIAXone (Rocephin) 1 g in D5W 100 mL IVPB-ADD  Dose: 1 g  Freq: Once Route: IV  Last Dose: 1 g (12/30/23 1957)  Start: 12/30/23 1924 End: 12/30/23 2027 1957 MV-New Bag   2004 MV-Handoff           hydrALAzine (Apresoline) 20 mg/mL injection 10 mg  Dose: 10 mg  Freq: Once Route: IV  Start: 12/30/23 1621 End: 12/30/23 1635    1635 JS-Given            morphINE PF 4 MG/ML injection 4 mg  Dose: 4 mg  Freq: Once Route: IV  Start: 12/30/23 1523 End: 12/30/23 1601    1601 JS-Given            NIFEdipine ER (Procardia-XL) 24 hr tab 90 mg  Dose: 90 mg  Freq: Nightly Route: OR  Start: 12/30/23 2100 End: 01/01/24 1947   Admin Instructions:   Do not crush    2109 JL-Given        2012 SW-Given        1947-D/C'd        potassium chloride (K-Dur) tab 40 mEq  Dose: 40 mEq  Freq: Once Route: OR  Start: 01/01/24 1230 End: 01/01/24 1437   Admin Instructions:   Do not crush      1437 PL-Given          potassium chloride (K-Dur) tab 40 mEq  Dose: 40 mEq  Freq: Once Route: OR  Start: 12/31/23 1300 End: 12/31/23 1337   Admin Instructions:   Do not crush     1337 DG-Given           potassium chloride (K-Dur) tab 40 mEq  Dose: 40 mEq  Freq: Once Route: OR  Start: 12/31/23 0900 End: 12/31/23 1001   Admin Instructions:   Do not crush     1001 DG-Given           sodium bicarbonate tab 650 mg  Dose: 650 mg  Freq: 3 times daily Route: OR  Start: 12/31/23 1600 End: 01/01/24 1947     1603 DG-Given   2015 SW-Given       1121 PL-Given   1656 PL-Given   1947-D/C'd              Medications 12/30/23 12/31/23 01/01/24   lactated ringers infusion  Rate: 75 mL/hr Freq: Continuous Route: IV  Start: 12/31/23 1300 End: 01/01/24 1221     1336  DG-New Bag        0237 SW-New Bag   1221-D/C'd       sodium chloride 0.9% infusion  Rate: 100 mL/hr Freq: Continuous Route: IV  Start: 12/30/23 2015 End: 12/31/23 1251    2112 JL-New Bag        0053 JL-New Bag   1251-D/C'd        sodium chloride 0.9% infusion  Rate: 125 mL/hr Dose: 125 mL/hr  Freq: Continuous Route: IV  Last Dose: 125 mL/hr (12/30/23 1601)  Start: 12/30/23 1545 End: 12/30/23 2038    1601 JS-New Bag   2004 MV-Handoff   2038-D/C'd            Medications 12/30/23 12/31/23 01/01/24   acetaminophen (Tylenol Extra Strength) tab 500 mg  Dose: 500 mg  Freq: Every 4 hours PRN Route: OR  PRN Reason: Fever  PRN Comment: equal to or greater than 100.4  Start: 12/30/23 2013 End: 01/01/24 1947   Admin Instructions:   do not initiate oral therapy until 6-8 hours after the last IV acetaminophen dose if IV acetaminophen was used previously    2034 JL-Given         1947-D/C'd          01/01/24 1555 98.7 °F (37.1 °C) 85 20 167/79 Abnormal  96 % -- None (Room air) -- TD   01/01/24 1100 98.3 °F (36.8 °C) 83 -- 161/88 Abnormal  96 % -- None (Room air) -- PL   01/01/24 0608 -- -- -- -- -- 224 lb 3.3 oz -- -- AS   01/01/24 0218 98.5 °F (36.9 °C) 84 18 130/70 95 % -- None (Room air) -- AS   12/31/23 2000 98.8 °F (37.1 °C) -- 18 169/92 Abnormal  98 % -- None (Room air) -- SW   12/31/23 1417 98.9 °F (37.2 °C) 79 18 158/87 98 % -- None (Room air) -- EL   12/31/23 0747 98.7 °F (37.1 °C) 82 18 152/86 94 % -- None (Room air) -- EL   12/31/23 0517 98.1 °F (36.7 °C) 80 18 131/71 99 % 216 lb None (Room air) --    12/31/23 0006 -- -- -- 157/86 -- -- -- --    12/30/23 2014 97.8 °F (36.6 °C) 87 18 219/107 Abnormal  99 % 216 lb None (Room air) --    12/30/23 1830 -- 84 -- -- 96 % -- -- --    12/30/23 1800 -- 80 17 191/96 Abnormal  98 % -- None (Room air) --    12/30/23 1602 -- 72 18 198/107 Abnormal  98 % -- -- --    12/30/23 1357 97.6 °F (36.4 °C) 78 18 209/105 Abnormal  99 % 200 lb None (Room air) -- AR      --------------  DISCHARGE REVIEW    Payor: [employer] P[employer]  Subscriber #:  [id number]  Authorization Number: [id number]    Admit date: 12/30/23  Admit time:   8:11 PM  Discharge Date: 1/1/2024  5:30 PM     Admitting Physician: Chava Torres MD  Attending Physician:  No att. providers found  Primary Care Physician: Ashleigh Taylor

## 2024-01-03 ENCOUNTER — HOSPITAL ENCOUNTER (INPATIENT)
Facility: HOSPITAL | Age: 34
LOS: 3 days | Discharge: HOME | End: 2024-01-06
Attending: EMERGENCY MEDICINE | Admitting: HOSPITALIST
Payer: COMMERCIAL

## 2024-01-03 ENCOUNTER — APPOINTMENT (OUTPATIENT)
Dept: INTERVENTIONAL RADIOLOGY/VASCULAR | Facility: HOSPITAL | Age: 34
End: 2024-01-03
Attending: EMERGENCY MEDICINE
Payer: COMMERCIAL

## 2024-01-03 ENCOUNTER — APPOINTMENT (OUTPATIENT)
Dept: CT IMAGING | Facility: HOSPITAL | Age: 34
End: 2024-01-03
Attending: EMERGENCY MEDICINE
Payer: COMMERCIAL

## 2024-01-03 ENCOUNTER — APPOINTMENT (OUTPATIENT)
Dept: GENERAL RADIOLOGY | Facility: HOSPITAL | Age: 34
End: 2024-01-03
Attending: EMERGENCY MEDICINE
Payer: COMMERCIAL

## 2024-01-03 DIAGNOSIS — R09.02 HYPOXIA: Primary | ICD-10-CM

## 2024-01-03 DIAGNOSIS — N17.9 ACUTE RENAL FAILURE SUPERIMPOSED ON CHRONIC KIDNEY DISEASE, UNSPECIFIED ACUTE RENAL FAILURE TYPE, UNSPECIFIED CKD STAGE  (HCC): ICD-10-CM

## 2024-01-03 DIAGNOSIS — R10.9 ABDOMINAL PAIN OF UNKNOWN ETIOLOGY: ICD-10-CM

## 2024-01-03 DIAGNOSIS — N18.9 ACUTE RENAL FAILURE SUPERIMPOSED ON CHRONIC KIDNEY DISEASE, UNSPECIFIED ACUTE RENAL FAILURE TYPE, UNSPECIFIED CKD STAGE  (HCC): ICD-10-CM

## 2024-01-03 DIAGNOSIS — I50.9 HEART FAILURE, UNSPECIFIED HF CHRONICITY, UNSPECIFIED HEART FAILURE TYPE (HCC): ICD-10-CM

## 2024-01-03 LAB
ALBUMIN SERPL-MCNC: 4.2 G/DL (ref 3.2–4.8)
ALBUMIN/GLOB SERPL: 1.2 {RATIO} (ref 1–2)
ALP LIVER SERPL-CCNC: 79 U/L
ALT SERPL-CCNC: 18 U/L
ANION GAP SERPL CALC-SCNC: 13 MMOL/L (ref 0–18)
AST SERPL-CCNC: 25 U/L (ref ?–34)
ATRIAL RATE: 93 BPM
BASOPHILS # BLD AUTO: 0.07 X10(3) UL (ref 0–0.2)
BASOPHILS NFR BLD AUTO: 0.6 %
BILIRUB SERPL-MCNC: 0.3 MG/DL (ref 0.3–1.2)
BNP SERPL-MCNC: 300 PG/ML
BUN BLD-MCNC: 66 MG/DL (ref 9–23)
BUN/CREAT SERPL: 7.4 (ref 10–20)
CALCIUM BLD-MCNC: 8.4 MG/DL (ref 8.7–10.4)
CHLORIDE SERPL-SCNC: 110 MMOL/L (ref 98–112)
CO2 SERPL-SCNC: 18 MMOL/L (ref 21–32)
CREAT BLD-MCNC: 8.86 MG/DL
D DIMER PPP FEU-MCNC: 2.89 UG/ML FEU (ref ?–0.5)
DEPRECATED RDW RBC AUTO: 42.7 FL (ref 35.1–46.3)
EGFRCR SERPLBLD CKD-EPI 2021: 6 ML/MIN/1.73M2 (ref 60–?)
EOSINOPHIL # BLD AUTO: 0.39 X10(3) UL (ref 0–0.7)
EOSINOPHIL NFR BLD AUTO: 3.6 %
ERYTHROCYTE [DISTWIDTH] IN BLOOD BY AUTOMATED COUNT: 14.6 % (ref 11–15)
GLOBULIN PLAS-MCNC: 3.4 G/DL (ref 2.8–4.4)
GLUCOSE BLD-MCNC: 115 MG/DL (ref 70–99)
GLUCOSE BLDC GLUCOMTR-MCNC: 87 MG/DL (ref 70–99)
GLUCOSE BLDC GLUCOMTR-MCNC: 96 MG/DL (ref 70–99)
HBV CORE AB SERPL QL IA: NONREACTIVE
HBV SURFACE AB SER QL: NONREACTIVE
HBV SURFACE AB SERPL IA-ACNC: 3.54 MIU/ML
HBV SURFACE AG SER-ACNC: <0.1 [IU]/L
HBV SURFACE AG SERPL QL IA: NONREACTIVE
HCT VFR BLD AUTO: 28.1 %
HGB BLD-MCNC: 9.7 G/DL
IMM GRANULOCYTES # BLD AUTO: 0.07 X10(3) UL (ref 0–1)
IMM GRANULOCYTES NFR BLD: 0.6 %
LYMPHOCYTES # BLD AUTO: 2.01 X10(3) UL (ref 1–4)
LYMPHOCYTES NFR BLD AUTO: 18.4 %
MAGNESIUM SERPL-MCNC: 2.2 MG/DL (ref 1.6–2.6)
MCH RBC QN AUTO: 27.5 PG (ref 26–34)
MCHC RBC AUTO-ENTMCNC: 34.5 G/DL (ref 31–37)
MCV RBC AUTO: 79.6 FL
MONOCYTES # BLD AUTO: 0.47 X10(3) UL (ref 0.1–1)
MONOCYTES NFR BLD AUTO: 4.3 %
NEUTROPHILS # BLD AUTO: 7.94 X10 (3) UL (ref 1.5–7.7)
NEUTROPHILS # BLD AUTO: 7.94 X10(3) UL (ref 1.5–7.7)
NEUTROPHILS NFR BLD AUTO: 72.5 %
OSMOLALITY SERPL CALC.SUM OF ELEC: 312 MOSM/KG (ref 275–295)
P AXIS: 60 DEGREES
P-R INTERVAL: 168 MS
PLATELET # BLD AUTO: 337 10(3)UL (ref 150–450)
POTASSIUM SERPL-SCNC: 3.1 MMOL/L (ref 3.5–5.1)
PROT SERPL-MCNC: 7.6 G/DL (ref 5.7–8.2)
Q-T INTERVAL: 408 MS
QRS DURATION: 106 MS
QTC CALCULATION (BEZET): 507 MS
R AXIS: -12 DEGREES
RBC # BLD AUTO: 3.53 X10(6)UL
SODIUM SERPL-SCNC: 141 MMOL/L (ref 136–145)
T AXIS: 138 DEGREES
TROPONIN I SERPL HS-MCNC: 18 NG/L
VENTRICULAR RATE: 93 BPM
WBC # BLD AUTO: 11 X10(3) UL (ref 4–11)

## 2024-01-03 PROCEDURE — 71045 X-RAY EXAM CHEST 1 VIEW: CPT | Performed by: EMERGENCY MEDICINE

## 2024-01-03 PROCEDURE — 5A1D70Z PERFORMANCE OF URINARY FILTRATION, INTERMITTENT, LESS THAN 6 HOURS PER DAY: ICD-10-PCS | Performed by: INTERNAL MEDICINE

## 2024-01-03 PROCEDURE — 99223 1ST HOSP IP/OBS HIGH 75: CPT | Performed by: HOSPITALIST

## 2024-01-03 PROCEDURE — 02H633Z INSERTION OF INFUSION DEVICE INTO RIGHT ATRIUM, PERCUTANEOUS APPROACH: ICD-10-PCS | Performed by: STUDENT IN AN ORGANIZED HEALTH CARE EDUCATION/TRAINING PROGRAM

## 2024-01-03 PROCEDURE — 71260 CT THORAX DX C+: CPT | Performed by: EMERGENCY MEDICINE

## 2024-01-03 PROCEDURE — 0JH63XZ INSERTION OF TUNNELED VASCULAR ACCESS DEVICE INTO CHEST SUBCUTANEOUS TISSUE AND FASCIA, PERCUTANEOUS APPROACH: ICD-10-PCS | Performed by: STUDENT IN AN ORGANIZED HEALTH CARE EDUCATION/TRAINING PROGRAM

## 2024-01-03 RX ORDER — NIFEDIPINE 30 MG/1
90 TABLET, EXTENDED RELEASE ORAL NIGHTLY
Status: DISCONTINUED | OUTPATIENT
Start: 2024-01-03 | End: 2024-01-06

## 2024-01-03 RX ORDER — ONDANSETRON 2 MG/ML
4 INJECTION INTRAMUSCULAR; INTRAVENOUS EVERY 6 HOURS PRN
Status: DISCONTINUED | OUTPATIENT
Start: 2024-01-03 | End: 2024-01-06

## 2024-01-03 RX ORDER — ALBUMIN (HUMAN) 12.5 G/50ML
25 SOLUTION INTRAVENOUS
Status: ACTIVE | OUTPATIENT
Start: 2024-01-03 | End: 2024-01-04

## 2024-01-03 RX ORDER — HEPARIN SODIUM 1000 [USP'U]/ML
INJECTION, SOLUTION INTRAVENOUS; SUBCUTANEOUS
Status: COMPLETED
Start: 2024-01-03 | End: 2024-01-03

## 2024-01-03 RX ORDER — CARVEDILOL 25 MG/1
25 TABLET ORAL 2 TIMES DAILY
Status: DISCONTINUED | OUTPATIENT
Start: 2024-01-03 | End: 2024-01-06

## 2024-01-03 RX ORDER — MIDAZOLAM HYDROCHLORIDE 1 MG/ML
INJECTION INTRAMUSCULAR; INTRAVENOUS
Status: COMPLETED
Start: 2024-01-03 | End: 2024-01-03

## 2024-01-03 RX ORDER — ACETAMINOPHEN 325 MG/1
650 TABLET ORAL EVERY 4 HOURS PRN
Status: DISCONTINUED | OUTPATIENT
Start: 2024-01-03 | End: 2024-01-06

## 2024-01-03 RX ORDER — SODIUM BICARBONATE 650 MG/1
650 TABLET ORAL 3 TIMES DAILY
Status: DISCONTINUED | OUTPATIENT
Start: 2024-01-03 | End: 2024-01-04

## 2024-01-03 RX ORDER — DIPHENHYDRAMINE HYDROCHLORIDE 50 MG/ML
50 INJECTION INTRAMUSCULAR; INTRAVENOUS ONCE
Status: DISCONTINUED | OUTPATIENT
Start: 2024-01-03 | End: 2024-01-06

## 2024-01-03 RX ORDER — BISACODYL 10 MG
10 SUPPOSITORY, RECTAL RECTAL
Status: DISCONTINUED | OUTPATIENT
Start: 2024-01-03 | End: 2024-01-06

## 2024-01-03 RX ORDER — POLYETHYLENE GLYCOL 3350 17 G/17G
17 POWDER, FOR SOLUTION ORAL DAILY PRN
Status: DISCONTINUED | OUTPATIENT
Start: 2024-01-03 | End: 2024-01-06

## 2024-01-03 RX ORDER — HYDROCODONE BITARTRATE AND ACETAMINOPHEN 5; 325 MG/1; MG/1
1 TABLET ORAL EVERY 4 HOURS PRN
Status: DISCONTINUED | OUTPATIENT
Start: 2024-01-03 | End: 2024-01-06

## 2024-01-03 RX ORDER — MELATONIN
3 NIGHTLY PRN
Status: DISCONTINUED | OUTPATIENT
Start: 2024-01-03 | End: 2024-01-06

## 2024-01-03 RX ORDER — LIDOCAINE HYDROCHLORIDE 20 MG/ML
INJECTION, SOLUTION EPIDURAL; INFILTRATION; INTRACAUDAL; PERINEURAL
Status: COMPLETED
Start: 2024-01-03 | End: 2024-01-03

## 2024-01-03 RX ORDER — HYDRALAZINE HYDROCHLORIDE 50 MG/1
50 TABLET, FILM COATED ORAL EVERY 8 HOURS SCHEDULED
Status: DISCONTINUED | OUTPATIENT
Start: 2024-01-03 | End: 2024-01-06

## 2024-01-03 RX ORDER — HEPARIN SODIUM (PORCINE) LOCK FLUSH IV SOLN 100 UNIT/ML 100 UNIT/ML
1.5 SOLUTION INTRAVENOUS ONCE
Status: DISCONTINUED | OUTPATIENT
Start: 2024-01-03 | End: 2024-01-06

## 2024-01-03 RX ORDER — ATORVASTATIN CALCIUM 40 MG/1
40 TABLET, FILM COATED ORAL NIGHTLY
Status: DISCONTINUED | OUTPATIENT
Start: 2024-01-03 | End: 2024-01-06

## 2024-01-03 RX ORDER — PROCHLORPERAZINE EDISYLATE 5 MG/ML
5 INJECTION INTRAMUSCULAR; INTRAVENOUS EVERY 8 HOURS PRN
Status: DISCONTINUED | OUTPATIENT
Start: 2024-01-03 | End: 2024-01-06

## 2024-01-03 RX ORDER — HEPARIN SODIUM 5000 [USP'U]/ML
5000 INJECTION, SOLUTION INTRAVENOUS; SUBCUTANEOUS EVERY 8 HOURS SCHEDULED
Status: DISCONTINUED | OUTPATIENT
Start: 2024-01-03 | End: 2024-01-06

## 2024-01-03 RX ORDER — HYDROCODONE BITARTRATE AND ACETAMINOPHEN 5; 325 MG/1; MG/1
2 TABLET ORAL EVERY 4 HOURS PRN
Status: DISCONTINUED | OUTPATIENT
Start: 2024-01-03 | End: 2024-01-06

## 2024-01-03 RX ORDER — CEFAZOLIN SODIUM/WATER 2 G/20 ML
SYRINGE (ML) INTRAVENOUS
Status: COMPLETED
Start: 2024-01-03 | End: 2024-01-03

## 2024-01-03 RX ORDER — ECHINACEA PURPUREA EXTRACT 125 MG
1 TABLET ORAL
Status: DISCONTINUED | OUTPATIENT
Start: 2024-01-03 | End: 2024-01-06

## 2024-01-03 RX ORDER — HYDRALAZINE HYDROCHLORIDE 20 MG/ML
10 INJECTION INTRAMUSCULAR; INTRAVENOUS EVERY 4 HOURS PRN
Status: DISCONTINUED | OUTPATIENT
Start: 2024-01-03 | End: 2024-01-06

## 2024-01-03 RX ORDER — SENNOSIDES 8.6 MG
17.2 TABLET ORAL NIGHTLY PRN
Status: DISCONTINUED | OUTPATIENT
Start: 2024-01-03 | End: 2024-01-06

## 2024-01-03 RX ORDER — ACETAMINOPHEN 500 MG
500 TABLET ORAL EVERY 6 HOURS PRN
Status: DISCONTINUED | OUTPATIENT
Start: 2024-01-03 | End: 2024-01-06

## 2024-01-03 NOTE — ED QUICK NOTES
Orders for admission, patient is aware of plan and ready to go upstairs. Any questions, please call ED RN Lyly at extension [phone].     Patient Covid vaccination status: Unvaccinated     COVID Test Ordered in ED: None    COVID Suspicion at Admission: N/A    Running Infusions:  None    Mental Status/LOC at time of transport: A&O x 3/3    Other pertinent information:   CIWA score: N/A   NIH score:  N/A

## 2024-01-03 NOTE — ED QUICK NOTES
Pt ambulated to BR w/ steadty gait on RA, pt was 90% when she returned, NC reapplied. Pt continues on monitor.   Pt was unsure if she had a mild reaction to IV contrast, benadryl ordered for stby. Pt denies rash/lip/tongue swelling.

## 2024-01-03 NOTE — PROCEDURES
Interventional Radiology  Brief Post-Procedure Note    Procedure(s): tunneled hemodialysis catheter placement    Indication: CKD stage 5, initiation of dialysis    (s): Hazel    Anesthesia: Sedation    Findings:    -right internal jugular vein access  -19 cm tip to cuff  -terminates in right atrium    Blood loss: <5 mL      Complications: None    Plan: ready for use    Please refer to full dictation under the \"Imaging\" tab in Epic.    Rob Oliva MD  1/3/2024  Interventional Radiology  [facility]

## 2024-01-03 NOTE — ED INITIAL ASSESSMENT (HPI)
SOB began yesterday with hx asthma or recent cough/uri. Pt discharged a couple days ago after an admit r/t LETHA, anemia, ARF, and UTI. Pt reports hx CHF as well with c/o orthopnea at home.

## 2024-01-03 NOTE — CONSULTS
NEPHROLOGY CONSULT NOTE       DATE: 1/3/2024    Requesting Physician: Dr. Son     Reason for Consult: ED physician      HISTORY OF PRESENT ILLNESS: Liu Walker is a 33 year old female with PMH sig for  PCOS, HTN, HLD, DM2, gastric sleeve surgery, and CKD stage 5 with known nephrotic range proteinuria. She is followed by Dr. Jenkins of [facility].  Recent hospital admission for pyelonephritis / acute cystitis for which she was treated with a course of antibiotics. She presents back to the hospital with complaints of worsening SOB that began yesterday afternoon. She reports difficulty climbing stairs due to SOB and is unable to lay flat.  She reports some mild swelling in her legs yesterday, but none today. She reports mild cough. She denies CP or n/v.  Reports appetite has been ok. Patient noted to be hypoxic in the ED and is currently requiring 3L of oxygen.  BNP elevated at 300, CXR with cardiomegaly.  D-dimer is positive and plan is for CTA chest. Nephrology is consulted given CKD stage 5 with BUN 66, sCr 8.86 with eGFR of 6.      MEDICAL HISTORY:   Past Medical History:   Diagnosis Date    Anxiety state     Arrhythmia     Congestive heart disease (HCC)     Depression     Diabetes (HCC)     Esophageal reflux     Essential hypertension     Hidradenitis 05/14/2020    bilateral axilla    High blood pressure     High cholesterol     Migraines     Polycystic ovarian syndrome     treated with Metformin    PONV (postoperative nausea and vomiting)     Renal disorder     Visual impairment     Wears glasses       SURGICAL HISTORY:   Past Surgical History:   Procedure Laterality Date    APPENDECTOMY      OTHER Bilateral     excision of axilla hydradenitis       FAMILY HISTORY:   Family History   Problem Relation Age of Onset    Hypertension Father     Lipids Father     Obesity Father     Diabetes Mother     Hypertension Mother     Lipids Mother     Obesity Mother     Psychiatric Sister     Psychiatric Brother         SOCIAL HISTORY:   Social History     Socioeconomic History    Marital status: Single     Spouse name: Not on file    Number of children: 0    Years of education: Not on file    Highest education level: Not on file   Occupational History    Occupation: [employer]    Tobacco Use    Smoking status: Never    Smokeless tobacco: Never   Vaping Use    Vaping Use: Former   Substance and Sexual Activity    Alcohol use: No    Drug use: No    Sexual activity: Not on file   Other Topics Concern    Not on file   Social History Narrative    Not on file     Social Determinants of Health     Financial Resource Strain: Not on file   Food Insecurity: No Food Insecurity (12/31/2023)    Food Insecurity     Food Insecurity: Never true   Transportation Needs: No Transportation Needs (12/31/2023)    Transportation Needs     Lack of Transportation: No   Physical Activity: Not on file   Stress: Not on file   Social Connections: Not on file   Housing Stability: Low Risk  (12/31/2023)    Housing Stability     Housing Instability: No     Housing Instability Emergency: Not on file       MEDICATIONS:   No current facility-administered medications for this encounter.         ALLERGIES:   Allergies   Allergen Reactions    Grapefruit HIVES    Other OTHER (SEE COMMENTS)     Muscle relaxants-paralysis    Citrus C Vit [Ascorbate] HIVES    Iodine (Topical) UNKNOWN    Latex RASH    Shellfish-Derived Products Tightness in Throat       REVIEW OF SYSTEMS:  A 10 pt review of systems was conducted and is negative except for what is mentioned in the HPI      PHYSICAL EXAM:  /87   Pulse 82   Temp 97.9 °F (36.6 °C) (Oral)   Resp 24   Ht 5' 5\" (1.651 m)   Wt 220 lb (99.8 kg)   LMP 12/15/2023   SpO2 93%   BMI 36.61 kg/m²   GEN: NAD, on oxygen   HEENT: NCAT    CHEST: coarse breath sounds bilaterally   CARDIAC: S1S2 normal  ABD: soft, NT/ND  EXT: no lower ext edema  NEURO: awake, alert, answering appropriately. No asterixis    SKIN: warm,  dry      LABS:  Recent Labs   Lab 12/31/23  0711 01/01/24  0602 01/03/24  0711   GLU 88 85 115*   BUN 62* 63* 66*   CREATSERUM 8.88* 8.66* 8.86*   EGFRCR 6* 6* 6*   CA 7.8* 7.6* 8.4*    141 141   K 2.8* 3.0* 3.1*    114* 110   CO2 21.0 20.0* 18.0*     Recent Labs   Lab 12/31/23  0711 01/01/24  0602 01/03/24  0712   RBC 2.90* 2.76* 3.53*   HGB 8.0* 7.8* 9.7*   HCT 23.7* 22.5* 28.1*   MCV 81.7 81.5 79.6*   MCH 27.6 28.3 27.5   MCHC 33.8 34.7 34.5   RDW 14.8 14.6 14.6   NEPRELIM 4.63 3.62 7.94*   WBC 7.4 6.8 11.0   .0 222.0 337.0           INTAKE/OUTPUT:  No intake or output data in the 24 hours ending 01/03/24 0935      IMAGING:  XR CHEST AP PORTABLE  (CPT=71045)    Result Date: 1/3/2024  CONCLUSION:  Stable chest demonstrating cardiomegaly and minimal basilar atelectasis without radiographically evident acute intrathoracic process.    Dictated by (CST): Natanael Burnette MD on 1/03/2024 at 7:45 AM     Finalized by (CST): Natanael Burnette MD on 1/03/2024 at 7:46 AM              ASSESSMENT AND PLAN:   This is a 33 year old female with PMH sig for  PCOS, HTN, HLD, DM2, gastric sleeve surgery, and CKD stage 5 with known nephrotic range proteinuria. She is followed by Dr. Jenkins of [facility].  Presents with hypoxia. Nephrology is consulted for CKD stage 5.     CKD stage 5 - now progressed to ESRD   - sCr 8.86 with eGFR of 6.    - Discussed with patient that it is only a matter of time when patient will need dialysis. Given new hypoxia,  elevated BNP, concern for fluid overload -  I recommended that dialysis be initiated for clearance and fluid removal.     - Patient is agreeable to dialysis.  Appreciate IR help with tunneled line placement.    - plan for 2 hour session of dialysis today. Anticipate will need 3 consecutive sessions of dialysis in a row.   - SW to help outpatient dialysis placement.   - primary nephrologist:  Dr. Jenkins of [facility]     Hypokalemia   - 4 K bath with dialysis today   -  follow K level     Metabolic acidosis   - should improve with dialysis   - can stop sodium bicarbonate tablets, now that patient to be started on dialysis.     Hypoxia   - cardiomegaly noted on CXR, BNP elevated - concern for fluid overload.   - D-dimer positive, planning for CTA to r/o PE.  Ok for IV contrast as patient to be initiated on dialysis.   - further per primary     Anemia   - trend Hb, blood transfusions per primary   - avoiding IV iron given recent infection     Dw ED physician - Dr. Son      Thank you for the consult and allowing us to participate in the care of Dixonmontrell Walker.  We will continue to follow.    Irma Aponte MD  [facility]  1/3/2024

## 2024-01-03 NOTE — ED PROVIDER NOTES
Patient Seen in: [facility] Emergency Department    History     Chief Complaint   Patient presents with    Difficulty Breathing     Stated Complaint: SOB    HPI    Patient has  history of renal failure/CHF  complains of shortness of breath that has gotten worse since being discharged form hospital.  Orthopnea.  Cannot lay flat.  Can barely walk to bathroom without being short of breath.    no fever or chills.  +  swelling.  + orthopnea.  no recent long car ride or plane ride.   .  Symptoms worse with exertion/laying flat.    Symptoms improved with sitting up.     Past Medical History:   Diagnosis Date    Anxiety state     Arrhythmia     Congestive heart disease (HCC)     Depression     Diabetes (HCC)     Esophageal reflux     Essential hypertension     Hidradenitis 05/14/2020    bilateral axilla    High blood pressure     High cholesterol     Migraines     Polycystic ovarian syndrome     treated with Metformin    PONV (postoperative nausea and vomiting)     Renal disorder     Visual impairment     Wears glasses       Past Surgical History:   Procedure Laterality Date    APPENDECTOMY      OTHER Bilateral     excision of axilla hydradenitis            Family History   Problem Relation Age of Onset    Hypertension Father     Lipids Father     Obesity Father     Diabetes Mother     Hypertension Mother     Lipids Mother     Obesity Mother     Psychiatric Sister     Psychiatric Brother        Social History     Socioeconomic History    Marital status: Single    Number of children: 0   Occupational History    Occupation: [employer]    Tobacco Use    Smoking status: Never    Smokeless tobacco: Never   Vaping Use    Vaping Use: Former   Substance and Sexual Activity    Alcohol use: No    Drug use: No     Social Determinants of Health     Food Insecurity: No Food Insecurity (12/31/2023)    Food Insecurity     Food Insecurity: Never true   Transportation Needs: No Transportation Needs (12/31/2023)     Transportation Needs     Lack of Transportation: No   Housing Stability: Low Risk  (12/31/2023)    Housing Stability     Housing Instability: No       Review of Systems    Positive for stated complaint: SOB  Other systems are as noted in HPI.  Constitutional and vital signs reviewed.      All other systems reviewed and negative except as noted above.    PSFH elements reviewed from today and agreed except as otherwise stated in HPI.    Physical Exam     ED Triage Vitals [01/03/24 0646]   /89   Pulse 92   Resp 26   Temp 97.9 °F (36.6 °C)   Temp src Oral   SpO2 (!) 89 %   O2 Device None (Room air)       Current:/89   Pulse 92   Temp 97.9 °F (36.6 °C) (Oral)   Resp 26   Ht 165.1 cm (5' 5\")   Wt 99.8 kg   LMP 12/15/2023   SpO2 (!) 89%   BMI 36.61 kg/m²   PULSE OX hypoxic 89% room air  GENERAL: awake alert conversational dyspnea  HEAD: normocephalic, atraumatic,   EYES: PERRLA, EOMI,  THROAT: mm dry, no lesions  NECK: supple, no meningeal signs  LUNGS:basilar rales  CARDIO: rr   GI: abdomen is soft and non tender, no masses, nl bowel sounds   EXTREMITIES: from, 5/5 strength in all 4 ext,pedal edema  NEURO: alert and oiented *3, 2-12 intact, no focal deficit noted  SKIN: good skin turgor, no  rashes  PSYCH: calm, cooperative,    Differential includes: renal failure vs. pneumonia vs. CHF vs. bronchitis vs. PE      ED Course     Labs Reviewed   CBC WITH DIFFERENTIAL WITH PLATELET    Narrative:     The following orders were created for panel order CBC With Differential With Platelet.  Procedure                               Abnormality         Status                     ---------                               -----------         ------                     CBC W/ DIFFERENTIAL[[id number]]                              In process                   Please view results for these tests on the individual orders.   COMP METABOLIC PANEL (14)   TROPONIN I HIGH SENSITIVITY   BNP (B TYPE NATRIURETIC PEPTIDE)   MAGNESIUM    RAINBOW DRAW LAVENDER   RAINBOW DRAW LIGHT GREEN   RAINBOW DRAW BLUE   RAINBOW DRAW GOLD   CBC W/ DIFFERENTIAL     EKG    Rate, intervals and axes as noted on EKG Report.  Rate: 93  Rhythm: Sinus Rhythm  Reading: nsr with incomplete rbbb non spec st changes           MDM     Monitor Interpretation:  Nsr 78    Radiology Interpretation:  CT CHEST PE AORTA (IV ONLY) (CPT=71260)    Result Date: 1/3/2024  CONCLUSION:  1. No evidence of acute pulmonary embolism to the level of the first order subsegmental pulmonary artery branches.  2. Cardiomegaly and pulmonary interstitial edema, concerning for cardiac insufficiency.   3. Small bilateral pleural effusions and associated compressive atelectasis, with or without superimposed infectious process.  4. Intraperitoneal ascites is partially delineated.  5. Dilatation of the main pulmonary artery trunk may relate to underlying pulmonary hypertension.  6. Lesser incidental findings as above.     Dictated by (CST): Natanael Burnette MD on 1/03/2024 at 10:55 AM     Finalized by (CST): Natanael Burnette MD on 1/03/2024 at 11:00 AM          XR CHEST AP PORTABLE  (CPT=71045)    Result Date: 1/3/2024  CONCLUSION:  Stable chest demonstrating cardiomegaly and minimal basilar atelectasis without radiographically evident acute intrathoracic process.    Dictated by (CST): Natanael Burnette MD on 1/03/2024 at 7:45 AM     Finalized by (CST): Natanael Burnette MD on 1/03/2024 at 7:46 AM             I reviewed xray noted no infiltrates no pneumothorax          MDM            Medical Decision Making  Problems Addressed:  Acute renal failure superimposed on chronic kidney disease, unspecified acute renal failure type, unspecified CKD stage  (HCC): acute illness or injury that poses a threat to life or bodily functions  Heart failure, unspecified HF chronicity, unspecified heart failure type (HCC): chronic illness or injury with exacerbation, progression, or side effects of treatment  Hypoxia: acute  illness or injury that poses a threat to life or bodily functions    Amount and/or Complexity of Data Reviewed  Labs: ordered. Decision-making details documented in ED Course.  Radiology: ordered and independent interpretation performed. Decision-making details documented in ED Course.  ECG/medicine tests: ordered and independent interpretation performed. Decision-making details documented in ED Course.  Discussion of management or test interpretation with external provider(s): Spoke with renal request dialysis catheter, to initiate dialysis.  Spoke with IR to arrange tunneled catheter.  Spoke with Dr. Chen hospitalist accepting admission.    I spent a total of 35 minutes of critical care time in obtaining history, performing a physical exam, bedside monitoring of interventions, collecting and interpreting tests and discussion with consultants but not including time spent performing procedures.          Disposition and Plan     Clinical Impression:  1. Hypoxia    2. Acute renal failure superimposed on chronic kidney disease, unspecified acute renal failure type, unspecified CKD stage  (HCC)    3. Heart failure, unspecified HF chronicity, unspecified heart failure type (HCC)         Disposition:  There is no disposition on file for this visit.  There is no disposition time on file for this visit.    Follow-up:  No follow-up provider specified.        Medications Prescribed:  Current Discharge Medication List                           Disposition and Plan     Clinical Impression:  1. Hypoxia    2. Acute renal failure superimposed on chronic kidney disease, unspecified acute renal failure type, unspecified CKD stage  (HCC)    3. Heart failure, unspecified HF chronicity, unspecified heart failure type (HCC)        Disposition:  There is no disposition on file for this visit.    Follow-up:  No follow-up provider specified.    Medications Prescribed:  Current Discharge Medication List

## 2024-01-03 NOTE — PROGRESS NOTES
[facility]  Progress Note      Liu Walker Patient Status:  Emergency    1990 MRN [id number]   Location [facility] EMERGENCY DEPARTMENT Attending Fredrick Son MD   Hosp Day # 0 PCP Ashleigh Murray       Subjective:   SOB    Objective:   Normal respiratory effort at rest. On nasal cannula    Vital Signs:  Blood pressure (!) 163/95, pulse 79, temperature 97.9 °F (36.6 °C), temperature source Oral, resp. rate 18, height 65\", weight 220 lb (99.8 kg), last menstrual period 12/15/2023, SpO2 94%, not currently breastfeeding.      Results:   Labs:  Lab Results   Component Value Date    WBC 11.0 2024    RBC 3.53 2024    HGB 9.7 2024    HCT 28.1 2024    MCV 79.6 2024    MCH 27.5 2024    MCHC 34.5 2024    RDW 14.6 2024    .0 2024       Assessment and Plan:   32yo with stage 5 CKD being admitted with SOB.  Dialysis catheter insertion planned for later today.  Liu states understanding of the procedure.    SANTY VELASCO, APRN  1/3/2024  1:32 PM

## 2024-01-04 LAB
ALBUMIN SERPL-MCNC: 3.6 G/DL (ref 3.2–4.8)
ALBUMIN/GLOB SERPL: 1.3 {RATIO} (ref 1–2)
ALP LIVER SERPL-CCNC: 58 U/L
ALT SERPL-CCNC: <7 U/L
ANION GAP SERPL CALC-SCNC: 11 MMOL/L (ref 0–18)
AST SERPL-CCNC: 15 U/L (ref ?–34)
BASOPHILS # BLD AUTO: 0.04 X10(3) UL (ref 0–0.2)
BASOPHILS NFR BLD AUTO: 0.6 %
BILIRUB SERPL-MCNC: 0.3 MG/DL (ref 0.3–1.2)
BUN BLD-MCNC: 45 MG/DL (ref 9–23)
BUN/CREAT SERPL: 6.4 (ref 10–20)
CALCIUM BLD-MCNC: 8.1 MG/DL (ref 8.7–10.4)
CHLORIDE SERPL-SCNC: 107 MMOL/L (ref 98–112)
CO2 SERPL-SCNC: 23 MMOL/L (ref 21–32)
CREAT BLD-MCNC: 7.02 MG/DL
DEPRECATED RDW RBC AUTO: 42.2 FL (ref 35.1–46.3)
EGFRCR SERPLBLD CKD-EPI 2021: 7 ML/MIN/1.73M2 (ref 60–?)
EOSINOPHIL # BLD AUTO: 0.22 X10(3) UL (ref 0–0.7)
EOSINOPHIL NFR BLD AUTO: 3.2 %
ERYTHROCYTE [DISTWIDTH] IN BLOOD BY AUTOMATED COUNT: 14.6 % (ref 11–15)
GLOBULIN PLAS-MCNC: 2.8 G/DL (ref 2.8–4.4)
GLUCOSE BLD-MCNC: 102 MG/DL (ref 70–99)
GLUCOSE BLDC GLUCOMTR-MCNC: 104 MG/DL (ref 70–99)
GLUCOSE BLDC GLUCOMTR-MCNC: 140 MG/DL (ref 70–99)
GLUCOSE BLDC GLUCOMTR-MCNC: 143 MG/DL (ref 70–99)
GLUCOSE BLDC GLUCOMTR-MCNC: 93 MG/DL (ref 70–99)
HCT VFR BLD AUTO: 23.9 %
HGB BLD-MCNC: 8.5 G/DL
IMM GRANULOCYTES # BLD AUTO: 0.02 X10(3) UL (ref 0–1)
IMM GRANULOCYTES NFR BLD: 0.3 %
LYMPHOCYTES # BLD AUTO: 1.43 X10(3) UL (ref 1–4)
LYMPHOCYTES NFR BLD AUTO: 20.9 %
MAGNESIUM SERPL-MCNC: 2.1 MG/DL (ref 1.6–2.6)
MCH RBC QN AUTO: 28.6 PG (ref 26–34)
MCHC RBC AUTO-ENTMCNC: 35.6 G/DL (ref 31–37)
MCV RBC AUTO: 80.5 FL
MONOCYTES # BLD AUTO: 0.39 X10(3) UL (ref 0.1–1)
MONOCYTES NFR BLD AUTO: 5.7 %
NEUTROPHILS # BLD AUTO: 4.75 X10 (3) UL (ref 1.5–7.7)
NEUTROPHILS # BLD AUTO: 4.75 X10(3) UL (ref 1.5–7.7)
NEUTROPHILS NFR BLD AUTO: 69.3 %
OSMOLALITY SERPL CALC.SUM OF ELEC: 304 MOSM/KG (ref 275–295)
PHOSPHATE SERPL-MCNC: 5 MG/DL (ref 2.4–5.1)
PLATELET # BLD AUTO: 261 10(3)UL (ref 150–450)
POTASSIUM SERPL-SCNC: 3.1 MMOL/L (ref 3.5–5.1)
PROT SERPL-MCNC: 6.4 G/DL (ref 5.7–8.2)
RBC # BLD AUTO: 2.97 X10(6)UL
SODIUM SERPL-SCNC: 141 MMOL/L (ref 136–145)
WBC # BLD AUTO: 6.9 X10(3) UL (ref 4–11)

## 2024-01-04 PROCEDURE — 99233 SBSQ HOSP IP/OBS HIGH 50: CPT | Performed by: HOSPITALIST

## 2024-01-04 RX ORDER — POTASSIUM CHLORIDE 20 MEQ/1
40 TABLET, EXTENDED RELEASE ORAL ONCE
Status: COMPLETED | OUTPATIENT
Start: 2024-01-04 | End: 2024-01-04

## 2024-01-04 NOTE — PLAN OF CARE
Problem: Patient Centered Care  Goal: Patient preferences are identified and integrated in the patient's plan of care  Description: Interventions:  - What would you like us to know as we care for you? Lives alone   - Provide timely, complete, and accurate information to patient/family  - Incorporate patient and family knowledge, values, beliefs, and cultural backgrounds into the planning and delivery of care  - Encourage patient/family to participate in care and decision-making at the level they choose  - Honor patient and family perspectives and choices  Outcome: Progressing     Problem: RESPIRATORY - ADULT  Goal: Achieves optimal ventilation and oxygenation  Description: INTERVENTIONS:  - Assess for changes in respiratory status  - Assess for changes in mentation and behavior  - Position to facilitate oxygenation and minimize respiratory effort  - Oxygen supplementation based on oxygen saturation or ABGs  - Provide Smoking Cessation handout, if applicable  - Encourage broncho-pulmonary hygiene including cough, deep breathe, Incentive Spirometry  - Assess the need for suctioning and perform as needed  - Assess and instruct to report SOB or any respiratory difficulty  - Respiratory Therapy support as indicated  - Manage/alleviate anxiety  - Monitor for signs/symptoms of CO2 retention  Outcome: Progressing     Problem: METABOLIC/FLUID AND ELECTROLYTES - ADULT  Goal: Glucose maintained within prescribed range  Description: INTERVENTIONS:  - Monitor Blood Glucose as ordered  - Assess for signs and symptoms of hyperglycemia and hypoglycemia  - Administer ordered medications to maintain glucose within target range  - Assess barriers to adequate nutritional intake and initiate nutrition consult as needed  - Instruct patient on self management of diabetes  Outcome: Progressing  Goal: Electrolytes maintained within normal limits  Description: INTERVENTIONS:  - Monitor labs and rhythm and assess patient for signs and  symptoms of electrolyte imbalances  - Administer electrolyte replacement as ordered  - Monitor response to electrolyte replacements, including rhythm and repeat lab results as appropriate  - Fluid restriction as ordered  - Instruct patient on fluid and nutrition restrictions as appropriate  Outcome: Progressing  Goal: Hemodynamic stability and optimal renal function maintained  Description: INTERVENTIONS:  - Monitor labs and assess for signs and symptoms of volume excess or deficit  - Monitor intake, output and patient weight  - Monitor urine specific gravity, serum osmolarity and serum sodium as indicated or ordered  - Monitor response to interventions for patient's volume status, including labs, urine output, blood pressure (other measures as available)  - Encourage oral intake as appropriate  - Instruct patient on fluid and nutrition restrictions as appropriate  Outcome: Progressing     Problem: SKIN/TISSUE INTEGRITY - ADULT  Goal: Skin integrity remains intact  Description: INTERVENTIONS  - Assess and document risk factors for pressure ulcer development  - Assess and document skin integrity  - Monitor for areas of redness and/or skin breakdown  - Initiate interventions, skin care algorithm/standards of care as needed  Outcome: Progressing     Problem: MUSCULOSKELETAL - ADULT  Goal: Return mobility to safest level of function  Description: INTERVENTIONS:  - Assess patient stability and activity tolerance for standing, transferring and ambulating w/ or w/o assistive devices  - Assist with transfers and ambulation using safe patient handling equipment as needed  - Ensure adequate protection for wounds/incisions during mobilization  - Obtain PT/OT consults as needed  - Advance activity as appropriate  - Communicate ordered activity level and limitations with patient/family  Outcome: Progressing     Problem: PAIN - ADULT  Goal: Verbalizes/displays adequate comfort level or patient's stated pain goal  Description:  INTERVENTIONS:  - Encourage pt to monitor pain and request assistance  - Assess pain using appropriate pain scale  - Administer analgesics based on type and severity of pain and evaluate response  - Implement non-pharmacological measures as appropriate and evaluate response  - Consider cultural and social influences on pain and pain management  - Manage/alleviate anxiety  - Utilize distraction and/or relaxation techniques  - Monitor for opioid side effects  - Notify MD/LIP if interventions unsuccessful or patient reports new pain  - Anticipate increased pain with activity and pre-medicate as appropriate  Outcome: Progressing     Problem: RISK FOR INFECTION - ADULT  Goal: Absence of fever/infection during anticipated neutropenic period  Description: INTERVENTIONS  - Monitor WBC  - Administer growth factors as ordered  - Implement neutropenic guidelines  Outcome: Progressing     Problem: SAFETY ADULT - FALL  Goal: Free from fall injury  Description: INTERVENTIONS:  - Assess pt frequently for physical needs  - Identify cognitive and physical deficits and behaviors that affect risk of falls.  - [place] fall precautions as indicated by assessment.  - Educate pt/family on patient safety including physical limitations  - Instruct pt to call for assistance with activity based on assessment  - Modify environment to reduce risk of injury  - Provide assistive devices as appropriate  - Consider OT/PT consult to assist with strengthening/mobility  - Encourage toileting schedule  Outcome: Progressing     Problem: DISCHARGE PLANNING  Goal: Discharge to home or other facility with appropriate resources  Description: INTERVENTIONS:  - Identify barriers to discharge w/pt and caregiver  - Include patient/family/discharge partner in discharge planning  - Arrange for needed discharge resources and transportation as appropriate  - Identify discharge learning needs (meds, wound care, etc)  - Arrange for interpreters to assist at  discharge as needed  - Consider post-discharge preferences of patient/family/discharge partner  - Complete POLST form as appropriate  - Assess patient's ability to be responsible for managing their own health  - Refer to Case Management Department for coordinating discharge planning if the patient needs post-hospital services based on physician/LIP order or complex needs related to functional status, cognitive ability or social support system  Outcome: Progressing   Patient received for continuity of care. Patient in no distress, resting comfortably, unlabored respirations. Safety precautions in place

## 2024-01-04 NOTE — PLAN OF CARE
Problem: Patient Centered Care  Goal: Patient preferences are identified and integrated in the patient's plan of care  Description: Interventions:  - What would you like us to know as we care for you? Lives alone   - Provide timely, complete, and accurate information to patient/family  - Incorporate patient and family knowledge, values, beliefs, and cultural backgrounds into the planning and delivery of care  - Encourage patient/family to participate in care and decision-making at the level they choose  - Honor patient and family perspectives and choices  Outcome: Progressing     Problem: RESPIRATORY - ADULT  Goal: Achieves optimal ventilation and oxygenation  Description: INTERVENTIONS:  - Assess for changes in respiratory status  - Assess for changes in mentation and behavior  - Position to facilitate oxygenation and minimize respiratory effort  - Oxygen supplementation based on oxygen saturation or ABGs  - Provide Smoking Cessation handout, if applicable  - Encourage broncho-pulmonary hygiene including cough, deep breathe, Incentive Spirometry  - Assess the need for suctioning and perform as needed  - Assess and instruct to report SOB or any respiratory difficulty  - Respiratory Therapy support as indicated  - Manage/alleviate anxiety  - Monitor for signs/symptoms of CO2 retention  Outcome: Progressing     Problem: METABOLIC/FLUID AND ELECTROLYTES - ADULT  Goal: Glucose maintained within prescribed range  Description: INTERVENTIONS:  - Monitor Blood Glucose as ordered  - Assess for signs and symptoms of hyperglycemia and hypoglycemia  - Administer ordered medications to maintain glucose within target range  - Assess barriers to adequate nutritional intake and initiate nutrition consult as needed  - Instruct patient on self management of diabetes  Outcome: Progressing  Goal: Electrolytes maintained within normal limits  Description: INTERVENTIONS:  - Monitor labs and rhythm and assess patient for signs and  symptoms of electrolyte imbalances  - Administer electrolyte replacement as ordered  - Monitor response to electrolyte replacements, including rhythm and repeat lab results as appropriate  - Fluid restriction as ordered  - Instruct patient on fluid and nutrition restrictions as appropriate  Outcome: Progressing  Goal: Hemodynamic stability and optimal renal function maintained  Description: INTERVENTIONS:  - Monitor labs and assess for signs and symptoms of volume excess or deficit  - Monitor intake, output and patient weight  - Monitor urine specific gravity, serum osmolarity and serum sodium as indicated or ordered  - Monitor response to interventions for patient's volume status, including labs, urine output, blood pressure (other measures as available)  - Encourage oral intake as appropriate  - Instruct patient on fluid and nutrition restrictions as appropriate  Outcome: Progressing     Problem: SKIN/TISSUE INTEGRITY - ADULT  Goal: Skin integrity remains intact  Description: INTERVENTIONS  - Assess and document risk factors for pressure ulcer development  - Assess and document skin integrity  - Monitor for areas of redness and/or skin breakdown  - Initiate interventions, skin care algorithm/standards of care as needed  Outcome: Progressing     Problem: MUSCULOSKELETAL - ADULT  Goal: Return mobility to safest level of function  Description: INTERVENTIONS:  - Assess patient stability and activity tolerance for standing, transferring and ambulating w/ or w/o assistive devices  - Assist with transfers and ambulation using safe patient handling equipment as needed  - Ensure adequate protection for wounds/incisions during mobilization  - Obtain PT/OT consults as needed  - Advance activity as appropriate  - Communicate ordered activity level and limitations with patient/family  Outcome: Progressing     Problem: PAIN - ADULT  Goal: Verbalizes/displays adequate comfort level or patient's stated pain goal  Description:  INTERVENTIONS:  - Encourage pt to monitor pain and request assistance  - Assess pain using appropriate pain scale  - Administer analgesics based on type and severity of pain and evaluate response  - Implement non-pharmacological measures as appropriate and evaluate response  - Consider cultural and social influences on pain and pain management  - Manage/alleviate anxiety  - Utilize distraction and/or relaxation techniques  - Monitor for opioid side effects  - Notify MD/LIP if interventions unsuccessful or patient reports new pain  - Anticipate increased pain with activity and pre-medicate as appropriate  Outcome: Progressing     Problem: RISK FOR INFECTION - ADULT  Goal: Absence of fever/infection during anticipated neutropenic period  Description: INTERVENTIONS  - Monitor WBC  - Administer growth factors as ordered  - Implement neutropenic guidelines  Outcome: Progressing     Problem: SAFETY ADULT - FALL  Goal: Free from fall injury  Description: INTERVENTIONS:  - Assess pt frequently for physical needs  - Identify cognitive and physical deficits and behaviors that affect risk of falls.  - [place] fall precautions as indicated by assessment.  - Educate pt/family on patient safety including physical limitations  - Instruct pt to call for assistance with activity based on assessment  - Modify environment to reduce risk of injury  - Provide assistive devices as appropriate  - Consider OT/PT consult to assist with strengthening/mobility  - Encourage toileting schedule  Outcome: Progressing     Problem: DISCHARGE PLANNING  Goal: Discharge to home or other facility with appropriate resources  Description: INTERVENTIONS:  - Identify barriers to discharge w/pt and caregiver  - Include patient/family/discharge partner in discharge planning  - Arrange for needed discharge resources and transportation as appropriate  - Identify discharge learning needs (meds, wound care, etc)  - Arrange for interpreters to assist at  discharge as needed  - Consider post-discharge preferences of patient/family/discharge partner  - Complete POLST form as appropriate  - Assess patient's ability to be responsible for managing their own health  - Refer to Case Management Department for coordinating discharge planning if the patient needs post-hospital services based on physician/LIP order or complex needs related to functional status, cognitive ability or social support system  Outcome: Progressing     Patient started on dialysis, new permacath to right jugular, noted with bleeding, dressing re-enforced. IR was made aware by day shift nurse. Dialysis education done by RN and dialysis nurse. 500ml of fluid removed. Safety measures in place. Call light within reach.

## 2024-01-04 NOTE — H&P
[facility]    PATIENT'S NAME: YESENIA ADAME   ATTENDING PHYSICIAN: Walter Estrada MD   PATIENT ACCOUNT#:   [id number]    LOCATION:  [address] [place] [address]  MEDICAL RECORD #:   [id number]       YOB: 1990  ADMISSION DATE:       01/03/2024    HISTORY AND PHYSICAL EXAMINATION    Complex medical decision making, coordinating care with IR with Dr. Son, reviewing CT chest images and EKG tracings, interviewing the patient and admitting her to the hospital.     CHIEF COMPLAINT:  Worsening shortness of breath and orthopnea.      ADMITTING DIAGNOSIS:  Chronic renal failure that has deteriorated with fluid overload, requiring initiation of dialysis.    HISTORY OF PRESENT ILLNESS:  This is a very pleasant 33-year-old  female who presents with a history of having recently been discharged from the hospital, having come in for CHF.  She can barely walk around and has to sleep sitting straight up.  She was sent to the hospital by Dr. Aponte for placement of catheter and initiation of dialysis.  She was found to also have a D-dimer positive and a CAT scan was performed which showed no pulmonary embolism.  The patient was very concerned about the catheter placement, and I assured her that our group is extremely skilled in this field.    PAST MEDICAL HISTORY:  Significant for anxiety, arrhythmias, depression, diabetes, reflux disease, axillary hidradenitis, hypertension, hyperlipidemia, migraines, polycystic ovarian syndrome, chronic kidney disease stage 5, appendectomy, excision of hidradenitis, and also appendectomy.    MEDICATIONS:  At home are the following.  Norco 5/325 one tablet every 4 hours as needed, hydralazine 50 mg every 8 hours as needed, sodium bicarbonate 650 mg twice a day, Coreg 25 mg twice a day, nifedipine ER 90 mg nightly, atorvastatin 40 mg nightly, cefadroxil 500 mg daily for 3 days, and EpiPen.    ALLERGIES:  Six.  They include grapefruit; vitamin, Citrus-C gives her  hives; latex gives her a rash; shellfish, tightness in her throat, but she tolerated her CAT scan; latex gives her a rash.     FAMILY HISTORY:  Father  of lung cancer at 69.  Her mother is alive at 68, but does have dementia.      SOCIAL HISTORY:  She does not smoke or drink.    REVIEW OF SYSTEMS:  Shortness of breath and orthopnea.  She does not have any unusual bleeding.  She was able to eat and swallow.  She does not have any chest pain, but extremely short of breath when she lies down as per HPI.  Other 13 systems reviewed were negative.      PHYSICAL EXAMINATION:    GENERAL:  Well-nourished, well-developed, friendly, obese,  female in mild respiratory distress.    VITAL SIGNS:  Temperature is 98.1, pulse 85, respiratory rate 18, blood pressure is 166/90, 95%.  HEENT:  She is normocephalic, atraumatic.  Anicteric.  Oropharynx is moist.  NECK:  Supple.  LUNGS:  Crackles in both bases.   HEART:  Normal S1, S2.  No S3.  There is an S4.  ABDOMEN:  Soft and nontender.  EXTREMITIES:  Mild edema without cyanosis.  VASCULAR:  Dorsalis pedis pulses 1+ bilaterally.  NEUROLOGIC:  She is alert, oriented x3 with no focal neurologic deficits, 5/5 motor strength in her upper extremities.  She can lift both lower extremities off the bed.  Reflexes are 1+ at the elbows and knees.  Toes are downgoing.  SKIN:  She has tattoos that she states are from licensed parlors.  No obvious rashes.  PSYCHIATRIC:  She is anxious and depressed about starting dialysis and having a catheter put in, but it is appropriate for the situation.    LYMPHATICS:  I do not palpate submandibular lymphadenopathy.  MUSCULOSKELETAL:  No joint deformities or injuries.    LABORATORY DATA:  The sodium is 141, potassium 3.1, chloride 110, CO2 of 18, BUN 66, creatinine 8.86, and the anion gap is 13.  D-dimer is 2.89.  Hepatitis B surface antibody is nonreactive.  White count is 11,000; hemoglobin 9.7; platelets are 337,000.    ASSESSMENT AND  PLAN:    1.   Chronic kidney disease stage 5.  Transforming to end-stage renal disease with severe symptoms.  Requires dialysis as per Dr. Aponte.  2.   Hypertension.  Continue medications.  3.   Hyperlipidemia.  Continue medications.  4.   Allergies.  Will avoid triggers here.  5.   Code status is Full Code.  Discussed with patient during this hospitalization.  6.      Metabolic acidosis from renal failure    Dictated By Walter Estrada MD  d: 01/03/2024 18:54:27  t: 01/03/2024 19:07:49  Job [id number]  [facility]/

## 2024-01-04 NOTE — CM/SW NOTE
Department  notified of request for DIAL, aidin referrals started. Assigned CM/SW to follow up with pt/family on further discharge planning.     Elissa Emmanuel, DSC

## 2024-01-04 NOTE — PLAN OF CARE
Patient alert, oriented, no c/o pain, plan for dialysis today, ([facility] called) pt up to bathroom, plan to collect urine for UA.  Problem: Patient Centered Care  Goal: Patient preferences are identified and integrated in the patient's plan of care  Description: Interventions:  - What would you like us to know as we care for you? Lives alone   - Provide timely, complete, and accurate information to patient/family  - Incorporate patient and family knowledge, values, beliefs, and cultural backgrounds into the planning and delivery of care  - Encourage patient/family to participate in care and decision-making at the level they choose  - Honor patient and family perspectives and choices  Outcome: Progressing

## 2024-01-04 NOTE — PROGRESS NOTES
[facility]    Progress Note    Liu Walker Patient Status:  Inpatient    1990 MRN [id number]   Location [facility] 5SW/SE Attending Walter Estrada,*   Hosp Day # 1 PCP Ashleigh Murray     Chief Complaint: pain inserion site    Subjective:     Constitutional:  Negative for appetite change and chills.   HENT:  Positive for congestion.    Respiratory:  Negative for cough, shortness of breath and wheezing.    Cardiovascular:  Positive for chest pain.        Chest wall   Genitourinary:  Negative for dyspareunia.   Musculoskeletal:  Negative for joint swelling and joint pain.   Neurological:  Positive for weakness.   Psychiatric/Behavioral:  Positive for sleep disturbance. Negative for confusion and decreased concentration. The patient is nervous/anxious.        Objective:   Blood pressure 158/85, pulse 80, temperature 98.5 °F (36.9 °C), temperature source Oral, resp. rate 18, height 5' 5\" (1.651 m), weight 225 lb (102.1 kg), last menstrual period 12/15/2023, SpO2 96%, not currently breastfeeding.  Physical Exam  Vitals and nursing note reviewed.   Constitutional:       Appearance: She is obese.   HENT:      Head: Normocephalic and atraumatic.   Cardiovascular:      Pulses: Normal pulses.   Pulmonary:      Breath sounds: Rhonchi present.   Abdominal:      General: Bowel sounds are normal.      Palpations: Abdomen is soft.   Skin:     General: Skin is warm and dry.      Capillary Refill: Capillary refill takes less than 2 seconds.   Neurological:      Mental Status: She is alert and oriented to person, place, and time.   Psychiatric:         Behavior: Behavior normal.         Judgment: Judgment normal.         Results:   Lab Results   Component Value Date    WBC 6.9 2024    HGB 8.5 (L) 2024    HCT 23.9 (L) 2024    .0 2024    CREATSERUM 7.02 (H) 2024    BUN 45 (H) 2024     2024    K 3.1 (L) 2024      01/04/2024    CO2 23.0 01/04/2024     (H) 01/04/2024    CA 8.1 (L) 01/04/2024    ALB 3.6 01/04/2024    ALKPHO 58 01/04/2024    BILT 0.3 01/04/2024    TP 6.4 01/04/2024    AST 15 01/04/2024    ALT <7 (L) 01/04/2024    PTT 31.8 02/17/2017    TSH 2.910 02/21/2022    LIP 69 12/30/2023    DDIMER 2.89 (H) 01/03/2024    MG 2.1 01/04/2024    PHOS 5.0 01/04/2024    TROP <0.045 12/03/2021    TROPHS 18 01/03/2024       IR TUNNELED CV CATH INSERTION EXCHANGE CHECK    Result Date: 1/3/2024  CONCLUSION: Placement of tunneled dialysis venous catheter via right internal jugular vein.    Dictated by (CST): Rob Oliva MD on 1/03/2024 at 6:03 PM     Finalized by (CST): Rob Oliva MD on 1/03/2024 at 6:05 PM          CT CHEST PE AORTA (IV ONLY) (CPT=71260)    Result Date: 1/3/2024  CONCLUSION:  1. No evidence of acute pulmonary embolism to the level of the first order subsegmental pulmonary artery branches.  2. Cardiomegaly and pulmonary interstitial edema, concerning for cardiac insufficiency.   3. Small bilateral pleural effusions and associated compressive atelectasis, with or without superimposed infectious process.  4. Intraperitoneal ascites is partially delineated.  5. Dilatation of the main pulmonary artery trunk may relate to underlying pulmonary hypertension.  6. Lesser incidental findings as above.     Dictated by (CST): Natanael Burnette MD on 1/03/2024 at 10:55 AM     Finalized by (CST): Natanael Burnette MD on 1/03/2024 at 11:00 AM          XR CHEST AP PORTABLE  (CPT=71045)    Result Date: 1/3/2024  CONCLUSION:  Stable chest demonstrating cardiomegaly and minimal basilar atelectasis without radiographically evident acute intrathoracic process.    Dictated by (CST): Natanael Burnette MD on 1/03/2024 at 7:45 AM     Finalized by (CST): Natanael Burnette MD on 1/03/2024 at 7:46 AM         EKG    Result Date: 1/3/2024  Normal sinus rhythm Incomplete right bundle branch block T wave abnormality, consider lateral  ischemia Prolonged QT interval or tu fusion, consider myocardial disease, electrolyte imbalance, or drug effects Abnormal ECG No previous ECGs found in [facility] Confirmed by Huber Hinton ([id number]) on 1/3/2024 7:52:49 AM     Assessment & Plan:       1.       Chronic kidney disease stage 5.  Transforming to end-stage renal disease with severe symptoms.  Requires dialysis as per Dr. Aponte. 3 sessions in house and dialysis set up  2.       Hypertension.  Continue medications.  3.       Hyperlipidemia.  Continue medications.  4.       Allergies.  Will avoid triggers here.  5.       Code status is Full Code.  Discussed with patient during this hospitalization.  6.      Metabolic acidosis from renal failure     Complex mdm; coordinating care with nurse/dr aponte and counseling pt about criteria dc        JE CHIANG MD

## 2024-01-04 NOTE — CDS QUERY
CLINICAL DOCUMENTATION CLARIFICATION FORM  Dear Doctor Estrada:  Clinical information (provided below) indicates impaired gas exchange. PLEASE CLARIFY IN YOUR PROGRESS NOTE;  IF YOUR PT IS BEING TREATED FOR:      ( x )  Acute Hypoxic Respiratory Failure    (  ) Other (please specify): ____________________________    Clinical Indicators-  Hypoxia/SOB  O2 sat 89% on room air       Risk Factors-  32yo AA female with ESRD/fl ovrload    Treatment-  Oxygen  Hemodialysis initiated              If you have any questions, please contact Clinical : Lilia Mckinney RN CDS  at [email or web address]    Thank You!    THIS FORM IS A PERMANENT PART OF THE MEDICAL RECORD

## 2024-01-04 NOTE — PROGRESS NOTES
NEPHROLOGY DAILY PROGRESS NOTE       SUBJECTIVE:  Tolerated first session of dialysis yesterday. 0.5L removed   Had some oozing from RIJ tunneled HD cath yesterday    Breathing is better today      OBJECTIVE:    Total Intake/Output:    Intake/Output Summary (Last 24 hours) at 1/4/2024 0906  Last data filed at 1/4/2024 0000  Gross per 24 hour   Intake 250 ml   Output 500 ml   Net -250 ml         PHYSICAL EXAM:  /64 (BP Location: Left arm)   Pulse 80   Temp 98 °F (36.7 °C) (Oral)   Resp 18   Ht 5' 5\" (1.651 m)   Wt 225 lb (102.1 kg)   LMP 12/15/2023   SpO2 91%   BMI 37.44 kg/m²   GEN:  NAD  HEENT: NCAT    CHEST: crackles at the bases   CARDIAC: S1S2 normal  EXT: no lower ext edema  NEURO: awake, alert  SKIN: warm, dry  DIALYSIS ACCESS: RIJ tunneled HD cath (1/3)          CURRENT MEDICATIONS:  Current Facility-Administered Medications   Medication Dose Route Frequency    hydrALAzine (Apresoline) 20 mg/mL injection 10 mg  10 mg Intravenous Q4H PRN    diphenhydrAMINE (Benadryl) 50 mg/mL  injection 50 mg  50 mg Intravenous Once    sodium chloride 0.9 % IV bolus 100 mL  100 mL Intravenous Q30 Min PRN    And    albumin human (Albumin) 25% injection 25 g  25 g Intravenous PRN Dialysis    heparin sodium lock flush 100 UNIT/ML injection 150 Units  1.5 mL Intravenous Once    carvedilol (Coreg) tab 25 mg  25 mg Oral BID    atorvastatin (Lipitor) tab 40 mg  40 mg Oral Nightly    hydrALAZINE (Apresoline) tab 50 mg  50 mg Oral Q8H MANISHA    NIFEdipine ER (Procardia-XL) 24 hr tab 90 mg  90 mg Oral Nightly    sodium bicarbonate tab 650 mg  650 mg Oral TID    heparin (Porcine) 5000 UNIT/ML injection 5,000 Units  5,000 Units Subcutaneous Q8H MANISHA    acetaminophen (Tylenol Extra Strength) tab 500 mg  500 mg Oral Q6H PRN    melatonin tab 3 mg  3 mg Oral Nightly PRN    acetaminophen (Tylenol) tab 650 mg  650 mg Oral Q4H PRN    Or    HYDROcodone-acetaminophen (Norco) 5-325 MG per tab 1 tablet  1 tablet Oral Q4H PRN    Or     HYDROcodone-acetaminophen (Norco) 5-325 MG per tab 2 tablet  2 tablet Oral Q4H PRN    polyethylene glycol (PEG 3350) (Miralax) 17 g oral packet 17 g  17 g Oral Daily PRN    sennosides (Senokot) tab 17.2 mg  17.2 mg Oral Nightly PRN    bisacodyl (Dulcolax) 10 MG rectal suppository 10 mg  10 mg Rectal Daily PRN    ondansetron (Zofran) 4 MG/2ML injection 4 mg  4 mg Intravenous Q6H PRN    prochlorperazine (Compazine) 10 MG/2ML injection 5 mg  5 mg Intravenous Q8H PRN    glycerin-hypromellose- (Artifical Tears) 0.2-0.2-1 % ophthalmic solution 1 drop  1 drop Both Eyes QID PRN    sodium chloride (Saline Mist) 0.65 % nasal solution 1 spray  1 spray Each Nare Q3H PRN       LABS:  Patient Labs Reviewed in Detail. Pertinent Labs as follows:  Recent Labs   Lab 01/01/24  0602 01/03/24  0711 01/04/24  0545   GLU 85 115* 102*   BUN 63* 66* 45*   CREATSERUM 8.66* 8.86* 7.02*   EGFRCR 6* 6* 7*   CA 7.6* 8.4* 8.1*    141 141   K 3.0* 3.1* 3.1*   * 110 107   CO2 20.0* 18.0* 23.0     Recent Labs   Lab 01/01/24  0602 01/03/24  0712 01/04/24  0545   RBC 2.76* 3.53* 2.97*   HGB 7.8* 9.7* 8.5*   HCT 22.5* 28.1* 23.9*   MCV 81.5 79.6* 80.5   MCH 28.3 27.5 28.6   MCHC 34.7 34.5 35.6   RDW 14.6 14.6 14.6   NEPRELIM 3.62 7.94* 4.75   WBC 6.8 11.0 6.9   .0 337.0 261.0           IMAGING:  IR TUNNELED CV CATH INSERTION EXCHANGE CHECK    Result Date: 1/3/2024  CONCLUSION: Placement of tunneled dialysis venous catheter via right internal jugular vein.    Dictated by (CST): Rob Oliva MD on 1/03/2024 at 6:03 PM     Finalized by (CST): Rob Oliva MD on 1/03/2024 at 6:05 PM          CT CHEST PE AORTA (IV ONLY) (CPT=71260)    Result Date: 1/3/2024  CONCLUSION:  1. No evidence of acute pulmonary embolism to the level of the first order subsegmental pulmonary artery branches.  2. Cardiomegaly and pulmonary interstitial edema, concerning for cardiac insufficiency.   3. Small bilateral pleural effusions and  associated compressive atelectasis, with or without superimposed infectious process.  4. Intraperitoneal ascites is partially delineated.  5. Dilatation of the main pulmonary artery trunk may relate to underlying pulmonary hypertension.  6. Lesser incidental findings as above.     Dictated by (CST): Natanael Burnette MD on 1/03/2024 at 10:55 AM     Finalized by (CST): Natanael Burnette MD on 1/03/2024 at 11:00 AM          XR CHEST AP PORTABLE  (CPT=71045)    Result Date: 1/3/2024  CONCLUSION:  Stable chest demonstrating cardiomegaly and minimal basilar atelectasis without radiographically evident acute intrathoracic process.    Dictated by (CST): Natanael Burnette MD on 1/03/2024 at 7:45 AM     Finalized by (CST): Natanael Burnette MD on 1/03/2024 at 7:46 AM            ASSESSMENT AND PLAN:   This is a 33 year old female with PMH sig for  PCOS, HTN, HLD, DM2, gastric sleeve surgery, and CKD stage 5 with known nephrotic range proteinuria. She is followed by Dr. Jenkins of [facility].  Presents with hypoxia. Nephrology is consulted for CKD stage 5.      CKD stage 5 - now progressed to ESRD   - sCr 8.86 with eGFR of 6.  - initiated on dialysis on 1/3 via RIJ tunneled HD cath.   - Having oozing from tunneled HD cath. IR to assess today    - 2nd session of dialysis today, anticipate 3rd session of dialysis tomorrow    - SW to help outpatient dialysis placement.   - primary nephrologist:  Dr. Jenkins of [facility]      Hypokalemia   - K remains low   - 40 meq of KCl ordered   - 4 K bath with dialysis today   - follow K level      Metabolic acidosis   - improved with dialysis   - stop sodium bicarb tablets as patient now on dialysis      Hypoxia secondary to pulm edema, b/l pleural effusions   - CTA neg for PE   - continue with fluid removal as tolerated with dialysis   - now on RA    - further per primary      Anemia   - trend Hb, blood transfusions per primary   - avoiding IV iron given recent infection     Dw Dr. Estrada and  RN    We will continue to follow Liu Aponte MD  [place] - Ne[facility]

## 2024-01-05 LAB
ANION GAP SERPL CALC-SCNC: 6 MMOL/L (ref 0–18)
BASOPHILS # BLD AUTO: 0.04 X10(3) UL (ref 0–0.2)
BASOPHILS NFR BLD AUTO: 0.5 %
BILIRUB UR QL: NEGATIVE
BUN BLD-MCNC: 30 MG/DL (ref 9–23)
BUN/CREAT SERPL: 6.4 (ref 10–20)
CALCIUM BLD-MCNC: 8.1 MG/DL (ref 8.7–10.4)
CHLORIDE SERPL-SCNC: 106 MMOL/L (ref 98–112)
CLARITY UR: CLEAR
CO2 SERPL-SCNC: 28 MMOL/L (ref 21–32)
COLOR UR: COLORLESS
CREAT BLD-MCNC: 4.71 MG/DL
DEPRECATED RDW RBC AUTO: 41.9 FL (ref 35.1–46.3)
EGFRCR SERPLBLD CKD-EPI 2021: 12 ML/MIN/1.73M2 (ref 60–?)
EOSINOPHIL # BLD AUTO: 0.21 X10(3) UL (ref 0–0.7)
EOSINOPHIL NFR BLD AUTO: 2.4 %
ERYTHROCYTE [DISTWIDTH] IN BLOOD BY AUTOMATED COUNT: 14.3 % (ref 11–15)
GLUCOSE BLD-MCNC: 112 MG/DL (ref 70–99)
GLUCOSE BLDC GLUCOMTR-MCNC: 115 MG/DL (ref 70–99)
GLUCOSE BLDC GLUCOMTR-MCNC: 119 MG/DL (ref 70–99)
GLUCOSE BLDC GLUCOMTR-MCNC: 90 MG/DL (ref 70–99)
GLUCOSE UR-MCNC: 70 MG/DL
HCT VFR BLD AUTO: 24.4 %
HGB BLD-MCNC: 8.6 G/DL
IMM GRANULOCYTES # BLD AUTO: 0.03 X10(3) UL (ref 0–1)
IMM GRANULOCYTES NFR BLD: 0.3 %
KETONES UR-MCNC: NEGATIVE MG/DL
LEUKOCYTE ESTERASE UR QL STRIP.AUTO: 75
LYMPHOCYTES # BLD AUTO: 1.96 X10(3) UL (ref 1–4)
LYMPHOCYTES NFR BLD AUTO: 22.1 %
MAGNESIUM SERPL-MCNC: 2 MG/DL (ref 1.6–2.6)
MCH RBC QN AUTO: 28.7 PG (ref 26–34)
MCHC RBC AUTO-ENTMCNC: 35.2 G/DL (ref 31–37)
MCV RBC AUTO: 81.3 FL
MONOCYTES # BLD AUTO: 0.65 X10(3) UL (ref 0.1–1)
MONOCYTES NFR BLD AUTO: 7.3 %
NEUTROPHILS # BLD AUTO: 5.99 X10 (3) UL (ref 1.5–7.7)
NEUTROPHILS # BLD AUTO: 5.99 X10(3) UL (ref 1.5–7.7)
NEUTROPHILS NFR BLD AUTO: 67.4 %
NITRITE UR QL STRIP.AUTO: NEGATIVE
OSMOLALITY SERPL CALC.SUM OF ELEC: 297 MOSM/KG (ref 275–295)
PH UR: 7.5 [PH] (ref 5–8)
PHOSPHATE SERPL-MCNC: 2.5 MG/DL (ref 2.4–5.1)
PLATELET # BLD AUTO: 263 10(3)UL (ref 150–450)
POTASSIUM SERPL-SCNC: 3.5 MMOL/L (ref 3.5–5.1)
PROT UR-MCNC: 300 MG/DL
RBC # BLD AUTO: 3 X10(6)UL
SODIUM SERPL-SCNC: 140 MMOL/L (ref 136–145)
SP GR UR STRIP: 1.01 (ref 1–1.03)
UROBILINOGEN UR STRIP-ACNC: NORMAL
WBC # BLD AUTO: 8.9 X10(3) UL (ref 4–11)

## 2024-01-05 PROCEDURE — 99233 SBSQ HOSP IP/OBS HIGH 50: CPT | Performed by: HOSPITALIST

## 2024-01-05 RX ORDER — HEPARIN SODIUM 1000 [USP'U]/ML
INJECTION, SOLUTION INTRAVENOUS; SUBCUTANEOUS
Status: COMPLETED
Start: 2024-01-05 | End: 2024-01-05

## 2024-01-05 RX ORDER — HEPARIN SODIUM 1000 [USP'U]/ML
1.5 INJECTION, SOLUTION INTRAVENOUS; SUBCUTANEOUS
Status: DISCONTINUED | OUTPATIENT
Start: 2024-01-05 | End: 2024-01-06

## 2024-01-05 NOTE — DISCHARGE INSTRUCTIONS
[facility]  Dr. Karla Cleveland  APPT/CHAIR TIME 2PM Tues/Thurs/Saturday  [address], [place] [address]   [phone]    Ok to go home if nausea resolves and she can eat small lunch  Ok to drive home  Please give her method to cover catheter for shower  Labs in dialysis  Fu preferred dialysis  center and md  Home catheter care     Medication List        START taking these medications      acetaminophen 500 MG Tabs  Commonly known as: Tylenol Extra Strength     ondansetron 4 MG Tbdp  Commonly known as: Zofran-ODT  Take 1 tablet (4 mg total) by mouth every 6 (six) hours as needed. Watch severe muscle stiffness/fever     Sennosides 17.2 MG Tabs  Take 1 tablet (17.2 mg total) by mouth nightly as needed (constipation, as needed if no bowel movement that day).            CHANGE how you take these medications      HYDROcodone-acetaminophen 5-325 MG Tabs  Commonly known as: Norco  Take 1 tablet by mouth every 6 (six) hours as needed for Pain. Watch drowsiness no alcohol  What changed:   when to take this  reasons to take this  additional instructions            CONTINUE taking these medications      atorvastatin 40 MG Tabs  Commonly known as: Lipitor  Take 1 tablet (40 mg total) by mouth nightly.     carvedilol 25 MG Tabs  Commonly known as: Coreg     EPINEPHrine 0.3 MG/0.3ML Soaj  Commonly known as: EpiPen     hydrALAZINE 50 MG Tabs  Commonly known as: Apresoline  Take 1 tablet (50 mg total) by mouth every 8 (eight) hours.     NIFEdipine ER 90 MG Tb24  Commonly known as: ADALAT CC            STOP taking these medications      cefadroxil 500 MG Caps  Commonly known as: DURICEF     sodium bicarbonate 650 MG Tabs               Where to Get Your Medications        These medications were sent to [facility] [address] [place], [place] - [address] [phone], [phone] [address] [address]      Phone: [phone]   HYDROcodone-acetaminophen 5-325 MG Tabs  ondansetron 4  MG Tbdp  Sennosides 17.2 MG Tabs

## 2024-01-05 NOTE — PROGRESS NOTES
NEPHROLOGY DAILY PROGRESS NOTE       SUBJECTIVE:  Completed 2nd session of dialysis ~ 5AM today   Tolerating dialysis ok    No SOB      OBJECTIVE:    Total Intake/Output:    Intake/Output Summary (Last 24 hours) at 1/5/2024 1049  Last data filed at 1/5/2024 0207  Gross per 24 hour   Intake 240 ml   Output --   Net 240 ml         PHYSICAL EXAM:  /82 (BP Location: Right arm)   Pulse 92   Temp 98.7 °F (37.1 °C) (Oral)   Resp 16   Ht 5' 5\" (1.651 m)   Wt 225 lb (102.1 kg)   LMP 12/15/2023   SpO2 95%   BMI 37.44 kg/m²   GEN:  NAD  HEENT: NCAT    CHEST: CTAB  CARDIAC: S1S2 normal  EXT: no lower ext edema  NEURO: awake, alert  SKIN: warm, dry  DIALYSIS ACCESS: RIJ tunneled HD cath (1/3)          CURRENT MEDICATIONS:     diphenhydrAMINE  50 mg Intravenous Once    heparin sodium lock flush  1.5 mL Intravenous Once    carvedilol  25 mg Oral BID    atorvastatin  40 mg Oral Nightly    hydrALAZINE  50 mg Oral Q8H MANISHA    NIFEdipine ER  90 mg Oral Nightly    heparin  5,000 Units Subcutaneous Q8H MANISHA         LABS:  Patient Labs Reviewed in Detail. Pertinent Labs as follows:  Recent Labs   Lab 01/03/24  0711 01/04/24  0545 01/05/24  0650   * 102* 112*   BUN 66* 45* 30*   CREATSERUM 8.86* 7.02* 4.71*   EGFRCR 6* 7* 12*   CA 8.4* 8.1* 8.1*    141 140   K 3.1* 3.1* 3.5    107 106   CO2 18.0* 23.0 28.0     Recent Labs   Lab 01/03/24  0712 01/04/24  0545 01/05/24  0526   RBC 3.53* 2.97* 3.00*   HGB 9.7* 8.5* 8.6*   HCT 28.1* 23.9* 24.4*   MCV 79.6* 80.5 81.3   MCH 27.5 28.6 28.7   MCHC 34.5 35.6 35.2   RDW 14.6 14.6 14.3   NEPRELIM 7.94* 4.75 5.99   WBC 11.0 6.9 8.9   .0 261.0 263.0           IMAGING:  IR TUNNELED CV CATH INSERTION EXCHANGE CHECK    Result Date: 1/3/2024  CONCLUSION: Placement of tunneled dialysis venous catheter via right internal jugular vein.    Dictated by (CST): Rob Oliva MD on 1/03/2024 at 6:03 PM     Finalized by (CST): Rob Oliva MD on 1/03/2024 at 6:05  PM            ASSESSMENT AND PLAN:   This is a 33 year old female with PMH sig for  PCOS, HTN, HLD, DM2, gastric sleeve surgery, and CKD stage 5 with known nephrotic range proteinuria. She is followed by Dr. Jenkins of [facility].  Presents with hypoxia. Nephrology is consulted for CKD stage 5.      CKD stage 5 - now progressed to ESRD   - sCr 8.86 with eGFR of 6.  - initiated on dialysis on 1/3 via RIJ tunneled HD cath.   - 2nd session of dialysis 1/5  - 3rd session of dialysis planned for tomorrow 1/6   - SW to help outpatient dialysis placement.   - primary nephrologist:  Dr. Jenkins of [facility]      Hypokalemia   - improved      Metabolic acidosis   - improved with dialysis   - stop sodium bicarb tablets as patient now on dialysis      Hypoxia secondary to pulm edema, b/l pleural effusions   - CTA neg for PE   - continue with fluid removal as tolerated with dialysis   - now on RA    - further per primary      Anemia   - trend Hb, blood transfusions per primary   - avoiding IV iron given recent infection     Dispo: potential discharge tomorrow after 3rd session of dialysis, if outpatient dialysis arranged.      Ian Estrada and RN    We will continue to follow Liu Aponte MD  [facility] - [facility]

## 2024-01-05 NOTE — PAYOR COMM NOTE
--------------  ADMISSION REVIEW     Payor: [employer]ARY KATZ  Subscriber #:  [id number]  Authorization Number: [id number]    Admit date: 1/3/24  Admit time:  3:37 PM       REVIEW DOCUMENTATION:       HPI    Patient has  history of renal failure/CHF  complains of shortness of breath that has gotten worse since being discharged form hospital.  Orthopnea.  Cannot lay flat.  Can barely walk to bathroom without being short of breath.    no fever or chills.  +  swelling.  + orthopnea.  no recent long car ride or plane ride.   .  Symptoms worse with exertion/laying flat.    Symptoms improved with sitting up.          Physical Exam     ED Triage Vitals [01/03/24 0646]   /89   Pulse 92   Resp 26   Temp 97.9 °F (36.6 °C)   Temp src Oral   SpO2 (!) 89 %   O2 Device None (Room air)       Current:/89   Pulse 92   Temp 97.9 °F (36.6 °C) (Oral)   Resp 26   Ht 165.1 cm (5' 5\")   Wt 99.8 kg   LMP 12/15/2023   SpO2 (!) 89%   BMI 36.61 kg/m²   PULSE OX hypoxic 89% room air  GENERAL: awake alert conversational dyspnea  HEAD: normocephalic, atraumatic,   EYES: PERRLA, EOMI,  THROAT: mm dry, no lesions  NECK: supple, no meningeal signs  LUNGS:basilar rales  CARDIO: rr   GI: abdomen is soft and non tender, no masses, nl bowel sounds   EXTREMITIES: from, 5/5 strength in all 4 ext,pedal edema  NEURO: alert and oiented *3, 2-12 intact, no focal deficit noted  SKIN: good skin turgor, no  rashes  PSYCH: calm, cooperative,    Differential includes: renal failure vs. pneumonia vs. CHF vs. bronchitis vs. PE  EKG    Rate, intervals and axes as noted on EKG Report.  Rate: 93  Rhythm: Sinus Rhythm  Reading: nsr with incomplete rbbb non spec st changes      MDM     Monitor Interpretation:  Nsr 78    Radiology Interpretation:  CT CHEST PE AORTA (IV ONLY) (CPT=71260)    Result Date: 1/3/2024  CONCLUSION:  1. No evidence of acute pulmonary embolism to the level of the first order subsegmental pulmonary artery branches.  2. Cardiomegaly and  pulmonary interstitial edema, concerning for cardiac insufficiency.   3. Small bilateral pleural effusions and associated compressive atelectasis, with or without superimposed infectious process.  4. Intraperitoneal ascites is partially delineated.  5. Dilatation of the main pulmonary artery trunk may relate to underlying pulmonary hypertension.  6. Lesser incidental findings as above.     Dictated by (CST): Natanael Burnette MD on 1/03/2024 at 10:55 AM     Finalized by (CST): Natanael Burnette MD on 1/03/2024 at 11:00 AM          XR CHEST AP PORTABLE  (CPT=71045)    Result Date: 1/3/2024  CONCLUSION:  Stable chest demonstrating cardiomegaly and minimal basilar atelectasis without radiographically evident acute intrathoracic process.    Dictated by (CST): Natanael Burnette MD on 1/03/2024 at 7:45 AM     Finalized by (CST): Natanael Burnette MD on 1/03/2024 at 7:46 AM             I reviewed xray noted no infiltrates no pneumothorax          MDM            Medical Decision Making  Problems Addressed:  Acute renal failure superimposed on chronic kidney disease, unspecified acute renal failure type, unspecified CKD stage  (HCC): acute illness or injury that poses a threat to life or bodily functions  Heart failure, unspecified HF chronicity, unspecified heart failure type (HCC): chronic illness or injury with exacerbation, progression, or side effects of treatment  Hypoxia: acute illness or injury that poses a threat to life or bodily functions    Amount and/or Complexity of Data Reviewed  Labs: ordered. Decision-making details documented in ED Course.  Radiology: ordered and independent interpretation performed. Decision-making details documented in ED Course.  ECG/medicine tests: ordered and independent interpretation performed. Decision-making details documented in ED Course.  Discussion of management or test interpretation with external provider(s): Spoke with renal request dialysis catheter, to initiate dialysis.  Spoke  with IR to arrange tunneled catheter.  Spoke with Dr. Chen hospitalist accepting admission.    I spent a total of 35 minutes of critical care time in obtaining history, performing a physical exam, bedside monitoring of interventions, collecting and interpreting tests and discussion with consultants but not including time spent performing procedures.          Disposition and Plan     Clinical Impression:  1. Hypoxia    2. Acute renal failure superimposed on chronic kidney disease, unspecified acute renal failure type, unspecified CKD stage  (HCC)    3. Heart failure, unspecified HF chronicity, unspecified heart failure type (HCC)         Disposition and Plan     Clinical Impression:  1. Hypoxia    2. Acute renal failure superimposed on chronic kidney disease, unspecified acute renal failure type, unspecified CKD stage  (HCC)    3. Heart failure, unspecified HF chronicity, unspecified heart failure type (HCC)        HISTORY AND PHYSICAL EXAMINATION    YESENIA ADAME [id number]   YOB: 1990 [id number]         [facility]    PATIENT'S NAME: YESENIA ADAME   ATTENDING PHYSICIAN: Walter Estrada MD   PATIENT ACCOUNT#:   [id number]    LOCATION:  [address] [place] [address]  MEDICAL RECORD #:   [id number]       YOB: 1990  ADMISSION DATE:       01/03/2024    HISTORY AND PHYSICAL EXAMINATION    Complex medical decision making, coordinating care with IR with Dr. Son, reviewing CT chest images and EKG tracings, interviewing the patient and admitting her to the hospital.     CHIEF COMPLAINT:  Worsening shortness of breath and orthopnea.      ADMITTING DIAGNOSIS:  Chronic renal failure that has deteriorated with fluid overload, requiring initiation of dialysis.    HISTORY OF PRESENT ILLNESS:  This is a very pleasant 33-year-old  female who presents with a history of having recently been discharged from the hospital, having come in for CHF.  She can barely walk around and  has to sleep sitting straight up.  She was sent to the hospital by Dr. Aponte for placement of catheter and initiation of dialysis.  She was found to also have a D-dimer positive and a CAT scan was performed which showed no pulmonary embolism.  The patient was very concerned about the catheter placement, and I assured her that our group is extremely skilled in this field.    PAST MEDICAL HISTORY:  Significant for anxiety, arrhythmias, depression, diabetes, reflux disease, axillary hidradenitis, hypertension, hyperlipidemia, migraines, polycystic ovarian syndrome, chronic kidney disease stage 5, appendectomy, excision of hidradenitis, and also appendectomy.    MEDICATIONS:  At home are the following.  Norco 5/325 one tablet every 4 hours as needed, hydralazine 50 mg every 8 hours as needed, sodium bicarbonate 650 mg twice a day, Coreg 25 mg twice a day, nifedipine ER 90 mg nightly, atorvastatin 40 mg nightly, cefadroxil 500 mg daily for 3 days, and EpiPen.    PHYSICAL EXAMINATION:    GENERAL:  Well-nourished, well-developed, friendly, obese,  female in mild respiratory distress.    VITAL SIGNS:  Temperature is 98.1, pulse 85, respiratory rate 18, blood pressure is 166/90, 95%.  HEENT:  She is normocephalic, atraumatic.  Anicteric.  Oropharynx is moist.  NECK:  Supple.  LUNGS:  Crackles in both bases.   HEART:  Normal S1, S2.  No S3.  There is an S4.  ABDOMEN:  Soft and nontender.  EXTREMITIES:  Mild edema without cyanosis.  VASCULAR:  Dorsalis pedis pulses 1+ bilaterally.  NEUROLOGIC:  She is alert, oriented x3 with no focal neurologic deficits, 5/5 motor strength in her upper extremities.  She can lift both lower extremities off the bed.  Reflexes are 1+ at the elbows and knees.  Toes are downgoing.  SKIN:  She has tattoos that she states are from licensed parlors.  No obvious rashes.  PSYCHIATRIC:  She is anxious and depressed about starting dialysis and having a catheter put in, but it is  appropriate for the situation.    LYMPHATICS:  I do not palpate submandibular lymphadenopathy.  MUSCULOSKELETAL:  No joint deformities or injuries.    LABORATORY DATA:  The sodium is 141, potassium 3.1, chloride 110, CO2 of 18, BUN 66, creatinine 8.86, and the anion gap is 13.  D-dimer is 2.89.  Hepatitis B surface antibody is nonreactive.  White count is 11,000; hemoglobin 9.7; platelets are 337,000.    ASSESSMENT AND PLAN:    1.   Chronic kidney disease stage 5.  Transforming to end-stage renal disease with severe symptoms.  Requires dialysis as per Dr. Aponte.  2.   Hypertension.  Continue medications.  3.   Hyperlipidemia.  Continue medications.  4.   Allergies.  Will avoid triggers here.  5.   Code status is Full Code.  Discussed with patient during this hospitalization.  6.      Metabolic acidosis from renal failure    Dictated By Walter Estrada MD  d: 01/03/2024 18:54:27  t: 01/03/2024 19:07:49  Job [id number]  LAS/        1/3 NEPHROLOGY:    ASSESSMENT AND PLAN:   This is a 33 year old female with PMH sig for  PCOS, HTN, HLD, DM2, gastric sleeve surgery, and CKD stage 5 with known nephrotic range proteinuria. She is followed by Dr. Jenkins of [facility].  Presents with hypoxia. Nephrology is consulted for CKD stage 5.      CKD stage 5 - now progressed to ESRD   - sCr 8.86 with eGFR of 6.    - Discussed with patient that it is only a matter of time when patient will need dialysis. Given new hypoxia,  elevated BNP, concern for fluid overload -  I recommended that dialysis be initiated for clearance and fluid removal.     - Patient is agreeable to dialysis.  Appreciate IR help with tunneled line placement.    - plan for 2 hour session of dialysis today. Anticipate will need 3 consecutive sessions of dialysis in a row.   - SW to help outpatient dialysis placement.   - primary nephrologist:  Dr. Jenkins of [facility]      Hypokalemia   - 4 K bath with dialysis today   - follow K level       Metabolic acidosis   - should improve with dialysis   - can stop sodium bicarbonate tablets, now that patient to be started on dialysis.      Hypoxia   - cardiomegaly noted on CXR, BNP elevated - concern for fluid overload.   - D-dimer positive, planning for CTA to r/o PE.  Ok for IV contrast as patient to be initiated on dialysis.   - further per primary      Anemia   - trend Hb, blood transfusions per primary   - avoiding IV iron given recent infection      Dw ED physician - Dr. Son       Thank you for the consult and allowing us to participate in the care of Liu Walker.  We will continue to follow.     Irma Aponte MD            Interventional Radiology  Brief Post-Procedure Note     Procedure(s): tunneled hemodialysis catheter placement     Indication: CKD stage 5, initiation of dialysis     (s): Hazel     Anesthesia: Sedation     Findings:    -right internal jugular vein access  -19 cm tip to cuff  -terminates in right atrium     Blood loss: <5 mL                            Complications: None     Plan: ready for use     Please refer to full dictation under the \"Imaging\" tab in Epic.     Rob Oliva MD  1/3/2024  Interventional Radiology  [facility]         1/4 HOSPITALIST:      Chief Complaint: pain inserion site        Subjective:      Constitutional:  Negative for appetite change and chills.   HENT:  Positive for congestion.    Respiratory:  Negative for cough, shortness of breath and wheezing.    Cardiovascular:  Positive for chest pain.        Chest wall   Genitourinary:  Negative for dyspareunia.   Musculoskeletal:  Negative for joint swelling and joint pain.   Neurological:  Positive for weakness.   Psychiatric/Behavioral:  Positive for sleep disturbance. Negative for confusion and decreased concentration. The patient is nervous/anxious.                Objective:   Blood pressure 158/85, pulse 80, temperature 98.5 °F (36.9 °C), temperature source Oral, resp.  rate 18, height 5' 5\" (1.651 m), weight 225 lb (102.1 kg), last menstrual period 12/15/2023, SpO2 96%, not currently breastfeeding.  Physical Exam  Vitals and nursing note reviewed.   Constitutional:       Appearance: She is obese.   HENT:      Head: Normocephalic and atraumatic.   Cardiovascular:      Pulses: Normal pulses.   Pulmonary:      Breath sounds: Rhonchi present.   Abdominal:      General: Bowel sounds are normal.      Palpations: Abdomen is soft.   Skin:     General: Skin is warm and dry.      Capillary Refill: Capillary refill takes less than 2 seconds.   Neurological:      Mental Status: She is alert and oriented to person, place, and time.   Psychiatric:         Behavior: Behavior normal.         Judgment: Judgment normal.                  Results:         Lab Results   Component Value Date     WBC 6.9 01/04/2024     HGB 8.5 (L) 01/04/2024     HCT 23.9 (L) 01/04/2024     .0 01/04/2024     CREATSERUM 7.02 (H) 01/04/2024     BUN 45 (H) 01/04/2024      01/04/2024     K 3.1 (L) 01/04/2024      01/04/2024     CO2 23.0 01/04/2024      (H) 01/04/2024     CA 8.1 (L) 01/04/2024     ALB 3.6 01/04/2024     ALKPHO 58 01/04/2024     BILT 0.3 01/04/2024     TP 6.4 01/04/2024     AST 15 01/04/2024     ALT <7 (L) 01/04/2024     PTT 31.8 02/17/2017     TSH 2.910        Assessment & Plan:   1.       Chronic kidney disease stage 5.  Transforming to end-stage renal disease with severe symptoms.  Requires dialysis as per Dr. Aponte. 3 sessions in house and dialysis set up  2.       Hypertension.  Continue medications.  3.       Hyperlipidemia.  Continue medications.  4.       Allergies.  Will avoid triggers here.  5.       Code status is Full Code.  Discussed with patient during this hospitalization.  6.      Metabolic acidosis from renal failure     Complex mdm; coordinating care with nurse/dr aponte and counseling pt about criteria quinn CHIANG MD    MEDICATIONS  ADMINISTERED IN LAST 1 DAY:  atorvastatin (Lipitor) tab 40 mg       Date Action Dose Route User    1/4/2024 2215 Given 40 mg Oral Liza Alfaro RN          carvedilol (Coreg) tab 25 mg       Date Action Dose Route User    1/5/2024 0130 Given 25 mg Oral Liza Alfaro RN    1/4/2024 0928 Given 25 mg Oral Gisella Whyte RN          heparin (Porcine) 1000 UNIT/ML injection       Date Action Dose Route User    1/5/2024 0345 Given by Other (none) (none) Liza Alfaro RN          hydrALAZINE (Apresoline) tab 50 mg       Date Action Dose Route User    1/5/2024 0131 Given 50 mg Oral Liza Alfaro RN    1/4/2024 1354 Given 50 mg Oral Gisella Whyte RN          HYDROcodone-acetaminophen (Norco) 5-325 MG per tab 2 tablet       Date Action Dose Route User    1/4/2024 2215 Given 2 tablet Oral Liza Alfaro RN          NIFEdipine ER (Procardia-XL) 24 hr tab 90 mg       Date Action Dose Route User    1/5/2024 0131 Given 90 mg Oral Liza Alfaro RN          potassium chloride (K-Dur) tab 40 mEq       Date Action Dose Route User    1/4/2024 0928 Given 40 mEq Oral Gisella Whyte RN            Vitals (last day)       Date/Time Temp Pulse Resp BP SpO2 Weight O2 Device O2 Flow Rate (L/min) [facility]    01/05/24 0131 -- 85 -- 181/118 -- -- -- --     01/05/24 0020 97.6 °F (36.4 °C) 83 16 175/95 94 % -- None (Room air) --     01/04/24 2212 -- 85 -- -- -- -- -- --     01/04/24 1351 98.5 °F (36.9 °C) -- 18 158/85 96 % -- None (Room air) --     01/04/24 0925 98.3 °F (36.8 °C) -- 18 139/79 90 % -- None (Room air) --     01/04/24 0609 98 °F (36.7 °C) 80 18 113/64 91 % -- None (Room air) -- RP

## 2024-01-05 NOTE — PLAN OF CARE
No acute changes in the patient's status overnight. Patient's sister had concerns about the patient being dialyzed again tonight after she had just been dialyzed yesterday. I read the orders to the patient and her sister and read them Dr. Aponte's notes. I explained that the amount to be removed is 0 to 1L and that if the patient is cramping to talk to the dialysis RN. When the dialysis nurse came to the room she spent time educating the patient on the process of hemodialysis and listened to the patient's concerns. Patient also wanted her dressing changed, which the dialysis nurse did. Education about hemodialysis was reinforced by the RN at the bedside.  Problem: Patient Centered Care  Goal: Patient preferences are identified and integrated in the patient's plan of care  Description: Interventions:  - What would you like us to know as we care for you? Lives alone   - Provide timely, complete, and accurate information to patient/family  - Incorporate patient and family knowledge, values, beliefs, and cultural backgrounds into the planning and delivery of care  - Encourage patient/family to participate in care and decision-making at the level they choose  - Honor patient and family perspectives and choices  Outcome: Progressing     Problem: RESPIRATORY - ADULT  Goal: Achieves optimal ventilation and oxygenation  Description: INTERVENTIONS:  - Assess for changes in respiratory status  - Assess for changes in mentation and behavior  - Position to facilitate oxygenation and minimize respiratory effort  - Oxygen supplementation based on oxygen saturation or ABGs  - Provide Smoking Cessation handout, if applicable  - Encourage broncho-pulmonary hygiene including cough, deep breathe, Incentive Spirometry  - Assess the need for suctioning and perform as needed  - Assess and instruct to report SOB or any respiratory difficulty  - Respiratory Therapy support as indicated  - Manage/alleviate anxiety  - Monitor for  signs/symptoms of CO2 retention  Outcome: Progressing     Problem: METABOLIC/FLUID AND ELECTROLYTES - ADULT  Goal: Glucose maintained within prescribed range  Description: INTERVENTIONS:  - Monitor Blood Glucose as ordered  - Assess for signs and symptoms of hyperglycemia and hypoglycemia  - Administer ordered medications to maintain glucose within target range  - Assess barriers to adequate nutritional intake and initiate nutrition consult as needed  - Instruct patient on self management of diabetes  Outcome: Progressing  Goal: Electrolytes maintained within normal limits  Description: INTERVENTIONS:  - Monitor labs and rhythm and assess patient for signs and symptoms of electrolyte imbalances  - Administer electrolyte replacement as ordered  - Monitor response to electrolyte replacements, including rhythm and repeat lab results as appropriate  - Fluid restriction as ordered  - Instruct patient on fluid and nutrition restrictions as appropriate  Outcome: Progressing  Goal: Hemodynamic stability and optimal renal function maintained  Description: INTERVENTIONS:  - Monitor labs and assess for signs and symptoms of volume excess or deficit  - Monitor intake, output and patient weight  - Monitor urine specific gravity, serum osmolarity and serum sodium as indicated or ordered  - Monitor response to interventions for patient's volume status, including labs, urine output, blood pressure (other measures as available)  - Encourage oral intake as appropriate  - Instruct patient on fluid and nutrition restrictions as appropriate  Outcome: Progressing     Problem: SKIN/TISSUE INTEGRITY - ADULT  Goal: Skin integrity remains intact  Description: INTERVENTIONS  - Assess and document risk factors for pressure ulcer development  - Assess and document skin integrity  - Monitor for areas of redness and/or skin breakdown  - Initiate interventions, skin care algorithm/standards of care as needed  Outcome: Progressing     Problem:  MUSCULOSKELETAL - ADULT  Goal: Return mobility to safest level of function  Description: INTERVENTIONS:  - Assess patient stability and activity tolerance for standing, transferring and ambulating w/ or w/o assistive devices  - Assist with transfers and ambulation using safe patient handling equipment as needed  - Ensure adequate protection for wounds/incisions during mobilization  - Obtain PT/OT consults as needed  - Advance activity as appropriate  - Communicate ordered activity level and limitations with patient/family  Outcome: Progressing     Problem: PAIN - ADULT  Goal: Verbalizes/displays adequate comfort level or patient's stated pain goal  Description: INTERVENTIONS:  - Encourage pt to monitor pain and request assistance  - Assess pain using appropriate pain scale  - Administer analgesics based on type and severity of pain and evaluate response  - Implement non-pharmacological measures as appropriate and evaluate response  - Consider cultural and social influences on pain and pain management  - Manage/alleviate anxiety  - Utilize distraction and/or relaxation techniques  - Monitor for opioid side effects  - Notify MD/LIP if interventions unsuccessful or patient reports new pain  - Anticipate increased pain with activity and pre-medicate as appropriate  Outcome: Progressing     Problem: RISK FOR INFECTION - ADULT  Goal: Absence of fever/infection during anticipated neutropenic period  Description: INTERVENTIONS  - Monitor WBC  - Administer growth factors as ordered  - Implement neutropenic guidelines  Outcome: Progressing     Problem: SAFETY ADULT - FALL  Goal: Free from fall injury  Description: INTERVENTIONS:  - Assess pt frequently for physical needs  - Identify cognitive and physical deficits and behaviors that affect risk of falls.  - [place] fall precautions as indicated by assessment.  - Educate pt/family on patient safety including physical limitations  - Instruct pt to call for assistance with  activity based on assessment  - Modify environment to reduce risk of injury  - Provide assistive devices as appropriate  - Consider OT/PT consult to assist with strengthening/mobility  - Encourage toileting schedule  Outcome: Progressing     Problem: DISCHARGE PLANNING  Goal: Discharge to home or other facility with appropriate resources  Description: INTERVENTIONS:  - Identify barriers to discharge w/pt and caregiver  - Include patient/family/discharge partner in discharge planning  - Arrange for needed discharge resources and transportation as appropriate  - Identify discharge learning needs (meds, wound care, etc)  - Arrange for interpreters to assist at discharge as needed  - Consider post-discharge preferences of patient/family/discharge partner  - Complete POLST form as appropriate  - Assess patient's ability to be responsible for managing their own health  - Refer to Case Management Department for coordinating discharge planning if the patient needs post-hospital services based on physician/LIP order or complex needs related to functional status, cognitive ability or social support system  Outcome: Progressing

## 2024-01-05 NOTE — PLAN OF CARE
Patient scheduled for dialysis tomorrow morning, to be discharged after dialysis tomorrow.   Problem: Patient Centered Care  Goal: Patient preferences are identified and integrated in the patient's plan of care  Description: Interventions:  - What would you like us to know as we care for you? Lives alone   - Provide timely, complete, and accurate information to patient/family  - Incorporate patient and family knowledge, values, beliefs, and cultural backgrounds into the planning and delivery of care  - Encourage patient/family to participate in care and decision-making at the level they choose  - Honor patient and family perspectives and choices  Outcome: Progressing     Problem: RESPIRATORY - ADULT  Goal: Achieves optimal ventilation and oxygenation  Description: INTERVENTIONS:  - Assess for changes in respiratory status  - Assess for changes in mentation and behavior  - Position to facilitate oxygenation and minimize respiratory effort  - Oxygen supplementation based on oxygen saturation or ABGs  - Provide Smoking Cessation handout, if applicable  - Encourage broncho-pulmonary hygiene including cough, deep breathe, Incentive Spirometry  - Assess the need for suctioning and perform as needed  - Assess and instruct to report SOB or any respiratory difficulty  - Respiratory Therapy support as indicated  - Manage/alleviate anxiety  - Monitor for signs/symptoms of CO2 retention  Outcome: Progressing     Problem: METABOLIC/FLUID AND ELECTROLYTES - ADULT  Goal: Glucose maintained within prescribed range  Description: INTERVENTIONS:  - Monitor Blood Glucose as ordered  - Assess for signs and symptoms of hyperglycemia and hypoglycemia  - Administer ordered medications to maintain glucose within target range  - Assess barriers to adequate nutritional intake and initiate nutrition consult as needed  - Instruct patient on self management of diabetes  Outcome: Progressing  Goal: Electrolytes maintained within normal  limits  Description: INTERVENTIONS:  - Monitor labs and rhythm and assess patient for signs and symptoms of electrolyte imbalances  - Administer electrolyte replacement as ordered  - Monitor response to electrolyte replacements, including rhythm and repeat lab results as appropriate  - Fluid restriction as ordered  - Instruct patient on fluid and nutrition restrictions as appropriate  Outcome: Progressing  Goal: Hemodynamic stability and optimal renal function maintained  Description: INTERVENTIONS:  - Monitor labs and assess for signs and symptoms of volume excess or deficit  - Monitor intake, output and patient weight  - Monitor urine specific gravity, serum osmolarity and serum sodium as indicated or ordered  - Monitor response to interventions for patient's volume status, including labs, urine output, blood pressure (other measures as available)  - Encourage oral intake as appropriate  - Instruct patient on fluid and nutrition restrictions as appropriate  Outcome: Progressing     Problem: SKIN/TISSUE INTEGRITY - ADULT  Goal: Skin integrity remains intact  Description: INTERVENTIONS  - Assess and document risk factors for pressure ulcer development  - Assess and document skin integrity  - Monitor for areas of redness and/or skin breakdown  - Initiate interventions, skin care algorithm/standards of care as needed  Outcome: Progressing     Problem: MUSCULOSKELETAL - ADULT  Goal: Return mobility to safest level of function  Description: INTERVENTIONS:  - Assess patient stability and activity tolerance for standing, transferring and ambulating w/ or w/o assistive devices  - Assist with transfers and ambulation using safe patient handling equipment as needed  - Ensure adequate protection for wounds/incisions during mobilization  - Obtain PT/OT consults as needed  - Advance activity as appropriate  - Communicate ordered activity level and limitations with patient/family  Outcome: Progressing     Problem: PAIN -  ADULT  Goal: Verbalizes/displays adequate comfort level or patient's stated pain goal  Description: INTERVENTIONS:  - Encourage pt to monitor pain and request assistance  - Assess pain using appropriate pain scale  - Administer analgesics based on type and severity of pain and evaluate response  - Implement non-pharmacological measures as appropriate and evaluate response  - Consider cultural and social influences on pain and pain management  - Manage/alleviate anxiety  - Utilize distraction and/or relaxation techniques  - Monitor for opioid side effects  - Notify MD/LIP if interventions unsuccessful or patient reports new pain  - Anticipate increased pain with activity and pre-medicate as appropriate  Outcome: Progressing     Problem: RISK FOR INFECTION - ADULT  Goal: Absence of fever/infection during anticipated neutropenic period  Description: INTERVENTIONS  - Monitor WBC  - Administer growth factors as ordered  - Implement neutropenic guidelines  Outcome: Progressing     Problem: SAFETY ADULT - FALL  Goal: Free from fall injury  Description: INTERVENTIONS:  - Assess pt frequently for physical needs  - Identify cognitive and physical deficits and behaviors that affect risk of falls.  - [place] fall precautions as indicated by assessment.  - Educate pt/family on patient safety including physical limitations  - Instruct pt to call for assistance with activity based on assessment  - Modify environment to reduce risk of injury  - Provide assistive devices as appropriate  - Consider OT/PT consult to assist with strengthening/mobility  - Encourage toileting schedule  Outcome: Progressing     Problem: DISCHARGE PLANNING  Goal: Discharge to home or other facility with appropriate resources  Description: INTERVENTIONS:  - Identify barriers to discharge w/pt and caregiver  - Include patient/family/discharge partner in discharge planning  - Arrange for needed discharge resources and transportation as appropriate  -  Identify discharge learning needs (meds, wound care, etc)  - Arrange for interpreters to assist at discharge as needed  - Consider post-discharge preferences of patient/family/discharge partner  - Complete POLST form as appropriate  - Assess patient's ability to be responsible for managing their own health  - Refer to Case Management Department for coordinating discharge planning if the patient needs post-hospital services based on physician/LIP order or complex needs related to functional status, cognitive ability or social support system  Outcome: Progressing

## 2024-01-05 NOTE — CM/SW NOTE
Expected Discharge Plan:    Destination: home with outpt HD  [facility] [place]  Dr. Karla Cleveland  [address], [place], [place] [id number] [phone]    Current Barriers to d/c: Medical Clearance;     Pt/Family aware of plan:   Yes, CM provided HD list for choice of provider to secure chairtime  [facility]  CHAIR TIME TU/TH/SA  2PM    [facility] Staff aware of dc plan: Patient discussed in care rounds. Pt received HD last 5a today 1/5.  Plan another session of HD 1/6, per nephrologist then may discharge once has chair time reserved.    Jacqueline RN at bedside and notified of chair time.    Discharge Planner Follow up Needed:  HD PROVIDER CHOICE FOR CHAIR TIME    DC planner/CM to remain available for support and/or discharge planning.    Sisi Conroy RN, [facility]  DC PLANNER / RN Case Manager  Ext.  [phone]  .

## 2024-01-05 NOTE — CM/SW NOTE
Expected Discharge Plan:    Destination:  Home with outpatient HD    Current Barriers to d/c: Medical Clearance;  Pt/Family aware of plan:   yes  [place] Staff aware of dc plan: Patient discussed in care rounds.    Chronic kidney disease stage 5.  Transforming to end-stage renal disease with severe symptoms.  Requires dialysis as per Dr. Aponte. 3 sessions in house and dialysis set up     CM met with pt at bedside for dc planning of outpt HD arrangement. Pt started already consultation w/nephrologist.  Primary nephrologist- Dr. Jenkins with [facility]   Home alone, drives herself.  Pt works at [employer] in [place].    Discharge Planner Follow up Needed:  HD choice/location for chairtime    DC planner/CM to remain available for support and/or discharge planning.    Sisi Conroy RN, [facility]  DC PLANNER / RN Case Manager  Ext.  [phone]

## 2024-01-05 NOTE — CM/SW NOTE
Per pt request, wants to check with Dr Alice VIVAR nephrologist of HD locations he will follow for HD chair time.    CM spoke w/Marycruz at office [phone] who will send message to nephrologist.    New referral with new locations need to be sent in aidin for alternalte chair time.    Pt currently has chair time with [facility]  T/th/Sa  2pm.  Pt has WELCOME Letter from [facility] [place].    DC planner/CM to remain available for support and/or discharge planning.    Sisi Conroy RN, [facility]  DC PLANNER / RN Case Manager  Ext.  [phone]

## 2024-01-05 NOTE — PROGRESS NOTES
[facility]    Progress Note    Liu Walker Patient Status:  Inpatient    1990 MRN [id number]   Location [facility] 5SW/SE Attending Walter Estrada,*   Hosp Day # 2 PCP Ashleigh Murray     Chief Complaint: pain inserion site    Subjective:     Constitutional:  Negative for appetite change and chills.   HENT:  Positive for congestion.    Respiratory:  Negative for cough, shortness of breath and wheezing.    Cardiovascular:  Positive for chest pain.        Chest wall   Genitourinary:  Negative for dyspareunia.   Musculoskeletal:  Negative for joint swelling and joint pain.   Neurological:  Positive for weakness.   Psychiatric/Behavioral:  Positive for sleep disturbance. Negative for confusion and decreased concentration. The patient is nervous/anxious.        Objective:   Blood pressure 143/82, pulse 92, temperature 98.7 °F (37.1 °C), temperature source Oral, resp. rate 16, height 5' 5\" (1.651 m), weight 225 lb (102.1 kg), last menstrual period 12/15/2023, SpO2 95%, not currently breastfeeding.  Physical Exam  Vitals and nursing note reviewed.   Constitutional:       Appearance: She is obese.   HENT:      Head: Normocephalic and atraumatic.   Cardiovascular:      Pulses: Normal pulses.   Pulmonary:      Breath sounds: Rhonchi present.   Abdominal:      General: Bowel sounds are normal.      Palpations: Abdomen is soft.   Skin:     General: Skin is warm and dry.      Capillary Refill: Capillary refill takes less than 2 seconds.   Neurological:      Mental Status: She is alert and oriented to person, place, and time.   Psychiatric:         Behavior: Behavior normal.         Judgment: Judgment normal.         Results:   Lab Results   Component Value Date    WBC 8.9 2024    HGB 8.6 (L) 2024    HCT 24.4 (L) 2024    .0 2024    CREATSERUM 4.71 (H) 2024    BUN 30 (H) 2024     2024    K 3.5 2024     2024     CO2 28.0 01/05/2024     (H) 01/05/2024    CA 8.1 (L) 01/05/2024    ALB 3.6 01/04/2024    ALKPHO 58 01/04/2024    BILT 0.3 01/04/2024    TP 6.4 01/04/2024    AST 15 01/04/2024    ALT <7 (L) 01/04/2024    PTT 31.8 02/17/2017    TSH 2.910 02/21/2022    LIP 69 12/30/2023    DDIMER 2.89 (H) 01/03/2024    MG 2.0 01/05/2024    PHOS 2.5 01/05/2024    TROP <0.045 12/03/2021    TROPHS 18 01/03/2024       IR TUNNELED CV CATH INSERTION EXCHANGE CHECK    Result Date: 1/3/2024  CONCLUSION: Placement of tunneled dialysis venous catheter via right internal jugular vein.    Dictated by (CST): Rob Oliva MD on 1/03/2024 at 6:03 PM     Finalized by (CST): Rob Oliva MD on 1/03/2024 at 6:05 PM               Assessment & Plan:       1.       Chronic kidney disease stage 5.  Transforming to end-stage renal disease with severe symptoms.  Requires dialysis as per Dr. Alcocer. 3 sessions in house and dialysis set up  2.       Hypertension.  Continue medications.  3.       Hyperlipidemia.  Continue medications.  4.       Allergies.  Will avoid triggers here.  5.       Code status is Full Code.  Discussed with patient during this hospitalization.  6.      Metabolic acidosis from renal failure     Complex mdm; coordinating care with nurse/dr alcocer and counseling pt about criteria dc  Hopefully tomorrow        JE CHIANG MD

## 2024-01-06 VITALS
TEMPERATURE: 99 F | OXYGEN SATURATION: 94 % | DIASTOLIC BLOOD PRESSURE: 84 MMHG | BODY MASS INDEX: 37.49 KG/M2 | HEART RATE: 92 BPM | WEIGHT: 225 LBS | HEIGHT: 65 IN | RESPIRATION RATE: 18 BRPM | SYSTOLIC BLOOD PRESSURE: 146 MMHG

## 2024-01-06 LAB
ANION GAP SERPL CALC-SCNC: 8 MMOL/L (ref 0–18)
BASOPHILS # BLD AUTO: 0.04 X10(3) UL (ref 0–0.2)
BASOPHILS NFR BLD AUTO: 0.5 %
BUN BLD-MCNC: 34 MG/DL (ref 9–23)
BUN/CREAT SERPL: 5.3 (ref 10–20)
CALCIUM BLD-MCNC: 8.2 MG/DL (ref 8.7–10.4)
CHLORIDE SERPL-SCNC: 109 MMOL/L (ref 98–112)
CO2 SERPL-SCNC: 23 MMOL/L (ref 21–32)
CREAT BLD-MCNC: 6.41 MG/DL
DEPRECATED RDW RBC AUTO: 41.4 FL (ref 35.1–46.3)
EGFRCR SERPLBLD CKD-EPI 2021: 8 ML/MIN/1.73M2 (ref 60–?)
EOSINOPHIL # BLD AUTO: 0.33 X10(3) UL (ref 0–0.7)
EOSINOPHIL NFR BLD AUTO: 4.2 %
ERYTHROCYTE [DISTWIDTH] IN BLOOD BY AUTOMATED COUNT: 14.1 % (ref 11–15)
GLUCOSE BLD-MCNC: 87 MG/DL (ref 70–99)
GLUCOSE BLDC GLUCOMTR-MCNC: 118 MG/DL (ref 70–99)
GLUCOSE BLDC GLUCOMTR-MCNC: 90 MG/DL (ref 70–99)
HCT VFR BLD AUTO: 23.5 %
HGB BLD-MCNC: 8.2 G/DL
IMM GRANULOCYTES # BLD AUTO: 0.03 X10(3) UL (ref 0–1)
IMM GRANULOCYTES NFR BLD: 0.4 %
LYMPHOCYTES # BLD AUTO: 2.41 X10(3) UL (ref 1–4)
LYMPHOCYTES NFR BLD AUTO: 31 %
MAGNESIUM SERPL-MCNC: 2.1 MG/DL (ref 1.6–2.6)
MCH RBC QN AUTO: 28.1 PG (ref 26–34)
MCHC RBC AUTO-ENTMCNC: 34.9 G/DL (ref 31–37)
MCV RBC AUTO: 80.5 FL
MONOCYTES # BLD AUTO: 0.5 X10(3) UL (ref 0.1–1)
MONOCYTES NFR BLD AUTO: 6.4 %
NEUTROPHILS # BLD AUTO: 4.46 X10 (3) UL (ref 1.5–7.7)
NEUTROPHILS # BLD AUTO: 4.46 X10(3) UL (ref 1.5–7.7)
NEUTROPHILS NFR BLD AUTO: 57.5 %
OSMOLALITY SERPL CALC.SUM OF ELEC: 297 MOSM/KG (ref 275–295)
PHOSPHATE SERPL-MCNC: 4.5 MG/DL (ref 2.4–5.1)
PLATELET # BLD AUTO: 235 10(3)UL (ref 150–450)
POTASSIUM SERPL-SCNC: 3.5 MMOL/L (ref 3.5–5.1)
RBC # BLD AUTO: 2.92 X10(6)UL
SODIUM SERPL-SCNC: 140 MMOL/L (ref 136–145)
WBC # BLD AUTO: 7.8 X10(3) UL (ref 4–11)

## 2024-01-06 PROCEDURE — 99239 HOSP IP/OBS DSCHRG MGMT >30: CPT | Performed by: HOSPITALIST

## 2024-01-06 RX ORDER — ACETAMINOPHEN 500 MG
500 TABLET ORAL EVERY 6 HOURS PRN
Status: SHIPPED | COMMUNITY
Start: 2024-01-06

## 2024-01-06 RX ORDER — ONDANSETRON 4 MG/1
4 TABLET, ORALLY DISINTEGRATING ORAL EVERY 6 HOURS PRN
Qty: 10 TABLET | Refills: 0 | Status: SHIPPED | OUTPATIENT
Start: 2024-01-06

## 2024-01-06 RX ORDER — HYDROCODONE BITARTRATE AND ACETAMINOPHEN 5; 325 MG/1; MG/1
1 TABLET ORAL EVERY 6 HOURS PRN
Qty: 5 TABLET | Refills: 0 | Status: SHIPPED | OUTPATIENT
Start: 2024-01-06

## 2024-01-06 RX ORDER — ONDANSETRON 4 MG/1
4 TABLET, ORALLY DISINTEGRATING ORAL EVERY 6 HOURS PRN
Status: DISCONTINUED | OUTPATIENT
Start: 2024-01-06 | End: 2024-01-06

## 2024-01-06 NOTE — PROGRESS NOTES
NEPHROLOGY DAILY PROGRESS NOTE       SUBJECTIVE:  Denies SOB, reports some swelling in the hands   Currently receiving dialysis      OBJECTIVE:    Total Intake/Output:    Intake/Output Summary (Last 24 hours) at 1/6/2024 0728  Last data filed at 1/5/2024 1700  Gross per 24 hour   Intake 240 ml   Output --   Net 240 ml         PHYSICAL EXAM:  /78 (BP Location: Right arm)   Pulse 88   Temp 98.5 °F (36.9 °C) (Oral)   Resp 18   Ht 5' 5\" (1.651 m)   Wt 225 lb (102.1 kg)   LMP 12/15/2023   SpO2 92%   BMI 37.44 kg/m²   GEN:  NAD  HEENT: NCAT    CHEST: CTAB  CARDIAC: S1S2 normal  EXT: no lower ext edema  NEURO: awake, alert  SKIN: warm, dry  DIALYSIS ACCESS: RIJ tunneled HD cath (1/3)          CURRENT MEDICATIONS:     diphenhydrAMINE  50 mg Intravenous Once    heparin sodium lock flush  1.5 mL Intravenous Once    carvedilol  25 mg Oral BID    atorvastatin  40 mg Oral Nightly    hydrALAZINE  50 mg Oral Q8H MANISHA    NIFEdipine ER  90 mg Oral Nightly    heparin  5,000 Units Subcutaneous Q8H MANISHA         LABS:  Patient Labs Reviewed in Detail. Pertinent Labs as follows:  Recent Labs   Lab 01/04/24  0545 01/05/24  0650 01/06/24  0555   * 112* 87   BUN 45* 30* 34*   CREATSERUM 7.02* 4.71* 6.41*   EGFRCR 7* 12* 8*   CA 8.1* 8.1* 8.2*    140 140   K 3.1* 3.5 3.5    106 109   CO2 23.0 28.0 23.0     Recent Labs   Lab 01/04/24  0545 01/05/24  0526 01/06/24  0555   RBC 2.97* 3.00* 2.92*   HGB 8.5* 8.6* 8.2*   HCT 23.9* 24.4* 23.5*   MCV 80.5 81.3 80.5   MCH 28.6 28.7 28.1   MCHC 35.6 35.2 34.9   RDW 14.6 14.3 14.1   NEPRELIM 4.75 5.99 4.46   WBC 6.9 8.9 7.8   .0 263.0 235.0           IMAGING:  No results found.     ASSESSMENT AND PLAN:   This is a 33 year old female with PMH sig for  PCOS, HTN, HLD, DM2, gastric sleeve surgery, and CKD stage 5 with known nephrotic range proteinuria. She is followed by Dr. Jenkins of [facility].  Presents with hypoxia. Nephrology is consulted for CKD stage 5.       CKD stage 5 - now progressed to ESRD   - sCr 8.86 with eGFR of 6.  - initiated on dialysis on 1/3 via RIJ tunneled HD cath.   - 2nd session of dialysis 1/5  - 3rd session of dialysis today   - SW to help outpatient dialysis placement.   - primary nephrologist:  Dr. Jenkins of [facility]      Hypokalemia   - improved      Metabolic acidosis   - improved with dialysis   - stop sodium bicarb tablets as patient now on dialysis      Hypoxia secondary to pulm edema, b/l pleural effusions   - CTA neg for PE   - continue with fluid removal as tolerated with dialysis   - now on RA    - further per primary      Anemia   - trend Hb, blood transfusions per primary   - avoiding IV iron given recent infection     HTN   - coreg, hydralazine, nifedipine      Dispo: anticipate dc later today post dialysis     Irma Aponte MD  [facility] - Nephrology

## 2024-01-06 NOTE — DISCHARGE SUMMARY
Dc summary#[id number]  >30 min spent on dc    Hospital Discharge Diagnoses: esrd needs dialysis    Lace+ Score: 71  59-90 High Risk  29-58 Medium Risk  0-28   Low Risk.    TCM Follow-Up Recommendation:  LACE 29-58: Moderate Risk of readmission after discharge from the hospital.tcm fu recommended

## 2024-01-06 NOTE — PLAN OF CARE
Patient medically cleared for discharge. Provided discharge paperwork and education. Patient had dialysis in the am. Per dialysis RN removed 2000mL and educated patient on HD catheter and maintenance. Patient was nauseous post dialysis, gave prn po zofran and notified MD. Per Dr. Estrada pt okay to discharge if nausea resolves and patient tolerating diet. Pt nausea resolved and patient did tolerate lunch. Confirmed with Dr. Estrada patient okay to drive self home.   Problem: Patient Centered Care  Goal: Patient preferences are identified and integrated in the patient's plan of care  Description: Interventions:  - What would you like us to know as we care for you? Lives alone   - Provide timely, complete, and accurate information to patient/family  - Incorporate patient and family knowledge, values, beliefs, and cultural backgrounds into the planning and delivery of care  - Encourage patient/family to participate in care and decision-making at the level they choose  - Honor patient and family perspectives and choices  Outcome: Completed     Problem: RESPIRATORY - ADULT  Goal: Achieves optimal ventilation and oxygenation  Description: INTERVENTIONS:  - Assess for changes in respiratory status  - Assess for changes in mentation and behavior  - Position to facilitate oxygenation and minimize respiratory effort  - Oxygen supplementation based on oxygen saturation or ABGs  - Provide Smoking Cessation handout, if applicable  - Encourage broncho-pulmonary hygiene including cough, deep breathe, Incentive Spirometry  - Assess the need for suctioning and perform as needed  - Assess and instruct to report SOB or any respiratory difficulty  - Respiratory Therapy support as indicated  - Manage/alleviate anxiety  - Monitor for signs/symptoms of CO2 retention  Outcome: Completed     Problem: METABOLIC/FLUID AND ELECTROLYTES - ADULT  Goal: Glucose maintained within prescribed range  Description: INTERVENTIONS:  - Monitor Blood  Glucose as ordered  - Assess for signs and symptoms of hyperglycemia and hypoglycemia  - Administer ordered medications to maintain glucose within target range  - Assess barriers to adequate nutritional intake and initiate nutrition consult as needed  - Instruct patient on self management of diabetes  Outcome: Completed  Goal: Electrolytes maintained within normal limits  Description: INTERVENTIONS:  - Monitor labs and rhythm and assess patient for signs and symptoms of electrolyte imbalances  - Administer electrolyte replacement as ordered  - Monitor response to electrolyte replacements, including rhythm and repeat lab results as appropriate  - Fluid restriction as ordered  - Instruct patient on fluid and nutrition restrictions as appropriate  Outcome: Completed  Goal: Hemodynamic stability and optimal renal function maintained  Description: INTERVENTIONS:  - Monitor labs and assess for signs and symptoms of volume excess or deficit  - Monitor intake, output and patient weight  - Monitor urine specific gravity, serum osmolarity and serum sodium as indicated or ordered  - Monitor response to interventions for patient's volume status, including labs, urine output, blood pressure (other measures as available)  - Encourage oral intake as appropriate  - Instruct patient on fluid and nutrition restrictions as appropriate  Outcome: Completed     Problem: SKIN/TISSUE INTEGRITY - ADULT  Goal: Skin integrity remains intact  Description: INTERVENTIONS  - Assess and document risk factors for pressure ulcer development  - Assess and document skin integrity  - Monitor for areas of redness and/or skin breakdown  - Initiate interventions, skin care algorithm/standards of care as needed  Outcome: Completed     Problem: MUSCULOSKELETAL - ADULT  Goal: Return mobility to safest level of function  Description: INTERVENTIONS:  - Assess patient stability and activity tolerance for standing, transferring and ambulating w/ or w/o assistive  devices  - Assist with transfers and ambulation using safe patient handling equipment as needed  - Ensure adequate protection for wounds/incisions during mobilization  - Obtain PT/OT consults as needed  - Advance activity as appropriate  - Communicate ordered activity level and limitations with patient/family  Outcome: Completed     Problem: PAIN - ADULT  Goal: Verbalizes/displays adequate comfort level or patient's stated pain goal  Description: INTERVENTIONS:  - Encourage pt to monitor pain and request assistance  - Assess pain using appropriate pain scale  - Administer analgesics based on type and severity of pain and evaluate response  - Implement non-pharmacological measures as appropriate and evaluate response  - Consider cultural and social influences on pain and pain management  - Manage/alleviate anxiety  - Utilize distraction and/or relaxation techniques  - Monitor for opioid side effects  - Notify MD/LIP if interventions unsuccessful or patient reports new pain  - Anticipate increased pain with activity and pre-medicate as appropriate  Outcome: Completed     Problem: RISK FOR INFECTION - ADULT  Goal: Absence of fever/infection during anticipated neutropenic period  Description: INTERVENTIONS  - Monitor WBC  - Administer growth factors as ordered  - Implement neutropenic guidelines  Outcome: Completed     Problem: SAFETY ADULT - FALL  Goal: Free from fall injury  Description: INTERVENTIONS:  - Assess pt frequently for physical needs  - Identify cognitive and physical deficits and behaviors that affect risk of falls.  - [place] fall precautions as indicated by assessment.  - Educate pt/family on patient safety including physical limitations  - Instruct pt to call for assistance with activity based on assessment  - Modify environment to reduce risk of injury  - Provide assistive devices as appropriate  - Consider OT/PT consult to assist with strengthening/mobility  - Encourage toileting schedule  Outcome:  Completed     Problem: DISCHARGE PLANNING  Goal: Discharge to home or other facility with appropriate resources  Description: INTERVENTIONS:  - Identify barriers to discharge w/pt and caregiver  - Include patient/family/discharge partner in discharge planning  - Arrange for needed discharge resources and transportation as appropriate  - Identify discharge learning needs (meds, wound care, etc)  - Arrange for interpreters to assist at discharge as needed  - Consider post-discharge preferences of patient/family/discharge partner  - Complete POLST form as appropriate  - Assess patient's ability to be responsible for managing their own health  - Refer to Case Management Department for coordinating discharge planning if the patient needs post-hospital services based on physician/LIP order or complex needs related to functional status, cognitive ability or social support system  Outcome: Completed

## 2024-01-06 NOTE — PLAN OF CARE
No acute changes.  Plan for Hemodialysis today  then possible discharge.  Problem: Patient Centered Care  Goal: Patient preferences are identified and integrated in the patient's plan of care  Description: Interventions:  - What would you like us to know as we care for you? Lives alone   - Provide timely, complete, and accurate information to patient/family  - Incorporate patient and family knowledge, values, beliefs, and cultural backgrounds into the planning and delivery of care  - Encourage patient/family to participate in care and decision-making at the level they choose  - Honor patient and family perspectives and choices  Outcome: Progressing     Problem: RESPIRATORY - ADULT  Goal: Achieves optimal ventilation and oxygenation  Description: INTERVENTIONS:  - Assess for changes in respiratory status  - Assess for changes in mentation and behavior  - Position to facilitate oxygenation and minimize respiratory effort  - Oxygen supplementation based on oxygen saturation or ABGs  - Provide Smoking Cessation handout, if applicable  - Encourage broncho-pulmonary hygiene including cough, deep breathe, Incentive Spirometry  - Assess the need for suctioning and perform as needed  - Assess and instruct to report SOB or any respiratory difficulty  - Respiratory Therapy support as indicated  - Manage/alleviate anxiety  - Monitor for signs/symptoms of CO2 retention  Outcome: Progressing     Problem: METABOLIC/FLUID AND ELECTROLYTES - ADULT  Goal: Glucose maintained within prescribed range  Description: INTERVENTIONS:  - Monitor Blood Glucose as ordered  - Assess for signs and symptoms of hyperglycemia and hypoglycemia  - Administer ordered medications to maintain glucose within target range  - Assess barriers to adequate nutritional intake and initiate nutrition consult as needed  - Instruct patient on self management of diabetes  Outcome: Progressing  Goal: Electrolytes maintained within normal limits  Description:  INTERVENTIONS:  - Monitor labs and rhythm and assess patient for signs and symptoms of electrolyte imbalances  - Administer electrolyte replacement as ordered  - Monitor response to electrolyte replacements, including rhythm and repeat lab results as appropriate  - Fluid restriction as ordered  - Instruct patient on fluid and nutrition restrictions as appropriate  Outcome: Progressing  Goal: Hemodynamic stability and optimal renal function maintained  Description: INTERVENTIONS:  - Monitor labs and assess for signs and symptoms of volume excess or deficit  - Monitor intake, output and patient weight  - Monitor urine specific gravity, serum osmolarity and serum sodium as indicated or ordered  - Monitor response to interventions for patient's volume status, including labs, urine output, blood pressure (other measures as available)  - Encourage oral intake as appropriate  - Instruct patient on fluid and nutrition restrictions as appropriate  Outcome: Progressing     Problem: SKIN/TISSUE INTEGRITY - ADULT  Goal: Skin integrity remains intact  Description: INTERVENTIONS  - Assess and document risk factors for pressure ulcer development  - Assess and document skin integrity  - Monitor for areas of redness and/or skin breakdown  - Initiate interventions, skin care algorithm/standards of care as needed  Outcome: Progressing     Problem: MUSCULOSKELETAL - ADULT  Goal: Return mobility to safest level of function  Description: INTERVENTIONS:  - Assess patient stability and activity tolerance for standing, transferring and ambulating w/ or w/o assistive devices  - Assist with transfers and ambulation using safe patient handling equipment as needed  - Ensure adequate protection for wounds/incisions during mobilization  - Obtain PT/OT consults as needed  - Advance activity as appropriate  - Communicate ordered activity level and limitations with patient/family  Outcome: Progressing     Problem: PAIN - ADULT  Goal:  Verbalizes/displays adequate comfort level or patient's stated pain goal  Description: INTERVENTIONS:  - Encourage pt to monitor pain and request assistance  - Assess pain using appropriate pain scale  - Administer analgesics based on type and severity of pain and evaluate response  - Implement non-pharmacological measures as appropriate and evaluate response  - Consider cultural and social influences on pain and pain management  - Manage/alleviate anxiety  - Utilize distraction and/or relaxation techniques  - Monitor for opioid side effects  - Notify MD/LIP if interventions unsuccessful or patient reports new pain  - Anticipate increased pain with activity and pre-medicate as appropriate  Outcome: Progressing     Problem: RISK FOR INFECTION - ADULT  Goal: Absence of fever/infection during anticipated neutropenic period  Description: INTERVENTIONS  - Monitor WBC  - Administer growth factors as ordered  - Implement neutropenic guidelines  Outcome: Progressing     Problem: SAFETY ADULT - FALL  Goal: Free from fall injury  Description: INTERVENTIONS:  - Assess pt frequently for physical needs  - Identify cognitive and physical deficits and behaviors that affect risk of falls.  - [place] fall precautions as indicated by assessment.  - Educate pt/family on patient safety including physical limitations  - Instruct pt to call for assistance with activity based on assessment  - Modify environment to reduce risk of injury  - Provide assistive devices as appropriate  - Consider OT/PT consult to assist with strengthening/mobility  - Encourage toileting schedule  Outcome: Progressing     Problem: DISCHARGE PLANNING  Goal: Discharge to home or other facility with appropriate resources  Description: INTERVENTIONS:  - Identify barriers to discharge w/pt and caregiver  - Include patient/family/discharge partner in discharge planning  - Arrange for needed discharge resources and transportation as appropriate  - Identify discharge  learning needs (meds, wound care, etc)  - Arrange for interpreters to assist at discharge as needed  - Consider post-discharge preferences of patient/family/discharge partner  - Complete POLST form as appropriate  - Assess patient's ability to be responsible for managing their own health  - Refer to Case Management Department for coordinating discharge planning if the patient needs post-hospital services based on physician/LIP order or complex needs related to functional status, cognitive ability or social support system  Outcome: Progressing

## 2024-01-07 NOTE — DISCHARGE SUMMARY
[facility]    PATIENT'S NAME: YESENIA ADAME   ATTENDING PHYSICIAN: Walter Estrada MD   PATIENT ACCOUNT#:   [id number]    LOCATION:  [address] [place] [address]  MEDICAL RECORD #:   [id number]       YOB: 1990  ADMISSION DATE:       01/03/2024      DISCHARGE DATE:  01/06/2024    DISCHARGE SUMMARY    About 40 minutes were spent preparing this discharge.    DISCHARGE DIAGNOSIS:  End-stage renal disease requiring dialysis because of severe hypoxia, shortness of breath.    HISTORY AND HOSPITAL COURSE:  This is a very pleasant 33-year-old  female who presents with a history of having worsening renal function.  She was asked to come into the hospital, and she did, in fact, have severe orthopnea, paroxysmal nocturnal dyspnea, and hypoxemia from fluid overload.  She had a catheter placed and was started on dialysis by Dr. Aponte.  She felt much better after dialysis but did have several episodes of nausea, and those resolved and she was able to go home and follow up with dialysis as scheduled.    PHYSICAL EXAMINATION ON DISCHARGE:    VITAL SIGNS:  Temperature is 98.7, pulse 92, respiratory rate 18, blood pressure 146/84, 94%.  LUNGS:  Crackles now only in the bases.  HEART:  Normal S1, S2.  No S3.  ABDOMEN:  Soft.    EXTREMITIES:  Without calf tenderness.  NEUROLOGIC:  She is alert and oriented, friendly and cooperative.    LABORATORY STUDIES:  Please see chart.    ASSESSMENT AND PLAN:    1.   End-stage renal disease requiring dialysis.  A little bit of nausea after dialysis, now seems to have resolved.  Will follow up with dialysis center as scheduled.  2.   Hypertension.   3.   Hyperlipidemia.  4.   Metabolic acidosis from renal failure.  5.   Obesity.  BMI 37.44.  May need DENISE workup.    CONDITION ON DISCHARGE:  Stable.    CODE STATUS:  Full Code.    DISCHARGE MEDICATIONS:    1.   EpiPen as needed.  Avoid allergen.  2.   Norco 5/325 one tablet every 6 hours as needed for pain.  Watch  drowsiness.  No alcohol.  3.   Lipitor 40 mg nightly.  Watch for muscle weakness.  4.   Coreg 25 mg twice a day.  5.   Hydralazine 50 mg every 8 hours as needed.  6.   Nifedipine ER 90 mg nightly.  7.   Tylenol 500 mg every 6 hours as needed.  Watch total daily Tylenol to 3 g.  8.   Zofran 4 mg every 6 hours as needed.  Watch for severe muscle stiffness and fever.  9.   Senokot 17.2 mg nightly.    DISCHARGE INSTRUCTIONS:  Diet is 70 g protein, low salt, low fat.  Followup is with Dr. Black in 3 days.  Please call for followup advise.  Follow up with Dr. Aponte in 2 weeks.  Okay to drive home.  Nurse to help give her something to cover up her catheter for dialysis.  Home catheter care.    RISK OF READMISSION:  High.  TCM followup recommended.    Dictated By Walter Estrada MD  d: 01/06/2024 18:07:26  t: 01/07/2024 16:57:02  Job [id number]  [facility]/

## 2024-01-08 NOTE — PAYOR COMM NOTE
--------------  CONTINUED STAY REVIEW    Payor: [facility]  Subscriber #:  [id number]  Authorization Number: [id number]    Admit date: 1/3/24  Admit time:  3:37 PM    1/5/23    HENT:  Positive for congestion.    Respiratory:  Negative for cough, shortness of breath and wheezing.    Cardiovascular:  Positive for chest pain.        Chest wall   Musculoskeletal:  Negative for joint swelling and joint pain.   Neurological:  Positive for weakness.   Psychiatric/Behavioral:  Positive for sleep disturbance. Negative for confusion and decreased concentration.        Blood pressure 143/82, pulse 92, temperature 98.7 °F (37.1 °C), temperature source Oral, resp. rate 16, height 5' 5\" (1.651 m), weight 225 lb (102.1 kg), last menstrual period 12/15/2023, SpO2 95%, not currently breastfeeding.    Constitutional:       Appearance: She is obese.   HENT:      Head: Normocephalic and atraumatic.   Cardiovascular:      Pulses: Normal pulses.   Pulmonary:      Breath sounds: Rhonchi present.   Abdominal:      General: Bowel sounds are normal.      Palpations: Abdomen is soft.   Skin:     General: Skin is warm and dry.      Capillary Refill: Capillary refill takes less than 2 seconds.   Neurological:      Mental Status: She is alert and oriented to person, place, and time.   Psychiatric:         Behavior: Behavior normal.         Judgment: Judgment normal.      Lab Results   Component Value Date     WBC 8.9 01/05/2024     HGB 8.6 (L) 01/05/2024     HCT 24.4 (L) 01/05/2024     .0 01/05/2024     CREATSERUM 4.71 (H) 01/05/2024     BUN 30 (H) 01/05/2024      01/05/2024     K 3.5 01/05/2024      01/05/2024     CO2 28.0 01/05/2024      (H) 01/05/2024     CA 8.1 (L) 01/05/2024        IR TUNNELED CV CATH INSERTION EXCHANGE CHECK   Result Date: 1/3/2024  CONCLUSION: Placement of tunneled dialysis venous catheter via right internal jugular vein.    Dictated by (CST): Rob Oliva MD on 1/03/2024 at 6:03 PM      Finalized by (CST): Rob Oliva MD on 1/03/2024 at 6:05 PM              Assessment & Plan:         1.       Chronic kidney disease stage 5.  Transforming to end-stage renal disease with severe symptoms.  Requires dialysis as per Dr. Aponte. 3 sessions in house and dialysis set up  2.       Hypertension.  Continue medications.  3.       Hyperlipidemia.  Continue medications.  4.       Allergies.  Will avoid triggers here.  5.       Code status is Full Code.  Discussed with patient during this hospitalization.  6.      Metabolic acidosis from renal failure       NEPHROLOGY  ASSESSMENT AND PLAN:   This is a 33 year old female with PMH sig for  PCOS, HTN, HLD, DM2, gastric sleeve surgery, and CKD stage 5 with known nephrotic range proteinuria. She is followed by Dr. Jenkins of [facility].  Presents with hypoxia. Nephrology is consulted for CKD stage 5.      CKD stage 5 - now progressed to ESRD   - sCr 8.86 with eGFR of 6.  - initiated on dialysis on 1/3 via RIJ tunneled HD cath.   - 2nd session of dialysis 1/5  - 3rd session of dialysis planned for tomorrow 1/6   - SW to help outpatient dialysis placement.   - primary nephrologist:  Dr. Jenkins of [facility]      Hypokalemia   - improved      Metabolic acidosis   - improved with dialysis   - stop sodium bicarb tablets as patient now on dialysis      Hypoxia secondary to pulm edema, b/l pleural effusions   - CTA neg for PE   - continue with fluid removal as tolerated with dialysis   - now on RA    - further per primary      Anemia   - trend Hb, blood transfusions per primary   - avoiding IV iron given recent infection      Dispo: potential discharge tomorrow after 3rd session of dialysis, if outpatient dialysis arranged.      DATE OF DISCHARGE: 1/6/24    Vitals (last day) before discharge       Date/Time Temp Pulse Resp BP SpO2 Weight O2 Device O2 Flow Rate (L/min) [facility]    01/06/24 0506 98.5 °F (36.9 °C) 88 18 140/78 92 % -- None (Room air) -- DS    01/05/24  2156 98 °F (36.7 °C) 87 18 161/93 97 % -- None (Room air) -- DS    01/05/24 1616 98.2 °F (36.8 °C) 95 18 145/77 97 % -- None (Room air) -- JN    01/05/24 0923 98.7 °F (37.1 °C) 92 16 143/82 95 % -- None (Room air) -- JN    01/05/24 0131 -- 85 -- 181/118 -- -- -- --     01/05/24 0020 97.6 °F (36.4 °C) 83 16 175/95 94 % -- None (Room air) --

## 2024-04-22 ENCOUNTER — HOSPITAL ENCOUNTER (EMERGENCY)
Facility: HOSPITAL | Age: 34
Discharge: HOME | End: 2024-04-22
Attending: EMERGENCY MEDICINE
Payer: COMMERCIAL

## 2024-04-22 VITALS
TEMPERATURE: 98 F | DIASTOLIC BLOOD PRESSURE: 92 MMHG | WEIGHT: 211 LBS | SYSTOLIC BLOOD PRESSURE: 166 MMHG | HEIGHT: 65 IN | HEART RATE: 81 BPM | BODY MASS INDEX: 35.16 KG/M2 | OXYGEN SATURATION: 99 % | RESPIRATION RATE: 18 BRPM

## 2024-04-22 DIAGNOSIS — T81.49XA CELLULITIS, WOUND, POST-OPERATIVE: Primary | ICD-10-CM

## 2024-04-22 LAB
ANION GAP SERPL CALC-SCNC: 13 MMOL/L (ref 0–18)
BASOPHILS # BLD AUTO: 0.05 X10(3) UL (ref 0–0.2)
BASOPHILS NFR BLD AUTO: 0.9 %
BUN BLD-MCNC: 65 MG/DL (ref 9–23)
BUN/CREAT SERPL: 7.6 (ref 10–20)
CALCIUM BLD-MCNC: 8.6 MG/DL (ref 8.7–10.4)
CHLORIDE SERPL-SCNC: 102 MMOL/L (ref 98–112)
CO2 SERPL-SCNC: 27 MMOL/L (ref 21–32)
CREAT BLD-MCNC: 8.57 MG/DL
DEPRECATED RDW RBC AUTO: 42.3 FL (ref 35.1–46.3)
EGFRCR SERPLBLD CKD-EPI 2021: 6 ML/MIN/1.73M2 (ref 60–?)
EOSINOPHIL # BLD AUTO: 0.23 X10(3) UL (ref 0–0.7)
EOSINOPHIL NFR BLD AUTO: 4.1 %
ERYTHROCYTE [DISTWIDTH] IN BLOOD BY AUTOMATED COUNT: 13.3 % (ref 11–15)
GLUCOSE BLD-MCNC: 95 MG/DL (ref 70–99)
HCT VFR BLD AUTO: 25.4 %
HGB BLD-MCNC: 8.4 G/DL
IMM GRANULOCYTES # BLD AUTO: 0.01 X10(3) UL (ref 0–1)
IMM GRANULOCYTES NFR BLD: 0.2 %
LYMPHOCYTES # BLD AUTO: 2.3 X10(3) UL (ref 1–4)
LYMPHOCYTES NFR BLD AUTO: 41.2 %
MAGNESIUM SERPL-MCNC: 2.5 MG/DL (ref 1.6–2.6)
MCH RBC QN AUTO: 29.1 PG (ref 26–34)
MCHC RBC AUTO-ENTMCNC: 33.1 G/DL (ref 31–37)
MCV RBC AUTO: 87.9 FL
MONOCYTES # BLD AUTO: 0.39 X10(3) UL (ref 0.1–1)
MONOCYTES NFR BLD AUTO: 7 %
NEUTROPHILS # BLD AUTO: 2.6 X10 (3) UL (ref 1.5–7.7)
NEUTROPHILS # BLD AUTO: 2.6 X10(3) UL (ref 1.5–7.7)
NEUTROPHILS NFR BLD AUTO: 46.6 %
OSMOLALITY SERPL CALC.SUM OF ELEC: 312 MOSM/KG (ref 275–295)
PLATELET # BLD AUTO: 278 10(3)UL (ref 150–450)
POTASSIUM SERPL-SCNC: 3.3 MMOL/L (ref 3.5–5.1)
RBC # BLD AUTO: 2.89 X10(6)UL
SODIUM SERPL-SCNC: 142 MMOL/L (ref 136–145)
WBC # BLD AUTO: 5.6 X10(3) UL (ref 4–11)

## 2024-04-22 PROCEDURE — 87040 BLOOD CULTURE FOR BACTERIA: CPT | Performed by: EMERGENCY MEDICINE

## 2024-04-22 PROCEDURE — 99285 EMERGENCY DEPT VISIT HI MDM: CPT

## 2024-04-22 PROCEDURE — 36415 COLL VENOUS BLD VENIPUNCTURE: CPT

## 2024-04-22 PROCEDURE — 96365 THER/PROPH/DIAG IV INF INIT: CPT

## 2024-04-22 PROCEDURE — 80048 BASIC METABOLIC PNL TOTAL CA: CPT | Performed by: EMERGENCY MEDICINE

## 2024-04-22 PROCEDURE — 99284 EMERGENCY DEPT VISIT MOD MDM: CPT

## 2024-04-22 PROCEDURE — 83735 ASSAY OF MAGNESIUM: CPT | Performed by: EMERGENCY MEDICINE

## 2024-04-22 PROCEDURE — 85025 COMPLETE CBC W/AUTO DIFF WBC: CPT | Performed by: EMERGENCY MEDICINE

## 2024-04-22 PROCEDURE — 96366 THER/PROPH/DIAG IV INF ADDON: CPT

## 2024-04-22 RX ORDER — BUMETANIDE 1 MG/1
3 TABLET ORAL DAILY
COMMUNITY
Start: 2024-02-28

## 2024-04-22 RX ORDER — VANCOMYCIN 2 GRAM/500 ML IN 0.9 % SODIUM CHLORIDE INTRAVENOUS
2000 ONCE
Status: COMPLETED | OUTPATIENT
Start: 2024-04-22 | End: 2024-04-22

## 2024-04-22 RX ORDER — CEFADROXIL 500 MG/1
500 CAPSULE ORAL 2 TIMES DAILY
Qty: 20 CAPSULE | Refills: 0 | Status: SHIPPED | OUTPATIENT
Start: 2024-04-22 | End: 2024-05-02

## 2024-04-22 RX ORDER — DOXYCYCLINE HYCLATE 100 MG/1
100 CAPSULE ORAL 2 TIMES DAILY
Qty: 14 CAPSULE | Refills: 0 | Status: SHIPPED | OUTPATIENT
Start: 2024-04-22 | End: 2024-04-29

## 2024-04-22 NOTE — ED PROVIDER NOTES
Patient Seen in: [facility] Emergency Department      History     Chief Complaint   Patient presents with    Postop/Procedure Problem     Stated Complaint: post-op problem    Subjective:   HPI    33-year-old female who had a left upper arm AV fistula created on April 1 at [facility] still using her tunneled catheter with dialysis last on Saturday due tomorrow who states since her sutures came out 5 days ago she been having some pain and irritation and now some subjective chills and drainage from the more proximal left axilla site.  She called her surgeon.  She does have an appointment on Wednesday but they recommended she come today for evaluation and possibility of IV antibiotics.    Objective:   Past Medical History:    Anxiety state    Arrhythmia    Congestive heart disease (HCC)    Depression    Diabetes (HCC)    Esophageal reflux    Essential hypertension    Hidradenitis    bilateral axilla    High blood pressure    High cholesterol    Kidney failure    Migraines    Polycystic ovarian syndrome    treated with Metformin    PONV (postoperative nausea and vomiting)    Renal disorder    Visual impairment    Wears glasses              Past Surgical History:   Procedure Laterality Date    Appendectomy      Other Bilateral     excision of axilla hydradenitis                Social History     Socioeconomic History    Marital status: Single    Number of children: 0   Occupational History    Occupation: [employer]    Tobacco Use    Smoking status: Never    Smokeless tobacco: Never   Vaping Use    Vaping status: Former   Substance and Sexual Activity    Alcohol use: No    Drug use: No     Social Determinants of Health     Food Insecurity: No Food Insecurity (1/3/2024)    Food Insecurity     Food Insecurity: Never true   Transportation Needs: No Transportation Needs (1/3/2024)    Transportation Needs     Lack of Transportation: No    Received from [facility]    Housing Stability               Review of Systems    Positive for stated complaint: post-op problem  Other systems are as noted in HPI.  Constitutional and vital signs reviewed.      All other systems reviewed and negative except as noted above.    Physical Exam     ED Triage Vitals [04/22/24 1518]   BP (!) 167/91   Pulse 82   Resp 20   Temp 98 °F (36.7 °C)   Temp src Temporal   SpO2 97 %   O2 Device None (Room air)       Current:BP (!) 167/91   Pulse 82   Temp 97.9 °F (36.6 °C) (Oral)   Resp 20   Ht 165.1 cm (5' 5\")   Wt 95.7 kg   LMP 04/14/2024 (Exact Date)   SpO2 97%   BMI 35.11 kg/m²         Physical Exam    Constitutional: Oriented to person, place, and time.  Appears well-developed. No distress.   Head: Normocephalic and atraumatic.   Eyes: Conjunctivae are normal. Pupils are equal, round, and reactive to light.   Neck: Normal range of motion. Neck supple.   Cardiovascular: Normal rate, regular rhythm and intact distal pulses.    Musculoskeletal: Normal range of motion. No edema or tenderness.  In the left axilla/left upper arm there are 2 dressings in place.  The more proximal dressing which is the involved wound was exposed.  There is some firmness and fullness over the middle portion.  I cannot express any pus.  There is no significant erythema or warmth.  Neurological: Alert and oriented to person, place, and time.   Skin: Skin is warm and dry.   Nursing note and vitals reviewed.    Differential diagnosis includes postoperative wound infection.      ED Course     Labs Reviewed   BASIC METABOLIC PANEL (8) - Abnormal; Notable for the following components:       Result Value    Potassium 3.3 (*)     BUN 65 (*)     Creatinine 8.57 (*)     BUN/CREA Ratio 7.6 (*)     Calcium, Total 8.6 (*)     Calculated Osmolality 312 (*)     eGFR-Cr 6 (*)     All other components within normal limits   CBC W/ DIFFERENTIAL - Abnormal; Notable for the following components:    RBC 2.89 (*)     HGB 8.4 (*)     HCT 25.4 (*)     All other components  within normal limits   MAGNESIUM - Normal   CBC WITH DIFFERENTIAL WITH PLATELET    Narrative:     The following orders were created for panel order CBC With Differential With Platelet.  Procedure                               Abnormality         Status                     ---------                               -----------         ------                     CBC W/ DIFFERENTIAL[[id number]]          Abnormal            Final result                 Please view results for these tests on the individual orders.   BLOOD CULTURE   BLOOD CULTURE                      MDM                                         Medical Decision Making  Patient clinic looks well.  Afebrile.  Normal white count.  No obvious fluctuance.  I did speak with one of the general surgery residents covering for Dr. Stuart at [facility].  I explained the findings and presentation to him.  I explained she has an appointment in 2 days.  He recommended Keflex.  I did do cultures.  Recommended Tylenol for pain.  It is okay for her to go home.  Will give her 1 dose of Vanco here.  I off the records spoke with one of the ID docs here suggested possibly Duricef and doxycycline to cover for all staph and strep.  I will do this.  She will follow-up on Wednesday.  She will come back with worsening or change.    Problems Addressed:  Cellulitis, wound, post-operative: acute illness or injury    Amount and/or Complexity of Data Reviewed  Labs: ordered. Decision-making details documented in ED Course.    Risk  OTC drugs.  Prescription drug management.  Decision regarding hospitalization.        Disposition and Plan     Clinical Impression:  1. Cellulitis, wound, post-operative         Disposition:  Discharge  4/22/2024  6:13 pm    Follow-up:  Ashleigh Taylor  [address] [facility] [id number] [phone]    Call      YOUR VASCULAR SURGEON AT [facility] IN 2 DAY    Follow up in 2 day(s)            Medications Prescribed:  Current Discharge Medication List        START  taking these medications    Details   doxycycline 100 MG Oral Cap Take 1 capsule (100 mg total) by mouth 2 (two) times daily for 7 days.  Qty: 14 capsule, Refills: 0      cefadroxil 500 MG Oral Cap Take 1 capsule (500 mg total) by mouth 2 (two) times daily for 10 days.  Qty: 20 capsule, Refills: 0

## 2024-04-22 NOTE — ED INITIAL ASSESSMENT (HPI)
Pt presents to ed with c/o post op issue. Pt states she had her fistula placed on April 1st on the left arm and the stitches removed on 4/17. Pt states the site looks infected. Pt states there is an odor coming from the site and \"there is a yellowish and green discharge.\"  Tylenol at  1145 am

## 2024-06-03 ENCOUNTER — APPOINTMENT (OUTPATIENT)
Dept: GENERAL RADIOLOGY | Facility: HOSPITAL | Age: 34
End: 2024-06-03
Payer: COMMERCIAL

## 2024-06-03 ENCOUNTER — HOSPITAL ENCOUNTER (EMERGENCY)
Facility: HOSPITAL | Age: 34
Discharge: HOME | End: 2024-06-03
Attending: EMERGENCY MEDICINE
Payer: COMMERCIAL

## 2024-06-03 VITALS
HEART RATE: 85 BPM | OXYGEN SATURATION: 97 % | BODY MASS INDEX: 34.32 KG/M2 | HEIGHT: 65 IN | RESPIRATION RATE: 20 BRPM | WEIGHT: 206 LBS | TEMPERATURE: 98 F | SYSTOLIC BLOOD PRESSURE: 181 MMHG | DIASTOLIC BLOOD PRESSURE: 95 MMHG

## 2024-06-03 DIAGNOSIS — J81.0 ACUTE PULMONARY EDEMA (HCC): Primary | ICD-10-CM

## 2024-06-03 LAB
ALBUMIN SERPL-MCNC: 4.2 G/DL (ref 3.2–4.8)
ALBUMIN/GLOB SERPL: 1.2 {RATIO} (ref 1–2)
ALP LIVER SERPL-CCNC: 73 U/L
ALT SERPL-CCNC: 13 U/L
ANION GAP SERPL CALC-SCNC: 11 MMOL/L (ref 0–18)
AST SERPL-CCNC: 15 U/L (ref ?–34)
BASOPHILS # BLD AUTO: 0.05 X10(3) UL (ref 0–0.2)
BASOPHILS NFR BLD AUTO: 0.7 %
BILIRUB SERPL-MCNC: 0.3 MG/DL (ref 0.3–1.2)
BUN BLD-MCNC: 56 MG/DL (ref 9–23)
BUN/CREAT SERPL: 6.4 (ref 10–20)
CALCIUM BLD-MCNC: 9.1 MG/DL (ref 8.7–10.4)
CHLORIDE SERPL-SCNC: 107 MMOL/L (ref 98–112)
CO2 SERPL-SCNC: 25 MMOL/L (ref 21–32)
CREAT BLD-MCNC: 8.74 MG/DL
DEPRECATED RDW RBC AUTO: 39.9 FL (ref 35.1–46.3)
EGFRCR SERPLBLD CKD-EPI 2021: 6 ML/MIN/1.73M2 (ref 60–?)
EOSINOPHIL # BLD AUTO: 0.18 X10(3) UL (ref 0–0.7)
EOSINOPHIL NFR BLD AUTO: 2.7 %
ERYTHROCYTE [DISTWIDTH] IN BLOOD BY AUTOMATED COUNT: 12.6 % (ref 11–15)
GLOBULIN PLAS-MCNC: 3.5 G/DL (ref 2–3.5)
GLUCOSE BLD-MCNC: 106 MG/DL (ref 70–99)
HCT VFR BLD AUTO: 30.2 %
HGB BLD-MCNC: 10.1 G/DL
IMM GRANULOCYTES # BLD AUTO: 0.02 X10(3) UL (ref 0–1)
IMM GRANULOCYTES NFR BLD: 0.3 %
LYMPHOCYTES # BLD AUTO: 1.89 X10(3) UL (ref 1–4)
LYMPHOCYTES NFR BLD AUTO: 28 %
MCH RBC QN AUTO: 29.2 PG (ref 26–34)
MCHC RBC AUTO-ENTMCNC: 33.4 G/DL (ref 31–37)
MCV RBC AUTO: 87.3 FL
MONOCYTES # BLD AUTO: 0.41 X10(3) UL (ref 0.1–1)
MONOCYTES NFR BLD AUTO: 6.1 %
NEUTROPHILS # BLD AUTO: 4.21 X10 (3) UL (ref 1.5–7.7)
NEUTROPHILS # BLD AUTO: 4.21 X10(3) UL (ref 1.5–7.7)
NEUTROPHILS NFR BLD AUTO: 62.2 %
OSMOLALITY SERPL CALC.SUM OF ELEC: 312 MOSM/KG (ref 275–295)
PLATELET # BLD AUTO: 226 10(3)UL (ref 150–450)
POTASSIUM SERPL-SCNC: 3.5 MMOL/L (ref 3.5–5.1)
PROT SERPL-MCNC: 7.7 G/DL (ref 5.7–8.2)
RBC # BLD AUTO: 3.46 X10(6)UL
SODIUM SERPL-SCNC: 143 MMOL/L (ref 136–145)
WBC # BLD AUTO: 6.8 X10(3) UL (ref 4–11)

## 2024-06-03 PROCEDURE — 71045 X-RAY EXAM CHEST 1 VIEW: CPT

## 2024-06-03 PROCEDURE — 99285 EMERGENCY DEPT VISIT HI MDM: CPT

## 2024-06-03 PROCEDURE — 99284 EMERGENCY DEPT VISIT MOD MDM: CPT

## 2024-06-03 PROCEDURE — 96374 THER/PROPH/DIAG INJ IV PUSH: CPT

## 2024-06-03 PROCEDURE — 85025 COMPLETE CBC W/AUTO DIFF WBC: CPT | Performed by: EMERGENCY MEDICINE

## 2024-06-03 PROCEDURE — 80053 COMPREHEN METABOLIC PANEL: CPT | Performed by: EMERGENCY MEDICINE

## 2024-06-03 RX ORDER — BUMETANIDE 0.25 MG/ML
2 INJECTION INTRAMUSCULAR; INTRAVENOUS ONCE
Status: COMPLETED | OUTPATIENT
Start: 2024-06-03 | End: 2024-06-03

## 2024-06-04 NOTE — ED INITIAL ASSESSMENT (HPI)
Patient states she has a rattle in her chest when she lays down and JOSELUIS. Improves with sitting up, but has JASMINE. Pt is suppose to get dialysis tomorrow

## 2024-06-04 NOTE — ED PROVIDER NOTES
Patient Seen in: [facility] Emergency Department    History     Chief Complaint   Patient presents with    Difficulty Breathing       HPI    The patient presents to the ED complaining of possible pulmonary edema.  She states when she lies down to sleep she feels like her chest is \"rattling\".  On dialysis and scheduled for dialysis tomorrow.  Denies fevers chills or other complaints.    History reviewed.   Past Medical History:    Anxiety state    Arrhythmia    Congestive heart disease (HCC)    Depression    Diabetes (HCC)    Esophageal reflux    Essential hypertension    Hidradenitis    bilateral axilla    High blood pressure    High cholesterol    Kidney failure    Migraines    Polycystic ovarian syndrome    treated with Metformin    PONV (postoperative nausea and vomiting)    Renal disorder    Visual impairment    Wears glasses       History reviewed.   Past Surgical History:   Procedure Laterality Date    Appendectomy      Other Bilateral     excision of axilla hydradenitis         Medications :  (Not in a hospital admission)       Family History   Problem Relation Age of Onset    Hypertension Father     Lipids Father     Obesity Father     Diabetes Mother     Hypertension Mother     Lipids Mother     Obesity Mother     Psychiatric Sister     Psychiatric Brother        Smoking Status:   Social History     Socioeconomic History    Marital status: Single    Number of children: 0   Occupational History    Occupation: [employer]    Tobacco Use    Smoking status: Never    Smokeless tobacco: Never   Vaping Use    Vaping status: Former   Substance and Sexual Activity    Alcohol use: No    Drug use: No       Constitutional and vital signs reviewed.      Social History and Family History elements reviewed from today, pertinent positives to the presenting problem noted.    Physical Exam     ED Triage Vitals [06/03/24 2010]   BP (!) 195/94   Pulse 85   Resp 20   Temp 98.4 °F (36.9 °C)   Temp src Temporal    SpO2 96 %   O2 Device None (Room air)       All measures to prevent infection transmission during my interaction with the patient were taken. Handwashing was performed prior to and after the exam.  Stethoscope and any equipment used during my examination was cleaned with super sani-cloth germicidal wipes following the exam.     Physical Exam  Vitals and nursing note reviewed.   Constitutional:       General: She is not in acute distress.     Appearance: She is obese.   HENT:      Head: Normocephalic and atraumatic.   Eyes:      General:         Right eye: No discharge.         Left eye: No discharge.      Conjunctiva/sclera: Conjunctivae normal.   Neck:      Trachea: No tracheal deviation.   Cardiovascular:      Rate and Rhythm: Normal rate.   Pulmonary:      Effort: Pulmonary effort is normal. No respiratory distress.      Breath sounds: No stridor. Examination of the right-lower field reveals rales. Examination of the left-lower field reveals rales. Rales present.   Abdominal:      General: There is no distension.      Palpations: Abdomen is soft.   Musculoskeletal:         General: No deformity.   Skin:     General: Skin is warm and dry.   Neurological:      Mental Status: She is alert and oriented to person, place, and time.   Psychiatric:         Mood and Affect: Mood normal.         Behavior: Behavior normal.         ED Course        Labs Reviewed   COMP METABOLIC PANEL (14) - Abnormal; Notable for the following components:       Result Value    Glucose 106 (*)     BUN 56 (*)     Creatinine 8.74 (*)     BUN/CREA Ratio 6.4 (*)     Calculated Osmolality 312 (*)     eGFR-Cr 6 (*)     All other components within normal limits   CBC W/ DIFFERENTIAL - Abnormal; Notable for the following components:    RBC 3.46 (*)     HGB 10.1 (*)     HCT 30.2 (*)     All other components within normal limits   CBC WITH DIFFERENTIAL WITH PLATELET    Narrative:     The following orders were created for panel order CBC With  Differential With Platelet.                  Procedure                               Abnormality         Status                                     ---------                               -----------         ------                                     CBC W/ DIFFERENTIAL[[id number]]          Abnormal            Final result                                                 Please view results for these tests on the individual orders.   RAINBOW DRAW LAVENDER   RAINBOW DRAW LIGHT GREEN   RAINBOW DRAW BLUE       As Interpreted by me    Imaging Results Available and Reviewed while in ED: XR CHEST AP PORTABLE  (CPT=71045)    Result Date: 6/3/2024  CONCLUSION:  1. CHF/fluid overload with pulmonary interstitial edema.    Dictated by (CST): Alexys Sun MD on 6/03/2024 at 9:12 PM     Finalized by (CST): Alexys Sun MD on 6/03/2024 at 9:13 PM         ED Medications Administered:   Medications   bumetanide (Bumex) 0.25 MG/ML injection 2 mg (2 mg Intravenous Given 6/3/24 2123)         MDM     Vitals:    06/03/24 2010 06/03/24 2130   BP: (!) 195/94 (!) 181/95   Pulse: 85 85   Resp: 20    Temp: 98.4 °F (36.9 °C)    TempSrc: Temporal    SpO2: 96% 97%   Weight: 93.4 kg    Height: 165.1 cm (5' 5\")      *I personally reviewed and interpreted all ED vitals.    Pulse Ox: 97%, Room air, Normal     Monitor Interpretation:   normal sinus rhythm as interpreted by me.  The cardiac monitor was ordered to monitor heart rate.    Differential Diagnosis/ Diagnostic Considerations: Pulmonary edema, other    Complicating Factors: The patient already has does not have any pertinent problems on file. to contribute to the complexity of this ED evaluation.    Medical Decision Making  The patient presents to the ED with mild pulmonary edema symptoms.  Asymptomatic in the ED but she reports orthopnea.  Chest x-ray with edema noted.  Patient was given IV Bumex in the ED and diuresed well.  Laboratory testing without concerning findings.  To prefer to  go home and I feel stable to do so with scheduled dialysis tomorrow.    Problems Addressed:  Acute pulmonary edema (HCC): acute illness or injury    Amount and/or Complexity of Data Reviewed  Labs: ordered. Decision-making details documented in ED Course.  Radiology: ordered and independent interpretation performed. Decision-making details documented in ED Course.     Details: I personally reviewed the patient's chest x-ray image and noted no pneumothorax        Condition upon leaving the department: Stable    Disposition and Plan     Clinical Impression:  1. Acute pulmonary edema (HCC)        Disposition:  Discharge    Follow-up:  Ashleigh Taylor  [address] [place] [id number]  [phone]    Schedule an appointment as soon as possible for a visit in 3 day(s)        Medications Prescribed:  Discharge Medication List as of 6/3/2024 10:20 PM

## 2024-06-09 ENCOUNTER — APPOINTMENT (OUTPATIENT)
Dept: GENERAL RADIOLOGY | Facility: HOSPITAL | Age: 34
DRG: 252 | End: 2024-06-09

## 2024-06-09 ENCOUNTER — HOSPITAL ENCOUNTER (INPATIENT)
Facility: HOSPITAL | Age: 34
LOS: 9 days | Discharge: HOME | DRG: 252 | End: 2024-06-19
Attending: EMERGENCY MEDICINE | Admitting: HOSPITALIST

## 2024-06-09 DIAGNOSIS — J18.9 COMMUNITY ACQUIRED PNEUMONIA, UNSPECIFIED LATERALITY: Primary | ICD-10-CM

## 2024-06-09 DIAGNOSIS — T82.7XXA INFECTION OF ARTERIOVENOUS FISTULA, INITIAL ENCOUNTER (HCC): ICD-10-CM

## 2024-06-09 LAB
ALBUMIN SERPL-MCNC: 4.6 G/DL (ref 3.2–4.8)
ALBUMIN/GLOB SERPL: 1.3 {RATIO} (ref 1–2)
ALP LIVER SERPL-CCNC: 65 U/L
ALT SERPL-CCNC: 17 U/L
ANION GAP SERPL CALC-SCNC: 12 MMOL/L (ref 0–18)
AST SERPL-CCNC: 21 U/L (ref ?–34)
BASOPHILS # BLD AUTO: 0.03 X10(3) UL (ref 0–0.2)
BASOPHILS NFR BLD AUTO: 0.2 %
BILIRUB SERPL-MCNC: 0.7 MG/DL (ref 0.3–1.2)
BNP SERPL-MCNC: 486 PG/ML
BUN BLD-MCNC: 46 MG/DL (ref 9–23)
CALCIUM BLD-MCNC: 8.4 MG/DL (ref 8.7–10.4)
CHLORIDE SERPL-SCNC: 95 MMOL/L (ref 98–112)
CO2 SERPL-SCNC: 27 MMOL/L (ref 21–32)
CREAT BLD-MCNC: 8.32 MG/DL
DEPRECATED RDW RBC AUTO: 40.3 FL (ref 35.1–46.3)
EGFRCR SERPLBLD CKD-EPI 2021: 6 ML/MIN/1.73M2 (ref 60–?)
EOSINOPHIL # BLD AUTO: 0 X10(3) UL (ref 0–0.7)
EOSINOPHIL NFR BLD AUTO: 0 %
ERYTHROCYTE [DISTWIDTH] IN BLOOD BY AUTOMATED COUNT: 12.9 % (ref 11–15)
FLUAV + FLUBV RNA SPEC NAA+PROBE: NEGATIVE
FLUAV + FLUBV RNA SPEC NAA+PROBE: NEGATIVE
GLOBULIN PLAS-MCNC: 3.6 G/DL (ref 2–3.5)
GLUCOSE BLD-MCNC: 113 MG/DL (ref 70–99)
HCG SERPL QL: NEGATIVE
HCT VFR BLD AUTO: 32.5 %
HGB BLD-MCNC: 10.8 G/DL
IMM GRANULOCYTES # BLD AUTO: 0.14 X10(3) UL (ref 0–1)
IMM GRANULOCYTES NFR BLD: 1 %
LACTATE SERPL-SCNC: 1 MMOL/L (ref 0.5–2)
LYMPHOCYTES # BLD AUTO: 0.63 X10(3) UL (ref 1–4)
LYMPHOCYTES NFR BLD AUTO: 4.5 %
MCH RBC QN AUTO: 29 PG (ref 26–34)
MCHC RBC AUTO-ENTMCNC: 33.2 G/DL (ref 31–37)
MCV RBC AUTO: 87.1 FL
MONOCYTES # BLD AUTO: 0.61 X10(3) UL (ref 0.1–1)
MONOCYTES NFR BLD AUTO: 4.3 %
NEUTROPHILS # BLD AUTO: 12.73 X10 (3) UL (ref 1.5–7.7)
NEUTROPHILS # BLD AUTO: 12.73 X10(3) UL (ref 1.5–7.7)
NEUTROPHILS NFR BLD AUTO: 90 %
PLATELET # BLD AUTO: 223 10(3)UL (ref 150–450)
POTASSIUM SERPL-SCNC: 2.8 MMOL/L (ref 3.5–5.1)
PROT SERPL-MCNC: 8.2 G/DL (ref 5.7–8.2)
RBC # BLD AUTO: 3.73 X10(6)UL
RSV RNA SPEC NAA+PROBE: NEGATIVE
SARS-COV-2 RNA RESP QL NAA+PROBE: NOT DETECTED
SODIUM SERPL-SCNC: 134 MMOL/L (ref 136–145)
WBC # BLD AUTO: 14.1 X10(3) UL (ref 4–11)

## 2024-06-09 PROCEDURE — 0241U SARS-COV-2/FLU A AND B/RSV BY PCR (GENEXPERT): CPT

## 2024-06-09 PROCEDURE — 80053 COMPREHEN METABOLIC PANEL: CPT

## 2024-06-09 PROCEDURE — 84703 CHORIONIC GONADOTROPIN ASSAY: CPT | Performed by: EMERGENCY MEDICINE

## 2024-06-09 PROCEDURE — 87040 BLOOD CULTURE FOR BACTERIA: CPT

## 2024-06-09 PROCEDURE — 87186 SC STD MICRODIL/AGAR DIL: CPT | Performed by: EMERGENCY MEDICINE

## 2024-06-09 PROCEDURE — 96365 THER/PROPH/DIAG IV INF INIT: CPT

## 2024-06-09 PROCEDURE — 96367 TX/PROPH/DG ADDL SEQ IV INF: CPT

## 2024-06-09 PROCEDURE — 80053 COMPREHEN METABOLIC PANEL: CPT | Performed by: EMERGENCY MEDICINE

## 2024-06-09 PROCEDURE — 99285 EMERGENCY DEPT VISIT HI MDM: CPT

## 2024-06-09 PROCEDURE — 85025 COMPLETE CBC W/AUTO DIFF WBC: CPT

## 2024-06-09 PROCEDURE — 83880 ASSAY OF NATRIURETIC PEPTIDE: CPT | Performed by: EMERGENCY MEDICINE

## 2024-06-09 PROCEDURE — 96366 THER/PROPH/DIAG IV INF ADDON: CPT

## 2024-06-09 PROCEDURE — 71045 X-RAY EXAM CHEST 1 VIEW: CPT

## 2024-06-09 PROCEDURE — 87040 BLOOD CULTURE FOR BACTERIA: CPT | Performed by: EMERGENCY MEDICINE

## 2024-06-09 PROCEDURE — 85025 COMPLETE CBC W/AUTO DIFF WBC: CPT | Performed by: EMERGENCY MEDICINE

## 2024-06-09 PROCEDURE — 0241U SARS-COV-2/FLU A AND B/RSV BY PCR (GENEXPERT): CPT | Performed by: EMERGENCY MEDICINE

## 2024-06-09 PROCEDURE — 83605 ASSAY OF LACTIC ACID: CPT | Performed by: EMERGENCY MEDICINE

## 2024-06-09 PROCEDURE — 87205 SMEAR GRAM STAIN: CPT | Performed by: EMERGENCY MEDICINE

## 2024-06-09 PROCEDURE — 36415 COLL VENOUS BLD VENIPUNCTURE: CPT

## 2024-06-09 PROCEDURE — 87150 DNA/RNA AMPLIFIED PROBE: CPT | Performed by: EMERGENCY MEDICINE

## 2024-06-09 PROCEDURE — 83605 ASSAY OF LACTIC ACID: CPT

## 2024-06-09 PROCEDURE — 87077 CULTURE AEROBIC IDENTIFY: CPT | Performed by: EMERGENCY MEDICINE

## 2024-06-09 RX ORDER — IBUPROFEN 400 MG/1
400 TABLET ORAL ONCE
Status: COMPLETED | OUTPATIENT
Start: 2024-06-09 | End: 2024-06-09

## 2024-06-10 LAB
ADENOVIRUS PCR:: NOT DETECTED
ALBUMIN SERPL-MCNC: 4 G/DL (ref 3.2–4.8)
ANION GAP SERPL CALC-SCNC: 13 MMOL/L (ref 0–18)
B PARAPERT DNA SPEC QL NAA+PROBE: NOT DETECTED
B PERT DNA SPEC QL NAA+PROBE: NOT DETECTED
BASOPHILS # BLD AUTO: 0.07 X10(3) UL (ref 0–0.2)
BASOPHILS NFR BLD AUTO: 0.4 %
BILIRUB UR QL CFM: NEGATIVE
BUN BLD-MCNC: 50 MG/DL (ref 9–23)
BUN/CREAT SERPL: 5.3 (ref 10–20)
C DIFF TOX B STL QL: NEGATIVE
C PNEUM DNA SPEC QL NAA+PROBE: NOT DETECTED
CALCIUM BLD-MCNC: 7.9 MG/DL (ref 8.7–10.4)
CHLORIDE SERPL-SCNC: 97 MMOL/L (ref 98–112)
CO2 SERPL-SCNC: 27 MMOL/L (ref 21–32)
CORONAVIRUS 229E PCR:: NOT DETECTED
CORONAVIRUS HKU1 PCR:: NOT DETECTED
CORONAVIRUS NL63 PCR:: NOT DETECTED
CORONAVIRUS OC43 PCR:: NOT DETECTED
CREAT BLD-MCNC: 9.42 MG/DL
DEPRECATED RDW RBC AUTO: 41.3 FL (ref 35.1–46.3)
EGFRCR SERPLBLD CKD-EPI 2021: 5 ML/MIN/1.73M2 (ref 60–?)
EOSINOPHIL # BLD AUTO: 0.03 X10(3) UL (ref 0–0.7)
EOSINOPHIL NFR BLD AUTO: 0.2 %
ERYTHROCYTE [DISTWIDTH] IN BLOOD BY AUTOMATED COUNT: 13 % (ref 11–15)
FLUAV RNA SPEC QL NAA+PROBE: NOT DETECTED
FLUBV RNA SPEC QL NAA+PROBE: NOT DETECTED
GLUCOSE BLD-MCNC: 111 MG/DL (ref 70–99)
HBV CORE AB SERPL QL IA: NONREACTIVE
HBV SURFACE AB SER QL: REACTIVE
HBV SURFACE AB SERPL IA-ACNC: >1000 MIU/ML
HBV SURFACE AG SER-ACNC: <0.1 [IU]/L
HBV SURFACE AG SERPL QL IA: NONREACTIVE
HCT VFR BLD AUTO: 28.4 %
HGB BLD-MCNC: 9.9 G/DL
IMM GRANULOCYTES # BLD AUTO: 0.11 X10(3) UL (ref 0–1)
IMM GRANULOCYTES NFR BLD: 0.6 %
LYMPHOCYTES # BLD AUTO: 0.87 X10(3) UL (ref 1–4)
LYMPHOCYTES NFR BLD AUTO: 5 %
MCH RBC QN AUTO: 30.3 PG (ref 26–34)
MCHC RBC AUTO-ENTMCNC: 34.9 G/DL (ref 31–37)
MCV RBC AUTO: 86.9 FL
METAPNEUMOVIRUS PCR:: NOT DETECTED
MONOCYTES # BLD AUTO: 1.05 X10(3) UL (ref 0.1–1)
MONOCYTES NFR BLD AUTO: 6 %
MYCOPLASMA PNEUMONIA PCR:: NOT DETECTED
NEUTROPHILS # BLD AUTO: 15.4 X10 (3) UL (ref 1.5–7.7)
NEUTROPHILS # BLD AUTO: 15.4 X10(3) UL (ref 1.5–7.7)
NEUTROPHILS NFR BLD AUTO: 87.8 %
OSMOLALITY SERPL CALC.SUM OF ELEC: 298 MOSM/KG (ref 275–295)
PARAINFLUENZA 1 PCR:: NOT DETECTED
PARAINFLUENZA 2 PCR:: NOT DETECTED
PARAINFLUENZA 3 PCR:: NOT DETECTED
PARAINFLUENZA 4 PCR:: NOT DETECTED
PHOSPHATE SERPL-MCNC: 6 MG/DL (ref 2.4–5.1)
PLATELET # BLD AUTO: 190 10(3)UL (ref 150–450)
POTASSIUM SERPL-SCNC: 3 MMOL/L (ref 3.5–5.1)
PROCALCITONIN SERPL-MCNC: 4.87 NG/ML (ref ?–0.05)
RBC # BLD AUTO: 3.27 X10(6)UL
RBC #/AREA URNS AUTO: >10 /HPF
RHINOVIRUS/ENTERO PCR:: NOT DETECTED
RSV RNA SPEC QL NAA+PROBE: NOT DETECTED
SARS-COV-2 RNA NPH QL NAA+NON-PROBE: NOT DETECTED
SODIUM SERPL-SCNC: 137 MMOL/L (ref 136–145)
SP GR UR STRIP: 1.02 (ref 1–1.03)
WBC # BLD AUTO: 17.5 X10(3) UL (ref 4–11)
WBC #/AREA URNS AUTO: >50 /HPF

## 2024-06-10 PROCEDURE — 86704 HEP B CORE ANTIBODY TOTAL: CPT | Performed by: HOSPITALIST

## 2024-06-10 PROCEDURE — 81015 MICROSCOPIC EXAM OF URINE: CPT | Performed by: HOSPITALIST

## 2024-06-10 PROCEDURE — 80503 PATH CLIN CONSLTJ SF 5-20: CPT | Performed by: HOSPITALIST

## 2024-06-10 PROCEDURE — 0202U NFCT DS 22 TRGT SARS-COV-2: CPT | Performed by: INTERNAL MEDICINE

## 2024-06-10 PROCEDURE — 80069 RENAL FUNCTION PANEL: CPT | Performed by: HOSPITALIST

## 2024-06-10 PROCEDURE — 87340 HEPATITIS B SURFACE AG IA: CPT | Performed by: HOSPITALIST

## 2024-06-10 PROCEDURE — 81001 URINALYSIS AUTO W/SCOPE: CPT | Performed by: HOSPITALIST

## 2024-06-10 PROCEDURE — 84145 PROCALCITONIN (PCT): CPT | Performed by: HOSPITALIST

## 2024-06-10 PROCEDURE — 5A1D70Z PERFORMANCE OF URINARY FILTRATION, INTERMITTENT, LESS THAN 6 HOURS PER DAY: ICD-10-PCS | Performed by: INTERNAL MEDICINE

## 2024-06-10 PROCEDURE — 85025 COMPLETE CBC W/AUTO DIFF WBC: CPT | Performed by: HOSPITALIST

## 2024-06-10 PROCEDURE — 87040 BLOOD CULTURE FOR BACTERIA: CPT | Performed by: HOSPITALIST

## 2024-06-10 PROCEDURE — 87493 C DIFF AMPLIFIED PROBE: CPT | Performed by: HOSPITALIST

## 2024-06-10 PROCEDURE — 90935 HEMODIALYSIS ONE EVALUATION: CPT

## 2024-06-10 PROCEDURE — 86706 HEP B SURFACE ANTIBODY: CPT | Performed by: HOSPITALIST

## 2024-06-10 RX ORDER — ACETAMINOPHEN 500 MG
1000 TABLET ORAL EVERY 6 HOURS PRN
Status: DISCONTINUED | OUTPATIENT
Start: 2024-06-10 | End: 2024-06-19

## 2024-06-10 RX ORDER — PROCHLORPERAZINE EDISYLATE 5 MG/ML
5 INJECTION INTRAMUSCULAR; INTRAVENOUS EVERY 8 HOURS PRN
Status: DISCONTINUED | OUTPATIENT
Start: 2024-06-10 | End: 2024-06-19

## 2024-06-10 RX ORDER — ONDANSETRON 2 MG/ML
4 INJECTION INTRAMUSCULAR; INTRAVENOUS EVERY 6 HOURS PRN
Status: DISCONTINUED | OUTPATIENT
Start: 2024-06-10 | End: 2024-06-17

## 2024-06-10 RX ORDER — LIDOCAINE AND PRILOCAINE 25; 25 MG/G; MG/G
CREAM TOPICAL
Status: COMPLETED
Start: 2024-06-10 | End: 2024-06-10

## 2024-06-10 RX ORDER — HEPARIN SODIUM 5000 [USP'U]/ML
5000 INJECTION, SOLUTION INTRAVENOUS; SUBCUTANEOUS EVERY 8 HOURS SCHEDULED
Status: DISCONTINUED | OUTPATIENT
Start: 2024-06-10 | End: 2024-06-17

## 2024-06-10 RX ORDER — BUMETANIDE 1 MG/1
3 TABLET ORAL DAILY
Status: DISCONTINUED | OUTPATIENT
Start: 2024-06-11 | End: 2024-06-14

## 2024-06-10 RX ORDER — POLYETHYLENE GLYCOL 3350 17 G/17G
17 POWDER, FOR SOLUTION ORAL DAILY PRN
Status: DISCONTINUED | OUTPATIENT
Start: 2024-06-10 | End: 2024-06-19

## 2024-06-10 RX ORDER — CARVEDILOL 25 MG/1
25 TABLET ORAL 2 TIMES DAILY
Status: DISCONTINUED | OUTPATIENT
Start: 2024-06-10 | End: 2024-06-19

## 2024-06-10 RX ORDER — BUMETANIDE 1 MG/1
3 TABLET ORAL DAILY
Status: DISCONTINUED | OUTPATIENT
Start: 2024-06-10 | End: 2024-06-10

## 2024-06-10 RX ORDER — SENNOSIDES 8.6 MG
17.2 TABLET ORAL NIGHTLY PRN
Status: DISCONTINUED | OUTPATIENT
Start: 2024-06-10 | End: 2024-06-19

## 2024-06-10 RX ORDER — ATORVASTATIN CALCIUM 40 MG/1
40 TABLET, FILM COATED ORAL NIGHTLY
Status: DISCONTINUED | OUTPATIENT
Start: 2024-06-10 | End: 2024-06-19

## 2024-06-10 RX ORDER — BISACODYL 10 MG
10 SUPPOSITORY, RECTAL RECTAL
Status: DISCONTINUED | OUTPATIENT
Start: 2024-06-10 | End: 2024-06-19

## 2024-06-10 RX ORDER — ACETAMINOPHEN 500 MG
1000 TABLET ORAL ONCE
Status: COMPLETED | OUTPATIENT
Start: 2024-06-10 | End: 2024-06-10

## 2024-06-10 RX ORDER — POTASSIUM CHLORIDE 20 MEQ/1
20 TABLET, EXTENDED RELEASE ORAL ONCE
Status: COMPLETED | OUTPATIENT
Start: 2024-06-10 | End: 2024-06-10

## 2024-06-10 RX ORDER — ACETAMINOPHEN 500 MG
500 TABLET ORAL EVERY 6 HOURS PRN
Status: DISCONTINUED | OUTPATIENT
Start: 2024-06-10 | End: 2024-06-10

## 2024-06-10 NOTE — PLAN OF CARE
Problem: Patient Centered Care  Goal: Patient preferences are identified and integrated in the patient's plan of care  Description: Interventions:  - What would you like us to know as we care for you? Pt from home alone  - Provide timely, complete, and accurate information to patient/family  - Incorporate patient and family knowledge, values, beliefs, and cultural backgrounds into the planning and delivery of care  - Encourage patient/family to participate in care and decision-making at the level they choose  - Honor patient and family perspectives and choices  Outcome: Progressing     Problem: Patient/Family Goals  Goal: Patient/Family Long Term Goal  Description: Patient's Long Term Goal: To improve Resp status    Interventions:  - See additional Care Plan goals for specific interventions  Outcome: Progressing  Goal: Patient/Family Short Term Goal  Description: Patient's Short Term Goal: Return home    Interventions:     - See additional Care Plan goals for specific interventions  Outcome: Progressing     Problem: PAIN - ADULT  Goal: Verbalizes/displays adequate comfort level or patient's stated pain goal  Description: INTERVENTIONS:  - Encourage pt to monitor pain and request assistance  - Assess pain using appropriate pain scale  - Administer analgesics based on type and severity of pain and evaluate response  - Implement non-pharmacological measures as appropriate and evaluate response  - Consider cultural and social influences on pain and pain management  - Manage/alleviate anxiety  - Utilize distraction and/or relaxation techniques  - Monitor for opioid side effects  - Notify MD/LIP if interventions unsuccessful or patient reports new pain  - Anticipate increased pain with activity and pre-medicate as appropriate  Outcome: Progressing     Problem: SAFETY ADULT - FALL  Goal: Free from fall injury  Description: INTERVENTIONS:  - Assess pt frequently for physical needs  - Identify cognitive and physical  deficits and behaviors that affect risk of falls.  - [place] fall precautions as indicated by assessment.  - Educate pt/family on patient safety including physical limitations  - Instruct pt to call for assistance with activity based on assessment  - Modify environment to reduce risk of injury  - Provide assistive devices as appropriate  - Consider OT/PT consult to assist with strengthening/mobility  - Encourage toileting schedule  Outcome: Progressing     Problem: DISCHARGE PLANNING  Goal: Discharge to home or other facility with appropriate resources  Description: INTERVENTIONS:  - Identify barriers to discharge w/pt and caregiver  - Include patient/family/discharge partner in discharge planning  - Arrange for needed discharge resources and transportation as appropriate  - Identify discharge learning needs (meds, wound care, etc)  - Arrange for interpreters to assist at discharge as needed  - Consider post-discharge preferences of patient/family/discharge partner  - Complete POLST form as appropriate  - Assess patient's ability to be responsible for managing their own health  - Refer to Case Management Department for coordinating discharge planning if the patient needs post-hospital services based on physician/LIP order or complex needs related to functional status, cognitive ability or social support system  Outcome: Progressing     Problem: CARDIOVASCULAR - ADULT  Goal: Maintains optimal cardiac output and hemodynamic stability  Description: INTERVENTIONS:  - Monitor vital signs, rhythm, and trends  - Monitor for bleeding, hypotension and signs of decreased cardiac output  - Evaluate effectiveness of vasoactive medications to optimize hemodynamic stability  - Monitor arterial and/or venous puncture sites for bleeding and/or hematoma  - Assess quality of pulses, skin color and temperature  - Assess for signs of decreased coronary artery perfusion - ex. Angina  - Evaluate fluid balance, assess for edema, trend  weights  Outcome: Progressing  Goal: Absence of cardiac arrhythmias or at baseline  Description: INTERVENTIONS:  - Continuous cardiac monitoring, monitor vital signs, obtain 12 lead EKG if indicated  - Evaluate effectiveness of antiarrhythmic and heart rate control medications as ordered  - Initiate emergency measures for life threatening arrhythmias  - Monitor electrolytes and administer replacement therapy as ordered  Outcome: Progressing     Problem: METABOLIC/FLUID AND ELECTROLYTES - ADULT  Goal: Glucose maintained within prescribed range  Description: INTERVENTIONS:  - Monitor Blood Glucose as ordered  - Assess for signs and symptoms of hyperglycemia and hypoglycemia  - Administer ordered medications to maintain glucose within target range  - Assess barriers to adequate nutritional intake and initiate nutrition consult as needed  - Instruct patient on self management of diabetes  Outcome: Progressing  Goal: Electrolytes maintained within normal limits  Description: INTERVENTIONS:  - Monitor labs and rhythm and assess patient for signs and symptoms of electrolyte imbalances  - Administer electrolyte replacement as ordered  - Monitor response to electrolyte replacements, including rhythm and repeat lab results as appropriate  - Fluid restriction as ordered  - Instruct patient on fluid and nutrition restrictions as appropriate  Outcome: Progressing  Goal: Hemodynamic stability and optimal renal function maintained  Description: INTERVENTIONS:  - Monitor labs and assess for signs and symptoms of volume excess or deficit  - Monitor intake, output and patient weight  - Monitor urine specific gravity, serum osmolarity and serum sodium as indicated or ordered  - Monitor response to interventions for patient's volume status, including labs, urine output, blood pressure (other measures as available)  - Encourage oral intake as appropriate  - Instruct patient on fluid and nutrition restrictions as appropriate  Outcome:  Progressing

## 2024-06-10 NOTE — PROGRESS NOTES
[facility] Pharmacy Dosing Service      Initial Pharmacokinetic Consult for Vancomycin Dosing     Liu Walker is a 33 year old female who is being initiated on vancomycin therapy for bacteremia.  Pharmacy has been asked to dose vancomycin by Dr Warner.  The initial treatment and monitoring approach will be non-AUC strategy.        Weight and Temperature:    Wt Readings from Last 1 Encounters:   06/10/24 94.1 kg (207 lb 8 oz)        Temp Readings from Last 1 Encounters:   06/10/24 99.1 °F (37.3 °C) (Oral)      Labs:   Recent Labs   Lab 06/03/24  2115 06/09/24  2035 06/10/24  0630   CREATSERUM 8.74* 8.32* 9.42*      Estimated Creatinine Clearance: 7.6 mL/min (A) (based on SCr of 9.42 mg/dL (H)).     Recent Labs   Lab 06/03/24  2115 06/09/24  2035 06/10/24  0630   WBC 6.8 14.1* 17.5*          The Pharmacokinetic Target is:    Trough/random 15-20 mg/L (applies to IV dosing in HD and IP dosing in APD)    Renal Dosing Considerations:    HD: Home regimen: [facility]     Assessment/Plan:   Initial/Loading dose: Will receive 2250 mg IV (25 mg/kg, capped at 2250 mg) x 1 loading dose.      Maintenance dose: Pharmacy will dose vancomycin per levels    Monitorin) Plan for vancomycin random level to be obtained prior to the 3rd HD session    2) Pharmacy will order SCr as clinically indicated to assess renal function.    3) Pharmacy will monitor for toxicity and efficacy, adjust vancomycin dose and/or frequency, and order vancomycin levels as appropriate per the Pharmacy and Therapeutics Committee approved protocol until discontinuation of the medication.       We appreciate the opportunity to assist in the care of this patient.     Molly López, PharmD  6/10/2024  8:07 AM  [facility] Pharm[facility] Extension: [phone]

## 2024-06-10 NOTE — CONSULTS
[facility]  part of [facility] Infectious Disease Consult    Liu Walker Patient Status:  Inpatient    1990 MRN [id number]   Location [facility] 1W Attending Catracho Warner MD   Hosp Day # 0 PCP Ashleigh Murray     Reason for Consultation:  To help with infection and treatment, requested by Dr. Warner,     History of Present Illness:  Liu Walker is a 33 year old female admitted to [facility] on  with fever,   Significant hx of   - ESRD on HD,  - Htn,   Per Patient she felt tired after her HD on Saturday, later had some chills too,   Next day on  she felt progressively worst with weakness, cough, nausea and fever,   She decided to come to ER at that point,   Here she is febrile too, blood cul drawn on admission are now growing MSSA in 2/2 bottles,   Patient is getting HD right now through AV Fistula, has R HD Catheter in place too which was last used a week ago,   Complains of diarrhea, some abd cramping, headache, cough.   ID is asked to help with infection and treatment,       History:  Past Medical History:    Anxiety state    Arrhythmia    Congestive heart disease (HCC)    Depression    Diabetes (HCC)    Esophageal reflux    Essential hypertension    Hidradenitis    bilateral axilla    High blood pressure    High cholesterol    Kidney failure    Migraines    Polycystic ovarian syndrome    treated with Metformin    PONV (postoperative nausea and vomiting)    Renal disorder    Visual impairment    Wears glasses     Past Surgical History:   Procedure Laterality Date    Appendectomy      Other Bilateral     excision of axilla hydradenitis     Family History   Problem Relation Age of Onset    Hypertension Father     Lipids Father     Obesity Father     Diabetes Mother     Hypertension Mother     Lipids Mother     Obesity Mother     Psychiatric Sister     Psychiatric Brother       reports that she has never smoked. She has never used smokeless tobacco. She  reports that she does not drink alcohol and does not use drugs.    Allergies:  Allergies   Allergen Reactions    Grapefruit HIVES    Other OTHER (SEE COMMENTS)     Muscle relaxants-paralysis    Citrus C Vit [Ascorbate] HIVES    Latex RASH    Shellfish-Derived Products Tightness in Throat       Medications:    Current Facility-Administered Medications:     atorvastatin (Lipitor) tab 40 mg, 40 mg, Oral, Nightly    carvedilol (Coreg) tab 25 mg, 25 mg, Oral, BID    heparin (Porcine) 5000 UNIT/ML injection 5,000 Units, 5,000 Units, Subcutaneous, Q8H MANISHA    ondansetron (Zofran) 4 MG/2ML injection 4 mg, 4 mg, Intravenous, Q6H PRN    prochlorperazine (Compazine) 10 MG/2ML injection 5 mg, 5 mg, Intravenous, Q8H PRN    polyethylene glycol (PEG 3350) (Miralax) 17 g oral packet 17 g, 17 g, Oral, Daily PRN    sennosides (Senokot) tab 17.2 mg, 17.2 mg, Oral, Nightly PRN    bisacodyl (Dulcolax) 10 MG rectal suppository 10 mg, 10 mg, Rectal, Daily PRN    piperacillin-tazobactam (Zosyn) 3.375 g in dextrose 5% 100 mL IVPB-ADDV, 3.375 g, Intravenous, Q12H    acetaminophen (Tylenol Extra Strength) tab 1,000 mg, 1,000 mg, Oral, Q6H PRN    [START ON 6/11/2024] bumetanide (Bumex) tab 3 mg, 3 mg, Oral, Daily    Vancomycin: PHARMACY DOSING, 1 each, Intravenous, See Admin Instructions (RX holding)    Review of Systems:   Constitutional: Negative for anorexia, chills, fatigue, fevers, malaise, night sweats and weight loss.  Eyes: Negative for visual disturbance, irritation and redness.  Ears, nose, mouth, throat, and face: Negative for hearing loss, tinnitus, nasal congestion, snoring, sore throat, hoarseness and voice change.  Respiratory: Negative for cough, sputum, hemoptysis, chest pain, wheezing, dyspnea on exertion, or stridor.  Cardiovascular: Negative for chest pain, palpitations, irregular heart beats, syncope, fatigue, orthopnea, paroxysmal nocturnal dyspnea, lower extremity edema.  Gastrointestinal: Negative for dysphagia,  odynophagia, reflux symptoms, nausea, vomiting, change in bowel habits, diarrhea, constipation and abdominal pain.  Integument/breast: Negative for rash, skin lesions, and pruritus.  Hematologic/lymphatic: Negative for easy bruising, bleeding, and lymphadenopathy.  Musculoskeletal: Negative for myalgias, arthralgias, muscle weakness.  Neurological: Negative for headaches, dizziness, seizures, memory problems, trouble swallowing, speech problems, gait problems and weakness.  Behavioral/Psych: Negative for active tobacco use.  Endocrine: No history of of diabetes, thyroid disorder.  All other review of systems are negative.    Vital signs in last 24 hours:  Patient Vitals for the past 24 hrs:   BP Temp Temp src Pulse Resp SpO2 Weight   06/10/24 1500 128/65 (!) 101.5 °F (38.6 °C) Oral 94 20 96 % --   06/10/24 1225 -- (!) 102.4 °F (39.1 °C) Oral -- -- -- --   06/10/24 0916 132/77 -- -- 85 -- -- --   06/10/24 0730 126/75 99.1 °F (37.3 °C) Oral 84 20 95 % --   06/10/24 0700 -- -- -- 83 -- -- --   06/10/24 0435 125/78 100.1 °F (37.8 °C) Axillary 90 22 94 % 207 lb 8 oz (94.1 kg)   06/10/24 0300 -- -- -- 88 -- -- --   06/10/24 0159 -- -- -- 89 -- -- --   06/10/24 0127 -- -- -- -- -- -- 207 lb 9.6 oz (94.2 kg)   06/10/24 0117 116/70 (!) 100.7 °F (38.2 °C) Oral 92 -- 95 % 207 lb 9.6 oz (94.2 kg)   06/10/24 0103 -- (!) 102.9 °F (39.4 °C) Oral -- -- -- --   06/10/24 0045 131/61 -- -- 93 12 92 % --   06/09/24 2330 (!) 174/87 -- -- 99 22 98 % --   06/09/24 2212 155/80 -- -- 100 20 95 % --   06/09/24 2132 -- (!) 101.4 °F (38.6 °C) Oral -- -- -- --   06/09/24 2028 -- -- -- 94 -- 95 % --   06/09/24 2027 113/62 -- -- 93 -- -- --   06/09/24 2009 127/70 (!) 102.2 °F (39 °C) -- 96 20 96 % --       Physical Exam:   General: alert, cooperative, oriented.  No respiratory distress.   Head: Normocephalic, without obvious abnormality, atraumatic.   Eyes: Conjunctivae/corneas clear.  No scleral icterus.  No conjunctival     hemorrhage.   Nose:  Nares normal.   Throat: Lips, mucosa, and tongue normal.  No thrush noted.   Neck: Soft, supple neck; trachea midline, no adenopathy, no thyromegaly.   Lungs: CTAB, normal and equal bilateral chest rise   Chest wall: No tenderness or deformity. Clean HD catheter,    Heart: Regular rate and rhythm, normal S1S2, no murmur.   Abdomen: soft, non-tender, non-distended, no masses, no guarding, no     rebound, positive BS.   Extremity: no edema, no cyanosis   Skin: No rashes or lesions.   Neurological: Alert, interactive, no focal deficits    Labs:  Lab Results   Component Value Date    WBC 17.5 06/10/2024    HGB 9.9 06/10/2024    HCT 28.4 06/10/2024    .0 06/10/2024    CREATSERUM 9.42 06/10/2024    BUN 50 06/10/2024     06/10/2024    K 3.0 06/10/2024    CL 97 06/10/2024    CO2 27.0 06/10/2024     06/10/2024    CA 7.9 06/10/2024    ALB 4.0 06/10/2024    ALKPHO 65 06/09/2024    BILT 0.7 06/09/2024    TP 8.2 06/09/2024    AST 21 06/09/2024    ALT 17 06/09/2024    PHOS 6.0 06/10/2024       Radiology:  CXR- Patchy bibasilar opacities, which may reflect atelectasis or pulmonary edema with or without superimposed pneumonia.    Mild prominence of the interstitial markings, which may reflect mild pulmonary edema.     Cultures:  Reviewed,     Assessment and Plan:    MSSA Sepsis with fever, lightheadedness, Cough,   - Blood cul with MSSA in 2/2 bottles admission,   - Clean HD catheter, not being used for a week as now getting HD through AV Fistula,   - UA is mildly abn, will follow Cul,   - CXR with Patchy bibasilar opacities, atelectasis or pulmonary edema with or without superimposed pneumonia.    - Will repeat blood cul if not done already,   - Will need to get HD Catheter removed as not being used.  - TTE,   - On IV Vanc, will dc and continue IV Zosyn, will de escalate soon,   - Will get RVP too,     2.   Diarrhea: some abd tenderness too,   - If any persistent diarrhea will get C diff,   - May need CT scan  if persistent tenderness,     3.   Leukocytosis: sec to above, will trend,     4.   Disposition: in house, a middle aged female admitted with MSSA sepsis with unknown source, ? Line,   - Repeat blood cul,   - OK to dc HD catheter per ID,   - TTE.  - RVP,  - Stool for C diff if any persistent diarrhea,   - WBC trend,     Discussed with patient, RN, all questions answered further recommendations to follow, Thanks,   Thank you for consulting [facility] for Liu Walker.  If you have any questions or concerns please call [facility] Infectious Disease at [phone].     Darrick Gu MD  6/10/2024  5:54 PM

## 2024-06-10 NOTE — ED PROVIDER NOTES
Patient Seen in: [facility] Emergency Department      History     Chief Complaint   Patient presents with    Fever     Stated Complaint: fever    Subjective:   HPI    33-year-old female on hemodialysis Tuesday Thursday and Saturday with last dialysis on Saturday presents for evaluation for cough, shortness of breath, fevers body aches and chills the past 2 days.  She is also had some vomiting and diarrhea.  She denies any abdominal pain.    Objective:   Past Medical History:    Anxiety state    Arrhythmia    Congestive heart disease (HCC)    Depression    Diabetes (HCC)    Esophageal reflux    Essential hypertension    Hidradenitis    bilateral axilla    High blood pressure    High cholesterol    Kidney failure    Migraines    Polycystic ovarian syndrome    treated with Metformin    PONV (postoperative nausea and vomiting)    Renal disorder    Visual impairment    Wears glasses              Past Surgical History:   Procedure Laterality Date    Appendectomy      Other Bilateral     excision of axilla hydradenitis                Social History     Socioeconomic History    Marital status: Single    Number of children: 0   Occupational History    Occupation: [employer]    Tobacco Use    Smoking status: Never    Smokeless tobacco: Never   Vaping Use    Vaping status: Former   Substance and Sexual Activity    Alcohol use: No    Drug use: No     Social Determinants of Health     Food Insecurity: No Food Insecurity (1/3/2024)    Food Insecurity     Food Insecurity: Never true   Transportation Needs: No Transportation Needs (1/3/2024)    Transportation Needs     Lack of Transportation: No    Received from [facility]    Housing Stability              Review of Systems    Positive for stated complaint: fever  Other systems are as noted in HPI.  Constitutional and vital signs reviewed.      All other systems reviewed and negative except as noted above.    Physical Exam     ED Triage Vitals    BP 06/09/24 2009 127/70   Pulse 06/09/24 2009 96   Resp 06/09/24 2009 20   Temp 06/09/24 2009 (!) 102.2 °F (39 °C)   Temp src 06/09/24 2132 Oral   SpO2 06/09/24 2009 96 %   O2 Device 06/09/24 2212 None (Room air)       Current Vitals:   Vital Signs  BP: (!) 174/87  Pulse: 99  Resp: 22  Temp: (!) 101.4 °F (38.6 °C)  Temp src: Oral  MAP (mmHg): (!) 112    Oxygen Therapy  SpO2: 98 %  O2 Device: None (Room air)            Physical Exam  Vitals and nursing note reviewed.   Constitutional:       Appearance: Normal appearance.   HENT:      Head: Normocephalic and atraumatic.   Cardiovascular:      Rate and Rhythm: Regular rhythm. Tachycardia present.      Pulses: Normal pulses.           Radial pulses are 2+ on the right side and 2+ on the left side.      Heart sounds: Normal heart sounds.   Pulmonary:      Effort: Pulmonary effort is normal. Tachypnea present.      Breath sounds: Normal air entry. Rhonchi present.   Chest:       Abdominal:      General: Bowel sounds are normal.      Palpations: Abdomen is soft.      Tenderness: There is no abdominal tenderness. There is no guarding or rebound.   Musculoskeletal:         General: Normal range of motion.      Cervical back: Normal range of motion.      Right lower leg: No edema.      Left lower leg: No edema.   Skin:     General: Skin is warm and dry.   Neurological:      General: No focal deficit present.      Mental Status: She is alert.               ED Course     Labs Reviewed   COMP METABOLIC PANEL (14) - Abnormal; Notable for the following components:       Result Value    Glucose 113 (*)     Sodium 134 (*)     Chloride 95 (*)     Creatinine 8.32 (*)     Calcium, Total 8.4 (*)     eGFR-Cr 6 (*)     Globulin  3.6 (*)     All other components within normal limits   BNP (B TYPE NATRIURETIC PEPTIDE) - Abnormal; Notable for the following components:    Beta Natriuretic Peptide 486 (*)     All other components within normal limits   REDRAW CMP (P) - Abnormal; Notable for  the following components:    Potassium 2.8 (*)     BUN 46 (*)     All other components within normal limits   CBC W/ DIFFERENTIAL - Abnormal; Notable for the following components:    WBC 14.1 (*)     RBC 3.73 (*)     HGB 10.8 (*)     HCT 32.5 (*)     Neutrophil Absolute Prelim 12.73 (*)     Neutrophil Absolute 12.73 (*)     Lymphocyte Absolute 0.63 (*)     All other components within normal limits   LACTIC ACID, PLASMA - Normal   HCG, BETA SUBUNIT, QUAL - Normal   SARS-COV-2/FLU A AND B/RSV BY PCR (GENEXPERT) - Normal    Narrative:     This test is intended for the qualitative detection and differentiation of SARS-CoV-2, influenza A, influenza B, and respiratory syncytial virus (RSV) viral RNA in nasopharyngeal or nares swabs from individuals suspected of respiratory viral infection consistent with COVID-19 by their healthcare provider. Signs and symptoms of respiratory viral infection due to SARS-CoV-2, influenza, and RSV can be similar.    Test performed using the Xpert Xpress SARS-CoV-2/FLU/RSV (real time RT-PCR)  assay on the [employer] instrument, [employer], [place] [address].   This test is being used under the Food and Drug Administration's Emergency Use Authorization.    The authorized Fact Sheet for Healthcare Providers for this assay is available upon request from the laboratory.   CBC WITH DIFFERENTIAL WITH PLATELET    Narrative:     The following orders were created for panel order CBC With Differential With Platelet.  Procedure                               Abnormality         Status                     ---------                               -----------         ------                     CBC W/ DIFFERENTIAL[[id number]]          Abnormal            Final result                 Please view results for these tests on the individual orders.   PROCALCITONIN   RAINBOW DRAW LAVENDER   RAINBOW DRAW LIGHT GREEN   RAINBOW DRAW GOLD   RAINBOW DRAW BLUE   BLOOD CULTURE   BLOOD CULTURE          ED Course as of  06/10/24 0014  ------------------------------------------------------------  Time: 06/09 2153  Value: XR CHEST AP PORTABLE  (CPT=71045)  Comment: Per my independent interpretation, patient's CXR demonstrates pneumonia.                [facility]        Admission disposition: 6/9/2024 11:32 PM                                        Medical Decision Making  Differential diagnosis includes but is not limited to viral syndrome, pneumonia, port infection, etc    CBC shows a leukocytosis.  Chemistry consistent with patient's ESRD.  Potassium is low, she is given PO K replacement.  Lactic acid is normal.  At this time patient does not meet severe sepsis or septic shock criteria.  Chest x-ray is consistent with pneumonia.  Patient will be admitted on IV Rocephin and azithromycin with renal on consult.    Rhythm Strip: Rate 110 sinus The cardiac monitor was ordered secondary to the patient's sepsis.     Complicating factors: The patient  has a past medical history of Anxiety state, Arrhythmia, Congestive heart disease (HCC), Depression, Diabetes (HCC), Esophageal reflux, Essential hypertension, Hidradenitis (05/14/2020), High blood pressure, High cholesterol, Kidney failure, Migraines, Polycystic ovarian syndrome, PONV (postoperative nausea and vomiting), Renal disorder, and Visual impairment. and  has a past surgical history that includes appendectomy and other (Bilateral). that contribute to the medical complexity of this ED evaluation.     Medical Record Review: I personally reviewed available prior medical records for any recent pertinent discharge summaries, testing, and procedures, and reviewed those reports.        Problems Addressed:  Community acquired pneumonia, unspecified laterality: acute illness or injury with systemic symptoms    Amount and/or Complexity of Data Reviewed  Labs: ordered. Decision-making details documented in ED Course.  Radiology: ordered and independent interpretation performed. Decision-making  details documented in ED Course.  Discussion of management or test interpretation with external provider(s): Case discussed with Dr. Jeter who accepts admission. Dr. Gay with nephro accepts consult.     Risk  Decision regarding hospitalization.        Disposition and Plan     Clinical Impression:  1. Community acquired pneumonia, unspecified laterality         Disposition:  Admit  6/9/2024 11:32 pm    Follow-up:  No follow-up provider specified.        Medications Prescribed:  Current Discharge Medication List                            Hospital Problems       Present on Admission  Date Reviewed: 3/9/2021            ICD-10-CM Noted POA    * (Principal) Community acquired pneumonia, unspecified laterality J18.9 6/9/2024 Unknown

## 2024-06-10 NOTE — PLAN OF CARE
Problem: Patient Centered Care  Goal: Patient preferences are identified and integrated in the patient's plan of care  Description: Interventions:  - What would you like us to know as we care for you? Lives at home alone. Dialysis on [place]  - Provide timely, complete, and accurate information to patient/family  - Incorporate patient and family knowledge, values, beliefs, and cultural backgrounds into the planning and delivery of care  - Encourage patient/family to participate in care and decision-making at the level they choose  - Honor patient and family perspectives and choices  Outcome: Progressing     Problem: Patient/Family Goals  Goal: Patient/Family Long Term Goal  Description: Patient's Long Term Goal: go home    Interventions:  - IV antibiotics  - monitor vital signs  - monitor lab results  - dialysis  -nephrology on consult  - See additional Care Plan goals for specific interventions  Outcome: Progressing  Goal: Patient/Family Short Term Goal  Description: Patient's Short Term Goal: feel better; find out what is wrong    Interventions:   - IV antibiotics  - monitor vital signs  - monitor lab results  - dialysis as ordered  - See additional Care Plan goals for specific interventions  Outcome: Progressing     Problem: PAIN - ADULT  Goal: Verbalizes/displays adequate comfort level or patient's stated pain goal  Description: INTERVENTIONS:  - Encourage pt to monitor pain and request assistance  - Assess pain using appropriate pain scale  - Administer analgesics based on type and severity of pain and evaluate response  - Implement non-pharmacological measures as appropriate and evaluate response  - Consider cultural and social influences on pain and pain management  - Manage/alleviate anxiety  - Utilize distraction and/or relaxation techniques  - Monitor for opioid side effects  - Notify MD/LIP if interventions unsuccessful or patient reports new pain  - Anticipate increased pain with activity and  pre-medicate as appropriate  Outcome: Progressing     Problem: SAFETY ADULT - FALL  Goal: Free from fall injury  Description: INTERVENTIONS:  - Assess pt frequently for physical needs  - Identify cognitive and physical deficits and behaviors that affect risk of falls.  - [place] fall precautions as indicated by assessment.  - Educate pt/family on patient safety including physical limitations  - Instruct pt to call for assistance with activity based on assessment  - Modify environment to reduce risk of injury  - Provide assistive devices as appropriate  - Consider OT/PT consult to assist with strengthening/mobility  - Encourage toileting schedule  Outcome: Progressing     Problem: DISCHARGE PLANNING  Goal: Discharge to home or other facility with appropriate resources  Description: INTERVENTIONS:  - Identify barriers to discharge w/pt and caregiver  - Include patient/family/discharge partner in discharge planning  - Arrange for needed discharge resources and transportation as appropriate  - Identify discharge learning needs (meds, wound care, etc)  - Arrange for interpreters to assist at discharge as needed  - Consider post-discharge preferences of patient/family/discharge partner  - Complete POLST form as appropriate  - Assess patient's ability to be responsible for managing their own health  - Refer to Case Management Department for coordinating discharge planning if the patient needs post-hospital services based on physician/LIP order or complex needs related to functional status, cognitive ability or social support system  Outcome: Progressing     Problem: CARDIOVASCULAR - ADULT  Goal: Maintains optimal cardiac output and hemodynamic stability  Description: INTERVENTIONS:  - Monitor vital signs, rhythm, and trends  - Monitor for bleeding, hypotension and signs of decreased cardiac output  - Evaluate effectiveness of vasoactive medications to optimize hemodynamic stability  - Monitor arterial and/or venous  puncture sites for bleeding and/or hematoma  - Assess quality of pulses, skin color and temperature  - Assess for signs of decreased coronary artery perfusion - ex. Angina  - Evaluate fluid balance, assess for edema, trend weights  Outcome: Progressing  Goal: Absence of cardiac arrhythmias or at baseline  Description: INTERVENTIONS:  - Continuous cardiac monitoring, monitor vital signs, obtain 12 lead EKG if indicated  - Evaluate effectiveness of antiarrhythmic and heart rate control medications as ordered  - Initiate emergency measures for life threatening arrhythmias  - Monitor electrolytes and administer replacement therapy as ordered  Outcome: Progressing     Problem: METABOLIC/FLUID AND ELECTROLYTES - ADULT  Goal: Glucose maintained within prescribed range  Description: INTERVENTIONS:  - Monitor Blood Glucose as ordered  - Assess for signs and symptoms of hyperglycemia and hypoglycemia  - Administer ordered medications to maintain glucose within target range  - Assess barriers to adequate nutritional intake and initiate nutrition consult as needed  - Instruct patient on self management of diabetes  Outcome: Progressing  Goal: Electrolytes maintained within normal limits  Description: INTERVENTIONS:  - Monitor labs and rhythm and assess patient for signs and symptoms of electrolyte imbalances  - Administer electrolyte replacement as ordered  - Monitor response to electrolyte replacements, including rhythm and repeat lab results as appropriate  - Fluid restriction as ordered  - Instruct patient on fluid and nutrition restrictions as appropriate  Outcome: Progressing  Goal: Hemodynamic stability and optimal renal function maintained  Description: INTERVENTIONS:  - Monitor labs and assess for signs and symptoms of volume excess or deficit  - Monitor intake, output and patient weight  - Monitor urine specific gravity, serum osmolarity and serum sodium as indicated or ordered  - Monitor response to interventions for  patient's volume status, including labs, urine output, blood pressure (other measures as available)  - Encourage oral intake as appropriate  - Instruct patient on fluid and nutrition restrictions as appropriate  Outcome: Progressing   Fever controlled with tylenol. Up with standby assist. Poor appetite. Dialysis at bedside. Positive blood culture. Continue to monitor temperature. Bed on low and locked position, call light within reach.

## 2024-06-10 NOTE — PROGRESS NOTES
Orders for admission, patient is aware of plan and ready to go upstairs. Any questions, please call ED RN Kiki at extension [phone].     Patient Covid vaccination status: Unvaccinated     COVID Test Ordered in ED: SARS-CoV-2/Flu A and B/RSV by PCR (GeneXpert)    COVID Suspicion at Admission: N/A    Running Infusions:  None    Mental Status/LOC at time of transport: AO x4    Other pertinent information:   CIWA score: N/A   NIH score:  N/A

## 2024-06-10 NOTE — ED INITIAL ASSESSMENT (HPI)
S: fever, nausea, dizziness since yesterday. Denies vomiting. Denies sick contacts.     B: HTN, dm, chf

## 2024-06-10 NOTE — CONSULTS
[facility]  part of [facility]    Report of Consultation    Liu Walker Patient Status:  Inpatient    1990 MRN [id number]   Location [facility] 1W Attending Catracho Warner MD   Hosp Day # 0 PCP Ashleigh Murray     Date of Admission:  2024  Date of Consult:  6/10/2024  Reason for Consultation:   ESRD on HD    History of Present Illness:   Patient is a 33 year old female with PMH of ESRD on HD TTS, HTN, HL, HFpEF admitted with fevers SOB and bacteremia, were consulted to resume iHD while inpatient.    Patient reports having improved fevers and chills, no significant SOB at this time.    Past Medical History  Past Medical History:    Anxiety state    Arrhythmia    Congestive heart disease (HCC)    Depression    Diabetes (HCC)    Esophageal reflux    Essential hypertension    Hidradenitis    bilateral axilla    High blood pressure    High cholesterol    Kidney failure    Migraines    Polycystic ovarian syndrome    treated with Metformin    PONV (postoperative nausea and vomiting)    Renal disorder    Visual impairment    Wears glasses       Past Surgical History  Past Surgical History:   Procedure Laterality Date    Appendectomy      Other Bilateral     excision of axilla hydradenitis       Family History  Family History   Problem Relation Age of Onset    Hypertension Father     Lipids Father     Obesity Father     Diabetes Mother     Hypertension Mother     Lipids Mother     Obesity Mother     Psychiatric Sister     Psychiatric Brother        Social History  Social History     Socioeconomic History    Marital status: Single    Number of children: 0   Occupational History    Occupation: [employer]    Tobacco Use    Smoking status: Never    Smokeless tobacco: Never   Vaping Use    Vaping status: Former   Substance and Sexual Activity    Alcohol use: No    Drug use: No     Social Determinants of Health     Food Insecurity: No Food Insecurity (6/10/2024)    Food  Insecurity     Food Insecurity: Never true   Transportation Needs: No Transportation Needs (6/10/2024)    Transportation Needs     Lack of Transportation: No   Housing Stability: Low Risk  (6/10/2024)    Housing Stability     Housing Instability: No           Current Medications:  Current Facility-Administered Medications   Medication Dose Route Frequency    atorvastatin (Lipitor) tab 40 mg  40 mg Oral Nightly    carvedilol (Coreg) tab 25 mg  25 mg Oral BID    heparin (Porcine) 5000 UNIT/ML injection 5,000 Units  5,000 Units Subcutaneous Q8H MANISHA    ondansetron (Zofran) 4 MG/2ML injection 4 mg  4 mg Intravenous Q6H PRN    prochlorperazine (Compazine) 10 MG/2ML injection 5 mg  5 mg Intravenous Q8H PRN    polyethylene glycol (PEG 3350) (Miralax) 17 g oral packet 17 g  17 g Oral Daily PRN    sennosides (Senokot) tab 17.2 mg  17.2 mg Oral Nightly PRN    bisacodyl (Dulcolax) 10 MG rectal suppository 10 mg  10 mg Rectal Daily PRN    piperacillin-tazobactam (Zosyn) 3.375 g in dextrose 5% 100 mL IVPB-ADDV  3.375 g Intravenous Q12H    vancomycin (Vancocin) 2.25 g in sodium chloride 0.9% 500 mL IVPB premix  25 mg/kg Intravenous Once    acetaminophen (Tylenol Extra Strength) tab 1,000 mg  1,000 mg Oral Q6H PRN    [START ON 6/11/2024] bumetanide (Bumex) tab 3 mg  3 mg Oral Daily    Vancomycin: PHARMACY DOSING  1 each Intravenous See Admin Instructions (RX holding)     Medications Prior to Admission   Medication Sig    bumetanide 1 MG Oral Tab Take 3 tablets (3 mg total) by mouth daily.    acetaminophen 500 MG Oral Tab Take 1 tablet (500 mg total) by mouth every 6 (six) hours as needed for Fever (equal to or greater than 100.4).    ondansetron 4 MG Oral Tablet Dispersible Take 1 tablet (4 mg total) by mouth every 6 (six) hours as needed. Watch severe muscle stiffness/fever    carvedilol 25 MG Oral Tab Take 1 tablet (25 mg total) by mouth 2 (two) times daily.    EPINEPHrine 0.3 MG/0.3ML Injection Solution Auto-injector TAKE 1 AUTO  PEN AS NEEDED BY INJECTION AS DIRECTED    atorvastatin 40 MG Oral Tab Take 1 tablet (40 mg total) by mouth nightly.    [] doxycycline 100 MG Oral Cap Take 1 capsule (100 mg total) by mouth 2 (two) times daily for 7 days.    [] cefadroxil 500 MG Oral Cap Take 1 capsule (500 mg total) by mouth 2 (two) times daily for 10 days.    HYDROcodone-acetaminophen 5-325 MG Oral Tab Take 1 tablet by mouth every 6 (six) hours as needed for Pain. Watch drowsiness no alcohol    sennosides 17.2 MG Oral Tab Take 1 tablet (17.2 mg total) by mouth nightly as needed (constipation, as needed if no bowel movement that day).    NIFEdipine ER 90 MG Oral Tablet 24 Hr Take 1 tablet (90 mg total) by mouth nightly. (Patient not taking: Reported on 2024)       Allergies  Allergies   Allergen Reactions    Grapefruit HIVES    Other OTHER (SEE COMMENTS)     Muscle relaxants-paralysis    Citrus C Vit [Ascorbate] HIVES    Latex RASH    Shellfish-Derived Products Tightness in Throat       Review of Systems:   Pertinent items are noted in HPI.    Physical Exam:   Vital Signs:  Blood pressure 132/77, pulse 85, temperature 99.1 °F (37.3 °C), temperature source Oral, resp. rate 20, weight 207 lb 8 oz (94.1 kg), last menstrual period 2024, SpO2 95%, not currently breastfeeding.    General: No acute distress. Alert and oriented x 3.  HEENT: Moist mucous membranes.   Respiratory: Diminished breath sounds  Cardiovascular: S1, S2. No Rubs  Abdomen: Soft, nontender, nondistended.  Positive bowel sounds.  Ext: Trace LE edema, RIJ tunneled HD catheter, LUE BC AVF + Bruit and thrill.      Results:     Laboratory Data:  Lab Results   Component Value Date    WBC 17.5 (H) 06/10/2024    HGB 9.9 (L) 06/10/2024    HCT 28.4 (L) 06/10/2024    .0 06/10/2024    CREATSERUM 9.42 (H) 06/10/2024    BUN 50 (H) 06/10/2024     06/10/2024    K 3.0 (L) 06/10/2024    CL 97 (L) 06/10/2024    CO2 27.0 06/10/2024     (H) 06/10/2024    CA 7.9  (L) 06/10/2024    ALB 4.0 06/10/2024    ALKPHO 65 06/09/2024    TP 8.2 06/09/2024    AST 21 06/09/2024    ALT 17 06/09/2024    PTT 31.8 02/17/2017    TSH 2.910 02/21/2022    LIP 69 12/30/2023    DDIMER 2.89 (H) 01/03/2024    MG 2.5 04/22/2024    PHOS 6.0 (H) 06/10/2024    TROP <0.045 12/03/2021         Imaging:  XR CHEST AP PORTABLE  (CPT=71045)    Result Date: 6/9/2024  CONCLUSION:   Patchy bibasilar opacities, which may reflect atelectasis or pulmonary edema with or without superimposed pneumonia.  Mild prominence of the interstitial markings, which may reflect mild pulmonary edema.  Redemonstrated enlarged cardiomediastinal silhouette.    Dictated by (CST): Chuy Harris MD on 6/09/2024 at 9:30 PM     Finalized by (CST): Chuy Harris MD on 6/09/2024 at 9:33 PM              Impression:   33 year old female with PMH of ESRD on HD TTS, HTN, HL, HFpEF admitted with fevers SOB and bacteremia, were consulted to resume iHD while inpatient:    ESRD on HD TTS:  - CXR with volume overload  - Last HD on Saturday  - Will plan HD today via fistula  - Patient reports having been using the fistula and plan for removal of her HD catheter.    Bacteremia:  - Cultures from Catheter.  - ABX and Line holiday per ID appreciate input.    Anemia in ESRD:  - Will obtain iron stores  - Resume epogen  - holding on venofer in light of active infection    Hypokalemia:  - 4K potassium bath with iHD    BMD:  - Will check Phos levels daily.    HTN:  - Continue coreg  - Bumex only on non HD days.    Thank you for allowing me to participate in the care of your patient.    Bernardino Bourgeois MD  [facility]  Nephrology

## 2024-06-10 NOTE — ED QUICK NOTES
Last hemodialysis treatment via right chest catheter yesterday (TuThSa), has non-functioning LUE fistula.      Recently seen in ED for pulmonary edema, received diuretics.  No respiratory distress, 97% SpO2 on room air.  Reporting chills, diaphoretic

## 2024-06-10 NOTE — CM/SW NOTE
06/10/24 1000   CM/SW Referral Data   Referral Source    Reason for Referral Discharge planning   Informant Patient   Medical Hx   Does patient have an established PCP? Yes  (Ashleigh Murray)   Patient Info   Patient's Current Mental Status at Time of Assessment Alert;Oriented   Patient's Home Environment House   Patient lives with Alone   Patient Status Prior to Admission   Independent with ADLs and Mobility Yes   Services in place prior to admission Dialysis   Dialysis Clinic   ([facility])   Scheduled Dialysis days T-TH-SAT   Dialysis scheduled time 1100     Pt discussed during nursing rounds. Dx CAP on RA, ESRD on HD. Home alone, completely independent and active w/o device at baseline. Pt drives self to HD on her own. Current w/[facility] for outpatient HD services, TTS at 11am. No additional home care needs anticipated on dc.     Plan: Home pending medical clearance.    / to remain available for support and/or discharge planning.     ROWENA Duron    [phone]

## 2024-06-10 NOTE — H&P
[facility] Hospitalist H&P       CC:   Chief Complaint   Patient presents with    Fever        PCP: Ashleigh Murray    History of Present Illness: Ms. Walker is a 33 year old female with PMH sig for ESRD on HD (t/t/s), HTN, HLD, obesity, HFpEF, who presents with fever, found to have bacteremia.  Patient states on Saturday she felt tired and after HD she noted feeling chills, and generally weak, symptoms progressed on Sunday with cough, nausea, lightheadedness as well as fevers and chills, so she came to ER.  She denies CP or sob, no vomiting, but has had diarrhea.  No other complaints.        PMH  Past Medical History:    Anxiety state    Arrhythmia    Congestive heart disease (HCC)    Depression    Diabetes (HCC)    Esophageal reflux    Essential hypertension    Hidradenitis    bilateral axilla    High blood pressure    High cholesterol    Kidney failure    Migraines    Polycystic ovarian syndrome    treated with Metformin    PONV (postoperative nausea and vomiting)    Renal disorder    Visual impairment    Wears glasses        PSH  Past Surgical History:   Procedure Laterality Date    Appendectomy      Other Bilateral     excision of axilla hydradenitis        ALL:  Allergies   Allergen Reactions    Grapefruit HIVES    Other OTHER (SEE COMMENTS)     Muscle relaxants-paralysis    Citrus C Vit [Ascorbate] HIVES    Latex RASH    Shellfish-Derived Products Tightness in Throat        Home Medications:  Outpatient Medications Marked as Taking for the 6/9/24 encounter (Hospital Encounter)   Medication Sig Dispense Refill    bumetanide 1 MG Oral Tab Take 3 tablets (3 mg total) by mouth daily.      acetaminophen 500 MG Oral Tab Take 1 tablet (500 mg total) by mouth every 6 (six) hours as needed for Fever (equal to or greater than 100.4).      ondansetron 4 MG Oral Tablet Dispersible Take 1 tablet (4 mg total) by mouth every 6 (six) hours as needed. Watch severe muscle stiffness/fever 10 tablet 0     carvedilol 25 MG Oral Tab Take 1 tablet (25 mg total) by mouth 2 (two) times daily.      EPINEPHrine 0.3 MG/0.3ML Injection Solution Auto-injector TAKE 1 AUTO PEN AS NEEDED BY INJECTION AS DIRECTED      atorvastatin 40 MG Oral Tab Take 1 tablet (40 mg total) by mouth nightly. 30 tablet 0         Soc Hx  Social History     Tobacco Use    Smoking status: Never    Smokeless tobacco: Never   Substance Use Topics    Alcohol use: No        Fam Hx  Family History   Problem Relation Age of Onset    Hypertension Father     Lipids Father     Obesity Father     Diabetes Mother     Hypertension Mother     Lipids Mother     Obesity Mother     Psychiatric Sister     Psychiatric Brother        Review of Systems  Comprehensive ROS reviewed and negative except for what's stated above.     OBJECTIVE:  /78 (BP Location: Right arm)   Pulse 90   Temp 100.1 °F (37.8 °C) (Axillary)   Resp 22   Wt 207 lb 8 oz (94.1 kg)   LMP 05/14/2024 (Exact Date)   SpO2 94%   BMI 34.53 kg/m²   General:  Alert, no distress   Head:  Normocephalic, without obvious abnormality, atraumatic.   Eyes:  Sclera anicteric, No conjunctival pallor    Nose: Nares normal. Septum midline    Throat: Lips, mucosa, and tongue normal.    Neck: Supple,     Lungs:   Clear to auscultation bilaterally. Normal effort   Chest wall:  HD catheter in place   Heart:  Regular rate and rhythm, S1, S2 normal, no murmur, rub or gallop appreciated   Abdomen:   Soft, non-tender. Bowel sounds normal. No masses,      Extremities: Extremities normal, atraumatic, no cyanosis or edema.   Skin: Skin color, texture, turgor normal. No rashes or lesions.    Neurologic: Normal strength, no focal deficit appreciated     Diagnostic Data:    CBC/Chem  Recent Labs   Lab 06/03/24  2115 06/09/24  2035 06/10/24  0630   WBC 6.8 14.1* 17.5*   HGB 10.1* 10.8* 9.9*   MCV 87.3 87.1 86.9   .0 223.0 190.0       Recent Labs   Lab 06/03/24 2115 06/09/24 2035 06/09/24  2041    134*  --     K 3.5  --  2.8*    95*  --    CO2 25.0 27.0  --    BUN 56*  --  46*   CREATSERUM 8.74* 8.32*  --    * 113*  --    CA 9.1 8.4*  --        Recent Labs   Lab 06/03/24 2115 06/09/24 2035 06/09/24 2041   ALT 13  --  17   AST 15  --  21   ALB 4.2 4.6  --        No results for input(s): \"TROP\" in the last 168 hours.       Radiology: XR CHEST AP PORTABLE  (CPT=71045)    Result Date: 6/9/2024  CONCLUSION:   Patchy bibasilar opacities, which may reflect atelectasis or pulmonary edema with or without superimposed pneumonia.  Mild prominence of the interstitial markings, which may reflect mild pulmonary edema.  Redemonstrated enlarged cardiomediastinal silhouette.    Dictated by (CST): Chuy Harris MD on 6/09/2024 at 9:30 PM     Finalized by (CST): Chuy Harris MD on 6/09/2024 at 9:33 PM             ASSESSMENT / PLAN:   Ms. Walker is a 33 year old female with PMH sig for ESRD on HD (t/t/s), HTN, HLD, obesity, HFpEF, who presents with fever, found to have bacteremia.     Sepsis   Bacteremia   Diarrhea  Leucocytosis   - Fevers, WBC 17, BC + GPC's,   - LA negative  - change ab's to Vanc and zosyn for now  - repeat BC  - does have HD line (no longer using as patient has fistula)  - ID consulted     ESRD  - on HD t/t/s  - renal consulted  - may need to discuss removing HD line    Anemia  - hgb 10.8 on admission  - likely ACD 2/2 ESRD  - monitor     HFpEF  - poss infiltrates vs fluid  - voluvme removal with HD    HTN  - continue coreg  - hold nifedipine    HLD  - statin     FN:  - IVF:none (ESRD)  - Diet: adv     DVT Prophy:scd, heparin  Lines: PIV, and HD line    Dispo: pending clinical course    Outpatient records or previous hospital records reviewed.     Further recommendations pending patient's clinical course.  [facility] hospitalist to continue to follow patient while in house    Patient and/or patient's family given opportunity to ask questions and note understanding and agreeing with therapeutic plan as  outlined    Thank You,  Catracho Warner MD    Hospitalist with [facility]  Answering Service number: [phone]

## 2024-06-10 NOTE — PROGRESS NOTES
Dr. Allen at bedside, patient reports x1 day flu-like symptoms including dizziness, nausea, diarrhea, cough.  Patient still produces urine, denies dysuria.  Tmax 104.7F

## 2024-06-11 ENCOUNTER — APPOINTMENT (OUTPATIENT)
Dept: CV DIAGNOSTICS | Facility: HOSPITAL | Age: 34
DRG: 252 | End: 2024-06-11
Attending: NURSE PRACTITIONER

## 2024-06-11 ENCOUNTER — APPOINTMENT (OUTPATIENT)
Dept: NUCLEAR MEDICINE | Facility: HOSPITAL | Age: 34
DRG: 252 | End: 2024-06-11
Attending: NURSE PRACTITIONER

## 2024-06-11 LAB
ALBUMIN SERPL-MCNC: 4.1 G/DL (ref 3.2–4.8)
ANION GAP SERPL CALC-SCNC: 10 MMOL/L (ref 0–18)
BASOPHILS # BLD AUTO: 0.04 X10(3) UL (ref 0–0.2)
BASOPHILS NFR BLD AUTO: 0.4 %
BUN BLD-MCNC: 43 MG/DL (ref 9–23)
BUN/CREAT SERPL: 5.3 (ref 10–20)
CALCIUM BLD-MCNC: 8.4 MG/DL (ref 8.7–10.4)
CHLORIDE SERPL-SCNC: 98 MMOL/L (ref 98–112)
CO2 SERPL-SCNC: 28 MMOL/L (ref 21–32)
CREAT BLD-MCNC: 8.17 MG/DL
DEPRECATED RDW RBC AUTO: 42.6 FL (ref 35.1–46.3)
EGFRCR SERPLBLD CKD-EPI 2021: 6 ML/MIN/1.73M2 (ref 60–?)
EOSINOPHIL # BLD AUTO: 0.14 X10(3) UL (ref 0–0.7)
EOSINOPHIL NFR BLD AUTO: 1.5 %
ERYTHROCYTE [DISTWIDTH] IN BLOOD BY AUTOMATED COUNT: 13.2 % (ref 11–15)
GLUCOSE BLD-MCNC: 102 MG/DL (ref 70–99)
HCT VFR BLD AUTO: 29.4 %
HGB BLD-MCNC: 9.5 G/DL
IMM GRANULOCYTES # BLD AUTO: 0.02 X10(3) UL (ref 0–1)
IMM GRANULOCYTES NFR BLD: 0.2 %
LYMPHOCYTES # BLD AUTO: 1.46 X10(3) UL (ref 1–4)
LYMPHOCYTES NFR BLD AUTO: 15.4 %
MAGNESIUM SERPL-MCNC: 2.1 MG/DL (ref 1.6–2.6)
MCH RBC QN AUTO: 28.5 PG (ref 26–34)
MCHC RBC AUTO-ENTMCNC: 32.3 G/DL (ref 31–37)
MCV RBC AUTO: 88.3 FL
MONOCYTES # BLD AUTO: 0.85 X10(3) UL (ref 0.1–1)
MONOCYTES NFR BLD AUTO: 8.9 %
NEUTROPHILS # BLD AUTO: 7 X10 (3) UL (ref 1.5–7.7)
NEUTROPHILS # BLD AUTO: 7 X10(3) UL (ref 1.5–7.7)
NEUTROPHILS NFR BLD AUTO: 73.6 %
OSMOLALITY SERPL CALC.SUM OF ELEC: 293 MOSM/KG (ref 275–295)
PHOSPHATE SERPL-MCNC: 4.6 MG/DL (ref 2.4–5.1)
PLATELET # BLD AUTO: 183 10(3)UL (ref 150–450)
POTASSIUM SERPL-SCNC: 3 MMOL/L (ref 3.5–5.1)
RBC # BLD AUTO: 3.33 X10(6)UL
SODIUM SERPL-SCNC: 136 MMOL/L (ref 136–145)
WBC # BLD AUTO: 9.5 X10(3) UL (ref 4–11)

## 2024-06-11 PROCEDURE — 85025 COMPLETE CBC W/AUTO DIFF WBC: CPT | Performed by: HOSPITALIST

## 2024-06-11 PROCEDURE — 93306 TTE W/DOPPLER COMPLETE: CPT | Performed by: NURSE PRACTITIONER

## 2024-06-11 PROCEDURE — 80069 RENAL FUNCTION PANEL: CPT | Performed by: HOSPITALIST

## 2024-06-11 PROCEDURE — 78802 RP LOCLZJ TUM WHBDY 1 D IMG: CPT | Performed by: NURSE PRACTITIONER

## 2024-06-11 PROCEDURE — 83735 ASSAY OF MAGNESIUM: CPT | Performed by: HOSPITALIST

## 2024-06-11 NOTE — PROGRESS NOTES
[facility] Hospitalist Progress Note     CC: Hospital Follow up    PCP: Ashleigh Murray       Assessment/Plan:   Ms. Walker is a 33 year old female with PMH sig for ESRD on HD (t/t/s), HTN, HLD, obesity, HFpEF, who presented with fever, found to have bacteremia.      Sepsis   Bacteremia   Diarrhea  - MSSA bacteremia  - source, unclear, fistula vs HD line  - was treated few months back with oral antibiotics for possible drainage around or near fistula by surgeon   - TTE pending   - ID on consult      ESRD  - on HD t/t/s  - renal consulted     Anemia  - hgb 10.8 on admission  - likely ACD 2/2 ESRD  - monitor      HFpEF  - poss infiltrates vs fluid  - volume removal with HD     HTN  - continue coreg  - can hold nifedipine today     HLD  - statin     Marco Muhammad DO  [facility] Hospitalist      Subjective:     Feels ok. No new symptoms no fever.       OBJECTIVE:    Blood pressure 130/75, pulse 66, temperature 97.7 °F (36.5 °C), temperature source Oral, resp. rate 18, weight 207 lb 8 oz (94.1 kg), last menstrual period 05/14/2024, SpO2 96%, not currently breastfeeding.    Temp:  [97.7 °F (36.5 °C)-102.2 °F (39 °C)] 97.7 °F (36.5 °C)  Pulse:  [] 66  Resp:  [18-20] 18  BP: (114-151)/(60-85) 130/75  SpO2:  [93 %-96 %] 96 %      Intake/Output:    Intake/Output Summary (Last 24 hours) at 6/11/2024 1457  Last data filed at 6/11/2024 1100  Gross per 24 hour   Intake 1490 ml   Output 2000 ml   Net -510 ml       Last 3 Weights   06/10/24 0435 207 lb 8 oz (94.1 kg)   06/10/24 0127 207 lb 9.6 oz (94.2 kg)   06/10/24 0117 207 lb 9.6 oz (94.2 kg)   06/03/24 2010 206 lb (93.4 kg)   04/22/24 1518 211 lb (95.7 kg)       /75 (BP Location: Right arm)   Pulse 66   Temp 97.7 °F (36.5 °C) (Oral)   Resp 18   Wt 207 lb 8 oz (94.1 kg)   LMP 05/14/2024 (Exact Date)   SpO2 96%   BMI 34.53 kg/m²   General: Alert, no acute distress  Lungs: clear to ausculation bilaterally  Heart: Regular rate and  rhythm  Abdomen: soft, non tender  Extremities: No edema    Data Review:       Labs:     Recent Labs   Lab 06/09/24  2035 06/10/24  0630 06/11/24  0835   RBC 3.73* 3.27* 3.33*   HGB 10.8* 9.9* 9.5*   HCT 32.5* 28.4* 29.4*   MCV 87.1 86.9 88.3   MCH 29.0 30.3 28.5   MCHC 33.2 34.9 32.3   RDW 12.9 13.0 13.2   NEPRELIM 12.73* 15.40* 7.00   WBC 14.1* 17.5* 9.5   .0 190.0 183.0         Recent Labs   Lab 06/09/24  2035 06/09/24  2041 06/10/24  0630 06/11/24  0835   *  --  111* 102*   BUN  --  46* 50* 43*   CREATSERUM 8.32*  --  9.42* 8.17*   EGFRCR 6*  --  5* 6*   CA 8.4*  --  7.9* 8.4*   *  --  137 136   K  --  2.8* 3.0* 3.0*   CL 95*  --  97* 98   CO2 27.0  --  27.0 28.0       Recent Labs   Lab 06/09/24  2035 06/09/24  2041 06/10/24  0630 06/11/24  0835   ALT  --  17  --   --    AST  --  21  --   --    ALB 4.6  --  4.0 4.1         Imaging:  XR CHEST AP PORTABLE  (CPT=71045)    Result Date: 6/9/2024  CONCLUSION:   Patchy bibasilar opacities, which may reflect atelectasis or pulmonary edema with or without superimposed pneumonia.  Mild prominence of the interstitial markings, which may reflect mild pulmonary edema.  Redemonstrated enlarged cardiomediastinal silhouette.    Dictated by (CST): Chuy Harris MD on 6/09/2024 at 9:30 PM     Finalized by (CST): Chuy Harris MD on 6/09/2024 at 9:33 PM             Meds:      ceFAZolin  1 g Intravenous Q24H    atorvastatin  40 mg Oral Nightly    carvedilol  25 mg Oral BID    heparin  5,000 Units Subcutaneous Q8H MANISHA    [Held by provider] bumetanide  3 mg Oral Daily         ondansetron    prochlorperazine    polyethylene glycol (PEG 3350)    sennosides    bisacodyl    acetaminophen

## 2024-06-11 NOTE — PROGRESS NOTES
[facility]  part of [facility] Infectious Disease  Progress Note    Liu Walker Patient Status:  Inpatient    1990 MRN [id number]   Location [facility] 1W Attending Marco Muhammad DO   Hosp Day # 1 PCP Ashleigh Murray     Subjective:    Patient seen and examined resting in bed. With continued complaints of diarrhea and decreased appetite states her stomach hurts after eating, no tenderness today though. Does report menstrual cramps.     Review of Systems:  Review of systems reviewed and negative except as mentioned    Objective:  Blood pressure 151/85, pulse 85, temperature 98.8 °F (37.1 °C), temperature source Oral, resp. rate 18, weight 207 lb 8 oz (94.1 kg), last menstrual period 2024, SpO2 95%, not currently breastfeeding.        Physical Exam:  General: Awake, alert, non-tox, NAD.  HEENT:  Oropharynx clear, trachea ML.  Heart: RRR S1S2 no murmurs.  Lungs: diminished   Abdomen: Soft, NT/ND.  BS present.   Extremity: +left arm AV fistula   Neurological: No focal deficits.  Derm:  Warm, dry, free from rashes.  Right chest HD cath    Lab Data Review:  Lab Results   Component Value Date    WBC 9.5 2024    HGB 9.5 2024    HCT 29.4 2024    .0 2024    CREATSERUM 8.17 2024    BUN 43 2024     2024    K 3.0 2024    CL 98 2024    CO2 28.0 2024     2024    CA 8.4 2024    ALB 4.1 2024    MG 2.1 2024    PHOS 4.6 2024        Cultures:  Hospital Encounter on 24   1. Blood Culture     Status: Abnormal (Preliminary result)    Collection Time: 24  8:41 PM    Specimen: Blood,peripheral   Result Value Ref Range    Blood Culture Result Positive N/A    Blood Culture Result Staphylococcus aureus (A) N/A    Blood Smear Gram positive cocci in pairs and clusters (A) N/A        Radiology:  XR CHEST AP PORTABLE  (CPT=71045)    Result Date:  6/9/2024  CONCLUSION:   Patchy bibasilar opacities, which may reflect atelectasis or pulmonary edema with or without superimposed pneumonia.  Mild prominence of the interstitial markings, which may reflect mild pulmonary edema.  Redemonstrated enlarged cardiomediastinal silhouette.    Dictated by (CST): Chuy Harris MD on 6/09/2024 at 9:30 PM     Finalized by (CST): Chuy Harris MD on 6/09/2024 at 9:33 PM          XR CHEST AP PORTABLE  (CPT=71045)    Result Date: 6/3/2024  CONCLUSION:  1. CHF/fluid overload with pulmonary interstitial edema.    Dictated by (CST): Alexys Sun MD on 6/03/2024 at 9:12 PM     Finalized by (CST): Alexys Sun MD on 6/03/2024 at 9:13 PM             Assessment and Plan:    MSSA Sepsis with fever, lightheadedness, Cough,   - Blood cul with MSSA in 2/2 bottles admission,   - Repeat blood cultures pending   - Per pt was treated with PO abx for fistula drainage/infection in April by surgeon  - UA is mildly abn, will follow Cul,   - CXR with Patchy bibasilar opacities, atelectasis or pulmonary edema with or without superimposed pneumonia.    - RVP negative   - TTE ordered and pending   - IV Zosyn ongoing      Diarrhea with abd tenderness  - C diff negative   - abd tenderness with improvement per pt     Leukocytosis: sec to above  - resolved  - will trend    Hx ESRD  - with AV fistula and Right chest HD cath in place  - per nephrology     Recommendations  - Will deescalate Zosyn to Ancef  - Follow pending cultures  - TTE ordered and pending  - Indium scan ordered to r/o AV fistula infection  - Repeat labs tomorrow  - Further recommendations to follow    Plan of care discussed with Dr. Walter, she is in agreement with the plan of care.     Plan of care discussed with patient and RN.       If you have any questions or concerns please call [facility] Infectious Disease at [phone].     TONO Vargas    6/11/2024  9:30 AM

## 2024-06-11 NOTE — PAYOR COMM NOTE
--------------  ADMISSION REVIEW     Payor: CARY KATZ  Subscriber #:  [id number]  Authorization Number: [id number]    Admit date: 6/10/24  Admit time:  1:17 AM     6/9 Patient Seen in: [facility] Emergency Department    History   Stated Complaint: fever    33-year-old female on hemodialysis Tuesday Thursday and Saturday with last dialysis on Saturday presents for evaluation for cough, shortness of breath, fevers body aches and chills the past 2 days.  She is also had some vomiting and diarrhea.  She denies any abdominal pain.    Objective:   Past Medical History:    Anxiety state    Arrhythmia    Congestive heart disease (HCC)    Depression    Diabetes (HCC)    Esophageal reflux    Essential hypertension    Hidradenitis    bilateral axilla    High blood pressure    High cholesterol    Kidney failure    Migraines    Polycystic ovarian syndrome    treated with Metformin    PONV (postoperative nausea and vomiting)    Renal disorder    Visual impairment    Wears glasses     Past Surgical History:   Procedure Laterality Date    Appendectomy      Other Bilateral     excision of axilla hydradenitis     Physical Exam     ED Triage Vitals   BP 06/09/24 2009 127/70   Pulse 06/09/24 2009 96   Resp 06/09/24 2009 20   Temp 06/09/24 2009 (!) 102.2 °F (39 °C)   Temp src 06/09/24 2132 Oral   SpO2 06/09/24 2009 96 %   O2 Device 06/09/24 2212 None (Room air)     Current Vitals:   Vital Signs  BP: (!) 174/87  Pulse: 99  Resp: 22  Temp: (!) 101.4 °F (38.6 °C)  Temp src: Oral  MAP (mmHg): (!) 112    Physical Exam  Vitals and nursing note reviewed.   Constitutional:       Appearance: Normal appearance.   HENT:      Head: Normocephalic and atraumatic.   Cardiovascular:      Rate and Rhythm: Regular rhythm. Tachycardia present.      Pulses: Normal pulses.           Radial pulses are 2+ on the right side and 2+ on the left side.      Heart sounds: Normal heart sounds.   Pulmonary:      Effort: Pulmonary effort is normal. Tachypnea  present.      Breath sounds: Normal air entry. Rhonchi present.   Abdominal:      General: Bowel sounds are normal.      Palpations: Abdomen is soft.      Tenderness: There is no abdominal tenderness. There is no guarding or rebound.   Musculoskeletal:         General: Normal range of motion.      Cervical back: Normal range of motion.      Right lower leg: No edema.      Left lower leg: No edema.   Skin:     General: Skin is warm and dry.   Neurological:      General: No focal deficit present.      Mental Status: She is alert.     Labs Reviewed   COMP METABOLIC PANEL (14) - Abnormal; Notable for the following components:       Result Value    Glucose 113 (*)     Sodium 134 (*)     Chloride 95 (*)     Creatinine 8.32 (*)     Calcium, Total 8.4 (*)     eGFR-Cr 6 (*)     Globulin  3.6 (*)     All other components within normal limits   BNP (B TYPE NATRIURETIC PEPTIDE) - Abnormal; Notable for the following components:    Beta Natriuretic Peptide 486 (*)     All other components within normal limits   REDRAW CMP (P) - Abnormal; Notable for the following components:    Potassium 2.8 (*)     BUN 46 (*)     All other components within normal limits   CBC W/ DIFFERENTIAL - Abnormal; Notable for the following components:    WBC 14.1 (*)     RBC 3.73 (*)     HGB 10.8 (*)     HCT 32.5 (*)     Neutrophil Absolute Prelim 12.73 (*)     Neutrophil Absolute 12.73 (*)     Lymphocyte Absolute 0.63 (*)     All other components within normal limits   LACTIC ACID, PLASMA - Normal   HCG, BETA SUBUNIT, QUAL - Normal   SARS-COV-2/FLU A AND B/RSV BY PCR (GENEXPERT) - Normal   BLOOD CULTURE   BLOOD CULTURE     XR CHEST AP PORTABLE Comment: Per my independent interpretation, patient's CXR demonstrates pneumonia.    Medical Decision Making  Differential diagnosis includes but is not limited to viral syndrome, pneumonia, port infection, etc    CBC shows a leukocytosis.  Chemistry consistent with patient's ESRD.  Potassium is low, she is given  PO K replacement.  Lactic acid is normal.  At this time patient does not meet severe sepsis or septic shock criteria.  Chest x-ray is consistent with pneumonia.  Patient will be admitted on IV Rocephin and azithromycin with renal on consult.    Rhythm Strip: Rate 110 sinus The cardiac monitor was ordered secondary to the patient's sepsis.     Complicating factors: The patient  has a past medical history of Anxiety state, Arrhythmia, Congestive heart disease (HCC), Depression, Diabetes (HCC), Esophageal reflux, Essential hypertension, Hidradenitis (05/14/2020), High blood pressure, High cholesterol, Kidney failure, Migraines, Polycystic ovarian syndrome, PONV (postoperative nausea and vomiting), Renal disorder, and Visual impairment. and  has a past surgical history that includes appendectomy and other (Bilateral). that contribute to the medical complexity of this ED evaluation.     Disposition and Plan     Clinical Impression:  1. Community acquired pneumonia, unspecified laterality         6/10:    History and Physical     Ms. Walker is a 33 year old female with PMH sig for ESRD on HD (t/t/s), HTN, HLD, obesity, HFpEF, who presents with fever, found to have bacteremia.  Patient states on Saturday she felt tired and after HD she noted feeling chills, and generally weak, symptoms progressed on Sunday with cough, nausea, lightheadedness as well as fevers and chills, so she came to ER.  She denies CP or sob, no vomiting, but has had diarrhea.  No other complaints.         OBJECTIVE:  /78 (BP Location: Right arm)   Pulse 90   Temp 100.1 °F (37.8 °C) (Axillary)   Resp 22   Wt 207 lb 8 oz (94.1 kg)   LMP 05/14/2024 (Exact Date)   SpO2 94%   BMI 34.53 kg/m²   General:  Alert, no distress   Head:  Normocephalic, without obvious abnormality, atraumatic.   Eyes:  Sclera anicteric, No conjunctival pallor    Nose: Nares normal. Septum midline    Throat: Lips, mucosa, and tongue normal.    Neck: Supple,     Lungs:    Clear to auscultation bilaterally. Normal effort   Chest wall:  HD catheter in place   Heart:  Regular rate and rhythm, S1, S2 normal, no murmur, rub or gallop appreciated   Abdomen:   Soft, non-tender. Bowel sounds normal. No masses,      Extremities: Extremities normal, atraumatic, no cyanosis or edema.   Skin: Skin color, texture, turgor normal. No rashes or lesions.    Neurologic: Normal strength, no focal deficit appreciated      Lab 06/03/24  2115 06/09/24  2035 06/10/24  0630   WBC 6.8 14.1* 17.5*   HGB 10.1* 10.8* 9.9*   MCV 87.3 87.1 86.9   .0 223.0 190.0      Lab 06/03/24 2115 06/09/24 2035 06/09/24  2041    134*  --    K 3.5  --  2.8*    95*  --    CO2 25.0 27.0  --    BUN 56*  --  46*   CREATSERUM 8.74* 8.32*  --    * 113*  --    CA 9.1 8.4*  --       ALT 13  --  17   AST 15  --  21   ALB 4.2 4.6  --          XR CHEST AP PORTABLE   Result Date: 6/9/2024  Patchy bibasilar opacities, which may reflect atelectasis or pulmonary edema with or without superimposed pneumonia.  Mild prominence of the interstitial markings, which may reflect mild pulmonary edema.  Redemonstrated enlarged cardiomediastinal silhouette.         ASSESSMENT / PLAN:   Ms. Walker is a 33 year old female with PMH sig for ESRD on HD (t/t/s), HTN, HLD, obesity, HFpEF, who presents with fever, found to have bacteremia.      Sepsis   Bacteremia   Diarrhea  Leucocytosis   - Fevers, WBC 17, BC + GPC's,   - LA negative  - change ab's to Vanc and zosyn for now  - repeat BC  - does have HD line (no longer using as patient has fistula)  - ID consulted      ESRD  - on HD t/t/s  - renal consulted  - may need to discuss removing HD line     Anemia  - hgb 10.8 on admission  - likely ACD 2/2 ESRD  - monitor      HFpEF  - poss infiltrates vs fluid  - voluvme removal with HD     HTN  - continue coreg  - hold nifedipine     HLD  - statin      FN:  - IVF:none (ESRD)  - Diet: adv      DVT Prophy:scd, heparin  Lines: PIV, and HD  line     Dispo: pending clinical course       RENAL:    33 year old female with PMH of ESRD on HD TTS, HTN, HL, HFpEF admitted with fevers SOB and bacteremia, were consulted to resume iHD while inpatient:     ESRD on HD TTS:  - CXR with volume overload  - Last HD on Saturday  - Will plan HD today via fistula  - Patient reports having been using the fistula and plan for removal of her HD catheter.     Bacteremia:  - Cultures from Catheter.  - ABX and Line holiday per ID appreciate input.     Anemia in ESRD:  - Will obtain iron stores  - Resume epogen  - holding on venofer in light of active infection     Hypokalemia:  - 4K potassium bath with iHD     BMD:  - Will check Phos levels daily.     HTN:  - Continue coreg  - Bumex only on non HD days.      ID:    Liu Walker is a 33 year old female admitted to [facility] on 6/09 with fever,   Significant hx of   - ESRD on HD,  - Htn,   Per Patient she felt tired after her HD on Saturday, later had some chills too,   Next day on Sunday she felt progressively worst with weakness, cough, nausea and fever,   She decided to come to ER at that point,   Here she is febrile too, blood cul drawn on admission are now growing MSSA in 2/2 bottles,   Patient is getting HD right now through AV Fistula, has R HD Catheter in place too which was last used a week ago,   Complains of diarrhea, some abd cramping, headache, cough.   ID is asked to help with infection and treatment,           Assessment and Plan:     MSSA Sepsis with fever, lightheadedness, Cough,   - Blood cul with MSSA in 2/2 bottles admission,   - Clean HD catheter, not being used for a week as now getting HD through AV Fistula,   - UA is mildly abn, will follow Cul,   - CXR with Patchy bibasilar opacities, atelectasis or pulmonary edema with or without superimposed pneumonia.    - Will repeat blood cul if not done already,   - Will need to get HD Catheter removed as not being used.  - TTE,   - On IV Vanc, will dc and continue IV  Zosyn, will de escalate soon,   - Will get RVP too,      2.   Diarrhea: some abd tenderness too,   - If any persistent diarrhea will get C diff,   - May need CT scan if persistent tenderness,      3.   Leukocytosis: sec to above, will trend,      4.   Disposition: in house, a middle aged female admitted with MSSA sepsis with unknown source, ? Line,   - Repeat blood cul,   - OK to dc HD catheter per ID,   - TTE.  - RVP,  - Stool for C diff if any persistent diarrhea,   - WBC trend,     6/11:    ID:      Patient seen and examined resting in bed. With continued complaints of diarrhea and decreased appetite states her stomach hurts after eating, no tenderness today though. Does report menstrual cramps.       Objective:  Blood pressure 151/85, pulse 85, temperature 98.8 °F (37.1 °C), temperature source Oral, resp. rate 18, weight 207 lb 8 oz (94.1 kg), last menstrual period 05/14/2024, SpO2 95%, not currently breastfeeding.     Physical Exam:  General: Awake, alert, non-tox, NAD.  HEENT:  Oropharynx clear, trachea ML.  Heart: RRR S1S2 no murmurs.  Lungs: diminished   Abdomen: Soft, NT/ND.  BS present.   Extremity: +left arm AV fistula   Neurological: No focal deficits.  Derm:  Warm, dry, free from rashes.  Right chest HD cath     Lab Data Review:        Lab Results   Component Value Date     WBC 9.5 06/11/2024     HGB 9.5 06/11/2024     HCT 29.4 06/11/2024     .0 06/11/2024     CREATSERUM 8.17 06/11/2024     BUN 43 06/11/2024      06/11/2024     K 3.0 06/11/2024     CL 98 06/11/2024     CO2 28.0 06/11/2024      06/11/2024     CA 8.4 06/11/2024     ALB 4.1 06/11/2024     MG 2.1 06/11/2024     PHOS 4.6 06/11/2024         Assessment and Plan:     MSSA Sepsis with fever, lightheadedness, Cough,   - Blood cul with MSSA in 2/2 bottles admission,   - Repeat blood cultures pending   - Per pt was treated with PO abx for fistula drainage/infection in April by surgeon  - UA is mildly abn, will follow Cul,   -  CXR with Patchy bibasilar opacities, atelectasis or pulmonary edema with or without superimposed pneumonia.    - RVP negative   - TTE ordered and pending   - IV Zosyn ongoing        Diarrhea with abd tenderness  - C diff negative   - abd tenderness with improvement per pt     Leukocytosis: sec to above  - resolved  - will trend     Hx ESRD  - with AV fistula and Right chest HD cath in place  - per nephrology      Recommendations  - Will deescalate Zosyn to Ancef  - Follow pending cultures  - TTE ordered and pending  - Indium scan ordered to r/o AV fistula infection  - Repeat labs tomorrow  - Further recommendations to follow    MEDICATIONS ADMINISTERED IN LAST 1 DAY:  acetaminophen (Tylenol Extra Strength) tab 1,000 mg       Date Action Dose Route User    6/11/2024 0925 Given 1,000 mg Oral Roberta Clarke RN    6/10/2024 1850 Given 1,000 mg Oral DanniNoelle garcia RN    6/10/2024 1226 Given 1,000 mg Oral DanniNoelle RN       ondansetron (Zofran) 4 MG/2ML injection 4 mg       Date Action Dose Route User    6/10/2024 1948 Given 4 mg Intravenous Pilar Hoskins RN          piperacillin-tazobactam (Zosyn) 3.375 g in dextrose 5% 100 mL IVPB-ADDV       Date Action Dose Route User    6/11/2024 0720 New Bag 3.375 g Intravenous Pilar Hoskins RN    6/10/2024 2059 New Bag 3.375 g Intravenous Pilar Hoskins RN            Vitals (last day)       Date/Time Temp Pulse Resp BP SpO2 Weight O2 Device O2 Flow Rate (L/min) [facility]    06/11/24 0807 98.8 °F (37.1 °C) 85 18 151/85 95 % -- None (Room air) -- EL    06/11/24 0700 -- 77 -- -- -- -- -- -- RW    06/11/24 0307 99.2 °F (37.3 °C) 81 20 114/60 95 % -- None (Room air) -- AS    06/11/24 0300 -- 80 -- -- -- -- -- -- DB    06/10/24 2351 98.9 °F (37.2 °C) -- -- -- -- -- -- -- LT    06/10/24 1949 102.2 °F (39 °C) 96 20 127/71 93 % -- None (Room air) -- LT    06/10/24 1900 -- 102 -- -- -- -- -- -- DB    06/10/24 1817 101.2 °F (38.4 °C) 105 -- 143/73 -- -- -- -- CP    06/10/24 1500  101.5 °F (38.6 °C) 94 20 128/65 96 % -- None (Room air) --     06/10/24 1225 102.4 °F (39.1 °C) -- -- -- -- -- -- --     06/10/24 0916 -- 85 -- 132/77 -- -- -- --     06/10/24 0730 99.1 °F (37.3 °C) 84 20 126/75 95 % -- None (Room air) --     06/10/24 0700 -- 83 -- -- -- -- -- --     06/10/24 0435 100.1 °F (37.8 °C) 90 22 125/78 94 % 207 lb 8 oz None (Room air) --     06/10/24 0300 -- 88 -- -- -- -- -- --     06/10/24 0159 -- 89 -- -- -- -- -- --     06/10/24 0127 -- -- -- -- -- 207 lb 9.6 oz -- --     06/10/24 0117 100.7 °F (38.2 °C) 92 -- 116/70 95 % 207 lb 9.6 oz None (Room air) --     06/10/24 0103 102.9 °F (39.4 °C) -- -- -- -- -- -- --     06/10/24 0045 -- 93 12 131/61 92 % -- None (Room air) --

## 2024-06-11 NOTE — PLAN OF CARE
Up independently. Tolerating meals. Plan for nuc med scan tomorrow. All safety measures in place.     Problem: Patient Centered Care  Goal: Patient preferences are identified and integrated in the patient's plan of care  Description: Interventions:  - What would you like us to know as we care for you? Dialysis T/TH/SAT  - Provide timely, complete, and accurate information to patient/family  - Incorporate patient and family knowledge, values, beliefs, and cultural backgrounds into the planning and delivery of care  - Encourage patient/family to participate in care and decision-making at the level they choose  - Honor patient and family perspectives and choices  Outcome: Progressing     Problem: PAIN - ADULT  Goal: Verbalizes/displays adequate comfort level or patient's stated pain goal  Description: INTERVENTIONS:  - Encourage pt to monitor pain and request assistance  - Assess pain using appropriate pain scale  - Administer analgesics based on type and severity of pain and evaluate response  - Implement non-pharmacological measures as appropriate and evaluate response  - Consider cultural and social influences on pain and pain management  - Manage/alleviate anxiety  - Utilize distraction and/or relaxation techniques  - Monitor for opioid side effects  - Notify MD/LIP if interventions unsuccessful or patient reports new pain  - Anticipate increased pain with activity and pre-medicate as appropriate  Outcome: Progressing     Problem: SAFETY ADULT - FALL  Goal: Free from fall injury  Description: INTERVENTIONS:  - Assess pt frequently for physical needs  - Identify cognitive and physical deficits and behaviors that affect risk of falls.  - [place] fall precautions as indicated by assessment.  - Educate pt/family on patient safety including physical limitations  - Instruct pt to call for assistance with activity based on assessment  - Modify environment to reduce risk of injury  - Provide assistive devices as  appropriate  - Consider OT/PT consult to assist with strengthening/mobility  - Encourage toileting schedule  Outcome: Progressing     Problem: DISCHARGE PLANNING  Goal: Discharge to home or other facility with appropriate resources  Description: INTERVENTIONS:  - Identify barriers to discharge w/pt and caregiver  - Include patient/family/discharge partner in discharge planning  - Arrange for needed discharge resources and transportation as appropriate  - Identify discharge learning needs (meds, wound care, etc)  - Arrange for interpreters to assist at discharge as needed  - Consider post-discharge preferences of patient/family/discharge partner  - Complete POLST form as appropriate  - Assess patient's ability to be responsible for managing their own health  - Refer to Case Management Department for coordinating discharge planning if the patient needs post-hospital services based on physician/LIP order or complex needs related to functional status, cognitive ability or social support system  Outcome: Progressing     Problem: CARDIOVASCULAR - ADULT  Goal: Maintains optimal cardiac output and hemodynamic stability  Description: INTERVENTIONS:  - Monitor vital signs, rhythm, and trends  - Monitor for bleeding, hypotension and signs of decreased cardiac output  - Evaluate effectiveness of vasoactive medications to optimize hemodynamic stability  - Monitor arterial and/or venous puncture sites for bleeding and/or hematoma  - Assess quality of pulses, skin color and temperature  - Assess for signs of decreased coronary artery perfusion - ex. Angina  - Evaluate fluid balance, assess for edema, trend weights  Outcome: Progressing  Goal: Absence of cardiac arrhythmias or at baseline  Description: INTERVENTIONS:  - Continuous cardiac monitoring, monitor vital signs, obtain 12 lead EKG if indicated  - Evaluate effectiveness of antiarrhythmic and heart rate control medications as ordered  - Initiate emergency measures for life  threatening arrhythmias  - Monitor electrolytes and administer replacement therapy as ordered  Outcome: Progressing     Problem: METABOLIC/FLUID AND ELECTROLYTES - ADULT  Goal: Glucose maintained within prescribed range  Description: INTERVENTIONS:  - Monitor Blood Glucose as ordered  - Assess for signs and symptoms of hyperglycemia and hypoglycemia  - Administer ordered medications to maintain glucose within target range  - Assess barriers to adequate nutritional intake and initiate nutrition consult as needed  - Instruct patient on self management of diabetes  Outcome: Progressing  Goal: Electrolytes maintained within normal limits  Description: INTERVENTIONS:  - Monitor labs and rhythm and assess patient for signs and symptoms of electrolyte imbalances  - Administer electrolyte replacement as ordered  - Monitor response to electrolyte replacements, including rhythm and repeat lab results as appropriate  - Fluid restriction as ordered  - Instruct patient on fluid and nutrition restrictions as appropriate  Outcome: Progressing  Goal: Hemodynamic stability and optimal renal function maintained  Description: INTERVENTIONS:  - Monitor labs and assess for signs and symptoms of volume excess or deficit  - Monitor intake, output and patient weight  - Monitor urine specific gravity, serum osmolarity and serum sodium as indicated or ordered  - Monitor response to interventions for patient's volume status, including labs, urine output, blood pressure (other measures as available)  - Encourage oral intake as appropriate  - Instruct patient on fluid and nutrition restrictions as appropriate  Outcome: Progressing

## 2024-06-11 NOTE — PROGRESS NOTES
[facility] Pharmacy Dosing Service  Antibiotic Dosing    Liu Walker is a 33 year old for whom pharmacy is dosing Ancef for treatment of  bacteremia.     Allergies: is allergic to grapefruit, other, citrus c vit [ascorbate], latex, and shellfish-derived products.    Vitals: /85 (BP Location: Right arm)   Pulse 85   Temp 98.8 °F (37.1 °C) (Oral)   Resp 18   Wt 94.1 kg (207 lb 8 oz)   LMP 05/14/2024 (Exact Date)   SpO2 95%   BMI 34.53 kg/m²     Labs:   WBC   Date Value Ref Range Status   06/11/2024 9.5 4.0 - 11.0 x10(3) uL Final     Creatinine   Date Value Ref Range Status   06/11/2024 8.17 (H) 0.55 - 1.02 mg/dL Final     BUN   Date Value Ref Range Status   06/11/2024 43 (H) 9 - 23 mg/dL Final       CrCl: estimated creatinine clearance is 8.7 mL/min (A) (based on SCr of 8.17 mg/dL (H)).    Cultures:   Hospital Encounter on 06/09/24   1. Blood Culture     Status: Abnormal (Preliminary result)    Collection Time: 06/09/24  8:41 PM    Specimen: Blood,peripheral   Result Value Ref Range    Blood Culture Result Positive N/A    Blood Culture Result Staphylococcus aureus (A) N/A    Blood Smear Gram positive cocci in pairs and clusters (A) N/A       Assessment/Plan: Ancef 1 gram q24hrs     Pharmacy will continue to follow.  We appreciate the opportunity to assist in the care of this patient.    Thank you,   Stephanie Tavera, PharmD  6/11/2024  9:41 AM

## 2024-06-11 NOTE — PROGRESS NOTES
[facility]  part of [facility]    Progress Note    Liu Walker Patient Status:  Inpatient    1990 MRN [id number]   Location [facility] 1W Attending Catracho Warner MD   Hosp Day # 1 PCP Ashleigh Murray       Subjective:     Tolerated HD through fistula overnight.  Patient reports having had an infection in fistula with drainage from fistula.     Objective:   Vital Signs:  Blood pressure 151/85, pulse 85, temperature 98.8 °F (37.1 °C), temperature source Oral, resp. rate 18, weight 207 lb 8 oz (94.1 kg), last menstrual period 2024, SpO2 95%, not currently breastfeeding.     General: No acute distress. Alert and oriented x 3.  HEENT: Moist mucous membranes.   Respiratory: Diminished breath sounds  Cardiovascular: S1, S2. No Rubs  Abdomen: Soft, nontender, nondistended.  Positive bowel sounds.  Ext: Trace LE edema, RIJ tunneled HD catheter, LUE BC AVF + Bruit and thrill.    Results:     Lab Results   Component Value Date    WBC 17.5 (H) 06/10/2024    HGB 9.9 (L) 06/10/2024    HCT 28.4 (L) 06/10/2024    .0 06/10/2024    CREATSERUM 9.42 (H) 06/10/2024    BUN 50 (H) 06/10/2024     06/10/2024    K 3.0 (L) 06/10/2024    CL 97 (L) 06/10/2024    CO2 27.0 06/10/2024     (H) 06/10/2024    CA 7.9 (L) 06/10/2024    ALB 4.0 06/10/2024    ALKPHO 65 2024    BILT 0.7 2024    TP 8.2 2024    AST 21 2024    ALT 17 2024    PTT 31.8 2017    TSH 2.910 2022    LIP 69 2023    DDIMER 2.89 (H) 2024    MG 2.5 2024    PHOS 6.0 (H) 06/10/2024    TROP <0.045 2021       XR CHEST AP PORTABLE  (CPT=71045)    Result Date: 2024  CONCLUSION:   Patchy bibasilar opacities, which may reflect atelectasis or pulmonary edema with or without superimposed pneumonia.  Mild prominence of the interstitial markings, which may reflect mild pulmonary edema.  Redemonstrated enlarged cardiomediastinal silhouette.    Dictated by  (CST): Chuy Harris MD on 6/09/2024 at 9:30 PM     Finalized by (CST): Chuy Harris MD on 6/09/2024 at 9:33 PM                       Bernardino Bourgeois MD  6/11/2024    Assessment and Plan:   33 year old female with PMH of ESRD on HD TTS, HTN, HL, HFpEF admitted with fevers SOB and bacteremia, were consulted to resume iHD while inpatient:     ESRD on HD TTS:  - HD done on Monday  - Will resume HD tom or Thursday pending ID/Vascular follow up.  - I did discuss with patient and current nephrologist Dr. Cleveland discussed with patient having her primary group follow up. patient would like to resume current care with our group at this time.     Bacteremia:  - Cultures from Catheter.  - will need to evaluate source of bacteremia given patients history fistula infection in the past( consider vascular evaluation or per ID) prior to catheter removal.     Anemia in ESRD:  - Will obtain iron stores  - Resume epogen  - holding on venofer in light of active infection     Hypokalemia:  - 4K potassium bath with iHD     BMD:  - Will check Phos levels daily.     HTN:  - Continue coreg  - Bumex only on non HD days.     Thank you for allowing me to participate in the care of your patient.     Bernardino Bourgeois MD  [facility]  Nephrology

## 2024-06-11 NOTE — PLAN OF CARE
Bed locked in low position, belongings in reach, call light in reach. Frequent rounding done, all patient needs met. Non-slip socks on/at bedside. No complaints of pain this shift. Temperature continues to fluctuate, tylenol given, see MAR. IV Zosyn continues. Dialysis done last night, 2L removed per report. Bumex held by MD post dialysis as BP was slightly low for patient norm. Patient declining heparin shots as she is walking and doesn't want to take it. No tele issues tonight.    Problem: PAIN - ADULT  Goal: Verbalizes/displays adequate comfort level or patient's stated pain goal  Description: INTERVENTIONS:  - Encourage pt to monitor pain and request assistance  - Assess pain using appropriate pain scale  - Administer analgesics based on type and severity of pain and evaluate response  - Implement non-pharmacological measures as appropriate and evaluate response  - Consider cultural and social influences on pain and pain management  - Manage/alleviate anxiety  - Utilize distraction and/or relaxation techniques  - Monitor for opioid side effects  - Notify MD/LIP if interventions unsuccessful or patient reports new pain  - Anticipate increased pain with activity and pre-medicate as appropriate  Outcome: Progressing     Problem: SAFETY ADULT - FALL  Goal: Free from fall injury  Description: INTERVENTIONS:  - Assess pt frequently for physical needs  - Identify cognitive and physical deficits and behaviors that affect risk of falls.  - [place] fall precautions as indicated by assessment.  - Educate pt/family on patient safety including physical limitations  - Instruct pt to call for assistance with activity based on assessment  - Modify environment to reduce risk of injury  - Provide assistive devices as appropriate  - Consider OT/PT consult to assist with strengthening/mobility  - Encourage toileting schedule  Outcome: Progressing     Problem: CARDIOVASCULAR - ADULT  Goal: Maintains optimal cardiac output and  hemodynamic stability  Description: INTERVENTIONS:  - Monitor vital signs, rhythm, and trends  - Monitor for bleeding, hypotension and signs of decreased cardiac output  - Evaluate effectiveness of vasoactive medications to optimize hemodynamic stability  - Monitor arterial and/or venous puncture sites for bleeding and/or hematoma  - Assess quality of pulses, skin color and temperature  - Assess for signs of decreased coronary artery perfusion - ex. Angina  - Evaluate fluid balance, assess for edema, trend weights  Outcome: Progressing  Goal: Absence of cardiac arrhythmias or at baseline  Description: INTERVENTIONS:  - Continuous cardiac monitoring, monitor vital signs, obtain 12 lead EKG if indicated  - Evaluate effectiveness of antiarrhythmic and heart rate control medications as ordered  - Initiate emergency measures for life threatening arrhythmias  - Monitor electrolytes and administer replacement therapy as ordered  Outcome: Progressing     Problem: METABOLIC/FLUID AND ELECTROLYTES - ADULT  Goal: Glucose maintained within prescribed range  Description: INTERVENTIONS:  - Monitor Blood Glucose as ordered  - Assess for signs and symptoms of hyperglycemia and hypoglycemia  - Administer ordered medications to maintain glucose within target range  - Assess barriers to adequate nutritional intake and initiate nutrition consult as needed  - Instruct patient on self management of diabetes  Outcome: Progressing  Goal: Electrolytes maintained within normal limits  Description: INTERVENTIONS:  - Monitor labs and rhythm and assess patient for signs and symptoms of electrolyte imbalances  - Administer electrolyte replacement as ordered  - Monitor response to electrolyte replacements, including rhythm and repeat lab results as appropriate  - Fluid restriction as ordered  - Instruct patient on fluid and nutrition restrictions as appropriate  Outcome: Progressing  Goal: Hemodynamic stability and optimal renal function  maintained  Description: INTERVENTIONS:  - Monitor labs and assess for signs and symptoms of volume excess or deficit  - Monitor intake, output and patient weight  - Monitor urine specific gravity, serum osmolarity and serum sodium as indicated or ordered  - Monitor response to interventions for patient's volume status, including labs, urine output, blood pressure (other measures as available)  - Encourage oral intake as appropriate  - Instruct patient on fluid and nutrition restrictions as appropriate  Outcome: Progressing

## 2024-06-12 ENCOUNTER — APPOINTMENT (OUTPATIENT)
Dept: NUCLEAR MEDICINE | Facility: HOSPITAL | Age: 34
DRG: 252 | End: 2024-06-12
Attending: NURSE PRACTITIONER

## 2024-06-12 LAB
ALBUMIN SERPL-MCNC: 4.1 G/DL (ref 3.2–4.8)
ANION GAP SERPL CALC-SCNC: 11 MMOL/L (ref 0–18)
BACTERIA BLD CULT: POSITIVE
BACTERIA BLD CULT: POSITIVE
BASOPHILS # BLD AUTO: 0.05 X10(3) UL (ref 0–0.2)
BASOPHILS NFR BLD AUTO: 0.6 %
BUN BLD-MCNC: 59 MG/DL (ref 9–23)
BUN/CREAT SERPL: 5.6 (ref 10–20)
CALCIUM BLD-MCNC: 8.5 MG/DL (ref 8.7–10.4)
CHLORIDE SERPL-SCNC: 100 MMOL/L (ref 98–112)
CO2 SERPL-SCNC: 27 MMOL/L (ref 21–32)
CREAT BLD-MCNC: 10.61 MG/DL
DEPRECATED RDW RBC AUTO: 42.1 FL (ref 35.1–46.3)
EGFRCR SERPLBLD CKD-EPI 2021: 4 ML/MIN/1.73M2 (ref 60–?)
EOSINOPHIL # BLD AUTO: 0.32 X10(3) UL (ref 0–0.7)
EOSINOPHIL NFR BLD AUTO: 4.1 %
ERYTHROCYTE [DISTWIDTH] IN BLOOD BY AUTOMATED COUNT: 13.3 % (ref 11–15)
GLUCOSE BLD-MCNC: 90 MG/DL (ref 70–99)
HCT VFR BLD AUTO: 28.4 %
HGB BLD-MCNC: 9.4 G/DL
IMM GRANULOCYTES # BLD AUTO: 0.05 X10(3) UL (ref 0–1)
IMM GRANULOCYTES NFR BLD: 0.6 %
LYMPHOCYTES # BLD AUTO: 1.54 X10(3) UL (ref 1–4)
LYMPHOCYTES NFR BLD AUTO: 19.5 %
MAGNESIUM SERPL-MCNC: 2.6 MG/DL (ref 1.6–2.6)
MCH RBC QN AUTO: 28.8 PG (ref 26–34)
MCHC RBC AUTO-ENTMCNC: 33.1 G/DL (ref 31–37)
MCV RBC AUTO: 87.1 FL
MONOCYTES # BLD AUTO: 0.72 X10(3) UL (ref 0.1–1)
MONOCYTES NFR BLD AUTO: 9.1 %
NEUTROPHILS # BLD AUTO: 5.22 X10 (3) UL (ref 1.5–7.7)
NEUTROPHILS # BLD AUTO: 5.22 X10(3) UL (ref 1.5–7.7)
NEUTROPHILS NFR BLD AUTO: 66.1 %
OSMOLALITY SERPL CALC.SUM OF ELEC: 302 MOSM/KG (ref 275–295)
PHOSPHATE SERPL-MCNC: 5.6 MG/DL (ref 2.4–5.1)
PLATELET # BLD AUTO: 183 10(3)UL (ref 150–450)
POTASSIUM SERPL-SCNC: 3.6 MMOL/L (ref 3.5–5.1)
RBC # BLD AUTO: 3.26 X10(6)UL
SODIUM SERPL-SCNC: 138 MMOL/L (ref 136–145)
STAPHYLOCOCCUS AUREUS, NOT MRSA BY PCR: DETECTED
WBC # BLD AUTO: 7.9 X10(3) UL (ref 4–11)

## 2024-06-12 PROCEDURE — 83735 ASSAY OF MAGNESIUM: CPT | Performed by: INTERNAL MEDICINE

## 2024-06-12 PROCEDURE — 80069 RENAL FUNCTION PANEL: CPT | Performed by: HOSPITALIST

## 2024-06-12 PROCEDURE — 85025 COMPLETE CBC W/AUTO DIFF WBC: CPT | Performed by: INTERNAL MEDICINE

## 2024-06-12 RX ORDER — LOPERAMIDE HYDROCHLORIDE 2 MG/1
2 CAPSULE ORAL 4 TIMES DAILY PRN
Status: DISCONTINUED | OUTPATIENT
Start: 2024-06-12 | End: 2024-06-19

## 2024-06-12 RX ORDER — NIFEDIPINE 30 MG/1
90 TABLET, EXTENDED RELEASE ORAL DAILY
Status: DISCONTINUED | OUTPATIENT
Start: 2024-06-12 | End: 2024-06-12

## 2024-06-12 NOTE — PLAN OF CARE
Up independently. No fevers. Dialysis called in for tomorrow.     Problem: Patient Centered Care  Goal: Patient preferences are identified and integrated in the patient's plan of care  Description: Interventions:  - What would you like us to know as we care for you? Dialysis T/TH/SAT  - Provide timely, complete, and accurate information to patient/family  - Incorporate patient and family knowledge, values, beliefs, and cultural backgrounds into the planning and delivery of care  - Encourage patient/family to participate in care and decision-making at the level they choose  - Honor patient and family perspectives and choices  Outcome: Progressin     Problem: PAIN - ADULT  Goal: Verbalizes/displays adequate comfort level or patient's stated pain goal  Description: INTERVENTIONS:  - Encourage pt to monitor pain and request assistance  - Assess pain using appropriate pain scale  - Administer analgesics based on type and severity of pain and evaluate response  - Implement non-pharmacological measures as appropriate and evaluate response  - Consider cultural and social influences on pain and pain management  - Manage/alleviate anxiety  - Utilize distraction and/or relaxation techniques  - Monitor for opioid side effects  - Notify MD/LIP if interventions unsuccessful or patient reports new pain  - Anticipate increased pain with activity and pre-medicate as appropriate  Outcome: Progressing     Problem: SAFETY ADULT - FALL  Goal: Free from fall injury  Description: INTERVENTIONS:  - Assess pt frequently for physical needs  - Identify cognitive and physical deficits and behaviors that affect risk of falls.  - [place] fall precautions as indicated by assessment.  - Educate pt/family on patient safety including physical limitations  - Instruct pt to call for assistance with activity based on assessment  - Modify environment to reduce risk of injury  - Provide assistive devices as appropriate  - Consider OT/PT consult to  assist with strengthening/mobility  - Encourage toileting schedule  Outcome: Progressing     Problem: DISCHARGE PLANNING  Goal: Discharge to home or other facility with appropriate resources  Description: INTERVENTIONS:  - Identify barriers to discharge w/pt and caregiver  - Include patient/family/discharge partner in discharge planning  - Arrange for needed discharge resources and transportation as appropriate  - Identify discharge learning needs (meds, wound care, etc)  - Arrange for interpreters to assist at discharge as needed  - Consider post-discharge preferences of patient/family/discharge partner  - Complete POLST form as appropriate  - Assess patient's ability to be responsible for managing their own health  - Refer to Case Management Department for coordinating discharge planning if the patient needs post-hospital services based on physician/LIP order or complex needs related to functional status, cognitive ability or social support system  Outcome: Progressing     Problem: CARDIOVASCULAR - ADULT  Goal: Maintains optimal cardiac output and hemodynamic stability  Description: INTERVENTIONS:  - Monitor vital signs, rhythm, and trends  - Monitor for bleeding, hypotension and signs of decreased cardiac output  - Evaluate effectiveness of vasoactive medications to optimize hemodynamic stability  - Monitor arterial and/or venous puncture sites for bleeding and/or hematoma  - Assess quality of pulses, skin color and temperature  - Assess for signs of decreased coronary artery perfusion - ex. Angina  - Evaluate fluid balance, assess for edema, trend weights  Outcome: Progressing  Goal: Absence of cardiac arrhythmias or at baseline  Description: INTERVENTIONS:  - Continuous cardiac monitoring, monitor vital signs, obtain 12 lead EKG if indicated  - Evaluate effectiveness of antiarrhythmic and heart rate control medications as ordered  - Initiate emergency measures for life threatening arrhythmias  - Monitor  electrolytes and administer replacement therapy as ordered  Outcome: Progressing     Problem: METABOLIC/FLUID AND ELECTROLYTES - ADULT  Goal: Glucose maintained within prescribed range  Description: INTERVENTIONS:  - Monitor Blood Glucose as ordered  - Assess for signs and symptoms of hyperglycemia and hypoglycemia  - Administer ordered medications to maintain glucose within target range  - Assess barriers to adequate nutritional intake and initiate nutrition consult as needed  - Instruct patient on self management of diabetes  Outcome: Progressing  Goal: Electrolytes maintained within normal limits  Description: INTERVENTIONS:  - Monitor labs and rhythm and assess patient for signs and symptoms of electrolyte imbalances  - Administer electrolyte replacement as ordered  - Monitor response to electrolyte replacements, including rhythm and repeat lab results as appropriate  - Fluid restriction as ordered  - Instruct patient on fluid and nutrition restrictions as appropriate  Outcome: Progressing  Goal: Hemodynamic stability and optimal renal function maintained  Description: INTERVENTIONS:  - Monitor labs and assess for signs and symptoms of volume excess or deficit  - Monitor intake, output and patient weight  - Monitor urine specific gravity, serum osmolarity and serum sodium as indicated or ordered  - Monitor response to interventions for patient's volume status, including labs, urine output, blood pressure (other measures as available)  - Encourage oral intake as appropriate  - Instruct patient on fluid and nutrition restrictions as appropriate  Outcome: Progressing

## 2024-06-12 NOTE — PLAN OF CARE
Problem: Patient Centered Care  Goal: Patient preferences are identified and integrated in the patient's plan of care  Description: Interventions:  - What would you like us to know as we care for you? Patient is HD patient   - Provide timely, complete, and accurate information to patient/family  - Incorporate patient and family knowledge, values, beliefs, and cultural backgrounds into the planning and delivery of care  - Encourage patient/family to participate in care and decision-making at the level they choose  - Honor patient and family perspectives and choices  Outcome: Progressing     Problem: Patient/Family Goals  Goal: Patient/Family Long Term Goal  Description: Patient's Long Term Goal: To find out what is wrong     Interventions:    - See additional Care Plan goals for specific interventions  Outcome: Progressing  Goal: Patient/Family Short Term Goal  Description: Patient's Short Term Goal: To go home    Interventions:     - See additional Care Plan goals for specific interventions  Outcome: Progressing     Problem: PAIN - ADULT  Goal: Verbalizes/displays adequate comfort level or patient's stated pain goal  Description: INTERVENTIONS:  - Encourage pt to monitor pain and request assistance  - Assess pain using appropriate pain scale  - Administer analgesics based on type and severity of pain and evaluate response  - Implement non-pharmacological measures as appropriate and evaluate response  - Consider cultural and social influences on pain and pain management  - Manage/alleviate anxiety  - Utilize distraction and/or relaxation techniques  - Monitor for opioid side effects  - Notify MD/LIP if interventions unsuccessful or patient reports new pain  - Anticipate increased pain with activity and pre-medicate as appropriate  Outcome: Progressing     Problem: SAFETY ADULT - FALL  Goal: Free from fall injury  Description: INTERVENTIONS:  - Assess pt frequently for physical needs  - Identify cognitive and  physical deficits and behaviors that affect risk of falls.  - [place] fall precautions as indicated by assessment.  - Educate pt/family on patient safety including physical limitations  - Instruct pt to call for assistance with activity based on assessment  - Modify environment to reduce risk of injury  - Provide assistive devices as appropriate  - Consider OT/PT consult to assist with strengthening/mobility  - Encourage toileting schedule  Outcome: Progressing     Problem: DISCHARGE PLANNING  Goal: Discharge to home or other facility with appropriate resources  Description: INTERVENTIONS:  - Identify barriers to discharge w/pt and caregiver  - Include patient/family/discharge partner in discharge planning  - Arrange for needed discharge resources and transportation as appropriate  - Identify discharge learning needs (meds, wound care, etc)  - Arrange for interpreters to assist at discharge as needed  - Consider post-discharge preferences of patient/family/discharge partner  - Complete POLST form as appropriate  - Assess patient's ability to be responsible for managing their own health  - Refer to Case Management Department for coordinating discharge planning if the patient needs post-hospital services based on physician/LIP order or complex needs related to functional status, cognitive ability or social support system  Outcome: Progressing     Problem: CARDIOVASCULAR - ADULT  Goal: Maintains optimal cardiac output and hemodynamic stability  Description: INTERVENTIONS:  - Monitor vital signs, rhythm, and trends  - Monitor for bleeding, hypotension and signs of decreased cardiac output  - Evaluate effectiveness of vasoactive medications to optimize hemodynamic stability  - Monitor arterial and/or venous puncture sites for bleeding and/or hematoma  - Assess quality of pulses, skin color and temperature  - Assess for signs of decreased coronary artery perfusion - ex. Angina  - Evaluate fluid balance, assess for  edema, trend weights  Outcome: Progressing  Goal: Absence of cardiac arrhythmias or at baseline  Description: INTERVENTIONS:  - Continuous cardiac monitoring, monitor vital signs, obtain 12 lead EKG if indicated  - Evaluate effectiveness of antiarrhythmic and heart rate control medications as ordered  - Initiate emergency measures for life threatening arrhythmias  - Monitor electrolytes and administer replacement therapy as ordered  Outcome: Progressing     Problem: METABOLIC/FLUID AND ELECTROLYTES - ADULT  Goal: Glucose maintained within prescribed range  Description: INTERVENTIONS:  - Monitor Blood Glucose as ordered  - Assess for signs and symptoms of hyperglycemia and hypoglycemia  - Administer ordered medications to maintain glucose within target range  - Assess barriers to adequate nutritional intake and initiate nutrition consult as needed  - Instruct patient on self management of diabetes  Outcome: Progressing  Goal: Electrolytes maintained within normal limits  Description: INTERVENTIONS:  - Monitor labs and rhythm and assess patient for signs and symptoms of electrolyte imbalances  - Administer electrolyte replacement as ordered  - Monitor response to electrolyte replacements, including rhythm and repeat lab results as appropriate  - Fluid restriction as ordered  - Instruct patient on fluid and nutrition restrictions as appropriate  Outcome: Progressing  Goal: Hemodynamic stability and optimal renal function maintained  Description: INTERVENTIONS:  - Monitor labs and assess for signs and symptoms of volume excess or deficit  - Monitor intake, output and patient weight  - Monitor urine specific gravity, serum osmolarity and serum sodium as indicated or ordered  - Monitor response to interventions for patient's volume status, including labs, urine output, blood pressure (other measures as available)  - Encourage oral intake as appropriate  - Instruct patient on fluid and nutrition restrictions as  appropriate  Outcome: Progressing   Patient denies pain overnight. Afebrile. Plan for scan today. VSS. Will continue to monitor patient.

## 2024-06-12 NOTE — PROGRESS NOTES
[facility] Hospitalist Progress Note     CC: Hospital Follow up    PCP: Ashleigh Murray       Assessment/Plan:   Ms. Walker is a 33 year old female with PMH sig for ESRD on HD (t/t/s), HTN, HLD, obesity, HFpEF, who presented with fever, found to have bacteremia.      Sepsis   Bacteremia   Diarrhea  - MSSA bacteremia  - source, unclear, fistula vs HD line  - was treated few months back with oral antibiotics for possible drainage around or near fistula by surgeon   - TTE   - WBC scan  - ID on consult      ESRD  - on HD t/t/s  - renal consulted     Anemia  - hgb 10.8 on admission  - likely ACD 2/2 ESRD  - monitor      HFpEF  - poss infiltrates vs fluid  - volume removal with HD     HTN  - continue coreg  - does not tolerate nifedipine any more she states     HLD  - statin     Emelyr Sabina Aleman [facility] Hospitalist      Subjective:     Feels ok. No new symptoms no fever.       OBJECTIVE:    Blood pressure 146/85, pulse 70, temperature 98.2 °F (36.8 °C), temperature source Oral, resp. rate 18, weight 199 lb 9.6 oz (90.5 kg), last menstrual period 05/14/2024, SpO2 97%, not currently breastfeeding.    Temp:  [97.7 °F (36.5 °C)-98.2 °F (36.8 °C)] 98.2 °F (36.8 °C)  Pulse:  [66-74] 70  Resp:  [18] 18  BP: (130-146)/(72-90) 146/85  SpO2:  [96 %-98 %] 97 %      Intake/Output:    Intake/Output Summary (Last 24 hours) at 6/12/2024 1009  Last data filed at 6/12/2024 0600  Gross per 24 hour   Intake 1040 ml   Output --   Net 1040 ml       Last 3 Weights   06/12/24 0514 199 lb 9.6 oz (90.5 kg)   06/11/24 1657 200 lb 4.8 oz (90.9 kg)   06/10/24 0435 207 lb 8 oz (94.1 kg)   06/10/24 0127 207 lb 9.6 oz (94.2 kg)   06/10/24 0117 207 lb 9.6 oz (94.2 kg)   06/03/24 2010 206 lb (93.4 kg)   04/22/24 1518 211 lb (95.7 kg)       /85 (BP Location: Right arm)   Pulse 70   Temp 98.2 °F (36.8 °C) (Oral)   Resp 18   Wt 199 lb 9.6 oz (90.5 kg)   LMP 05/14/2024 (Exact Date)   SpO2 97%   BMI 33.22 kg/m²    General: Alert, no acute distress  Lungs: clear to ausculation bilaterally  Heart: Regular rate and rhythm  Abdomen: soft, non tender  Extremities: No edema    Data Review:       Labs:     Recent Labs   Lab 06/09/24  2035 06/10/24  0630 06/11/24  0835   RBC 3.73* 3.27* 3.33*   HGB 10.8* 9.9* 9.5*   HCT 32.5* 28.4* 29.4*   MCV 87.1 86.9 88.3   MCH 29.0 30.3 28.5   MCHC 33.2 34.9 32.3   RDW 12.9 13.0 13.2   NEPRELIM 12.73* 15.40* 7.00   WBC 14.1* 17.5* 9.5   .0 190.0 183.0         Recent Labs   Lab 06/10/24  0630 06/11/24  0835 06/12/24  0746   * 102* 90   BUN 50* 43* 59*   CREATSERUM 9.42* 8.17* 10.61*   EGFRCR 5* 6* 4*   CA 7.9* 8.4* 8.5*    136 138   K 3.0* 3.0* 3.6   CL 97* 98 100   CO2 27.0 28.0 27.0       Recent Labs   Lab 06/09/24  2035 06/09/24  2041 06/10/24  0630 06/11/24  0835 06/12/24  0746   ALT  --  17  --   --   --    AST  --  21  --   --   --    ALB 4.6  --  4.0 4.1 4.1         Imaging:  NM INFECTION INDIUM  WB MULTI AREA SNGL DAY(CPT=78802)    Result Date: 6/12/2024  CONCLUSION:  Imaging findings concerning for infection of the left upper extremity AV graft.    Dictated by (CST): Natanael Burnette MD on 6/12/2024 at 9:57 AM     Finalized by (CST): Natanael Burnette MD on 6/12/2024 at 9:59 AM          XR CHEST AP PORTABLE  (CPT=71045)    Result Date: 6/9/2024  CONCLUSION:   Patchy bibasilar opacities, which may reflect atelectasis or pulmonary edema with or without superimposed pneumonia.  Mild prominence of the interstitial markings, which may reflect mild pulmonary edema.  Redemonstrated enlarged cardiomediastinal silhouette.    Dictated by (CST): Chuy Harris MD on 6/09/2024 at 9:30 PM     Finalized by (CST): Chuy Harris MD on 6/09/2024 at 9:33 PM             Meds:      ceFAZolin  1 g Intravenous Q24H    atorvastatin  40 mg Oral Nightly    carvedilol  25 mg Oral BID    heparin  5,000 Units Subcutaneous Q8H MANISHA    [Held by provider] bumetanide  3 mg Oral Daily          loperamide    ondansetron    prochlorperazine    polyethylene glycol (PEG 3350)    sennosides    bisacodyl    acetaminophen

## 2024-06-12 NOTE — PAYOR COMM NOTE
--------------  6/12 CONTINUED STAY REVIEW    Payor: [facility]  Subscriber #:  [id number]  Authorization Number: [id number]    ID:    Patient seen/examined.  Clinical course reviewed.  Intermittent diarrhea.  IV antibiotics ongoing for MSSA sepsis.     Objective:  Blood pressure 118/81, pulse 70, temperature 98.2 °F (36.8 °C), temperature source Oral, resp. rate 18, weight 199 lb 9.6 oz (90.5 kg), last menstrual period 05/14/2024, SpO2 97%, not currently breastfeeding.       Physical Exam:  General: Awake, alert, non-tox, NAD.  HEENT:  Oropharynx clear, trachea ML.  Heart: RRR S1S2 no murmurs.  Lungs: Essentially CTA b/l, no rhonchi, rales, wheezes.  Abdomen: Soft, NT/ND.  BS present.  No organomegaly.  Extremity: No edema.  Neurological: No focal deficits.  Derm:  Warm, dry, free from rashes.     Lab Data Review:        Lab Results   Component Value Date     WBC 7.9 06/12/2024     HGB 9.4 06/12/2024     HCT 28.4 06/12/2024     .0 06/12/2024     CREATSERUM 10.61 06/12/2024     BUN 59 06/12/2024      06/12/2024     K 3.6 06/12/2024      06/12/2024     CO2 27.0 06/12/2024     GLU 90 06/12/2024     CA 8.5 06/12/2024     ALB 4.1 06/12/2024     MG 2.6 06/12/2024     PHOS 5.6 06/12/2024      Cultures:  2/2 blood cultures + MSSA 6/9/24  Repeat blood cultures negative 6/10/24     Radiology:  CONCLUSION:   Imaging findings concerning for infection of the left upper extremity AV graft.      Antibiotics Reviewed:  Ancef     Assessment and Plan:     MSSA sepsis in this woman who presented with fevers, lightheadedness, cough  - 2/2 blood cultures positive for MSSA on 6/9/24, repeats negative  - h/o LUE AV fistula with drainage in April 2024 s/p treatment  - Current NM scan with uptake concerning for infection in the AV graft  - TTE without vegetations  - IV ancef ongoing     2.  ESRD on HD with R chest HD cath in place  - Per nephrology  - Given concern for AV fistula source, would not advise accessing  fistula at this time  - If any further positive blood cultures will need HD line removed as well     3.  Abdominal pain, cramping, tenderness, and diarrhea  - Also on menstrual cycle  - C.diff negative     4.  Disposition - inpatient.  Will need long course IV Rx for MSSA sepsis in the setting of likely endovascular seeding.  May need vascular input given probable fistula related infection.  Maintain HD line for now but will need to be removed if any further positive cultures.  Anticipate 6 weeks of IV Rx through 7/22/24, longer if procedures are needed.  D/w patient.  Will follow.      HOSPITALIST:      Assessment/Plan:   Ms. Walker is a 33 year old female with PMH sig for ESRD on HD (t/t/s), HTN, HLD, obesity, HFpEF, who presented with fever, found to have bacteremia.      Sepsis   Bacteremia   Diarrhea  - MSSA bacteremia  - source, unclear, fistula vs HD line  - was treated few months back with oral antibiotics for possible drainage around or near fistula by surgeon   - TTE   - WBC scan  - ID on consult      ESRD  - on HD t/t/s  - renal consulted     Anemia  - hgb 10.8 on admission  - likely ACD 2/2 ESRD  - monitor      HFpEF  - poss infiltrates vs fluid  - volume removal with HD     HTN  - continue coreg  - does not tolerate nifedipine any more she states     HLD  - statin            Imaging:  NM INFECTION INDIUM WBC    Result Date: 6/12/2024  Imaging findings concerning for infection of the left upper extremity AV graft.      MEDICATIONS ADMINISTERED IN LAST 1 DAY:    ceFAZolin (Ancef) 1 g in dextrose 5% 100mL IVPB-ADD       Date Action Dose Route User    6/11/2024 1707 New Bag 1 g Intravenous Roberta Clarke, RN            Vitals (last day)       Date/Time Temp Pulse Resp BP SpO2 Weight O2 Device O2 Flow Rate (L/min) Who    06/12/24 1403 97.9 °F (36.6 °C) 68 18 129/76 97 % -- None (Room air) -- EL    06/12/24 1101 -- -- -- 118/81 -- -- -- -- TR    06/12/24 0914 98.2 °F (36.8 °C) 70 18 146/85 97 % -- None (Room  air) --     06/12/24 0514 97.7 °F (36.5 °C) 71 18 144/90 96 % 199 lb 9.6 oz None (Room air) --     06/12/24 0300 -- 67 -- -- -- -- -- -- DB    06/11/24 1944 98.2 °F (36.8 °C) 71 18 135/72 98 % -- None (Room air) --     06/11/24 1900 -- 74 -- -- -- -- -- -- DB

## 2024-06-12 NOTE — PROGRESS NOTES
[facility]  part of [facility]    Progress Note    Liu Walker Patient Status:  Inpatient    1990 MRN [id number]   Location [facility] 1W Attending Catracho Warner MD   Hosp Day # 2 PCP Ashleigh Murray       Subjective:     WBC scan with evidence of infection in fistula/ graft.    Objective:   Vital Signs:  Blood pressure 129/76, pulse 68, temperature 97.9 °F (36.6 °C), temperature source Oral, resp. rate 18, weight 199 lb 9.6 oz (90.5 kg), last menstrual period 2024, SpO2 97%, not currently breastfeeding.     General: No acute distress. Alert and oriented x 3.  HEENT: Moist mucous membranes.   Respiratory: Diminished breath sounds  Cardiovascular: S1, S2. No Rubs  Abdomen: Soft, nontender, nondistended.  Positive bowel sounds.  Ext: Trace LE edema, RIJ tunneled HD catheter, LUE BC AVF/G + Bruit and thrill.    Results:     Lab Results   Component Value Date    WBC 7.9 2024    HGB 9.4 (L) 2024    HCT 28.4 (L) 2024    .0 2024    CREATSERUM 10.61 (H) 2024    BUN 59 (H) 2024     2024    K 3.6 2024     2024    CO2 27.0 2024    GLU 90 2024    CA 8.5 (L) 2024    ALB 4.1 2024    ALKPHO 65 2024    BILT 0.7 2024    TP 8.2 2024    AST 21 2024    ALT 17 2024    PTT 31.8 2017    TSH 2.910 2022    LIP 69 2023    DDIMER 2.89 (H) 2024    MG 2.6 2024    PHOS 5.6 (H) 2024    TROP <0.045 2021       NM INFECTION INDIUM  WB MULTI AREA SNGL DAY(CPT=78802)    Result Date: 2024  CONCLUSION:  Imaging findings concerning for infection of the left upper extremity AV graft.    Dictated by (CST): Natanael Burnette MD on 2024 at 9:57 AM     Finalized by (CST): Natanael Burnette MD on 2024 at 9:59 AM                       Bernardino Bourgeois MD  2024    Assessment and Plan:   33 year old female with PMH of ESRD on HD  TTS, HTN, HL, HFpEF admitted with fevers SOB and bacteremia, were consulted to resume iHD while inpatient:     ESRD on HD TTS:  - HD done on Monday  - Will resume HD Arsalan through HD catheter AVOID USE OF FISTULA FOR NOW.  - I did discuss with patient and current nephrologist Dr. Cleveland, discussed with patient having her primary group follow up. patient would like to resume current care with our group at this time.     Bacteremia:  - Cultures from Catheter ordered.  - WBC scan with likely graft infection, Will defer to Vascular and ID.     Anemia in ESRD:  - Resume epogen  - holding on venofer in light of active infection     Hypokalemia:  - resolved.     BMD:  - Will check Phos levels daily.  - low phos diet     HTN:  - Continue coreg  - Bumex only on non HD days.     Thank you for allowing me to participate in the care of your patient.     Bernardino Bourgeois MD  [facility]  Nephrology

## 2024-06-12 NOTE — PROGRESS NOTES
[facility]  part of [facility]  Progress Note    Liu Walker Patient Status:  Inpatient    1990 MRN [id number]   Location [facility] 1W Attending Marco Muhammad, DO   Hosp Day # 2 PCP Ashleigh Murray     Subjective:  Patient seen/examined.  Clinical course reviewed.  Intermittent diarrhea.  IV antibiotics ongoing for MSSA sepsis.    Objective:  Blood pressure 118/81, pulse 70, temperature 98.2 °F (36.8 °C), temperature source Oral, resp. rate 18, weight 199 lb 9.6 oz (90.5 kg), last menstrual period 2024, SpO2 97%, not currently breastfeeding.    Intake/Output:    Intake/Output Summary (Last 24 hours) at 2024 1132  Last data filed at 2024 0600  Gross per 24 hour   Intake 800 ml   Output --   Net 800 ml       Physical Exam:  General: Awake, alert, non-tox, NAD.  HEENT:  Oropharynx clear, trachea ML.  Heart: RRR S1S2 no murmurs.  Lungs: Essentially CTA b/l, no rhonchi, rales, wheezes.  Abdomen: Soft, NT/ND.  BS present.  No organomegaly.  Extremity: No edema.  Neurological: No focal deficits.  Derm:  Warm, dry, free from rashes.    Lab Data Review:  Lab Results   Component Value Date    WBC 7.9 2024    HGB 9.4 2024    HCT 28.4 2024    .0 2024    CREATSERUM 10.61 2024    BUN 59 2024     2024    K 3.6 2024     2024    CO2 27.0 2024    GLU 90 2024    CA 8.5 2024    ALB 4.1 2024    MG 2.6 2024    PHOS 5.6 2024      Cultures:  2/2 blood cultures + MSSA 24  Repeat blood cultures negative 6/10/24    Radiology:  CONCLUSION:   Imaging findings concerning for infection of the left upper extremity AV graft.     Antibiotics Reviewed:  Anc    Assessment and Plan:    MSSA sepsis in this woman who presented with fevers, lightheadedness, cough  - 2/2 blood cultures positive for MSSA on 24, repeats negative  - h/o  LUE AV fistula with drainage in April 2024 s/p treatment  - Current NM scan with uptake concerning for infection in the AV graft  - TTE without vegetations  - IV ancef ongoing    2.  ESRD on HD with R chest HD cath in place  - Per nephrology  - Given concern for AV fistula source, would not advise accessing fistula at this time  - If any further positive blood cultures will need HD line removed as well    3.  Abdominal pain, cramping, tenderness, and diarrhea  - Also on menstrual cycle  - C.diff negative    4.  Disposition - inpatient.  Will need long course IV Rx for MSSA sepsis in the setting of likely endovascular seeding.  May need vascular input given probable fistula related infection.  Maintain HD line for now but will need to be removed if any further positive cultures.  Anticipate 6 weeks of IV Rx through 7/22/24, longer if procedures are needed.  D/w patient.  Will follow.    Essie Walter DO, [facility] Infectious Disease  [phone]    6/12/2024  11:32 AM

## 2024-06-13 LAB
ALBUMIN SERPL-MCNC: 4 G/DL (ref 3.2–4.8)
ANION GAP SERPL CALC-SCNC: 12 MMOL/L (ref 0–18)
BASOPHILS # BLD AUTO: 0.07 X10(3) UL (ref 0–0.2)
BASOPHILS NFR BLD AUTO: 0.9 %
BUN BLD-MCNC: 76 MG/DL (ref 9–23)
BUN/CREAT SERPL: 6.2 (ref 10–20)
CALCIUM BLD-MCNC: 8.5 MG/DL (ref 8.7–10.4)
CHLORIDE SERPL-SCNC: 99 MMOL/L (ref 98–112)
CO2 SERPL-SCNC: 26 MMOL/L (ref 21–32)
CREAT BLD-MCNC: 12.31 MG/DL
DEPRECATED RDW RBC AUTO: 40.6 FL (ref 35.1–46.3)
EGFRCR SERPLBLD CKD-EPI 2021: 4 ML/MIN/1.73M2 (ref 60–?)
EOSINOPHIL # BLD AUTO: 0.32 X10(3) UL (ref 0–0.7)
EOSINOPHIL NFR BLD AUTO: 4 %
ERYTHROCYTE [DISTWIDTH] IN BLOOD BY AUTOMATED COUNT: 13.1 % (ref 11–15)
GLUCOSE BLD-MCNC: 92 MG/DL (ref 70–99)
HCT VFR BLD AUTO: 27.2 %
HGB BLD-MCNC: 9.2 G/DL
IMM GRANULOCYTES # BLD AUTO: 0.04 X10(3) UL (ref 0–1)
IMM GRANULOCYTES NFR BLD: 0.5 %
LYMPHOCYTES # BLD AUTO: 2.01 X10(3) UL (ref 1–4)
LYMPHOCYTES NFR BLD AUTO: 25.4 %
MAGNESIUM SERPL-MCNC: 2.6 MG/DL (ref 1.6–2.6)
MCH RBC QN AUTO: 28.8 PG (ref 26–34)
MCHC RBC AUTO-ENTMCNC: 33.8 G/DL (ref 31–37)
MCV RBC AUTO: 85.3 FL
MONOCYTES # BLD AUTO: 0.61 X10(3) UL (ref 0.1–1)
MONOCYTES NFR BLD AUTO: 7.7 %
NEUTROPHILS # BLD AUTO: 4.86 X10 (3) UL (ref 1.5–7.7)
NEUTROPHILS # BLD AUTO: 4.86 X10(3) UL (ref 1.5–7.7)
NEUTROPHILS NFR BLD AUTO: 61.5 %
OSMOLALITY SERPL CALC.SUM OF ELEC: 306 MOSM/KG (ref 275–295)
PHOSPHATE SERPL-MCNC: 6.4 MG/DL (ref 2.4–5.1)
PLATELET # BLD AUTO: 187 10(3)UL (ref 150–450)
POTASSIUM SERPL-SCNC: 3.6 MMOL/L (ref 3.5–5.1)
RBC # BLD AUTO: 3.19 X10(6)UL
SODIUM SERPL-SCNC: 137 MMOL/L (ref 136–145)
WBC # BLD AUTO: 7.9 X10(3) UL (ref 4–11)

## 2024-06-13 PROCEDURE — 90935 HEMODIALYSIS ONE EVALUATION: CPT

## 2024-06-13 PROCEDURE — 83735 ASSAY OF MAGNESIUM: CPT | Performed by: INTERNAL MEDICINE

## 2024-06-13 PROCEDURE — 80069 RENAL FUNCTION PANEL: CPT | Performed by: HOSPITALIST

## 2024-06-13 PROCEDURE — 85025 COMPLETE CBC W/AUTO DIFF WBC: CPT | Performed by: INTERNAL MEDICINE

## 2024-06-13 RX ORDER — HEPARIN SODIUM 1000 [USP'U]/ML
4000 INJECTION, SOLUTION INTRAVENOUS; SUBCUTANEOUS
Status: COMPLETED | OUTPATIENT
Start: 2024-06-13 | End: 2024-06-13

## 2024-06-13 NOTE — PAYOR COMM NOTE
--------------  6/13 CONTINUED STAY REVIEW    Payor: [employer]  Subscriber #:  [id number]  Authorization Number: [id number]    ID:    Patient seen and examined resting in bed during HD. She is tolerating the IV abx no new complaints.      Objective:  Blood pressure 153/89, pulse 70, temperature 97.8 °F (36.6 °C), temperature source Oral, resp. rate 18, weight 204 lb 3.2 oz (92.6 kg), last menstrual period 05/14/2024, SpO2 98%, not currently breastfeeding.      Physical Exam:  General: Awake, alert, non-tox, NAD.  HEENT:  Oropharynx clear, trachea ML.  Heart: RRR S1S2 no murmurs.  Lungs: Essentially CTA b/l, no rhonchi, rales, wheezes.  Abdomen: Soft, NT/ND.  BS present.  No organomegaly.  Extremity: No edema. LUE AV fistula  Neurological: No focal deficits.  Derm:  Warm, dry, free from rashes.  + right chest HD cath     Lab Results   Component Value Date     WBC 7.9 06/13/2024     HGB 9.2 06/13/2024     HCT 27.2 06/13/2024     .0 06/13/2024     CREATSERUM 12.31 06/13/2024     BUN 76 06/13/2024      06/13/2024     K 3.6 06/13/2024     CL 99 06/13/2024     CO2 26.0 06/13/2024     GLU 92 06/13/2024     CA 8.5 06/13/2024     ALB 4.0 06/13/2024     MG 2.6 06/13/2024     PHOS 6.4 06/13/2024         Assessment and Plan:     MSSA sepsis in this woman who presented with fevers, lightheadedness, cough  - 2/2 blood cultures positive for MSSA on 6/9/24, repeats negative  - h/o LUE AV fistula with drainage in April 2024 s/p treatment  - Current NM scan with uptake concerning for infection in the AV graft  - TTE without vegetations  - IV ancef ongoing     ESRD on HD with R chest HD cath in place  - Per nephrology  - Given concern for AV fistula source, would not advise accessing fistula at this time  - If any further positive blood cultures will need HD line removed as well     Abdominal pain, cramping, tenderness, and diarrhea  - Also on menstrual cycle  - C.diff negative     Recommendations  - Continue IV ancef as  Rx, anticipate at least 6 weeks of IV abx  - Follow pending cultures  - Vascular consult pending  - Okay to maintain HD line for now but will need to be removed if any further positive cultures  - Repeat labs tomorrow  - Further recommendations to follow        RENAL:     Assessment and Plan:   33 year old female with PMH of ESRD on HD TTS, HTN, HL, HFpEF admitted with fevers SOB and bacteremia, were consulted to resume iHD while inpatient:     ESRD on HD TTS:  - HD done on Monday  - Will resume HD Arsalan through HD catheter AVOID USE OF FISTULA FOR NOW.  - I did discuss with patient and current nephrologist Dr. Cleveland, discussed with patient having her primary group follow up. patient would like to resume current care with our group at this time.     Bacteremia:  - Cultures from Catheter ordered.  - WBC scan with likely graft infection, Will defer to Vascular and ID.     Anemia in ESRD:  - Resume epogen  - holding on venofer in light of active infection     Hypokalemia:  - resolved.     BMD:  - Will check Phos levels daily.  - low phos diet     HTN:  - Continue coreg  - Bumex only on non HD days as needed.      MEDICATIONS ADMINISTERED IN LAST 1 DAY:    ceFAZolin (Ancef) 1 g in dextrose 5% 100mL IVPB-ADD       Date Action Dose Route User    6/12/2024 1729 New Bag 1 g Intravenous Roberta Clarke, RN          heparin (Porcine) 1000 UNIT/ML injection 4,000 Units       Date Action Dose Route User    6/13/2024 1100 Given by Other 4,000 Units Intracatheter Dali Stringer RN            Vitals (last day)       Date/Time Temp Pulse Resp BP SpO2 Weight O2 Device O2 Flow Rate (L/min) Who    06/13/24 1131 98.6 °F (37 °C) -- 16 172/95 98 % -- None (Room air) -- PL    06/13/24 0700 -- 89 -- -- -- -- -- -- ST    06/13/24 0438 97.8 °F (36.6 °C) 70 18 153/89 98 % 204 lb 3.2 oz -- -- LV    06/12/24 1919 97.7 °F (36.5 °C) 69 18 142/79 97 % -- None (Room air) -- LV    06/12/24 1900 -- 71 -- -- -- -- -- -- KD    06/12/24 1500 -- 70  -- -- -- -- -- -- ST    06/12/24 1403 97.9 °F (36.6 °C) 68 18 129/76 97 % -- None (Room air) -- EL    06/12/24 1101 -- -- -- 118/81 -- -- -- -- TR    06/12/24 0914 98.2 °F (36.8 °C) 70 18 146/85 97 % -- None (Room air) --     06/12/24 0514 97.7 °F (36.5 °C) 71 18 144/90 96 % 199 lb 9.6 oz None (Room air) -- AC    06/12/24 0300 -- 67 -- -- -- -- -- -- DB

## 2024-06-13 NOTE — PROGRESS NOTES
[facility] Hospitalist Progress Note     CC: Hospital Follow up    PCP: Ashleigh Murray       Assessment/Plan:   Ms. Walker is a 33 year old female with PMH sig for ESRD on HD (t/t/s), HTN, HLD, obesity, HFpEF, who presented with fever, found to have bacteremia.      Sepsis   Bacteremia   Diarrhea  - MSSA bacteremia  - source, unclear, fistula vs HD line  - was treated few months back with oral antibiotics for possible drainage around or near fistula by surgeon   - TTE   - WBC scan positive for possible infection in area of fistula  - consulted vascular surgery on 6/12/24   - ID on consult      ESRD  - on HD t/t/s  - renal consulted     Anemia  - hgb 10.8 on admission  - likely ACD 2/2 ESRD  - monitor      HFpEF  - poss infiltrates vs fluid  - volume removal with HD     HTN  - continue coreg  - does not tolerate nifedipine any more she states     HLD  - statin     Marco Muhammad DO  [facility] Hospitalist      Subjective:     Feels ok. No new symptoms no fever.       OBJECTIVE:    Blood pressure (!) 172/95, pulse 89, temperature 98.6 °F (37 °C), temperature source Oral, resp. rate 16, weight 204 lb 3.2 oz (92.6 kg), last menstrual period 05/14/2024, SpO2 98%, not currently breastfeeding.    Temp:  [97.7 °F (36.5 °C)-98.6 °F (37 °C)] 98.6 °F (37 °C)  Pulse:  [69-89] 89  Resp:  [16-18] 16  BP: (142-172)/(79-95) 172/95  SpO2:  [97 %-98 %] 98 %      Intake/Output:    Intake/Output Summary (Last 24 hours) at 6/13/2024 1409  Last data filed at 6/12/2024 1801  Gross per 24 hour   Intake 340 ml   Output --   Net 340 ml       Last 3 Weights   06/13/24 0438 204 lb 3.2 oz (92.6 kg)   06/12/24 0514 199 lb 9.6 oz (90.5 kg)   06/11/24 1657 200 lb 4.8 oz (90.9 kg)   06/10/24 0435 207 lb 8 oz (94.1 kg)   06/10/24 0127 207 lb 9.6 oz (94.2 kg)   06/10/24 0117 207 lb 9.6 oz (94.2 kg)   06/03/24 2010 206 lb (93.4 kg)   04/22/24 1518 211 lb (95.7 kg)       BP (!) 172/95 (BP Location: Right arm)   Pulse 89    Temp 98.6 °F (37 °C) (Oral)   Resp 16   Wt 204 lb 3.2 oz (92.6 kg)   LMP 05/14/2024 (Exact Date)   SpO2 98%   BMI 33.98 kg/m²   General: Alert, no acute distress  Lungs: clear to ausculation bilaterally  Heart: Regular rate and rhythm  Abdomen: soft, non tender  Extremities: No edema    Data Review:       Labs:     Recent Labs   Lab 06/11/24 0835 06/12/24  0912 06/13/24  0648   RBC 3.33* 3.26* 3.19*   HGB 9.5* 9.4* 9.2*   HCT 29.4* 28.4* 27.2*   MCV 88.3 87.1 85.3   MCH 28.5 28.8 28.8   MCHC 32.3 33.1 33.8   RDW 13.2 13.3 13.1   NEPRELIM 7.00 5.22 4.86   WBC 9.5 7.9 7.9   .0 183.0 187.0         Recent Labs   Lab 06/11/24 0835 06/12/24  0746 06/13/24  0648   * 90 92   BUN 43* 59* 76*   CREATSERUM 8.17* 10.61* 12.31*   EGFRCR 6* 4* 4*   CA 8.4* 8.5* 8.5*    138 137   K 3.0* 3.6 3.6   CL 98 100 99   CO2 28.0 27.0 26.0       Recent Labs   Lab 06/09/24  2035 06/09/24  2041 06/10/24  0630 06/11/24  0835 06/12/24  0746 06/13/24  0648   ALT  --  17  --   --   --   --    AST  --  21  --   --   --   --    ALB 4.6  --  4.0 4.1 4.1 4.0         Imaging:  NM INFECTION INDIUM  WB MULTI AREA SNGL DAY(CPT=78802)    Result Date: 6/12/2024  CONCLUSION:  Imaging findings concerning for infection of the left upper extremity AV graft.    Dictated by (CST): Natanael Burnette MD on 6/12/2024 at 9:57 AM     Finalized by (CST): Natanael Burnette MD on 6/12/2024 at 9:59 AM             Meds:      ceFAZolin  1 g Intravenous Q24H    atorvastatin  40 mg Oral Nightly    carvedilol  25 mg Oral BID    heparin  5,000 Units Subcutaneous Q8H [facility]    [Held by provider] bumetanide  3 mg Oral Daily         loperamide    ondansetron    prochlorperazine    polyethylene glycol (PEG 3350)    sennosides    bisacodyl    acetaminophen

## 2024-06-13 NOTE — PROGRESS NOTES
[facility]  part of [facility]    Progress Note    Liu Walker Patient Status:  Inpatient    1990 MRN [id number]   Location [facility] 1W Attending Catracho Warner MD   Hosp Day # 3 PCP Ashleigh Murray       Subjective:     Seen and examined on HD tolerating well not in acute distress at this time.    Objective:   Vital Signs:  Blood pressure 153/89, pulse 70, temperature 97.8 °F (36.6 °C), temperature source Oral, resp. rate 18, weight 204 lb 3.2 oz (92.6 kg), last menstrual period 2024, SpO2 98%, not currently breastfeeding.     General: No acute distress. Alert and oriented x 3.  HEENT: Moist mucous membranes.   Respiratory: Diminished breath sounds  Cardiovascular: S1, S2. No Rubs  Abdomen: Soft, nontender, nondistended.  Positive bowel sounds.  Ext: Trace LE edema, RIJ tunneled HD catheter, LUE BC AVF/G + Bruit and thrill.    Results:     Lab Results   Component Value Date    WBC 7.9 2024    HGB 9.2 (L) 2024    HCT 27.2 (L) 2024    .0 2024    CREATSERUM 12.31 (H) 2024    BUN 76 (H) 2024     2024    K 3.6 2024    CL 99 2024    CO2 26.0 2024    GLU 92 2024    CA 8.5 (L) 2024    ALB 4.0 2024    ALKPHO 65 2024    BILT 0.7 2024    TP 8.2 2024    AST 21 2024    ALT 17 2024    PTT 31.8 2017    TSH 2.910 2022    LIP 69 2023    DDIMER 2.89 (H) 2024    MG 2.6 2024    PHOS 6.4 (H) 2024    TROP <0.045 2021       NM INFECTION INDIUM  WB MULTI AREA SNGL DAY(CPT=78802)    Result Date: 2024  CONCLUSION:  Imaging findings concerning for infection of the left upper extremity AV graft.    Dictated by (CST): Natanael Burnette MD on 2024 at 9:57 AM     Finalized by (CST): Natanael Burnette MD on 2024 at 9:59 AM                       Bernardino Bourgeois MD  2024    Assessment and Plan:   33 year old female  with PMH of ESRD on HD TTS, HTN, HL, HFpEF admitted with fevers SOB and bacteremia, were consulted to resume iHD while inpatient:     ESRD on HD TTS:  - HD done on Monday  - Will resume HD Arsalan through HD catheter AVOID USE OF FISTULA FOR NOW.  - I did discuss with patient and current nephrologist Dr. Cleveland, discussed with patient having her primary group follow up. patient would like to resume current care with our group at this time.     Bacteremia:  - Cultures from Catheter ordered.  - WBC scan with likely graft infection, Will defer to Vascular and ID.     Anemia in ESRD:  - Resume epogen  - holding on venofer in light of active infection     Hypokalemia:  - resolved.     BMD:  - Will check Phos levels daily.  - low phos diet     HTN:  - Continue coreg  - Bumex only on non HD days as needed.     Thank you for allowing me to participate in the care of your patient.     Bernardino Bourgeois MD  [facility]  Nephrology

## 2024-06-13 NOTE — PLAN OF CARE
Problem: PAIN - ADULT  Goal: Verbalizes/displays adequate comfort level or patient's stated pain goal  Description: INTERVENTIONS:  - Encourage pt to monitor pain and request assistance  - Assess pain using appropriate pain scale  - Administer analgesics based on type and severity of pain and evaluate response  - Implement non-pharmacological measures as appropriate and evaluate response  - Consider cultural and social influences on pain and pain management  - Manage/alleviate anxiety  - Utilize distraction and/or relaxation techniques  - Monitor for opioid side effects  - Notify MD/LIP if interventions unsuccessful or patient reports new pain  - Anticipate increased pain with activity and pre-medicate as appropriate  Outcome: Progressing     Problem: SAFETY ADULT - FALL  Goal: Free from fall injury  Description: INTERVENTIONS:  - Assess pt frequently for physical needs  - Identify cognitive and physical deficits and behaviors that affect risk of falls.  - [place] fall precautions as indicated by assessment.  - Educate pt/family on patient safety including physical limitations  - Instruct pt to call for assistance with activity based on assessment  - Modify environment to reduce risk of injury  - Provide assistive devices as appropriate  - Consider OT/PT consult to assist with strengthening/mobility  - Encourage toileting schedule  Outcome: Progressing     Problem: DISCHARGE PLANNING  Goal: Discharge to home or other facility with appropriate resources  Description: INTERVENTIONS:  - Identify barriers to discharge w/pt and caregiver  - Include patient/family/discharge partner in discharge planning  - Arrange for needed discharge resources and transportation as appropriate  - Identify discharge learning needs (meds, wound care, etc)  - Arrange for interpreters to assist at discharge as needed  - Consider post-discharge preferences of patient/family/discharge partner  - Complete POLST form as appropriate  - Assess  patient's ability to be responsible for managing their own health  - Refer to Case Management Department for coordinating discharge planning if the patient needs post-hospital services based on physician/LIP order or complex needs related to functional status, cognitive ability or social support system  Outcome: Progressing     Problem: CARDIOVASCULAR - ADULT  Goal: Maintains optimal cardiac output and hemodynamic stability  Description: INTERVENTIONS:  - Monitor vital signs, rhythm, and trends  - Monitor for bleeding, hypotension and signs of decreased cardiac output  - Evaluate effectiveness of vasoactive medications to optimize hemodynamic stability  - Monitor arterial and/or venous puncture sites for bleeding and/or hematoma  - Assess quality of pulses, skin color and temperature  - Assess for signs of decreased coronary artery perfusion - ex. Angina  - Evaluate fluid balance, assess for edema, trend weights  Outcome: Progressing  Goal: Absence of cardiac arrhythmias or at baseline  Description: INTERVENTIONS:  - Continuous cardiac monitoring, monitor vital signs, obtain 12 lead EKG if indicated  - Evaluate effectiveness of antiarrhythmic and heart rate control medications as ordered  - Initiate emergency measures for life threatening arrhythmias  - Monitor electrolytes and administer replacement therapy as ordered  Outcome: Progressing     Problem: METABOLIC/FLUID AND ELECTROLYTES - ADULT  Goal: Glucose maintained within prescribed range  Description: INTERVENTIONS:  - Monitor Blood Glucose as ordered  - Assess for signs and symptoms of hyperglycemia and hypoglycemia  - Administer ordered medications to maintain glucose within target range  - Assess barriers to adequate nutritional intake and initiate nutrition consult as needed  - Instruct patient on self management of diabetes  Outcome: Progressing  Goal: Electrolytes maintained within normal limits  Description: INTERVENTIONS:  - Monitor labs and rhythm and  assess patient for signs and symptoms of electrolyte imbalances  - Administer electrolyte replacement as ordered  - Monitor response to electrolyte replacements, including rhythm and repeat lab results as appropriate  - Fluid restriction as ordered  - Instruct patient on fluid and nutrition restrictions as appropriate  Outcome: Progressing    Electrolyte and excess fluid balance managed by nephrology.  Goal: Hemodynamic stability and optimal renal function maintained  Description: INTERVENTIONS:  - Monitor labs and assess for signs and symptoms of volume excess or deficit  - Monitor intake, output and patient weight  - Monitor urine specific gravity, serum osmolarity and serum sodium as indicated or ordered  - Monitor response to interventions for patient's volume status, including labs, urine output, blood pressure (other measures as available)  - Encourage oral intake as appropriate  - Instruct patient on fluid and nutrition restrictions as appropriate  Outcome: Progressing

## 2024-06-13 NOTE — PROGRESS NOTES
[facility]  part of [facility] Infectious Disease  Progress Note    Liu Walker Patient Status:  Inpatient    1990 MRN [id number]   Location [facility] 1W Attending Marco Muhammad, DO   Hosp Day # 3 PCP Ashleigh Murray     Subjective:    Patient seen and examined resting in bed during HD. She is tolerating the IV abx no new complaints.    Review of Systems:  Review of systems reviewed and negative except as mentioned    Objective:  Blood pressure 153/89, pulse 70, temperature 97.8 °F (36.6 °C), temperature source Oral, resp. rate 18, weight 204 lb 3.2 oz (92.6 kg), last menstrual period 2024, SpO2 98%, not currently breastfeeding.        Physical Exam:  General: Awake, alert, non-tox, NAD.  HEENT:  Oropharynx clear, trachea ML.  Heart: RRR S1S2 no murmurs.  Lungs: Essentially CTA b/l, no rhonchi, rales, wheezes.  Abdomen: Soft, NT/ND.  BS present.  No organomegaly.  Extremity: No edema. LUE AV fistula  Neurological: No focal deficits.  Derm:  Warm, dry, free from rashes.  + right chest HD cath    Lab Data Review:  Lab Results   Component Value Date    WBC 7.9 2024    HGB 9.2 2024    HCT 27.2 2024    .0 2024    CREATSERUM 12.31 2024    BUN 76 2024     2024    K 3.6 2024    CL 99 2024    CO2 26.0 2024    GLU 92 2024    CA 8.5 2024    ALB 4.0 2024    MG 2.6 2024    PHOS 6.4 2024        Cultures:  Hospital Encounter on 24   1. Blood Culture     Status: None (Preliminary result)    Collection Time: 06/10/24  2:20 PM    Specimen: Blood,peripheral   Result Value Ref Range    Blood Culture Result No Growth 2 Days N/A        Radiology:  NM INFECTION INDIUM  WB MULTI AREA SNGL DAY(CPT=78802)    Result Date: 2024  CONCLUSION:  Imaging findings concerning for infection of the left upper extremity AV graft.    Dictated by (CST): Vasile  MD Natanael on 6/12/2024 at 9:57 AM     Finalized by (CST): Naatnael Burnette MD on 6/12/2024 at 9:59 AM          XR CHEST AP PORTABLE  (CPT=71045)    Result Date: 6/9/2024  CONCLUSION:   Patchy bibasilar opacities, which may reflect atelectasis or pulmonary edema with or without superimposed pneumonia.  Mild prominence of the interstitial markings, which may reflect mild pulmonary edema.  Redemonstrated enlarged cardiomediastinal silhouette.    Dictated by (CST): Chuy Harris MD on 6/09/2024 at 9:30 PM     Finalized by (CST): Chuy Harris MD on 6/09/2024 at 9:33 PM          XR CHEST AP PORTABLE  (CPT=71045)    Result Date: 6/3/2024  CONCLUSION:  1. CHF/fluid overload with pulmonary interstitial edema.    Dictated by (CST): Alexys Sun MD on 6/03/2024 at 9:12 PM     Finalized by (CST): Alexys Sun MD on 6/03/2024 at 9:13 PM             Assessment and Plan:    MSSA sepsis in this woman who presented with fevers, lightheadedness, cough  - 2/2 blood cultures positive for MSSA on 6/9/24, repeats negative  - h/o LUE AV fistula with drainage in April 2024 s/p treatment  - Current NM scan with uptake concerning for infection in the AV graft  - TTE without vegetations  - IV ancef ongoing     ESRD on HD with R chest HD cath in place  - Per nephrology  - Given concern for AV fistula source, would not advise accessing fistula at this time  - If any further positive blood cultures will need HD line removed as well     Abdominal pain, cramping, tenderness, and diarrhea  - Also on menstrual cycle  - C.diff negative    Recommendations  - Continue IV ancef as Rx, anticipate at least 6 weeks of IV abx  - Follow pending cultures  - Vascular consult pending  - Okay to maintain HD line for now but will need to be removed if any further positive cultures  - Repeat labs tomorrow  - Further recommendations to follow      If you have any questions or concerns please call [facility] Infectious Disease at [phone].      TONO Vargas    6/13/2024  8:28 AM

## 2024-06-13 NOTE — CONSULTS
Peg Huddleston MD.  [facility]   Vascular and Endovascular Surgery     VASCULAR SURGERY   CONSULT NOTE      Name: Liu Walker   :   1990  [id number]     2024       REFERRING PHYSICIAN: Marco Muhammad DO  PRIMARY CARE PHYSICIAN:  Ashleigh Murray    HISTORY OF PRESENT ILLNESS: Liu is a 33 year old female whom I have been asked to see regarding left upper extremity AV graft infection. Patient is right handed. On ESRD currently via a RIJ permacatheter. She reports that she had the AV graft placed in March/April of this year. At her operative follow-up visit, she had some foul smelling drainage from the incision but was told that some drainage is normal. She states she had two course of oral antibiotics and the incision healed. When she started using the graft, she started to develop fevers. She presented to [place] with fevers as high as 104. She was found to have MSSA bacteremia. She underwent a tagged WBC scan which identified the graft as the likely source. She still has some swelling and tenderness in the arm. Vascular surgery was consulted     PAST MEDICAL HISTORY:    Past Medical History:    Anxiety state    Arrhythmia    Congestive heart disease (HCC)    Depression    Diabetes (HCC)    Esophageal reflux    Essential hypertension    Hidradenitis    bilateral axilla    High blood pressure    High cholesterol    Kidney failure    Migraines    Polycystic ovarian syndrome    treated with Metformin    PONV (postoperative nausea and vomiting)    Renal disorder    Visual impairment    Wears glasses     PAST SURGICAL HISTORY:   Past Surgical History:   Procedure Laterality Date    Appendectomy      Other Bilateral     excision of axilla hydradenitis     MEDICATIONS:     Current Facility-Administered Medications:     loperamide (Imodium) cap 2 mg, 2 mg, Oral, QID PRN    ceFAZolin (Ancef) 1 g in dextrose 5% 100mL IVPB-ADD, 1 g, Intravenous, Q24H    atorvastatin (Lipitor) tab 40 mg,  40 mg, Oral, Nightly    carvedilol (Coreg) tab 25 mg, 25 mg, Oral, BID    heparin (Porcine) 5000 UNIT/ML injection 5,000 Units, 5,000 Units, Subcutaneous, Q8H MANISHA    ondansetron (Zofran) 4 MG/2ML injection 4 mg, 4 mg, Intravenous, Q6H PRN    prochlorperazine (Compazine) 10 MG/2ML injection 5 mg, 5 mg, Intravenous, Q8H PRN    polyethylene glycol (PEG 3350) (Miralax) 17 g oral packet 17 g, 17 g, Oral, Daily PRN    sennosides (Senokot) tab 17.2 mg, 17.2 mg, Oral, Nightly PRN    bisacodyl (Dulcolax) 10 MG rectal suppository 10 mg, 10 mg, Rectal, Daily PRN    acetaminophen (Tylenol Extra Strength) tab 1,000 mg, 1,000 mg, Oral, Q6H PRN    [Held by provider] bumetanide (Bumex) tab 3 mg, 3 mg, Oral, Daily    ALLERGIES:    She is allergic to grapefruit, other, citrus c vit [ascorbate], latex, and shellfish-derived products.    SOCIAL HISTORY:    Patient  reports that she has never smoked. She has never used smokeless tobacco. She reports that she does not drink alcohol and does not use drugs.    FAMILY HISTORY:    Patient's family history includes Diabetes in her mother; Hypertension in her father and mother; Lipids in her father and mother; Obesity in her father and mother; Psychiatric in her brother and sister.    ROS:    Comprehensive ROS reviewed and negative except for what's stated above.  Including negative for chest pain, shortness of breath, syncope.     EXAM:  BP (!) 172/95 (BP Location: Right arm)   Pulse 89   Temp 98.6 °F (37 °C) (Oral)   Resp 16   Wt 204 lb 3.2 oz (92.6 kg)   LMP 05/14/2024 (Exact Date)   SpO2 98%   BMI 33.98 kg/m²   GENERAL: alert and oriented x3, in no apparent distress  PSYCH: Normal mood and affect  NEURO: Cranial nerves grossly intact. No sensory or motor deficits  HEENT: Ears and throat are clear  NECK: Supple  RESPIRATORY: Normal work of breathing  CARDIO: RRR   ABDOMEN: Soft, non-tender, non-distended  BACK: Normal, no tenderness  SKIN: No rashes, warm and dry  MUSCULOSKELETAL:  Strength 5/5 throughout, sensation intact  EXTREMITIES: No edema. Some fullness of the left axilla  VASCULAR: Palpable thrill in the graft, palpable radial pulse        Diagnostic Data:      LABS:  Recent Labs   Lab 06/09/24  2035 06/10/24  0630 06/11/24  0835 06/12/24  0912 06/13/24  0648   WBC 14.1* 17.5* 9.5 7.9 7.9   HGB 10.8* 9.9* 9.5* 9.4* 9.2*   MCV 87.1 86.9 88.3 87.1 85.3   .0 190.0 183.0 183.0 187.0       Recent Labs   Lab 06/09/24  2035 06/09/24  2041 06/10/24  0630 06/11/24  0835 06/12/24  0746 06/13/24  0648   *  --  137 136 138 137   K  --  2.8* 3.0* 3.0* 3.6 3.6   CL 95*  --  97* 98 100 99   CO2 27.0  --  27.0 28.0 27.0 26.0   BUN  --  46* 50* 43* 59* 76*   CREATSERUM 8.32*  --  9.42* 8.17* 10.61* 12.31*   *  --  111* 102* 90 92   CA 8.4*  --  7.9* 8.4* 8.5* 8.5*   MG  --   --   --  2.1 2.6 2.6   PHOS  --   --  6.0* 4.6 5.6* 6.4*     No results for input(s): \"PTP\", \"INR\", \"PTT\" in the last 168 hours.  Recent Labs   Lab 06/09/24  2035 06/09/24  2041 06/10/24  0630 06/11/24  0835 06/12/24  0746 06/13/24  0648   ALT  --  17  --   --   --   --    AST  --  21  --   --   --   --    ALB 4.6  --  4.0 4.1 4.1 4.0     No results for input(s): \"TROP\" in the last 168 hours.  No results found for: \"ANAS\", \"ERASMO\", \"ANASCRN\"  Recent Labs   Lab 06/10/24  1406   HBSAG Nonreactive   HBCAB Nonreactive       Radiology: NM INFECTION INDIUM  WB MULTI AREA SNGL DAY(CPT=78802)    Result Date: 6/12/2024  PROCEDURE: NM INFECTION INDIUM  COMPLETE (CPT=78806)  COMPARISON: None available.  INDICATIONS: MSSA bacteremia; clinical concern for AV fistula infection.  TECHNIQUE: This is an Indium 111 labeled white blood cell scan, whole body, for infection, performed 20-24 hours after the intravenous injection of 200 microcuries of Indium 111 labeled autologous white blood cells injected into the left antecubital vein.  Whole body automated images were performed anteriorly and posteriorly followed by  spot views of the left upper extremity.   FINDINGS:  REGION IMAGED: Whole-body and left upper extremity.  ABNORMAL FOCI: There is increased uptake in the region of the left upper extremity. OTHER: Prominent activity is demonstrated in the right antecubital region, presumably related to the injection site. Otherwise, there is physiologic distribution of the radiotracer.          CONCLUSION:  Imaging findings concerning for infection of the left upper extremity AV graft.    Dictated by (CST): Natanael Burnette MD on 2024 at 9:57 AM     Finalized by (CST): Natanael Burnette MD on 2024 at 9:59 AM          CARD ECHO 2D DOPPLER (CPT=93306)    Result Date: 2024  Transthoracic Echocardiogram Name:Liu Walker Date: 2024 :  1990 Ht:  (65in)  BP: 151 / 85 MRN:  [id number]    Age:  33years    Wt:  (207lb) HR: 77bpm Loc:  [facility]       Gndr: F          BSA: 2.01m^2 Sonographer: Baldev BARNETT T Ordering:    Beverly Vazquez Consulting:  Essie Walter ---------------------------------------------------------------------------- History/Indications:  MSSA bacteremia. ---------------------------------------------------------------------------- Procedure information:  A transthoracic complete 2D study was performed. Additional evaluation included M-mode, complete spectral Doppler, and color Doppler.  Patient status:  Inpatient.  Location:  Bedside.    This was a routine study. Transthoracic echocardiography for ventricular function evaluation and assessment of valvular function. Image quality was adequate. ECG rhythm:   Normal sinus ---------------------------------------------------------------------------- Conclusions: 1. Left ventricle: The cavity size was normal. Wall thickness was moderately    increased. Systolic function was normal. The estimated ejection fraction    was 60-65%, by 3D assessment. No diagnostic evidence for regional wall    motion abnormalities. Features are consistent with  a pseudonormal left    ventricular filling pattern, with concomitant abnormal relaxation and    increased filling pressure - grade 2 diastolic dysfunction. 2. Left atrium: The left atrial volume was mildly increased. 3. Mitral valve: There was mild regurgitation. 4. Vegetation is not seen. * ---------------------------------------------------------------------------- * Findings: Left ventricle:  The cavity size was normal. Wall thickness was moderately increased. Systolic function was normal. The estimated ejection fraction was 60-65%, by 3D assessment. No diagnostic evidence for regional wall motion abnormalities. Features are consistent with a pseudonormal left ventricular filling pattern, with concomitant abnormal relaxation and increased filling pressure - grade 2 diastolic dysfunction. Left atrium:  The left atrial volume was mildly increased. Right ventricle:  The cavity size was normal. Systolic function was normal. Right atrium:  The atrium was normal in size. Mitral valve:  The valve was structurally normal. Leaflet separation was normal.  Doppler:  Transvalvular velocity was within the normal range. There was no evidence for stenosis. There was mild regurgitation. Aortic valve:  The valve was structurally normal. The valve was trileaflet. Cusp separation was normal.  Doppler:  Transvalvular velocity was within the normal range. There was no evidence for stenosis. There was no significant regurgitation. Tricuspid valve:  The valve is structurally normal. Leaflet separation was normal.  Doppler:  Transvalvular velocity was within the normal range. There was no evidence for stenosis. There was mild regurgitation. Pulmonic valve:   The valve is structurally normal. Cusp separation was normal.  Doppler:  Transvalvular velocity was within the normal range. There was no evidence for stenosis. There was trivial regurgitation. Pericardium:  A trivial pericardial effusion was identified. Aorta: Aortic root: The  aortic root was normal. Ascending aorta: The ascending aorta was normal. Pulmonary arteries: Systolic pressure was within the normal range, estimated to be 29mm Hg. Estimated pulmonary artery diastolic pressure was 9mm Hg. Systemic veins:  Central venous respirophasic diameter changes are in the normal range (>50%). Inferior vena cava: The IVC was normal-sized. ---------------------------------------------------------------------------- Measurements  Left ventricle                    Value        Ref  LV end-diastolic volume, 3D       189   ml     ---------  LV end-systolic volume, 3D        76    ml     ---------  LV ejection fraction, 3D          60    %      54 - 74  LV end-diastolic volume/bsa,  (H) 94    ml/m^2 <=71  3D  LV end-systolic volume/bsa,   (H) 38    ml/m^2 <=28  3D  LV wall mass, 3D                  228   g      ---------  LV wall mass/bsa, 3D              114   g/m^2  ---------  IVS thickness, ED, PLAX       (H) 1.4   cm     0.6 - 0.9  LV ID, ED, PLAX                   5.2   cm     3.8 - 5.2  LV ID, ES, PLAX               (H) 3.7   cm     2.2 - 3.5  LV PW thickness, ED, PLAX     (H) 1.4   cm     0.6 - 0.9  IVS/LV PW ratio, ED, PLAX         1.00         ---------  LV PW/LV ID ratio, ED, PLAX       0.27         ---------  LV ejection fraction              55    %      54 - 74  Stroke volume/bsa, 2D             62    ml/m^2 ---------  LV e', lateral                (L) 6.5   cm/sec >=10.0  LV E/e', lateral              (H) 15           <=13  LV e', medial                 (L) 6.8   cm/sec >=7.0  LV E/e', medial                   14           ---------  LV e', average                    6.7   cm/sec ---------  LV E/e', average                  14           <=14  LVOT                              Value        Ref  LVOT ID                           2.3   cm     ---------  LVOT peak velocity, S             1.63  m/sec  ---------  LVOT VTI, S                       30.0  cm     ---------  LVOT peak gradient, S              11    mm Hg  ---------  LVOT mean gradient, S             6     mm Hg  ---------  Stroke volume (SV), LVOT DP       125   ml     ---------  Stroke index (SV/bsa), LVOT       62    ml/m^2 ---------  DP  Aortic root                       Value        Ref  Aortic root ID                    3.2   cm     2.7 - 3.7  Ascending aorta                   Value        Ref  Ascending aorta ID                3.5   cm     1.9 - 3.5  Left atrium                       Value        Ref  LA ID, A-P, ES                (H) 4.7   cm     2.7 - 3.8  LA volume, S                  (H) 72    ml     22 - 52  LA volume/bsa, S              (H) 36    ml/m^2 16 - 34  LA volume, ES, 1-p A4C        (H) 73    ml     22 - 52  LA volume, ES, 1-p A2C        (H) 68    ml     22 - 52  LA volume, ES, A/L                76    ml     ---------  LA volume/bsa, ES, A/L        (H) 38    ml/m^2 16 - 34  LA/aortic root ratio              1.47         ---------  LA volume, ES, 3D             (H) 68    ml     22 - 52  LA volume/bsa, ES, 3D             34    ml/m^2 ---------  Mitral valve                      Value        Ref  Mitral E-wave peak velocity       0.96  m/sec  ---------  Mitral A-wave peak velocity       0.78  m/sec  ---------  Mitral deceleration time          185   ms     ---------  Mitral peak gradient, D           4     mm Hg  ---------  Mitral E/A ratio, peak            1.2          ---------  Pulmonary artery                  Value        Ref  PA pressure, S, DP                29    mm Hg  ---------  PA pressure, ED, DP               9     mm Hg  ---------  Tricuspid valve                   Value        Ref  Tricuspid regurg peak             2.54  m/sec  <=2.8  velocity  Tricuspid peak RV-RA gradient     26    mm Hg  ---------  Systemic veins                    Value        Ref  Estimated CVP                     3     mm Hg  ---------  Right ventricle                   Value        Ref  TAPSE, MM                         2.75  cm      >=1.70  RV pressure, S, DP                29    mm Hg  ---------  RV s', lateral                    16.2  cm/sec >=9.5  Pulmonic valve                    Value        Ref  Pulmonic regurg velocity, ED      1.19  m/sec  ---------  Pulmonic regurg gradient, ED      6     mm Hg  --------- Legend: (L)  and  (H)  oral values outside specified reference range. ---------------------------------------------------------------------------- Prepared and electronically signed by Kuldeep Xiao 06/11/2024 13:18     XR CHEST AP PORTABLE  (CPT=71045)    Result Date: 6/9/2024  PROCEDURE: XR CHEST AP PORTABLE  (CPT=71045) TIME: 20:37.   COMPARISON: [facility], CT CHEST PE AORTA (IV ONLY) (CPT=71260), 1/03/2024, 9:58 AM.  [facility], XR CHEST AP PORTABLE (CPT=71045), 6/03/2024, 8:13 PM.  [facility], XR CHEST AP PORTABLE (CPT=71045), 1/03/2024, 7:30 AM.  [facility], XR CHEST AP PORTABLE (CPT=71045), 7/08/2022, 7:44 PM.  INDICATIONS: Fever  TECHNIQUE:   Single view.   FINDINGS:  CARDIAC/VASC: The cardiomediastinal silhouette is enlarged and unchanged in size. MEDIAST/FLYNN:   The mediastinum and hilum are unchanged. LUNGS/PLEURA: There is prominence of the interstitial markings.  There are patchy bibasilar opacities.  No significant pleural effusion.  No pneumothorax. BONES: Scattered degenerative changes of the thoracic spine. OTHER: The tunneled right IJ central venous catheter is in unchanged position.         CONCLUSION:   Patchy bibasilar opacities, which may reflect atelectasis or pulmonary edema with or without superimposed pneumonia.  Mild prominence of the interstitial markings, which may reflect mild pulmonary edema.  Redemonstrated enlarged cardiomediastinal silhouette.    Dictated by (CST): Chuy Harris MD on 6/09/2024 at 9:30 PM     Finalized by (CST): Chuy Harris MD on 6/09/2024 at 9:33 PM          XR CHEST AP PORTABLE  (CPT=71045)    Result Date:  6/3/2024  PROCEDURE: XR CHEST AP PORTABLE  (CPT=71045) TIME: 2018 hours  COMPARISON: [facility], XR CHEST AP PORTABLE (CPT=71045), 1/03/2024, 7:30 AM.  INDICATIONS: Shortness of breath today  TECHNIQUE:   Single view.   FINDINGS:  CARDIAC/VASC: Cardiomegaly. Pulmonary venous congestion.  MEDIAST/FLYNN:   No visible mass or adenopathy. LUNGS/PLEURA: Pulmonary interstitial edema. BONES: No fracture or visible bony lesion. OTHER: Right-sided double lumen dialysis catheter with tip at the RA SVC junction.         CONCLUSION:  1. CHF/fluid overload with pulmonary interstitial edema.    Dictated by (CST): Alexys Sun MD on 6/03/2024 at 9:12 PM     Finalized by (CST): Alexys Sun MD on 6/03/2024 at 9:13 PM               ASSESSMENT AND PLAN:     The patient is a 33 year old female who presents with LUE infected AV graft and MSSA bacteremia. Discussed with patient regarding findings on the nuclear med scan. Given infection that developed shortly after the graft was placed and now fevers when the graft is used, I have recommended that it be removed. I will plan to get an ultrasound of the arm to evaluate for fluid collections. Will likely plan for graft excision on Monday.     The patient indicated an understanding of these issues and agreed to the plan and all questions were answered.     Thank you for allowing me to participate in the patient's care.     Sincerely,  Peg Huddleston MD  06/13/24   12:23 PM

## 2024-06-14 ENCOUNTER — APPOINTMENT (OUTPATIENT)
Dept: ULTRASOUND IMAGING | Facility: HOSPITAL | Age: 34
DRG: 252 | End: 2024-06-14
Attending: SURGERY

## 2024-06-14 PROCEDURE — 76882 US LMTD JT/FCL EVL NVASC XTR: CPT | Performed by: SURGERY

## 2024-06-14 RX ORDER — CALCIUM CARBONATE 500 MG/1
500 TABLET, CHEWABLE ORAL
Status: DISCONTINUED | OUTPATIENT
Start: 2024-06-14 | End: 2024-06-19

## 2024-06-14 RX ORDER — BUMETANIDE 1 MG/1
3 TABLET ORAL
Status: DISCONTINUED | OUTPATIENT
Start: 2024-06-14 | End: 2024-06-19

## 2024-06-14 RX ORDER — BUMETANIDE 1 MG/1
3 TABLET ORAL DAILY
Status: DISCONTINUED | OUTPATIENT
Start: 2024-06-14 | End: 2024-06-14

## 2024-06-14 RX ORDER — BUMETANIDE 1 MG/1
3 TABLET ORAL DAILY
Status: DISCONTINUED | OUTPATIENT
Start: 2024-06-14 | End: 2024-06-14 | Stop reason: ALTCHOICE

## 2024-06-14 NOTE — PROGRESS NOTES
[facility] Hospitalist Progress Note     CC: Hospital Follow up    PCP: Ashleigh Murray       Assessment/Plan:   Ms. Walker is a 33 year old female with PMH sig for ESRD on HD (t/t/s), HTN, HLD, obesity, HFpEF, who presented with fever, found to have bacteremia.      Sepsis   Bacteremia   Diarrhea  - MSSA bacteremia  - source possibly fistula.   - was treated few months back with oral antibiotics for possible drainage around or near fistula by surgeon   - WBC scan positive for possible infection in area of fistula  - consulted vascular surgery appreciate eval. Plan for likely removal of fistula Monday  - ID on consult      ESRD  - on HD t/t/s  - renal consulted     Anemia  - likely ACD 2/2 ESRD  - monitor      HFpEF  - poss infiltrates vs fluid  - volume removal with HD  - bumex on non dialysis days as needed     HTN  - continue coreg  - does not tolerate nifedipine any more she states     HLD  - statin     Marco Muhammad DO  [facility] Hospitalist      Subjective:     Feels ok. No new symptoms.       OBJECTIVE:    Blood pressure 156/77, pulse 66, temperature 98.3 °F (36.8 °C), temperature source Oral, resp. rate 18, weight 199 lb 8 oz (90.5 kg), last menstrual period 05/14/2024, SpO2 98%, not currently breastfeeding.    Temp:  [97.8 °F (36.6 °C)-99.1 °F (37.3 °C)] 98.3 °F (36.8 °C)  Pulse:  [63-77] 66  Resp:  [16-18] 18  BP: (131-172)/(77-95) 156/77  SpO2:  [95 %-99 %] 98 %      Intake/Output:    Intake/Output Summary (Last 24 hours) at 6/14/2024 0950  Last data filed at 6/13/2024 1845  Gross per 24 hour   Intake 230 ml   Output 2000 ml   Net -1770 ml       Last 3 Weights   06/14/24 0441 199 lb 8 oz (90.5 kg)   06/13/24 0438 204 lb 3.2 oz (92.6 kg)   06/12/24 0514 199 lb 9.6 oz (90.5 kg)   06/11/24 1657 200 lb 4.8 oz (90.9 kg)   06/10/24 0435 207 lb 8 oz (94.1 kg)   06/10/24 0127 207 lb 9.6 oz (94.2 kg)   06/10/24 0117 207 lb 9.6 oz (94.2 kg)   06/03/24 2010 206 lb (93.4 kg)   04/22/24  1518 211 lb (95.7 kg)       /77 (BP Location: Right arm)   Pulse 66   Temp 98.3 °F (36.8 °C) (Oral)   Resp 18   Wt 199 lb 8 oz (90.5 kg)   LMP 05/14/2024 (Exact Date)   SpO2 98%   BMI 33.20 kg/m²   General: Alert, no acute distress  Lungs: clear to ausculation bilaterally  Heart: Regular rate and rhythm  Abdomen: soft, non tender  Extremities: No edema    Data Review:       Labs:     Recent Labs   Lab 06/11/24 0835 06/12/24  0912 06/13/24  0648   RBC 3.33* 3.26* 3.19*   HGB 9.5* 9.4* 9.2*   HCT 29.4* 28.4* 27.2*   MCV 88.3 87.1 85.3   MCH 28.5 28.8 28.8   MCHC 32.3 33.1 33.8   RDW 13.2 13.3 13.1   NEPRELIM 7.00 5.22 4.86   WBC 9.5 7.9 7.9   .0 183.0 187.0         Recent Labs   Lab 06/11/24 0835 06/12/24  0746 06/13/24  0648   * 90 92   BUN 43* 59* 76*   CREATSERUM 8.17* 10.61* 12.31*   EGFRCR 6* 4* 4*   CA 8.4* 8.5* 8.5*    138 137   K 3.0* 3.6 3.6   CL 98 100 99   CO2 28.0 27.0 26.0       Recent Labs   Lab 06/09/24  2035 06/09/24  2041 06/10/24  0630 06/11/24  0835 06/12/24  0746 06/13/24  0648   ALT  --  17  --   --   --   --    AST  --  21  --   --   --   --    ALB 4.6  --  4.0 4.1 4.1 4.0         Imaging:  NM INFECTION INDIUM  WB MULTI AREA SNGL DAY(CPT=78802)    Result Date: 6/12/2024  CONCLUSION:  Imaging findings concerning for infection of the left upper extremity AV graft.    Dictated by (CST): Natanael Burnette MD on 6/12/2024 at 9:57 AM     Finalized by (CST): Natanael Burnette MD on 6/12/2024 at 9:59 AM             Meds:      calcium carbonate  500 mg Oral TID CC    ceFAZolin  1 g Intravenous Q24H    atorvastatin  40 mg Oral Nightly    carvedilol  25 mg Oral BID    heparin  5,000 Units Subcutaneous Q8H MANISHA    [Held by provider] bumetanide  3 mg Oral Daily         loperamide    ondansetron    prochlorperazine    polyethylene glycol (PEG 3350)    sennosides    bisacodyl    acetaminophen

## 2024-06-14 NOTE — PROGRESS NOTES
[facility]  part of [facility] Infectious Disease Progress Note    Liu Walker Patient Status:  Inpatient    1990 MRN [id number]   Location [facility] 1W Attending Marco Muhmamad DO   Hosp Day # 4 PCP Ashleigh Murray     Subjective:  Pt states she is feeling better.     Objective:    Allergies:  Allergies   Allergen Reactions    Grapefruit HIVES    Other OTHER (SEE COMMENTS)     Muscle relaxants-paralysis    Citrus C Vit [Ascorbate] HIVES    Latex RASH    Shellfish-Derived Products Tightness in Throat       Medications:    Current Facility-Administered Medications:     loperamide (Imodium) cap 2 mg, 2 mg, Oral, QID PRN    ceFAZolin (Ancef) 1 g in dextrose 5% 100mL IVPB-ADD, 1 g, Intravenous, Q24H    atorvastatin (Lipitor) tab 40 mg, 40 mg, Oral, Nightly    carvedilol (Coreg) tab 25 mg, 25 mg, Oral, BID    heparin (Porcine) 5000 UNIT/ML injection 5,000 Units, 5,000 Units, Subcutaneous, Q8H MANISHA    ondansetron (Zofran) 4 MG/2ML injection 4 mg, 4 mg, Intravenous, Q6H PRN    prochlorperazine (Compazine) 10 MG/2ML injection 5 mg, 5 mg, Intravenous, Q8H PRN    polyethylene glycol (PEG 3350) (Miralax) 17 g oral packet 17 g, 17 g, Oral, Daily PRN    sennosides (Senokot) tab 17.2 mg, 17.2 mg, Oral, Nightly PRN    bisacodyl (Dulcolax) 10 MG rectal suppository 10 mg, 10 mg, Rectal, Daily PRN    acetaminophen (Tylenol Extra Strength) tab 1,000 mg, 1,000 mg, Oral, Q6H PRN    [Held by provider] bumetanide (Bumex) tab 3 mg, 3 mg, Oral, Daily    Physical Exam:  General: Alert, orientated x3.  Cooperative.  No apparent distress.  Vital Signs:  Blood pressure 142/85, pulse 63, temperature 98 °F (36.7 °C), temperature source Oral, resp. rate 16, weight 199 lb 8 oz (90.5 kg), last menstrual period 2024, SpO2 99%, not currently breastfeeding.   Temp (24hrs), Av.4 °F (36.9 °C), Min:97.8 °F (36.6 °C), Max:99.1 °F (37.3 °C)      HEENT: Exam is unremarkable.  Without scleral  icterus.  Mucous membranes are moist. PERRLA.  Oropharynx is clear.  Neck: No tenderness to palpitation.  Full range of motion to flexion and extension, lateral rotation and lateral flexion of cervical spine.  No JVD. Supple.   Lungs: Clear to auscultation bilaterally.  Cardiac: Regular rate and rhythm. No murmur.  Abdomen:  Soft, non-distended, non-tender, with no rebound or guarding.   Extremities:  No lower extremity edema noted.  Without clubbing or cyanosis.    Skin: LUE covered  Neurologic: Cranial nerves are grossly intact.  Motor strength and sensory examination is grossly normal.  No focal neurologic deficit.      Assessment/Plan:    1.  MSSA sepsis  -positive blood culture on 6/9, repeat on 6/10 NG  -secondary to AV fistula site infection  -hx of LUE AV fistula drainage and pain, was given PO doxycycline and cefadroxil in 4/2024  -NM scan with uptake around AV graft site  -TTE negative for vegetation  -vascular on consult, plans for removal on Monday  -on IV cefazolin  2.  ESRD on HD  -avoiding AV fistula  -using HD cath  -nephrology on consult      If you have any questions or concerns please call [facility] Infectious Disease at [phone].     SHANA Cruz

## 2024-06-14 NOTE — PLAN OF CARE
Problem: PAIN - ADULT  Goal: Verbalizes/displays adequate comfort level or patient's stated pain goal  Description: INTERVENTIONS:  - Encourage pt to monitor pain and request assistance  - Assess pain using appropriate pain scale  - Administer analgesics based on type and severity of pain and evaluate response  - Implement non-pharmacological measures as appropriate and evaluate response  - Consider cultural and social influences on pain and pain management  - Manage/alleviate anxiety  - Utilize distraction and/or relaxation techniques  - Monitor for opioid side effects  - Notify MD/LIP if interventions unsuccessful or patient reports new pain  - Anticipate increased pain with activity and pre-medicate as appropriate  Outcome: Progressing     Problem: SAFETY ADULT - FALL  Goal: Free from fall injury  Description: INTERVENTIONS:  - Assess pt frequently for physical needs  - Identify cognitive and physical deficits and behaviors that affect risk of falls.  - [place] fall precautions as indicated by assessment.  - Educate pt/family on patient safety including physical limitations  - Instruct pt to call for assistance with activity based on assessment  - Modify environment to reduce risk of injury  - Provide assistive devices as appropriate  - Consider OT/PT consult to assist with strengthening/mobility  - Encourage toileting schedule  Outcome: Progressing     Problem: DISCHARGE PLANNING  Goal: Discharge to home or other facility with appropriate resources  Description: INTERVENTIONS:  - Identify barriers to discharge w/pt and caregiver  - Include patient/family/discharge partner in discharge planning  - Arrange for needed discharge resources and transportation as appropriate  - Identify discharge learning needs (meds, wound care, etc)  - Arrange for interpreters to assist at discharge as needed  - Consider post-discharge preferences of patient/family/discharge partner  - Complete POLST form as appropriate  - Assess  patient's ability to be responsible for managing their own health  - Refer to Case Management Department for coordinating discharge planning if the patient needs post-hospital services based on physician/LIP order or complex needs related to functional status, cognitive ability or social support system  Outcome: Progressing     Problem: CARDIOVASCULAR - ADULT  Goal: Maintains optimal cardiac output and hemodynamic stability  Description: INTERVENTIONS:  - Monitor vital signs, rhythm, and trends  - Monitor for bleeding, hypotension and signs of decreased cardiac output  - Evaluate effectiveness of vasoactive medications to optimize hemodynamic stability  - Monitor arterial and/or venous puncture sites for bleeding and/or hematoma  - Assess quality of pulses, skin color and temperature  - Assess for signs of decreased coronary artery perfusion - ex. Angina  - Evaluate fluid balance, assess for edema, trend weights  Outcome: Progressing  Goal: Absence of cardiac arrhythmias or at baseline  Description: INTERVENTIONS:  - Continuous cardiac monitoring, monitor vital signs, obtain 12 lead EKG if indicated  - Evaluate effectiveness of antiarrhythmic and heart rate control medications as ordered  - Initiate emergency measures for life threatening arrhythmias  - Monitor electrolytes and administer replacement therapy as ordered  Outcome: Progressing     Problem: METABOLIC/FLUID AND ELECTROLYTES - ADULT  Goal: Glucose maintained within prescribed range  Description: INTERVENTIONS:  - Monitor Blood Glucose as ordered  - Assess for signs and symptoms of hyperglycemia and hypoglycemia  - Administer ordered medications to maintain glucose within target range  - Assess barriers to adequate nutritional intake and initiate nutrition consult as needed  - Instruct patient on self management of diabetes  Outcome: Progressing  Goal: Electrolytes maintained within normal limits  Description: INTERVENTIONS:  - Monitor labs and rhythm and  assess patient for signs and symptoms of electrolyte imbalances  - Administer electrolyte replacement as ordered  - Monitor response to electrolyte replacements, including rhythm and repeat lab results as appropriate  - Fluid restriction as ordered  - Instruct patient on fluid and nutrition restrictions as appropriate  Outcome: Progressing  Note: Electrolyte and excess fluid balance addressed by nephrology with HD.  Goal: Hemodynamic stability and optimal renal function maintained  Description: INTERVENTIONS:  - Monitor labs and assess for signs and symptoms of volume excess or deficit  - Monitor intake, output and patient weight  - Monitor urine specific gravity, serum osmolarity and serum sodium as indicated or ordered  - Monitor response to interventions for patient's volume status, including labs, urine output, blood pressure (other measures as available)  - Encourage oral intake as appropriate  - Instruct patient on fluid and nutrition restrictions as appropriate  Outcome: Progressing

## 2024-06-14 NOTE — PROGRESS NOTES
Samuel called, hemodialysis scheduled for tomorrow 6/15 as ordered, informed of note from MD to call nephrology before starting HD.

## 2024-06-14 NOTE — PROGRESS NOTES
[facility]  part of [facility]    Progress Note    Liu Walker Patient Status:  Inpatient    1990 MRN [id number]   Location [facility] 1W Attending Catracho Warner MD   Hosp Day # 4 PCP Ashleigh Murray       Subjective:     Resting comfortably in bed not in acute distress.    Objective:   Vital Signs:  Blood pressure 156/77, pulse 66, temperature 98.3 °F (36.8 °C), temperature source Oral, resp. rate 18, weight 199 lb 8 oz (90.5 kg), last menstrual period 2024, SpO2 98%, not currently breastfeeding.     General: No acute distress. Alert and oriented x 3.  HEENT: Moist mucous membranes.   Respiratory: Diminished breath sounds  Cardiovascular: S1, S2. No Rubs  Abdomen: Soft, nontender, nondistended.  Positive bowel sounds.  Ext: Trace LE edema, RIJ tunneled HD catheter, LUE BC AVF/G + Bruit and thrill.    Results:     Lab Results   Component Value Date    WBC 7.9 2024    HGB 9.2 (L) 2024    HCT 27.2 (L) 2024    .0 2024    CREATSERUM 12.31 (H) 2024    BUN 76 (H) 2024     2024    K 3.6 2024    CL 99 2024    CO2 26.0 2024    GLU 92 2024    CA 8.5 (L) 2024    ALB 4.0 2024    ALKPHO 65 2024    BILT 0.7 2024    TP 8.2 2024    AST 21 2024    ALT 17 2024    PTT 31.8 2017    TSH 2.910 2022    LIP 69 2023    DDIMER 2.89 (H) 2024    MG 2.6 2024    PHOS 6.4 (H) 2024    TROP <0.045 2021       No results found.                Bernardino Bourgeois MD  2024    Assessment and Plan:   33 year old female with PMH of ESRD on HD TTS, HTN, HL, HFpEF admitted with fevers SOB and bacteremia, were consulted to resume iHD while inpatient:     ESRD on HD TTS:  - HD done on Monday  - Will resume HD through HD catheter AVOID USE OF FISTULA FOR NOW.  - I did discuss with patient and current nephrologist Dr. Cleveland, discussed with patient  having her primary group follow up. patient would like to resume current care with our group at this time.     Bacteremia:  - Repeat cultures negative  - WBC scan with likely graft infection, Will defer to Vascular and ID.  - Plan for AVG removal next week as per Vascular.     Anemia in ESRD:  - holding on venofer in light of active infection  - Hemoglobin levels close to goal consider adding Epogen if remains low.     Hypokalemia:  - resolved.     BMD:  - low phos diet  - start on tums 500mg PO TID     HTN:  - Continue coreg  - Bumex only on non HD days as needed.     Thank you for allowing me to participate in the care of your patient.     Bernardino Bourgeois MD  [facility]  Nephrology

## 2024-06-15 LAB
ALBUMIN SERPL-MCNC: 4.3 G/DL (ref 3.2–4.8)
ANION GAP SERPL CALC-SCNC: 11 MMOL/L (ref 0–18)
BUN BLD-MCNC: 69 MG/DL (ref 9–23)
BUN/CREAT SERPL: 6.2 (ref 10–20)
CALCIUM BLD-MCNC: 9 MG/DL (ref 8.7–10.4)
CHLORIDE SERPL-SCNC: 98 MMOL/L (ref 98–112)
CO2 SERPL-SCNC: 27 MMOL/L (ref 21–32)
CREAT BLD-MCNC: 11.21 MG/DL
EGFRCR SERPLBLD CKD-EPI 2021: 4 ML/MIN/1.73M2 (ref 60–?)
GLUCOSE BLD-MCNC: 84 MG/DL (ref 70–99)
MAGNESIUM SERPL-MCNC: 2.6 MG/DL (ref 1.6–2.6)
OSMOLALITY SERPL CALC.SUM OF ELEC: 301 MOSM/KG (ref 275–295)
PHOSPHATE SERPL-MCNC: 7.2 MG/DL (ref 2.4–5.1)
POTASSIUM SERPL-SCNC: 4.1 MMOL/L (ref 3.5–5.1)
SODIUM SERPL-SCNC: 136 MMOL/L (ref 136–145)

## 2024-06-15 PROCEDURE — 83735 ASSAY OF MAGNESIUM: CPT | Performed by: INTERNAL MEDICINE

## 2024-06-15 PROCEDURE — 80069 RENAL FUNCTION PANEL: CPT | Performed by: INTERNAL MEDICINE

## 2024-06-15 PROCEDURE — 90935 HEMODIALYSIS ONE EVALUATION: CPT

## 2024-06-15 NOTE — PLAN OF CARE
Patient resting in bed throughout day, no complaints of pain. Ultrsound of left arm done at bedside. Hemodialysis scheduled for tomorrow as ordered. Iv antibiotics as ordered. Voided small amount x2 today. SBP's 160-170, MD notified, restart home Bumex 3mg on non-dialysis days.  Problem: Patient Centered Care  Goal: Patient preferences are identified and integrated in the patient's plan of care  Description: Interventions:  - What would you like us to know as we care for you?   - Provide timely, complete, and accurate information to patient/family  - Incorporate patient and family knowledge, values, beliefs, and cultural backgrounds into the planning and delivery of care  - Encourage patient/family to participate in care and decision-making at the level they choose  - Honor patient and family perspectives and choices  Outcome: Progressing     Problem: Patient/Family Goals  Goal: Patient/Family Long Term Goal  Description: Patient's Long Term Goal:     Interventions:  -   - See additional Care Plan goals for specific interventions  Outcome: Progressing  Goal: Patient/Family Short Term Goal  Description: Patient's Short Term Goal:     Interventions:   -   - See additional Care Plan goals for specific interventions  Outcome: Progressing     Problem: PAIN - ADULT  Goal: Verbalizes/displays adequate comfort level or patient's stated pain goal  Description: INTERVENTIONS:  - Encourage pt to monitor pain and request assistance  - Assess pain using appropriate pain scale  - Administer analgesics based on type and severity of pain and evaluate response  - Implement non-pharmacological measures as appropriate and evaluate response  - Consider cultural and social influences on pain and pain management  - Manage/alleviate anxiety  - Utilize distraction and/or relaxation techniques  - Monitor for opioid side effects  - Notify MD/LIP if interventions unsuccessful or patient reports new pain  - Anticipate increased pain with  activity and pre-medicate as appropriate  Outcome: Progressing     Problem: SAFETY ADULT - FALL  Goal: Free from fall injury  Description: INTERVENTIONS:  - Assess pt frequently for physical needs  - Identify cognitive and physical deficits and behaviors that affect risk of falls.  - [place] fall precautions as indicated by assessment.  - Educate pt/family on patient safety including physical limitations  - Instruct pt to call for assistance with activity based on assessment  - Modify environment to reduce risk of injury  - Provide assistive devices as appropriate  - Consider OT/PT consult to assist with strengthening/mobility  - Encourage toileting schedule  Outcome: Progressing     Problem: DISCHARGE PLANNING  Goal: Discharge to home or other facility with appropriate resources  Description: INTERVENTIONS:  - Identify barriers to discharge w/pt and caregiver  - Include patient/family/discharge partner in discharge planning  - Arrange for needed discharge resources and transportation as appropriate  - Identify discharge learning needs (meds, wound care, etc)  - Arrange for interpreters to assist at discharge as needed  - Consider post-discharge preferences of patient/family/discharge partner  - Complete POLST form as appropriate  - Assess patient's ability to be responsible for managing their own health  - Refer to Case Management Department for coordinating discharge planning if the patient needs post-hospital services based on physician/LIP order or complex needs related to functional status, cognitive ability or social support system  Outcome: Progressing     Problem: CARDIOVASCULAR - ADULT  Goal: Maintains optimal cardiac output and hemodynamic stability  Description: INTERVENTIONS:  - Monitor vital signs, rhythm, and trends  - Monitor for bleeding, hypotension and signs of decreased cardiac output  - Evaluate effectiveness of vasoactive medications to optimize hemodynamic stability  - Monitor arterial and/or  venous puncture sites for bleeding and/or hematoma  - Assess quality of pulses, skin color and temperature  - Assess for signs of decreased coronary artery perfusion - ex. Angina  - Evaluate fluid balance, assess for edema, trend weights  Outcome: Progressing  Goal: Absence of cardiac arrhythmias or at baseline  Description: INTERVENTIONS:  - Continuous cardiac monitoring, monitor vital signs, obtain 12 lead EKG if indicated  - Evaluate effectiveness of antiarrhythmic and heart rate control medications as ordered  - Initiate emergency measures for life threatening arrhythmias  - Monitor electrolytes and administer replacement therapy as ordered  Outcome: Progressing     Problem: METABOLIC/FLUID AND ELECTROLYTES - ADULT  Goal: Glucose maintained within prescribed range  Description: INTERVENTIONS:  - Monitor Blood Glucose as ordered  - Assess for signs and symptoms of hyperglycemia and hypoglycemia  - Administer ordered medications to maintain glucose within target range  - Assess barriers to adequate nutritional intake and initiate nutrition consult as needed  - Instruct patient on self management of diabetes  Outcome: Progressing  Goal: Electrolytes maintained within normal limits  Description: INTERVENTIONS:  - Monitor labs and rhythm and assess patient for signs and symptoms of electrolyte imbalances  - Administer electrolyte replacement as ordered  - Monitor response to electrolyte replacements, including rhythm and repeat lab results as appropriate  - Fluid restriction as ordered  - Instruct patient on fluid and nutrition restrictions as appropriate  Outcome: Progressing  Goal: Hemodynamic stability and optimal renal function maintained  Description: INTERVENTIONS:  - Monitor labs and assess for signs and symptoms of volume excess or deficit  - Monitor intake, output and patient weight  - Monitor urine specific gravity, serum osmolarity and serum sodium as indicated or ordered  - Monitor response to  interventions for patient's volume status, including labs, urine output, blood pressure (other measures as available)  - Encourage oral intake as appropriate  - Instruct patient on fluid and nutrition restrictions as appropriate  Outcome: Progressing

## 2024-06-15 NOTE — PROGRESS NOTES
[facility]  part of [facility] Infectious Disease  Progress Note    Liu Walker Patient Status:  Inpatient    1990 MRN [id number]   Location [facility] [place] 1W Attending Marco Muhammad, DO   Hosp Day # 5 PCP Ashleigh Murray     Subjective:  Patient seen/examined.  Clinical course reviewed since my last exam.  Resection of fistula anticipated on Monday per vascular.  Patient is frustrated, however, because another physician told her the fistula doesn't have to be removed?    Objective:  Blood pressure (!) 154/91, pulse 71, temperature 98.3 °F (36.8 °C), temperature source Oral, resp. rate 18, weight 202 lb 4.8 oz (91.8 kg), last menstrual period 2024, SpO2 97%, not currently breastfeeding.    Intake/Output:    Intake/Output Summary (Last 24 hours) at 6/15/2024 0828  Last data filed at 6/15/2024 0600  Gross per 24 hour   Intake 840 ml   Output --   Net 840 ml       Physical Exam:  General: Awake, alert, non-tox, NAD.  HEENT:  Oropharynx clear, trachea ML.  Heart: RRR S1S2 no murmurs.  Lungs: Essentially CTA b/l, no rhonchi, rales, wheezes.  Abdomen: Soft, NT/ND.  BS present.  No organomegaly.  Extremity: No edema.  Neurological: No focal deficits.  Derm:  Warm, dry, free from rashes.    Lab Data Review:  Lab Results   Component Value Date    CREATSERUM 11.21 06/15/2024    BUN 69 06/15/2024     06/15/2024    K 4.1 06/15/2024    CL 98 06/15/2024    CO2 27.0 06/15/2024    GLU 84 06/15/2024    CA 9.0 06/15/2024    ALB 4.3 06/15/2024    MG 2.6 06/15/2024    PHOS 7.2 06/15/2024      Cultures:  2/2 blood cultures + MSSA 24  Repeat blood cultures negative 6/10/24     Radiology:  CONCLUSION:   Imaging findings concerning for infection of the left upper extremity AV graft.      Antibiotics Reviewed:  Ancef     Assessment and Plan:     MSSA sepsis in this woman who presented with fevers, lightheadedness, cough  - 2/2 blood cultures positive for MSSA  on 6/9/24, repeats negative  - h/o LUE AV fistula with drainage in April 2024 s/p treatment  - Current NM scan with uptake concerning for infection in the AV graft  - TTE without vegetations  - IV ancef ongoing     2.  ESRD on HD with R chest HD cath in place  - Per nephrology  - Given concern for AV fistula source, would not advise accessing fistula at this time  - If any further positive blood cultures will need HD line removed as well  - Plans for resection of fistula on Monday     3.  Abdominal pain, cramping, tenderness, and diarrhea  - Also on menstrual cycle  - C.diff negative     4.  Disposition - inpatient.  Will need long course IV Rx for MSSA sepsis in the setting of likely endovascular seeding.  Plans for resection of fistula on Monday.  Maintain HD line for now but will need to be removed if any further positive cultures.  Anticipate 6 weeks of IV Rx through 7/22/24 with definitive EOT to be determined if any further complication.  D/w patient.  Will follow.    Essie Walter DO, [facility] Infectious Disease  [phone]    6/15/2024  8:28 AM

## 2024-06-15 NOTE — PROGRESS NOTES
[facility]  part of [facility]    Progress Note    Liu Walker Patient Status:  Inpatient    1990 MRN [id number]   Location [facility] 1W Attending Catracho Warner MD   Hosp Day # 5 PCP Ashleigh Murray     Subjective:     Patient seen on dialysis. Headache.     Objective:   Vital Signs:  Blood pressure (!) 154/91, pulse 70, temperature 98.3 °F (36.8 °C), temperature source Oral, resp. rate 18, weight 202 lb 4.8 oz (91.8 kg), last menstrual period 2024, SpO2 97%, not currently breastfeeding.     General: No acute distress.  HEENT: Moist mucous membranes.   Respiratory: No distress  Cardiovascular: S1, S2. No Rubs  Abdomen: Soft, non-tender  Ext: Trace LE edema, RIJ tunneled HD catheter, LUE AVF    Results:     Lab Results   Component Value Date    WBC 7.9 2024    HGB 9.2 (L) 2024    HCT 27.2 (L) 2024    .0 2024    CREATSERUM 11.21 (H) 06/15/2024    BUN 69 (H) 06/15/2024     06/15/2024    K 4.1 06/15/2024    CL 98 06/15/2024    CO2 27.0 06/15/2024    GLU 84 06/15/2024    CA 9.0 06/15/2024    ALB 4.3 06/15/2024    ALKPHO 65 2024    BILT 0.7 2024    TP 8.2 2024    AST 21 2024    ALT 17 2024    PTT 31.8 2017    TSH 2.910 2022    LIP 69 2023    DDIMER 2.89 (H) 2024    MG 2.6 06/15/2024    PHOS 7.2 (H) 06/15/2024    TROP <0.045 2021       US ARM LEFT LIMITED (CPT=76882)    Result Date: 2024  PROCEDURE: ULTRASOUND ARM LEFT  LIMITED (CPT=76882)  COMPARISON: None.  INDICATIONS: Swelling  Findings and impression:  There is a 2 x 1 x 0.7 centimeter mildly hypoechoic fluid collection adjacent to the fistula.     Dictated by (CST): Gennaro Godoy MD on 2024 at 1:52 PM     Finalized by (CST): Gennaro Godoy MD on 2024 at 1:54 PM                 Assessment and Plan:     33 year old female with PMH of ESRD on HD TTS, HTN, HL, HFpEF admitted with fevers SOB and bacteremia, were  consulted to resume iHD while inpatient:     ESRD on HD TTS:  - HD TTS  - Will resume HD through HD catheter\  - AVOID USE OF FISTULA FOR NOW.  - My partner did discuss with patient and current nephrologist Dr. Cleveland, and patient would like to resume current care with our group at this time.     Bacteremia:  - Repeat cultures negative  - WBC scan with likely graft infection, Will defer to Vascular and ID.  - Plan for AVG removal next week as per Vascular.     Anemia in ESRD:  - holding on venofer in light of active infection  - Hemoglobin levels close to goal consider adding Epogen if remains low.    BMD:  - low phos diet  - start on tums 500mg PO TID  - consider renvela 800mg TID if remains uncontrolled     HTN:  - Continue coreg  - Bumex only on non HD days as needed.         Thank you for allowing me to participate in the care of your patient.      Estefania Ponce DO  [facility]  Nephrology

## 2024-06-15 NOTE — PROGRESS NOTES
[facility] Hospitalist Progress Note     CC: Hospital Follow up    PCP: Ashleigh Murray       Assessment/Plan:   Ms. Walker is a 33 year old female with PMH sig for ESRD on HD (t/t/s), HTN, HLD, obesity, HFpEF, who presented with fever, found to have bacteremia.      Sepsis   Bacteremia   Diarrhea  - MSSA bacteremia  - source possibly fistula.   - was treated few months back with oral antibiotics for possible drainage around or near fistula by surgeon   - WBC scan positive for possible infection in area of fistula  - consulted vascular surgery appreciate eval. Plan for likely removal of fistula Monday  - ID on consult      ESRD  - on HD t/t/s  - renal consulted     Anemia  - likely ACD 2/2 ESRD  - monitor      HFpEF  - poss infiltrates vs fluid  - volume removal with HD  - bumex on non dialysis days as needed     HTN  - continue coreg  - does not tolerate nifedipine any more she states     HLD  - statin     Marco Muhammad DO  [facility] Hospitalist      Subjective:     Feels ok. No new symptoms.       OBJECTIVE:    Blood pressure 137/83, pulse 65, temperature 98.4 °F (36.9 °C), temperature source Oral, resp. rate 18, weight 202 lb 4.8 oz (91.8 kg), last menstrual period 05/14/2024, SpO2 98%, not currently breastfeeding.    Temp:  [97.7 °F (36.5 °C)-98.5 °F (36.9 °C)] 98.4 °F (36.9 °C)  Pulse:  [64-72] 65  Resp:  [16-18] 18  BP: (137-170)/(83-91) 137/83  SpO2:  [97 %-99 %] 98 %      Intake/Output:    Intake/Output Summary (Last 24 hours) at 6/15/2024 1200  Last data filed at 6/15/2024 0600  Gross per 24 hour   Intake 720 ml   Output --   Net 720 ml       Last 3 Weights   06/15/24 0552 202 lb 4.8 oz (91.8 kg)   06/14/24 0441 199 lb 8 oz (90.5 kg)   06/13/24 0438 204 lb 3.2 oz (92.6 kg)   06/12/24 0514 199 lb 9.6 oz (90.5 kg)   06/11/24 1657 200 lb 4.8 oz (90.9 kg)   06/10/24 0435 207 lb 8 oz (94.1 kg)   06/10/24 0127 207 lb 9.6 oz (94.2 kg)   06/10/24 0117 207 lb 9.6 oz (94.2 kg)   06/03/24  2010 206 lb (93.4 kg)   04/22/24 1518 211 lb (95.7 kg)       /83 (BP Location: Right arm)   Pulse 65   Temp 98.4 °F (36.9 °C) (Oral)   Resp 18   Wt 202 lb 4.8 oz (91.8 kg)   LMP 05/14/2024 (Exact Date)   SpO2 98%   BMI 33.66 kg/m²   General: Alert, no acute distress  Lungs: clear to ausculation bilaterally  Heart: Regular rate and rhythm  Abdomen: soft, non tender  Extremities: No edema    Data Review:       Labs:     Recent Labs   Lab 06/11/24  0835 06/12/24  0912 06/13/24  0648   RBC 3.33* 3.26* 3.19*   HGB 9.5* 9.4* 9.2*   HCT 29.4* 28.4* 27.2*   MCV 88.3 87.1 85.3   MCH 28.5 28.8 28.8   MCHC 32.3 33.1 33.8   RDW 13.2 13.3 13.1   NEPRELIM 7.00 5.22 4.86   WBC 9.5 7.9 7.9   .0 183.0 187.0         Recent Labs   Lab 06/12/24  0746 06/13/24  0648 06/15/24  0653   GLU 90 92 84   BUN 59* 76* 69*   CREATSERUM 10.61* 12.31* 11.21*   EGFRCR 4* 4* 4*   CA 8.5* 8.5* 9.0    137 136   K 3.6 3.6 4.1    99 98   CO2 27.0 26.0 27.0       Recent Labs   Lab 06/09/24  2041 06/10/24  0630 06/11/24  0835 06/12/24  0746 06/13/24  0648 06/15/24  0653   ALT 17  --   --   --   --   --    AST 21  --   --   --   --   --    ALB  --  4.0 4.1 4.1 4.0 4.3         Imaging:  US ARM LEFT LIMITED (CPT=76882)    Result Date: 6/14/2024  PROCEDURE: ULTRASOUND ARM LEFT  LIMITED (CPT=76882)  COMPARISON: None.  INDICATIONS: Swelling  Findings and impression:  There is a 2 x 1 x 0.7 centimeter mildly hypoechoic fluid collection adjacent to the fistula.     Dictated by (CST): Gennaro Godoy MD on 6/14/2024 at 1:52 PM     Finalized by (CST): Gennaro Godoy MD on 6/14/2024 at 1:54 PM             Meds:      calcium carbonate  500 mg Oral TID CC    bumetanide  3 mg Oral Once per day on Sunday Monday Wednesday Friday    ceFAZolin  1 g Intravenous Q24H    atorvastatin  40 mg Oral Nightly    carvedilol  25 mg Oral BID    heparin  5,000 Units Subcutaneous Q8H MANISHA         loperamide    ondansetron    prochlorperazine    polyethylene  glycol (PEG 3350)    sennosides    bisacodyl    acetaminophen

## 2024-06-15 NOTE — PLAN OF CARE
Problem: Patient Centered Care  Goal: Patient preferences are identified and integrated in the patient's plan of care  Description: Interventions:  - What would you like us to know as we care for you? Patient is from home   - Provide timely, complete, and accurate information to patient/family  - Incorporate patient and family knowledge, values, beliefs, and cultural backgrounds into the planning and delivery of care  - Encourage patient/family to participate in care and decision-making at the level they choose  - Honor patient and family perspectives and choices  Outcome: Progressing     Problem: Patient/Family Goals  Goal: Patient/Family Long Term Goal  Description: Patient's Long Term Goal: to go home     Interventions:  - See additional Care Plan goals for specific interventions  Outcome: Progressing  Goal: Patient/Family Short Term Goal  Description: Patient's Short Term Goal: to feel better     Interventions:   - See additional Care Plan goals for specific interventions  Outcome: Progressing     Problem: PAIN - ADULT  Goal: Verbalizes/displays adequate comfort level or patient's stated pain goal  Description: INTERVENTIONS:  - Encourage pt to monitor pain and request assistance  - Assess pain using appropriate pain scale  - Administer analgesics based on type and severity of pain and evaluate response  - Implement non-pharmacological measures as appropriate and evaluate response  - Consider cultural and social influences on pain and pain management  - Manage/alleviate anxiety  - Utilize distraction and/or relaxation techniques  - Monitor for opioid side effects  - Notify MD/LIP if interventions unsuccessful or patient reports new pain  - Anticipate increased pain with activity and pre-medicate as appropriate  Outcome: Progressing     Problem: SAFETY ADULT - FALL  Goal: Free from fall injury  Description: INTERVENTIONS:  - Assess pt frequently for physical needs  - Identify cognitive and physical deficits and  behaviors that affect risk of falls.  - [place] fall precautions as indicated by assessment.  - Educate pt/family on patient safety including physical limitations  - Instruct pt to call for assistance with activity based on assessment  - Modify environment to reduce risk of injury  - Provide assistive devices as appropriate  - Consider OT/PT consult to assist with strengthening/mobility  - Encourage toileting schedule  Outcome: Progressing     Problem: DISCHARGE PLANNING  Goal: Discharge to home or other facility with appropriate resources  Description: INTERVENTIONS:  - Identify barriers to discharge w/pt and caregiver  - Include patient/family/discharge partner in discharge planning  - Arrange for needed discharge resources and transportation as appropriate  - Identify discharge learning needs (meds, wound care, etc)  - Arrange for interpreters to assist at discharge as needed  - Consider post-discharge preferences of patient/family/discharge partner  - Complete POLST form as appropriate  - Assess patient's ability to be responsible for managing their own health  - Refer to Case Management Department for coordinating discharge planning if the patient needs post-hospital services based on physician/LIP order or complex needs related to functional status, cognitive ability or social support system  Outcome: Progressing     Problem: CARDIOVASCULAR - ADULT  Goal: Maintains optimal cardiac output and hemodynamic stability  Description: INTERVENTIONS:  - Monitor vital signs, rhythm, and trends  - Monitor for bleeding, hypotension and signs of decreased cardiac output  - Evaluate effectiveness of vasoactive medications to optimize hemodynamic stability  - Monitor arterial and/or venous puncture sites for bleeding and/or hematoma  - Assess quality of pulses, skin color and temperature  - Assess for signs of decreased coronary artery perfusion - ex. Angina  - Evaluate fluid balance, assess for edema, trend  weights  Outcome: Progressing  Goal: Absence of cardiac arrhythmias or at baseline  Description: INTERVENTIONS:  - Continuous cardiac monitoring, monitor vital signs, obtain 12 lead EKG if indicated  - Evaluate effectiveness of antiarrhythmic and heart rate control medications as ordered  - Initiate emergency measures for life threatening arrhythmias  - Monitor electrolytes and administer replacement therapy as ordered  Outcome: Progressing     Problem: METABOLIC/FLUID AND ELECTROLYTES - ADULT  Goal: Glucose maintained within prescribed range  Description: INTERVENTIONS:  - Monitor Blood Glucose as ordered  - Assess for signs and symptoms of hyperglycemia and hypoglycemia  - Administer ordered medications to maintain glucose within target range  - Assess barriers to adequate nutritional intake and initiate nutrition consult as needed  - Instruct patient on self management of diabetes  Outcome: Progressing  Goal: Electrolytes maintained within normal limits  Description: INTERVENTIONS:  - Monitor labs and rhythm and assess patient for signs and symptoms of electrolyte imbalances  - Administer electrolyte replacement as ordered  - Monitor response to electrolyte replacements, including rhythm and repeat lab results as appropriate  - Fluid restriction as ordered  - Instruct patient on fluid and nutrition restrictions as appropriate  Outcome: Progressing  Goal: Hemodynamic stability and optimal renal function maintained  Description: INTERVENTIONS:  - Monitor labs and assess for signs and symptoms of volume excess or deficit  - Monitor intake, output and patient weight  - Monitor urine specific gravity, serum osmolarity and serum sodium as indicated or ordered  - Monitor response to interventions for patient's volume status, including labs, urine output, blood pressure (other measures as available)  - Encourage oral intake as appropriate  - Instruct patient on fluid and nutrition restrictions as appropriate  Outcome:  Progressing

## 2024-06-16 LAB
ANION GAP SERPL CALC-SCNC: 13 MMOL/L (ref 0–18)
BUN BLD-MCNC: 48 MG/DL (ref 9–23)
BUN/CREAT SERPL: 5.5 (ref 10–20)
CALCIUM BLD-MCNC: 9.3 MG/DL (ref 8.7–10.4)
CHLORIDE SERPL-SCNC: 102 MMOL/L (ref 98–112)
CO2 SERPL-SCNC: 24 MMOL/L (ref 21–32)
CREAT BLD-MCNC: 8.65 MG/DL
EGFRCR SERPLBLD CKD-EPI 2021: 6 ML/MIN/1.73M2 (ref 60–?)
GLUCOSE BLD-MCNC: 81 MG/DL (ref 70–99)
OSMOLALITY SERPL CALC.SUM OF ELEC: 300 MOSM/KG (ref 275–295)
POTASSIUM SERPL-SCNC: 4.3 MMOL/L (ref 3.5–5.1)
SODIUM SERPL-SCNC: 139 MMOL/L (ref 136–145)

## 2024-06-16 PROCEDURE — 80048 BASIC METABOLIC PNL TOTAL CA: CPT | Performed by: STUDENT IN AN ORGANIZED HEALTH CARE EDUCATION/TRAINING PROGRAM

## 2024-06-16 NOTE — PROGRESS NOTES
[facility]  part of [facility] Infectious Disease  Progress Note    Liu Walker Patient Status:  Inpatient    1990 MRN [id number]   Location [facility] 5SW/SE Attending Marco Muhammad, DO   Hosp Day # 6 PCP Ashleigh Murray     Subjective:  Patient seen/examined.  She is up in bed in NAD.  No F/C.  No new issues.    Objective:  Blood pressure 160/84, pulse 69, temperature 98.1 °F (36.7 °C), temperature source Oral, resp. rate 18, weight 200 lb 3.2 oz (90.8 kg), last menstrual period 2024, SpO2 97%, not currently breastfeeding.    Intake/Output:    Intake/Output Summary (Last 24 hours) at 2024 0935  Last data filed at 6/15/2024 2300  Gross per 24 hour   Intake 660 ml   Output --   Net 660 ml       Physical Exam:  General: Awake, alert, non-tox, NAD.  HEENT:  Oropharynx clear, trachea ML.  Heart: RRR S1S2 no murmurs.  Lungs: Essentially CTA b/l, no rhonchi, rales, wheezes.  Abdomen: Soft, NT/ND.  BS present.  No organomegaly.  Extremity: No edema.  Neurological: No focal deficits.  Derm:  Warm, dry, free from rashes.    Lab Data Review:  Recent Labs   Lab 24  0835 24  0912 24  0648   RBC 3.33* 3.26* 3.19*   HGB 9.5* 9.4* 9.2*   HCT 29.4* 28.4* 27.2*   MCV 88.3 87.1 85.3   MCH 28.5 28.8 28.8   MCHC 32.3 33.1 33.8   RDW 13.2 13.3 13.1   NEPRELIM 7.00 5.22 4.86   WBC 9.5 7.9 7.9   .0 183.0 187.0     Cultures:  2/2 blood cultures + MSSA 24  Repeat blood cultures negative 6/10/24     Radiology:  CONCLUSION:   Imaging findings concerning for infection of the left upper extremity AV graft.      Antibiotics Reviewed:  Ancef     Assessment and Plan:     MSSA sepsis in this woman who presented with fevers, lightheadedness, cough  - 2/2 blood cultures positive for MSSA on 24, repeats negative  - h/o LUE AV fistula with drainage in 2024 s/p treatment  - Current NM scan with uptake concerning for infection in the  AV graft  - TTE without vegetations  - IV ancef ongoing     2.  ESRD on HD with R chest HD cath in place  - Per nephrology  - Given concern for AV fistula source, would not advise accessing fistula at this time  - If any further positive blood cultures will need HD line removed as well  - Plans for resection of fistula on Monday     3.  Abdominal pain, cramping, tenderness, and diarrhea  - Also on menstrual cycle  - C.diff negative     4.  Disposition - inpatient.  Will need long course IV Rx for MSSA sepsis in the setting of likely endovascular seeding.  Plans for resection of fistula on Monday.  Maintain HD line for now but will need to be removed if any further positive cultures.  Anticipate 6 weeks of IV Rx through 7/22/24 with definitive EOT to be determined if any further complication.  D/w patient.  Will follow.    Essie Walter DO, [facility] Infectious Disease  [phone]    6/16/2024  9:35 AM

## 2024-06-16 NOTE — PROGRESS NOTES
[facility] Hospitalist Progress Note     CC: Hospital Follow up    PCP: Ashleigh Murray       Assessment/Plan:   Ms. Walker is a 33 year old female with PMH sig for ESRD on HD (t/t/s), HTN, HLD, obesity, HFpEF, who presented with fever, found to have bacteremia.      Sepsis   Bacteremia   Diarrhea  - MSSA bacteremia  - source fistula graft  - was treated few months back with oral antibiotics for possible drainage around or near fistula by surgeon   - WBC scan positive for possible infection in area of fistula  - consulted vascular surgery appreciate eval. Plan for graft removal tomorrow, 6/17  - ID on consult      ESRD  - on HD t/t/s  - renal consulted     Anemia  - likely ACD 2/2 ESRD  - monitor      HFpEF  - poss infiltrates vs fluid  - volume removal with HD  - bumex on non dialysis days as needed     HTN  - continue coreg  - does not tolerate nifedipine any more she states     HLD  - statin     Marco Muhammad DO  [facility] Hospitalist      Subjective:     Feels ok. No new symptoms. Wants to move forward with surgery.       OBJECTIVE:    Blood pressure 160/84, pulse 69, temperature 98.1 °F (36.7 °C), temperature source Oral, resp. rate 18, weight 200 lb 3.2 oz (90.8 kg), last menstrual period 05/14/2024, SpO2 97%, not currently breastfeeding.    Temp:  [97.8 °F (36.6 °C)-98.6 °F (37 °C)] 98.1 °F (36.7 °C)  Pulse:  [65-69] 69  Resp:  [18] 18  BP: (138-161)/(81-93) 160/84  SpO2:  [97 %-99 %] 97 %      Intake/Output:    Intake/Output Summary (Last 24 hours) at 6/16/2024 1121  Last data filed at 6/15/2024 2300  Gross per 24 hour   Intake 660 ml   Output --   Net 660 ml       Last 3 Weights   06/16/24 0537 200 lb 3.2 oz (90.8 kg)   06/15/24 0552 202 lb 4.8 oz (91.8 kg)   06/14/24 0441 199 lb 8 oz (90.5 kg)   06/13/24 0438 204 lb 3.2 oz (92.6 kg)   06/12/24 0514 199 lb 9.6 oz (90.5 kg)   06/11/24 1657 200 lb 4.8 oz (90.9 kg)   06/10/24 0435 207 lb 8 oz (94.1 kg)   06/10/24 0127 207 lb 9.6 oz  (94.2 kg)   06/10/24 0117 207 lb 9.6 oz (94.2 kg)   06/03/24 2010 206 lb (93.4 kg)   04/22/24 1518 211 lb (95.7 kg)       /84 (BP Location: Right arm)   Pulse 69   Temp 98.1 °F (36.7 °C) (Oral)   Resp 18   Wt 200 lb 3.2 oz (90.8 kg)   LMP 05/14/2024 (Exact Date)   SpO2 97%   BMI 33.32 kg/m²   General: Alert, no acute distress  Lungs: clear to ausculation bilaterally  Heart: Regular rate and rhythm  Abdomen: soft, non tender  Extremities: No edema    Data Review:       Labs:     Recent Labs   Lab 06/11/24  0835 06/12/24  0912 06/13/24  0648   RBC 3.33* 3.26* 3.19*   HGB 9.5* 9.4* 9.2*   HCT 29.4* 28.4* 27.2*   MCV 88.3 87.1 85.3   MCH 28.5 28.8 28.8   MCHC 32.3 33.1 33.8   RDW 13.2 13.3 13.1   NEPRELIM 7.00 5.22 4.86   WBC 9.5 7.9 7.9   .0 183.0 187.0         Recent Labs   Lab 06/13/24  0648 06/15/24  0653 06/16/24  0914   GLU 92 84 81   BUN 76* 69* 48*   CREATSERUM 12.31* 11.21* 8.65*   EGFRCR 4* 4* 6*   CA 8.5* 9.0 9.3    136 139   K 3.6 4.1 4.3   CL 99 98 102   CO2 26.0 27.0 24.0       Recent Labs   Lab 06/09/24  2041 06/10/24  0630 06/11/24  0835 06/12/24  0746 06/13/24  0648 06/15/24  0653   ALT 17  --   --   --   --   --    AST 21  --   --   --   --   --    ALB  --  4.0 4.1 4.1 4.0 4.3         Imaging:  US ARM LEFT LIMITED (CPT=76882)    Result Date: 6/14/2024  PROCEDURE: ULTRASOUND ARM LEFT  LIMITED (CPT=76882)  COMPARISON: None.  INDICATIONS: Swelling  Findings and impression:  There is a 2 x 1 x 0.7 centimeter mildly hypoechoic fluid collection adjacent to the fistula.     Dictated by (CST): Gennaro Godoy MD on 6/14/2024 at 1:52 PM     Finalized by (CST): Gennaro Godoy MD on 6/14/2024 at 1:54 PM             Meds:      calcium carbonate  500 mg Oral TID CC    bumetanide  3 mg Oral Once per day on Sunday Monday Wednesday Friday    ceFAZolin  1 g Intravenous Q24H    atorvastatin  40 mg Oral Nightly    carvedilol  25 mg Oral BID    heparin  5,000 Units Subcutaneous Q8H MANISHA          loperamide    ondansetron    prochlorperazine    polyethylene glycol (PEG 3350)    sennosides    bisacodyl    acetaminophen

## 2024-06-16 NOTE — PLAN OF CARE
Problem: Patient Centered Care  Goal: Patient preferences are identified and integrated in the patient's plan of care  Description: Interventions:  - What would you like us to know as we care for you? \"I am from home.\"  - Provide timely, complete, and accurate information to patient/family  - Incorporate patient and family knowledge, values, beliefs, and cultural backgrounds into the planning and delivery of care  - Encourage patient/family to participate in care and decision-making at the level they choose  - Honor patient and family perspectives and choices  Outcome: Progressing     Problem: Patient/Family Goals  Goal: Patient/Family Long Term Goal  Description: Patient's Long Term Goal: \"to go home\"    Interventions:  -Follow up MD appt  -Take meds as prescribed  -Use of assistive device if needed  - See additional Care Plan goals for specific interventions  Outcome: Progressing  Goal: Patient/Family Short Term Goal  Description: Patient's Short Term Goal: \"to prevent infection\"    Interventions:   -Monitor VS  -Check labs results  -Administer IVF and IV ABT as ordered  -Provide pain meds as prescribed  - See additional Care Plan goals for specific interventions  Outcome: Progressing     Problem: PAIN - ADULT  Goal: Verbalizes/displays adequate comfort level or patient's stated pain goal  Description: INTERVENTIONS:  - Encourage pt to monitor pain and request assistance  - Assess pain using appropriate pain scale  - Administer analgesics based on type and severity of pain and evaluate response  - Implement non-pharmacological measures as appropriate and evaluate response  - Consider cultural and social influences on pain and pain management  - Manage/alleviate anxiety  - Utilize distraction and/or relaxation techniques  - Monitor for opioid side effects  - Notify MD/LIP if interventions unsuccessful or patient reports new pain  - Anticipate increased pain with activity and pre-medicate as appropriate  Outcome:  Progressing     Problem: SAFETY ADULT - FALL  Goal: Free from fall injury  Description: INTERVENTIONS:  - Assess pt frequently for physical needs  - Identify cognitive and physical deficits and behaviors that affect risk of falls.  - [place] fall precautions as indicated by assessment.  - Educate pt/family on patient safety including physical limitations  - Instruct pt to call for assistance with activity based on assessment  - Modify environment to reduce risk of injury  - Provide assistive devices as appropriate  - Consider OT/PT consult to assist with strengthening/mobility  - Encourage toileting schedule  Outcome: Progressing     Problem: DISCHARGE PLANNING  Goal: Discharge to home or other facility with appropriate resources  Description: INTERVENTIONS:  - Identify barriers to discharge w/pt and caregiver  - Include patient/family/discharge partner in discharge planning  - Arrange for needed discharge resources and transportation as appropriate  - Identify discharge learning needs (meds, wound care, etc)  - Arrange for interpreters to assist at discharge as needed  - Consider post-discharge preferences of patient/family/discharge partner  - Complete POLST form as appropriate  - Assess patient's ability to be responsible for managing their own health  - Refer to Case Management Department for coordinating discharge planning if the patient needs post-hospital services based on physician/LIP order or complex needs related to functional status, cognitive ability or social support system  Outcome: Progressing     Problem: CARDIOVASCULAR - ADULT  Goal: Maintains optimal cardiac output and hemodynamic stability  Description: INTERVENTIONS:  - Monitor vital signs, rhythm, and trends  - Monitor for bleeding, hypotension and signs of decreased cardiac output  - Evaluate effectiveness of vasoactive medications to optimize hemodynamic stability  - Monitor arterial and/or venous puncture sites for bleeding and/or  hematoma  - Assess quality of pulses, skin color and temperature  - Assess for signs of decreased coronary artery perfusion - ex. Angina  - Evaluate fluid balance, assess for edema, trend weights  Outcome: Progressing  Goal: Absence of cardiac arrhythmias or at baseline  Description: INTERVENTIONS:  - Continuous cardiac monitoring, monitor vital signs, obtain 12 lead EKG if indicated  - Evaluate effectiveness of antiarrhythmic and heart rate control medications as ordered  - Initiate emergency measures for life threatening arrhythmias  - Monitor electrolytes and administer replacement therapy as ordered  Outcome: Progressing     Problem: METABOLIC/FLUID AND ELECTROLYTES - ADULT  Goal: Glucose maintained within prescribed range  Description: INTERVENTIONS:  - Monitor Blood Glucose as ordered  - Assess for signs and symptoms of hyperglycemia and hypoglycemia  - Administer ordered medications to maintain glucose within target range  - Assess barriers to adequate nutritional intake and initiate nutrition consult as needed  - Instruct patient on self management of diabetes  Outcome: Progressing  Goal: Electrolytes maintained within normal limits  Description: INTERVENTIONS:  - Monitor labs and rhythm and assess patient for signs and symptoms of electrolyte imbalances  - Administer electrolyte replacement as ordered  - Monitor response to electrolyte replacements, including rhythm and repeat lab results as appropriate  - Fluid restriction as ordered  - Instruct patient on fluid and nutrition restrictions as appropriate  Outcome: Progressing  Goal: Hemodynamic stability and optimal renal function maintained  Description: INTERVENTIONS:  - Monitor labs and assess for signs and symptoms of volume excess or deficit  - Monitor intake, output and patient weight  - Monitor urine specific gravity, serum osmolarity and serum sodium as indicated or ordered  - Monitor response to interventions for patient's volume status, including  labs, urine output, blood pressure (other measures as available)  - Encourage oral intake as appropriate  - Instruct patient on fluid and nutrition restrictions as appropriate  Outcome: Progressing

## 2024-06-16 NOTE — PLAN OF CARE
Problem: Patient Centered Care  Goal: Patient preferences are identified and integrated in the patient's plan of care  Description: Interventions:  - What would you like us to know as we care for you?   - Provide timely, complete, and accurate information to patient/family  - Incorporate patient and family knowledge, values, beliefs, and cultural backgrounds into the planning and delivery of care  - Encourage patient/family to participate in care and decision-making at the level they choose  - Honor patient and family perspectives and choices  Outcome: Progressing     Problem: Patient/Family Goals  Goal: Patient/Family Long Term Goal  Description: Patient's Long Term Goal:     Interventions:  -   - See additional Care Plan goals for specific interventions  Outcome: Progressing  Goal: Patient/Family Short Term Goal  Description: Patient's Short Term Goal:     Interventions:   -   - See additional Care Plan goals for specific interventions  Outcome: Progressing     Problem: PAIN - ADULT  Goal: Verbalizes/displays adequate comfort level or patient's stated pain goal  Description: INTERVENTIONS:  - Encourage pt to monitor pain and request assistance  - Assess pain using appropriate pain scale  - Administer analgesics based on type and severity of pain and evaluate response  - Implement non-pharmacological measures as appropriate and evaluate response  - Consider cultural and social influences on pain and pain management  - Manage/alleviate anxiety  - Utilize distraction and/or relaxation techniques  - Monitor for opioid side effects  - Notify MD/LIP if interventions unsuccessful or patient reports new pain  - Anticipate increased pain with activity and pre-medicate as appropriate  Outcome: Progressing     Problem: SAFETY ADULT - FALL  Goal: Free from fall injury  Description: INTERVENTIONS:  - Assess pt frequently for physical needs  - Identify cognitive and physical deficits and behaviors that affect risk of  falls.  - [place] fall precautions as indicated by assessment.  - Educate pt/family on patient safety including physical limitations  - Instruct pt to call for assistance with activity based on assessment  - Modify environment to reduce risk of injury  - Provide assistive devices as appropriate  - Consider OT/PT consult to assist with strengthening/mobility  - Encourage toileting schedule  Outcome: Progressing     Problem: DISCHARGE PLANNING  Goal: Discharge to home or other facility with appropriate resources  Description: INTERVENTIONS:  - Identify barriers to discharge w/pt and caregiver  - Include patient/family/discharge partner in discharge planning  - Arrange for needed discharge resources and transportation as appropriate  - Identify discharge learning needs (meds, wound care, etc)  - Arrange for interpreters to assist at discharge as needed  - Consider post-discharge preferences of patient/family/discharge partner  - Complete POLST form as appropriate  - Assess patient's ability to be responsible for managing their own health  - Refer to Case Management Department for coordinating discharge planning if the patient needs post-hospital services based on physician/LIP order or complex needs related to functional status, cognitive ability or social support system  Outcome: Progressing     Problem: CARDIOVASCULAR - ADULT  Goal: Maintains optimal cardiac output and hemodynamic stability  Description: INTERVENTIONS:  - Monitor vital signs, rhythm, and trends  - Monitor for bleeding, hypotension and signs of decreased cardiac output  - Evaluate effectiveness of vasoactive medications to optimize hemodynamic stability  - Monitor arterial and/or venous puncture sites for bleeding and/or hematoma  - Assess quality of pulses, skin color and temperature  - Assess for signs of decreased coronary artery perfusion - ex. Angina  - Evaluate fluid balance, assess for edema, trend weights  Outcome: Progressing  Goal: Absence  of cardiac arrhythmias or at baseline  Description: INTERVENTIONS:  - Continuous cardiac monitoring, monitor vital signs, obtain 12 lead EKG if indicated  - Evaluate effectiveness of antiarrhythmic and heart rate control medications as ordered  - Initiate emergency measures for life threatening arrhythmias  - Monitor electrolytes and administer replacement therapy as ordered  Outcome: Progressing     Problem: METABOLIC/FLUID AND ELECTROLYTES - ADULT  Goal: Glucose maintained within prescribed range  Description: INTERVENTIONS:  - Monitor Blood Glucose as ordered  - Assess for signs and symptoms of hyperglycemia and hypoglycemia  - Administer ordered medications to maintain glucose within target range  - Assess barriers to adequate nutritional intake and initiate nutrition consult as needed  - Instruct patient on self management of diabetes  Outcome: Progressing  Goal: Electrolytes maintained within normal limits  Description: INTERVENTIONS:  - Monitor labs and rhythm and assess patient for signs and symptoms of electrolyte imbalances  - Administer electrolyte replacement as ordered  - Monitor response to electrolyte replacements, including rhythm and repeat lab results as appropriate  - Fluid restriction as ordered  - Instruct patient on fluid and nutrition restrictions as appropriate  Outcome: Progressing  Goal: Hemodynamic stability and optimal renal function maintained  Description: INTERVENTIONS:  - Monitor labs and assess for signs and symptoms of volume excess or deficit  - Monitor intake, output and patient weight  - Monitor urine specific gravity, serum osmolarity and serum sodium as indicated or ordered  - Monitor response to interventions for patient's volume status, including labs, urine output, blood pressure (other measures as available)  - Encourage oral intake as appropriate  - Instruct patient on fluid and nutrition restrictions as appropriate  Outcome: Progressing   Safety precautions on place. Call  light within reach.

## 2024-06-16 NOTE — PROGRESS NOTES
[facility]  part of [facility]    Progress Note    Liu Walker Patient Status:  Inpatient    1990 MRN [id number]   Location [facility] 1W Attending Catracho Warner MD   Hosp Day # 6 PCP Ashleigh Murray     Subjective:     Patient seen at bedside. No acute complaints.    Objective:   Vital Signs:  Blood pressure 160/84, pulse 69, temperature 98.1 °F (36.7 °C), temperature source Oral, resp. rate 18, weight 200 lb 3.2 oz (90.8 kg), last menstrual period 2024, SpO2 97%, not currently breastfeeding.     General: No acute distress.  HEENT: Moist mucous membranes.   Respiratory: No distress  Cardiovascular: S1, S2  Abdomen: Soft, non-tender  Ext: Trace LE edema, RIJ tunneled HD catheter, LUE AVF    Results:     Lab Results   Component Value Date    WBC 7.9 2024    HGB 9.2 (L) 2024    HCT 27.2 (L) 2024    .0 2024    CREATSERUM 11.21 (H) 06/15/2024    BUN 69 (H) 06/15/2024     06/15/2024    K 4.1 06/15/2024    CL 98 06/15/2024    CO2 27.0 06/15/2024    GLU 84 06/15/2024    CA 9.0 06/15/2024    ALB 4.3 06/15/2024    ALKPHO 65 2024    BILT 0.7 2024    TP 8.2 2024    AST 21 2024    ALT 17 2024    PTT 31.8 2017    TSH 2.910 2022    LIP 69 2023    DDIMER 2.89 (H) 2024    MG 2.6 06/15/2024    PHOS 7.2 (H) 06/15/2024    TROP <0.045 2021       US ARM LEFT LIMITED (CPT=76882)    Result Date: 2024  PROCEDURE: ULTRASOUND ARM LEFT  LIMITED (CPT=76882)  COMPARISON: None.  INDICATIONS: Swelling  Findings and impression:  There is a 2 x 1 x 0.7 centimeter mildly hypoechoic fluid collection adjacent to the fistula.     Dictated by (CST): Gennaro Godoy MD on 2024 at 1:52 PM     Finalized by (CST): Gennaro Godoy MD on 2024 at 1:54 PM                 Assessment and Plan:     33 year old female with PMH of ESRD on HD TTS, HTN, HL, HFpEF admitted with fevers SOB and bacteremia, were  consulted to resume iHD while inpatient:     ESRD on HD TTS:  - HD TTS  - Will resume HD through HD catheter  - AVOID USE OF FISTULA FOR NOW.  - My partner did discuss with patient and current nephrologist Dr. [place], and patient would like to resume current care with our group at this time.     Bacteremia:  - Repeat cultures negative  - WBC scan with likely graft infection, Will defer to Vascular and ID.  - Plan for AVG removal this upcoming week as per Vascular.     Anemia in ESRD:  - holding on venofer in light of active infection  - Hemoglobin levels close to goal consider adding Epogen if remains low.    BMD:  - low phos diet  - start on tums 500mg PO TID  - consider renvela 800mg TID if remains uncontrolled     HTN:  - Continue coreg  - Bumex only on non HD days as needed.         Thank you for allowing me to participate in the care of your patient.      DO Felecia [facility]  Nephrology

## 2024-06-16 NOTE — PROGRESS NOTES
Vascular Surgery Note    No acute events overnight. Patient states she was seen by Dr. Mcclain yesterday and was told she did not need the graft removed because it can be treated with antibiotics. There is no documentation from him. Discussed findings again in detail with patient. WBC scan shows infection in graft. This is correlating with the ultrasound which shows fluid around the graft. I discussed with patient that she can try to treat this with long term IV antibiotics and follow-up with the surgeon who placed the graft. However, given that it is a foreign body with fluid around it on ultrasound, this is unlikely to resolve with antibiotics. She does not want to delay surgery if it is inevitable that her graft will need to come out. At this time she feels comfortable moving forward with graft excision. NPO order and consent order placed for tomorrow. Discussed with patient that she can change her mind at any time between now and time of surgery.     Peg Huddleston MD  06/16/24  9:42 AM

## 2024-06-17 ENCOUNTER — ANESTHESIA EVENT (OUTPATIENT)
Dept: SURGERY | Facility: HOSPITAL | Age: 34
DRG: 252 | End: 2024-06-17
Payer: COMMERCIAL

## 2024-06-17 ENCOUNTER — ANESTHESIA (OUTPATIENT)
Dept: SURGERY | Facility: HOSPITAL | Age: 34
DRG: 252 | End: 2024-06-17
Payer: COMMERCIAL

## 2024-06-17 LAB
ALBUMIN SERPL-MCNC: 4.2 G/DL (ref 3.2–4.8)
ANION GAP SERPL CALC-SCNC: 12 MMOL/L (ref 0–18)
ANTIBODY SCREEN: NEGATIVE
B-HCG UR QL: NEGATIVE
BUN BLD-MCNC: 66 MG/DL (ref 9–23)
BUN/CREAT SERPL: 6 (ref 10–20)
CALCIUM BLD-MCNC: 9 MG/DL (ref 8.7–10.4)
CHLORIDE SERPL-SCNC: 104 MMOL/L (ref 98–112)
CO2 SERPL-SCNC: 24 MMOL/L (ref 21–32)
CREAT BLD-MCNC: 11.07 MG/DL
EGFRCR SERPLBLD CKD-EPI 2021: 4 ML/MIN/1.73M2 (ref 60–?)
GLUCOSE BLD-MCNC: 85 MG/DL (ref 70–99)
GLUCOSE BLDC GLUCOMTR-MCNC: 137 MG/DL (ref 70–99)
GLUCOSE BLDC GLUCOMTR-MCNC: 173 MG/DL (ref 70–99)
OSMOLALITY SERPL CALC.SUM OF ELEC: 308 MOSM/KG (ref 275–295)
PHOSPHATE SERPL-MCNC: 7.4 MG/DL (ref 2.4–5.1)
POTASSIUM SERPL-SCNC: 4.7 MMOL/L (ref 3.5–5.1)
RH BLOOD TYPE: POSITIVE
RH BLOOD TYPE: POSITIVE
SODIUM SERPL-SCNC: 140 MMOL/L (ref 136–145)

## 2024-06-17 PROCEDURE — 87070 CULTURE OTHR SPECIMN AEROBIC: CPT | Performed by: SURGERY

## 2024-06-17 PROCEDURE — 82962 GLUCOSE BLOOD TEST: CPT

## 2024-06-17 PROCEDURE — 87205 SMEAR GRAM STAIN: CPT | Performed by: SURGERY

## 2024-06-17 PROCEDURE — 87075 CULTR BACTERIA EXCEPT BLOOD: CPT | Performed by: SURGERY

## 2024-06-17 PROCEDURE — 81025 URINE PREGNANCY TEST: CPT

## 2024-06-17 PROCEDURE — 03PY0JZ REMOVAL OF SYNTHETIC SUBSTITUTE FROM UPPER ARTERY, OPEN APPROACH: ICD-10-PCS | Performed by: SURGERY

## 2024-06-17 PROCEDURE — 86901 BLOOD TYPING SEROLOGIC RH(D): CPT | Performed by: SURGERY

## 2024-06-17 PROCEDURE — 05PY0JZ REMOVAL OF SYNTHETIC SUBSTITUTE FROM UPPER VEIN, OPEN APPROACH: ICD-10-PCS | Performed by: SURGERY

## 2024-06-17 PROCEDURE — 80069 RENAL FUNCTION PANEL: CPT | Performed by: INTERNAL MEDICINE

## 2024-06-17 PROCEDURE — 86900 BLOOD TYPING SEROLOGIC ABO: CPT | Performed by: SURGERY

## 2024-06-17 PROCEDURE — 87176 TISSUE HOMOGENIZATION CULTR: CPT | Performed by: SURGERY

## 2024-06-17 PROCEDURE — 86850 RBC ANTIBODY SCREEN: CPT | Performed by: SURGERY

## 2024-06-17 RX ORDER — HYDROCODONE BITARTRATE AND ACETAMINOPHEN 5; 325 MG/1; MG/1
1 TABLET ORAL EVERY 4 HOURS PRN
Status: DISCONTINUED | OUTPATIENT
Start: 2024-06-17 | End: 2024-06-19

## 2024-06-17 RX ORDER — MORPHINE SULFATE 10 MG/ML
6 INJECTION, SOLUTION INTRAMUSCULAR; INTRAVENOUS EVERY 10 MIN PRN
Status: DISCONTINUED | OUTPATIENT
Start: 2024-06-17 | End: 2024-06-17 | Stop reason: HOSPADM

## 2024-06-17 RX ORDER — ACETAMINOPHEN 325 MG/1
650 TABLET ORAL EVERY 4 HOURS PRN
Status: DISCONTINUED | OUTPATIENT
Start: 2024-06-17 | End: 2024-06-19

## 2024-06-17 RX ORDER — HYDRALAZINE HYDROCHLORIDE 20 MG/ML
INJECTION INTRAMUSCULAR; INTRAVENOUS AS NEEDED
Status: DISCONTINUED | OUTPATIENT
Start: 2024-06-17 | End: 2024-06-17 | Stop reason: SURG

## 2024-06-17 RX ORDER — HEPARIN SODIUM 1000 [USP'U]/ML
INJECTION, SOLUTION INTRAVENOUS; SUBCUTANEOUS AS NEEDED
Status: DISCONTINUED | OUTPATIENT
Start: 2024-06-17 | End: 2024-06-17 | Stop reason: SURG

## 2024-06-17 RX ORDER — LABETALOL HYDROCHLORIDE 5 MG/ML
INJECTION, SOLUTION INTRAVENOUS AS NEEDED
Status: DISCONTINUED | OUTPATIENT
Start: 2024-06-17 | End: 2024-06-17 | Stop reason: SURG

## 2024-06-17 RX ORDER — ONDANSETRON 2 MG/ML
4 INJECTION INTRAMUSCULAR; INTRAVENOUS EVERY 6 HOURS PRN
Status: DISCONTINUED | OUTPATIENT
Start: 2024-06-17 | End: 2024-06-19

## 2024-06-17 RX ORDER — LIDOCAINE HYDROCHLORIDE 10 MG/ML
INJECTION, SOLUTION EPIDURAL; INFILTRATION; INTRACAUDAL; PERINEURAL AS NEEDED
Status: DISCONTINUED | OUTPATIENT
Start: 2024-06-17 | End: 2024-06-17 | Stop reason: SURG

## 2024-06-17 RX ORDER — SODIUM CHLORIDE 9 MG/ML
INJECTION, SOLUTION INTRAVENOUS CONTINUOUS
Status: DISCONTINUED | OUTPATIENT
Start: 2024-06-17 | End: 2024-06-18

## 2024-06-17 RX ORDER — SODIUM CHLORIDE, SODIUM LACTATE, POTASSIUM CHLORIDE, CALCIUM CHLORIDE 600; 310; 30; 20 MG/100ML; MG/100ML; MG/100ML; MG/100ML
INJECTION, SOLUTION INTRAVENOUS CONTINUOUS
Status: DISCONTINUED | OUTPATIENT
Start: 2024-06-17 | End: 2024-06-17 | Stop reason: HOSPADM

## 2024-06-17 RX ORDER — SODIUM CHLORIDE, SODIUM LACTATE, POTASSIUM CHLORIDE, CALCIUM CHLORIDE 600; 310; 30; 20 MG/100ML; MG/100ML; MG/100ML; MG/100ML
INJECTION, SOLUTION INTRAVENOUS CONTINUOUS
OUTPATIENT
Start: 2024-06-17

## 2024-06-17 RX ORDER — HYDROMORPHONE HYDROCHLORIDE 1 MG/ML
0.2 INJECTION, SOLUTION INTRAMUSCULAR; INTRAVENOUS; SUBCUTANEOUS EVERY 5 MIN PRN
Status: DISCONTINUED | OUTPATIENT
Start: 2024-06-17 | End: 2024-06-17 | Stop reason: HOSPADM

## 2024-06-17 RX ORDER — MORPHINE SULFATE 4 MG/ML
2 INJECTION, SOLUTION INTRAMUSCULAR; INTRAVENOUS EVERY 10 MIN PRN
Status: DISCONTINUED | OUTPATIENT
Start: 2024-06-17 | End: 2024-06-17 | Stop reason: HOSPADM

## 2024-06-17 RX ORDER — HYDROMORPHONE HYDROCHLORIDE 1 MG/ML
0.6 INJECTION, SOLUTION INTRAMUSCULAR; INTRAVENOUS; SUBCUTANEOUS EVERY 5 MIN PRN
OUTPATIENT
Start: 2024-06-17

## 2024-06-17 RX ORDER — VANCOMYCIN HYDROCHLORIDE 1 G/20ML
INJECTION, POWDER, LYOPHILIZED, FOR SOLUTION INTRAVENOUS AS NEEDED
Status: DISCONTINUED | OUTPATIENT
Start: 2024-06-17 | End: 2024-06-17 | Stop reason: HOSPADM

## 2024-06-17 RX ORDER — MORPHINE SULFATE 4 MG/ML
2 INJECTION, SOLUTION INTRAMUSCULAR; INTRAVENOUS EVERY 10 MIN PRN
OUTPATIENT
Start: 2024-06-17

## 2024-06-17 RX ORDER — PROTAMINE SULFATE 10 MG/ML
INJECTION, SOLUTION INTRAVENOUS AS NEEDED
Status: DISCONTINUED | OUTPATIENT
Start: 2024-06-17 | End: 2024-06-17 | Stop reason: SURG

## 2024-06-17 RX ORDER — HYDROCODONE BITARTRATE AND ACETAMINOPHEN 5; 325 MG/1; MG/1
2 TABLET ORAL EVERY 4 HOURS PRN
Status: DISCONTINUED | OUTPATIENT
Start: 2024-06-17 | End: 2024-06-19

## 2024-06-17 RX ORDER — SODIUM CHLORIDE, SODIUM LACTATE, POTASSIUM CHLORIDE, CALCIUM CHLORIDE 600; 310; 30; 20 MG/100ML; MG/100ML; MG/100ML; MG/100ML
INJECTION, SOLUTION INTRAVENOUS CONTINUOUS PRN
Status: DISCONTINUED | OUTPATIENT
Start: 2024-06-17 | End: 2024-06-17 | Stop reason: SURG

## 2024-06-17 RX ORDER — HYDROMORPHONE HYDROCHLORIDE 1 MG/ML
0.6 INJECTION, SOLUTION INTRAMUSCULAR; INTRAVENOUS; SUBCUTANEOUS EVERY 5 MIN PRN
Status: DISCONTINUED | OUTPATIENT
Start: 2024-06-17 | End: 2024-06-17 | Stop reason: HOSPADM

## 2024-06-17 RX ORDER — LABETALOL HYDROCHLORIDE 5 MG/ML
10 INJECTION, SOLUTION INTRAVENOUS EVERY 4 HOURS PRN
Status: DISCONTINUED | OUTPATIENT
Start: 2024-06-17 | End: 2024-06-19

## 2024-06-17 RX ORDER — NICOTINE POLACRILEX 4 MG
15 LOZENGE BUCCAL
Status: DISCONTINUED | OUTPATIENT
Start: 2024-06-17 | End: 2024-06-17 | Stop reason: HOSPADM

## 2024-06-17 RX ORDER — HEPARIN SODIUM 5000 [USP'U]/ML
5000 INJECTION, SOLUTION INTRAVENOUS; SUBCUTANEOUS EVERY 8 HOURS SCHEDULED
Status: DISCONTINUED | OUTPATIENT
Start: 2024-06-18 | End: 2024-06-19

## 2024-06-17 RX ORDER — NICOTINE POLACRILEX 4 MG
30 LOZENGE BUCCAL
OUTPATIENT
Start: 2024-06-17

## 2024-06-17 RX ORDER — HYDROMORPHONE HYDROCHLORIDE 1 MG/ML
0.4 INJECTION, SOLUTION INTRAMUSCULAR; INTRAVENOUS; SUBCUTANEOUS EVERY 5 MIN PRN
Status: DISCONTINUED | OUTPATIENT
Start: 2024-06-17 | End: 2024-06-17 | Stop reason: HOSPADM

## 2024-06-17 RX ORDER — NALOXONE HYDROCHLORIDE 0.4 MG/ML
0.08 INJECTION, SOLUTION INTRAMUSCULAR; INTRAVENOUS; SUBCUTANEOUS AS NEEDED
OUTPATIENT
Start: 2024-06-17 | End: 2024-06-18

## 2024-06-17 RX ORDER — NALOXONE HYDROCHLORIDE 0.4 MG/ML
0.08 INJECTION, SOLUTION INTRAMUSCULAR; INTRAVENOUS; SUBCUTANEOUS AS NEEDED
Status: DISCONTINUED | OUTPATIENT
Start: 2024-06-17 | End: 2024-06-17 | Stop reason: HOSPADM

## 2024-06-17 RX ORDER — MORPHINE SULFATE 4 MG/ML
4 INJECTION, SOLUTION INTRAMUSCULAR; INTRAVENOUS EVERY 10 MIN PRN
OUTPATIENT
Start: 2024-06-17

## 2024-06-17 RX ORDER — ONDANSETRON 2 MG/ML
INJECTION INTRAMUSCULAR; INTRAVENOUS AS NEEDED
Status: DISCONTINUED | OUTPATIENT
Start: 2024-06-17 | End: 2024-06-17 | Stop reason: SURG

## 2024-06-17 RX ORDER — HYDROMORPHONE HYDROCHLORIDE 1 MG/ML
0.2 INJECTION, SOLUTION INTRAMUSCULAR; INTRAVENOUS; SUBCUTANEOUS EVERY 5 MIN PRN
OUTPATIENT
Start: 2024-06-17

## 2024-06-17 RX ORDER — NICOTINE POLACRILEX 4 MG
30 LOZENGE BUCCAL
Status: DISCONTINUED | OUTPATIENT
Start: 2024-06-17 | End: 2024-06-17 | Stop reason: HOSPADM

## 2024-06-17 RX ORDER — HYDROMORPHONE HYDROCHLORIDE 1 MG/ML
0.2 INJECTION, SOLUTION INTRAMUSCULAR; INTRAVENOUS; SUBCUTANEOUS EVERY 2 HOUR PRN
Status: DISCONTINUED | OUTPATIENT
Start: 2024-06-17 | End: 2024-06-19

## 2024-06-17 RX ORDER — HYDRALAZINE HYDROCHLORIDE 20 MG/ML
10 INJECTION INTRAMUSCULAR; INTRAVENOUS EVERY 4 HOURS PRN
Status: DISCONTINUED | OUTPATIENT
Start: 2024-06-17 | End: 2024-06-19

## 2024-06-17 RX ORDER — DEXTROSE MONOHYDRATE 25 G/50ML
50 INJECTION, SOLUTION INTRAVENOUS
Status: DISCONTINUED | OUTPATIENT
Start: 2024-06-17 | End: 2024-06-17 | Stop reason: HOSPADM

## 2024-06-17 RX ORDER — ESMOLOL HYDROCHLORIDE 10 MG/ML
INJECTION INTRAVENOUS AS NEEDED
Status: DISCONTINUED | OUTPATIENT
Start: 2024-06-17 | End: 2024-06-17 | Stop reason: SURG

## 2024-06-17 RX ORDER — NICOTINE POLACRILEX 4 MG
15 LOZENGE BUCCAL
OUTPATIENT
Start: 2024-06-17

## 2024-06-17 RX ORDER — DEXTROSE MONOHYDRATE 25 G/50ML
50 INJECTION, SOLUTION INTRAVENOUS
OUTPATIENT
Start: 2024-06-17

## 2024-06-17 RX ORDER — HYDROMORPHONE HYDROCHLORIDE 1 MG/ML
0.4 INJECTION, SOLUTION INTRAMUSCULAR; INTRAVENOUS; SUBCUTANEOUS EVERY 5 MIN PRN
OUTPATIENT
Start: 2024-06-17

## 2024-06-17 RX ORDER — MORPHINE SULFATE 10 MG/ML
6 INJECTION, SOLUTION INTRAMUSCULAR; INTRAVENOUS EVERY 10 MIN PRN
OUTPATIENT
Start: 2024-06-17

## 2024-06-17 RX ORDER — MORPHINE SULFATE 4 MG/ML
4 INJECTION, SOLUTION INTRAMUSCULAR; INTRAVENOUS EVERY 10 MIN PRN
Status: DISCONTINUED | OUTPATIENT
Start: 2024-06-17 | End: 2024-06-17 | Stop reason: HOSPADM

## 2024-06-17 RX ORDER — DEXAMETHASONE SODIUM PHOSPHATE 4 MG/ML
VIAL (ML) INJECTION AS NEEDED
Status: DISCONTINUED | OUTPATIENT
Start: 2024-06-17 | End: 2024-06-17 | Stop reason: SURG

## 2024-06-17 RX ADMIN — LIDOCAINE HYDROCHLORIDE 10 MG: 10 INJECTION, SOLUTION EPIDURAL; INFILTRATION; INTRACAUDAL; PERINEURAL at 17:26:00

## 2024-06-17 RX ADMIN — ESMOLOL HYDROCHLORIDE 10 MG: 10 INJECTION INTRAVENOUS at 18:02:00

## 2024-06-17 RX ADMIN — ESMOLOL HYDROCHLORIDE 10 MG: 10 INJECTION INTRAVENOUS at 18:07:00

## 2024-06-17 RX ADMIN — PROTAMINE SULFATE 30 MG: 10 INJECTION, SOLUTION INTRAVENOUS at 19:20:00

## 2024-06-17 RX ADMIN — LABETALOL HYDROCHLORIDE 10 MG: 5 INJECTION, SOLUTION INTRAVENOUS at 18:53:00

## 2024-06-17 RX ADMIN — ONDANSETRON 4 MG: 2 INJECTION INTRAMUSCULAR; INTRAVENOUS at 19:34:00

## 2024-06-17 RX ADMIN — HEPARIN SODIUM 7000 UNITS: 1000 INJECTION, SOLUTION INTRAVENOUS; SUBCUTANEOUS at 18:45:00

## 2024-06-17 RX ADMIN — DEXAMETHASONE SODIUM PHOSPHATE 4 MG: 4 MG/ML VIAL (ML) INJECTION at 19:34:00

## 2024-06-17 RX ADMIN — LABETALOL HYDROCHLORIDE 10 MG: 5 INJECTION, SOLUTION INTRAVENOUS at 18:51:00

## 2024-06-17 RX ADMIN — HYDRALAZINE HYDROCHLORIDE 5 MG: 20 INJECTION INTRAMUSCULAR; INTRAVENOUS at 18:53:00

## 2024-06-17 RX ADMIN — SODIUM CHLORIDE, SODIUM LACTATE, POTASSIUM CHLORIDE, CALCIUM CHLORIDE: 600; 310; 30; 20 INJECTION, SOLUTION INTRAVENOUS at 17:25:00

## 2024-06-17 RX ADMIN — ESMOLOL HYDROCHLORIDE 10 MG: 10 INJECTION INTRAVENOUS at 18:48:00

## 2024-06-17 RX ADMIN — HYDRALAZINE HYDROCHLORIDE 5 MG: 20 INJECTION INTRAMUSCULAR; INTRAVENOUS at 19:02:00

## 2024-06-17 NOTE — PROGRESS NOTES
[facility] Hospitalist Progress Note     CC: Hospital Follow up    PCP: Ashleigh Murray       Assessment/Plan:   Ms. Walker is a 33 year old female with PMH sig for ESRD on HD (t/t/s), HTN, HLD, obesity, HFpEF, who presented with fever, found to have bacteremia.      Sepsis   Bacteremia   Diarrhea  - MSSA bacteremia  - source fistula graft  - was treated few months back with oral antibiotics for possible drainage around or near fistula by surgeon   - WBC scan positive for possible infection in area of fistula  - consulted vascular surgery appreciate eval. Plan for graft removal today, 6/17  - ID on consult      ESRD  - on HD t/t/s  - renal consulted     Anemia  - likely ACD 2/2 ESRD  - monitor      HFpEF  - poss infiltrates vs fluid  - volume removal with HD  - bumex on non dialysis days as needed     HTN  - continue coreg  - does not tolerate nifedipine any more she states     HLD  - statin     PPX: pt declining hep subcutaneous    Note: This chart was prepared using voice recognition software and may contain unintended word substitution errors.     Jacqueline Jeter MD  Hospitalist  [facility]   Answering service: [phone]\     Subjective:     Feels ok. No new symptoms. Awaiting surgery       OBJECTIVE:    Blood pressure 154/83, pulse 67, temperature 97.5 °F (36.4 °C), temperature source Oral, resp. rate 18, weight 199 lb 11.2 oz (90.6 kg), last menstrual period 05/14/2024, SpO2 97%, not currently breastfeeding.    Temp:  [97.5 °F (36.4 °C)-97.8 °F (36.6 °C)] 97.5 °F (36.4 °C)  Pulse:  [67-89] 67  Resp:  [16-18] 18  BP: (146-160)/(81-89) 154/83  SpO2:  [97 %-99 %] 97 %      Intake/Output:    Intake/Output Summary (Last 24 hours) at 6/17/2024 1402  Last data filed at 6/17/2024 0936  Gross per 24 hour   Intake 1000 ml   Output --   Net 1000 ml       Last 3 Weights   06/17/24 0600 199 lb 11.2 oz (90.6 kg)   06/16/24 0537 200 lb 3.2 oz (90.8 kg)   06/15/24 0552 202 lb 4.8 oz (91.8 kg)    06/14/24 0441 199 lb 8 oz (90.5 kg)   06/13/24 0438 204 lb 3.2 oz (92.6 kg)   06/12/24 0514 199 lb 9.6 oz (90.5 kg)   06/11/24 1657 200 lb 4.8 oz (90.9 kg)   06/10/24 0435 207 lb 8 oz (94.1 kg)   06/10/24 0127 207 lb 9.6 oz (94.2 kg)   06/10/24 0117 207 lb 9.6 oz (94.2 kg)   06/03/24 2010 206 lb (93.4 kg)   04/22/24 1518 211 lb (95.7 kg)       /83 (BP Location: Right arm)   Pulse 67   Temp 97.5 °F (36.4 °C) (Oral)   Resp 18   Wt 199 lb 11.2 oz (90.6 kg)   LMP 05/14/2024 (Exact Date)   SpO2 97%   BMI 33.23 kg/m²   General: Alert, no acute distress  Lungs: clear to ausculation bilaterally  Heart: Regular rate and rhythm  Abdomen: soft, non tender  Extremities: No edema    Data Review:       Labs:     Recent Labs   Lab 06/11/24  0835 06/12/24  0912 06/13/24  0648   RBC 3.33* 3.26* 3.19*   HGB 9.5* 9.4* 9.2*   HCT 29.4* 28.4* 27.2*   MCV 88.3 87.1 85.3   MCH 28.5 28.8 28.8   MCHC 32.3 33.1 33.8   RDW 13.2 13.3 13.1   NEPRELIM 7.00 5.22 4.86   WBC 9.5 7.9 7.9   .0 183.0 187.0         Recent Labs   Lab 06/15/24  0653 06/16/24  0914 06/17/24  0919   GLU 84 81 85   BUN 69* 48* 66*   CREATSERUM 11.21* 8.65* 11.07*   EGFRCR 4* 6* 4*   CA 9.0 9.3 9.0    139 140   K 4.1 4.3 4.7   CL 98 102 104   CO2 27.0 24.0 24.0       Recent Labs   Lab 06/11/24  0835 06/12/24  0746 06/13/24  0648 06/15/24  0653 06/17/24  0919   ALB 4.1 4.1 4.0 4.3 4.2         Imaging:  No results found.      Meds:      calcium carbonate  500 mg Oral TID CC    bumetanide  3 mg Oral Once per day on Sunday Monday Wednesday Friday    ceFAZolin  1 g Intravenous Q24H    atorvastatin  40 mg Oral Nightly    carvedilol  25 mg Oral BID    heparin  5,000 Units Subcutaneous Q8H MANISHA         sodium chloride    loperamide    ondansetron    prochlorperazine    polyethylene glycol (PEG 3350)    sennosides    bisacodyl    acetaminophen

## 2024-06-17 NOTE — CM/SW NOTE
SW followed up on discharge planning.    Patient will need IV Ancef with HD Tues/Thurs/Sat from ID note.    Patient is current with [facility] for HD.    SW sent clinicals to HD clinic and will confirm with ID final drug/dosing needed at MT.    PLAN: MT home w/outpatient HD and IV abx [facility]    / to remain available for support and/or discharge planning.     Cici Kumari MSW, LSW [id number]

## 2024-06-17 NOTE — PLAN OF CARE
VSS overnight, tylenol given for mild pain. Plan graft excision today.    Problem: Patient Centered Care  Goal: Patient preferences are identified and integrated in the patient's plan of care  Description: Interventions:  - What would you like us to know as we care for you? I'm from home alone.  - Provide timely, complete, and accurate information to patient/family  - Incorporate patient and family knowledge, values, beliefs, and cultural backgrounds into the planning and delivery of care  - Encourage patient/family to participate in care and decision-making at the level they choose  - Honor patient and family perspectives and choices     Problem: PAIN - ADULT  Goal: Verbalizes/displays adequate comfort level or patient's stated pain goal  Description: INTERVENTIONS:  - Encourage pt to monitor pain and request assistance  - Assess pain using appropriate pain scale  - Administer analgesics based on type and severity of pain and evaluate response  - Implement non-pharmacological measures as appropriate and evaluate response  - Consider cultural and social influences on pain and pain management  - Manage/alleviate anxiety  - Utilize distraction and/or relaxation techniques  - Monitor for opioid side effects  - Notify MD/LIP if interventions unsuccessful or patient reports new pain  - Anticipate increased pain with activity and pre-medicate as appropriate  Outcome: Progressing     Problem: SAFETY ADULT - FALL  Goal: Free from fall injury  Description: INTERVENTIONS:  - Assess pt frequently for physical needs  - Identify cognitive and physical deficits and behaviors that affect risk of falls.  - [place] fall precautions as indicated by assessment.  - Educate pt/family on patient safety including physical limitations  - Instruct pt to call for assistance with activity based on assessment  - Modify environment to reduce risk of injury  - Provide assistive devices as appropriate  - Consider OT/PT consult to assist with  strengthening/mobility  - Encourage toileting schedule  Outcome: Progressing     Problem: DISCHARGE PLANNING  Goal: Discharge to home or other facility with appropriate resources  Description: INTERVENTIONS:  - Identify barriers to discharge w/pt and caregiver  - Include patient/family/discharge partner in discharge planning  - Arrange for needed discharge resources and transportation as appropriate  - Identify discharge learning needs (meds, wound care, etc)  - Arrange for interpreters to assist at discharge as needed  - Consider post-discharge preferences of patient/family/discharge partner  - Complete POLST form as appropriate  - Assess patient's ability to be responsible for managing their own health  - Refer to Case Management Department for coordinating discharge planning if the patient needs post-hospital services based on physician/LIP order or complex needs related to functional status, cognitive ability or social support system  Outcome: Progressing     Problem: CARDIOVASCULAR - ADULT  Goal: Maintains optimal cardiac output and hemodynamic stability  Description: INTERVENTIONS:  - Monitor vital signs, rhythm, and trends  - Monitor for bleeding, hypotension and signs of decreased cardiac output  - Evaluate effectiveness of vasoactive medications to optimize hemodynamic stability  - Monitor arterial and/or venous puncture sites for bleeding and/or hematoma  - Assess quality of pulses, skin color and temperature  - Assess for signs of decreased coronary artery perfusion - ex. Angina  - Evaluate fluid balance, assess for edema, trend weights  Outcome: Progressing  Goal: Absence of cardiac arrhythmias or at baseline  Description: INTERVENTIONS:  - Continuous cardiac monitoring, monitor vital signs, obtain 12 lead EKG if indicated  - Evaluate effectiveness of antiarrhythmic and heart rate control medications as ordered  - Initiate emergency measures for life threatening arrhythmias  - Monitor electrolytes and  administer replacement therapy as ordered  Outcome: Progressing     Problem: METABOLIC/FLUID AND ELECTROLYTES - ADULT  Goal: Glucose maintained within prescribed range  Description: INTERVENTIONS:  - Monitor Blood Glucose as ordered  - Assess for signs and symptoms of hyperglycemia and hypoglycemia  - Administer ordered medications to maintain glucose within target range  - Assess barriers to adequate nutritional intake and initiate nutrition consult as needed  - Instruct patient on self management of diabetes  Outcome: Progressing  Goal: Electrolytes maintained within normal limits  Description: INTERVENTIONS:  - Monitor labs and rhythm and assess patient for signs and symptoms of electrolyte imbalances  - Administer electrolyte replacement as ordered  - Monitor response to electrolyte replacements, including rhythm and repeat lab results as appropriate  - Fluid restriction as ordered  - Instruct patient on fluid and nutrition restrictions as appropriate  Outcome: Progressing  Goal: Hemodynamic stability and optimal renal function maintained  Description: INTERVENTIONS:  - Monitor labs and assess for signs and symptoms of volume excess or deficit  - Monitor intake, output and patient weight  - Monitor urine specific gravity, serum osmolarity and serum sodium as indicated or ordered  - Monitor response to interventions for patient's volume status, including labs, urine output, blood pressure (other measures as available)  - Encourage oral intake as appropriate  - Instruct patient on fluid and nutrition restrictions as appropriate  Outcome: Progressing

## 2024-06-17 NOTE — ANESTHESIA PREPROCEDURE EVALUATION
Anesthesia PreOp Note    HPI:     Liu Walker is a 33 year old female who presents for preoperative consultation requested by: Peg Huddleston MD    Date of Surgery: 6/9/2024 - 6/17/2024    Procedure(s):  Removal left lower extremity arteriorvenous graft  Indication: AV graft infection    Relevant Problems   No relevant active problems       NPO:                         History Review:  Patient Active Problem List    Diagnosis Date Noted    Community acquired pneumonia, unspecified laterality 06/09/2024    Hypoxia 01/03/2024    Acute renal failure superimposed on chronic kidney disease, unspecified acute renal failure type, unspecified CKD stage ([facility]) 01/03/2024    Heart failure, unspecified HF chronicity, unspecified heart failure type ([facility]) 01/03/2024    Anemia 12/30/2023    Acute renal failure (ARF) (Formerly [facility]) 12/30/2023    Acute kidney injury ([facility]) 12/30/2023    Abdominal pain of unknown etiology 12/30/2023    Urinary tract infection with hematuria, site unspecified 12/30/2023    Acute on chronic renal insufficiency 07/08/2022    Peripheral edema 07/08/2022    LETHA (acute kidney injury) ([facility]) 02/21/2022    Hypertension, unspecified type 02/21/2022    NSTEMI (non-ST elevated myocardial infarction) ([facility]) 02/21/2022    Essential hypertension 03/29/2018    Migraine without status migrainosus, not intractable, unspecified migraine type 03/29/2018    Severe depressed bipolar I disorder without psychotic features ([facility])     Elevated troponin 02/16/2017    Dizziness 02/16/2017    Bipolar depression ([facility]) 10/07/2016    Acute chest pain 10/05/2016    Hypertensive urgency 10/05/2016    Hypokalemia 10/05/2016       Past Medical History:    Anxiety state    Arrhythmia    Congestive heart disease ([facility])    Depression    Diabetes ([facility])    Esophageal reflux    Essential hypertension    Hidradenitis    bilateral axilla    High blood pressure    High cholesterol    Kidney failure    Migraines    Polycystic ovarian syndrome     treated with Metformin    PONV (postoperative nausea and vomiting)    Renal disorder    Visual impairment    Wears glasses       Past Surgical History:   Procedure Laterality Date    Appendectomy      Other Bilateral     excision of axilla hydradenitis       Medications Prior to Admission   Medication Sig Dispense Refill Last Dose    bumetanide 1 MG Oral Tab Take 3 tablets (3 mg total) by mouth daily.       acetaminophen 500 MG Oral Tab Take 1 tablet (500 mg total) by mouth every 6 (six) hours as needed for Fever (equal to or greater than 100.4).       ondansetron 4 MG Oral Tablet Dispersible Take 1 tablet (4 mg total) by mouth every 6 (six) hours as needed. Watch severe muscle stiffness/fever 10 tablet 0     carvedilol 25 MG Oral Tab Take 1 tablet (25 mg total) by mouth 2 (two) times daily.       EPINEPHrine 0.3 MG/0.3ML Injection Solution Auto-injector TAKE 1 AUTO PEN AS NEEDED BY INJECTION AS DIRECTED       atorvastatin 40 MG Oral Tab Take 1 tablet (40 mg total) by mouth nightly. 30 tablet 0     [] doxycycline 100 MG Oral Cap Take 1 capsule (100 mg total) by mouth 2 (two) times daily for 7 days. 14 capsule 0     [] cefadroxil 500 MG Oral Cap Take 1 capsule (500 mg total) by mouth 2 (two) times daily for 10 days. 20 capsule 0     HYDROcodone-acetaminophen 5-325 MG Oral Tab Take 1 tablet by mouth every 6 (six) hours as needed for Pain. Watch drowsiness no alcohol 5 tablet 0     sennosides 17.2 MG Oral Tab Take 1 tablet (17.2 mg total) by mouth nightly as needed (constipation, as needed if no bowel movement that day). 30 tablet 0     NIFEdipine ER 90 MG Oral Tablet 24 Hr Take 1 tablet (90 mg total) by mouth nightly. (Patient not taking: Reported on 2024)        Current Facility-Administered Medications Ordered in Epic   Medication Dose Route Frequency Provider Last Rate Last Admin    sodium chloride 0.9 % IV bolus 100 mL  100 mL Intravenous Q30 Min PRN Irma Aponte MD        hydrALAzine  (Apresoline) 20 mg/mL injection 10 mg  10 mg Intravenous Q4H PRN Jacqueline Jeter MD   10 mg at 06/17/24 1548    labetalol (Trandate) 5 mg/mL injection 10 mg  10 mg Intravenous Q4H PRN Jacqueline Jeter MD        calcium carbonate (Tums) chewable tab 500 mg  500 mg Oral TID CC Bernardino Bourgeois MD   500 mg at 06/17/24 0848    bumetanide (Bumex) tab 3 mg  3 mg Oral Once per day on Sunday Monday Wednesday Friday Rome Flaherty MD   3 mg at 06/17/24 0850    loperamide (Imodium) cap 2 mg  2 mg Oral QID PRN Marco Muhammad DO        ceFAZolin (Ancef) 1 g in dextrose 5% 100mL IVPB-ADD  1 g Intravenous Q24H Beverly Vazquez APRN 200 mL/hr at 06/16/24 1804 1 g at 06/16/24 1804    atorvastatin (Lipitor) tab 40 mg  40 mg Oral Nightly Jacqueline Jeter MD   40 mg at 06/16/24 2227    carvedilol (Coreg) tab 25 mg  25 mg Oral BID Jacqueline Jeter MD   25 mg at 06/17/24 0848    [Held by provider] heparin (Porcine) 5000 UNIT/ML injection 5,000 Units  5,000 Units Subcutaneous Q8H MANISHA Jacqueline Jeter MD   5,000 Units at 06/10/24 0529    ondansetron (Zofran) 4 MG/2ML injection 4 mg  4 mg Intravenous Q6H PRN Jacqueline Jeter MD   4 mg at 06/10/24 1948    prochlorperazine (Compazine) 10 MG/2ML injection 5 mg  5 mg Intravenous Q8H PRN Jacqueline Jeter MD        polyethylene glycol (PEG 3350) (Miralax) 17 g oral packet 17 g  17 g Oral Daily PRN Jacqueline Jeter MD        sennosides (Senokot) tab 17.2 mg  17.2 mg Oral Nightly PRN Jacqueline Jeter MD        bisacodyl (Dulcolax) 10 MG rectal suppository 10 mg  10 mg Rectal Daily PRN Jacqueline Jeter MD        acetaminophen (Tylenol Extra Strength) tab 1,000 mg  1,000 mg Oral Q6H PRN Catracho Warner MD   1,000 mg at 06/16/24 2232     No current Epic-ordered outpatient medications on file.       Allergies   Allergen Reactions    Grapefruit HIVES    Other OTHER (SEE COMMENTS)     Muscle relaxants-paralysis    Citrus C Vit [Ascorbate] HIVES    Latex RASH    Shellfish-Derived Products  Tightness in Throat       Family History   Problem Relation Age of Onset    Hypertension Father     Lipids Father     Obesity Father     Diabetes Mother     Hypertension Mother     Lipids Mother     Obesity Mother     Psychiatric Sister     Psychiatric Brother      Social History     Socioeconomic History    Marital status: Single    Number of children: 0   Occupational History    Occupation: [employer]    Tobacco Use    Smoking status: Never    Smokeless tobacco: Never   Vaping Use    Vaping status: Former   Substance and Sexual Activity    Alcohol use: No    Drug use: No       Available pre-op labs reviewed.  Lab Results   Component Value Date    WBC 7.9 06/13/2024    RBC 3.19 (L) 06/13/2024    HGB 9.2 (L) 06/13/2024    HCT 27.2 (L) 06/13/2024    MCV 85.3 06/13/2024    MCH 28.8 06/13/2024    MCHC 33.8 06/13/2024    RDW 13.1 06/13/2024    .0 06/13/2024    PREGS Negative 06/09/2024     Lab Results   Component Value Date     06/17/2024    K 4.7 06/17/2024     06/17/2024    CO2 24.0 06/17/2024    BUN 66 (H) 06/17/2024    CREATSERUM 11.07 (H) 06/17/2024    GLU 85 06/17/2024    CA 9.0 06/17/2024          Vital Signs:  Body mass index is 33.23 kg/m².   weight is 90.6 kg (199 lb 11.2 oz). Her oral temperature is 98 °F (36.7 °C). Her blood pressure is 184/100 (abnormal) and her pulse is 71. Her respiration is 18 and oxygen saturation is 100%.   Vitals:    06/17/24 0600 06/17/24 0845 06/17/24 1513 06/17/24 1548   BP:  154/83 (!) 171/90 (!) 184/100   Pulse:  67 71    Resp:  18 18    Temp:  97.5 °F (36.4 °C) 98 °F (36.7 °C)    TempSrc:  Oral Oral    SpO2:  97% 100%    Weight: 90.6 kg (199 lb 11.2 oz)           Anesthesia Evaluation     Patient summary reviewed and Nursing notes reviewed    History of anesthetic complications   Airway   Mallampati: IV  TM distance: <3 FB  Neck ROM: full  Dental      Pulmonary - negative ROS and normal exam   Cardiovascular - normal exam  Exercise tolerance: good  (+)  hypertension, CHF    Neuro/Psych - negative ROS   (+)  headaches, anxiety/panic attacks,  bipolar disorder, depression      GI/Hepatic/Renal - negative ROS   (+) GERD, chronic renal disease ESRD and dialysis    Endo/Other - negative ROS   (+) diabetes mellitus type 2 poorly controlled  Abdominal  - normal exam     Other findings: Infected AV graft            Anesthesia Plan:   ASA:  3  Plan:   General  Airway:  ETT  Informed Consent Plan and Risks Discussed With:  Patient  Use of Blood Products Discussed With:  Patient  Blood Product Use Consented        I have informed Liu Walker and/or legal guardian or family member of the nature of the anesthetic plan, benefits, risks including possible dental damage if relevant, major complications, and any alternative forms of anesthetic management.   All of the patient's questions were answered to the best of my ability. The patient desires the anesthetic management as planned.  Elba Rangel MD  6/17/2024 4:22 PM  Present on Admission:  **None**

## 2024-06-17 NOTE — SPIRITUAL CARE NOTE
Spiritual Care Visit Note    Patient Name: Liu Walker Date of Spiritual Care Visit: 24   : 1990 Primary Dx: Community acquired pneumonia, unspecified laterality       Referred By: Referral From:     Spiritual Care Taxonomy:    Intended Effects: Demonstrate caring and concern    Methods: Collaborate with care team member;Offer support         Visit Type/Summary:     - Spiritual Care: Consulted with RN prior to visit. Offered empathic listening and emotional support. Patient spoke about her current health and she is trust God for better results. Family is her support system. Patient and family expressed appreciation for  visit. Provided information regarding how to contact Spiritual Care and left a Spiritual Care information card.    Spiritual Care support can be requested via an Epic consult. For urgent/immediate needs, please contact the On Call  at: [place]: ext [phone]    CARLIN Israel   [phone]

## 2024-06-17 NOTE — PROGRESS NOTES
[facility]  part of [facility]    Progress Note      Subjective:     Doing ok  Denies SOB or leg swelling          Objective:   Vital Signs:  Blood pressure 154/83, pulse 67, temperature 97.5 °F (36.4 °C), temperature source Oral, resp. rate 18, weight 199 lb 11.2 oz (90.6 kg), last menstrual period 05/14/2024, SpO2 97%, not currently breastfeeding.     General: No acute distress.  HEENT: NCAT   Respiratory: CTAB   Cardiovascular: normal S1S2   Abdomen: Soft, NT/ND   Ext: no LE edema  Neuro: awake, alert   Access: RIJ tunneled HD catheter, LUE AV graft - swelling noted     Results:     Recent Labs   Lab 06/13/24  0648 06/15/24  0653 06/16/24  0914   GLU 92 84 81   BUN 76* 69* 48*   CREATSERUM 12.31* 11.21* 8.65*   EGFRCR 4* 4* 6*   CA 8.5* 9.0 9.3    136 139   K 3.6 4.1 4.3   CL 99 98 102   CO2 26.0 27.0 24.0     Recent Labs   Lab 06/11/24  0835 06/12/24  0912 06/13/24  0648   RBC 3.33* 3.26* 3.19*   HGB 9.5* 9.4* 9.2*   HCT 29.4* 28.4* 27.2*   MCV 88.3 87.1 85.3   MCH 28.5 28.8 28.8   MCHC 32.3 33.1 33.8   RDW 13.2 13.3 13.1   NEPRELIM 7.00 5.22 4.86   WBC 9.5 7.9 7.9   .0 183.0 187.0             Assessment and Plan:     This is 33 year old female with PMH of ESRD on HD TTS, HTN, HL, HFpEF admitted with fevers, SOB and bacteremia. Nephrology is consulted for dialysis      ESRD on HD TTS:  -  await labs from today   - anticipate next dialysis session tomorrow    - Vascular to remove infected AV graft today.   - receiving dialysis via RIJ tunneled HD catheter   - followed by Dr. Cleveland at [facility] dialysis     Bacteremia:  - per ID   - vascular to remove AV graft today      Anemia   - holding on venofer in light of active infection  - blood transfusions per primary   - may need GEOFFREY if worsening      Hyperphosphatemia    - currently receiving tums 500 TID   - patient to let us know what phos binder she is taking at home   - renal diet when no longer NPO      HTN:  - Continue  coreg  - Bumex on non HD days      We will continue to follow     Irma Aponte MD  [facility]

## 2024-06-17 NOTE — PLAN OF CARE
Problem: Patient Centered Care  Goal: Patient preferences are identified and integrated in the patient's plan of care  Description: Interventions:  - What would you like us to know as we care for you? From home alone   - Provide timely, complete, and accurate information to patient/family  - Incorporate patient and family knowledge, values, beliefs, and cultural backgrounds into the planning and delivery of care  - Encourage patient/family to participate in care and decision-making at the level they choose  - Honor patient and family perspectives and choices  Outcome: Progressing     Problem: Patient/Family Goals  Goal: Patient/Family Long Term Goal  Description: Patient's Long Term Goal:     Interventions:  -   - See additional Care Plan goals for specific interventions  Outcome: Progressing  Goal: Patient/Family Short Term Goal  Description: Patient's Short Term Goal:     Interventions:   -   - See additional Care Plan goals for specific interventions  Outcome: Progressing     Problem: PAIN - ADULT  Goal: Verbalizes/displays adequate comfort level or patient's stated pain goal  Description: INTERVENTIONS:  - Encourage pt to monitor pain and request assistance  - Assess pain using appropriate pain scale  - Administer analgesics based on type and severity of pain and evaluate response  - Implement non-pharmacological measures as appropriate and evaluate response  - Consider cultural and social influences on pain and pain management  - Manage/alleviate anxiety  - Utilize distraction and/or relaxation techniques  - Monitor for opioid side effects  - Notify MD/LIP if interventions unsuccessful or patient reports new pain  - Anticipate increased pain with activity and pre-medicate as appropriate  Outcome: Progressing     Problem: SAFETY ADULT - FALL  Goal: Free from fall injury  Description: INTERVENTIONS:  - Assess pt frequently for physical needs  - Identify cognitive and physical deficits and behaviors that affect  risk of falls.  - [place] fall precautions as indicated by assessment.  - Educate pt/family on patient safety including physical limitations  - Instruct pt to call for assistance with activity based on assessment  - Modify environment to reduce risk of injury  - Provide assistive devices as appropriate  - Consider OT/PT consult to assist with strengthening/mobility  - Encourage toileting schedule  Outcome: Progressing     Problem: DISCHARGE PLANNING  Goal: Discharge to home or other facility with appropriate resources  Description: INTERVENTIONS:  - Identify barriers to discharge w/pt and caregiver  - Include patient/family/discharge partner in discharge planning  - Arrange for needed discharge resources and transportation as appropriate  - Identify discharge learning needs (meds, wound care, etc)  - Arrange for interpreters to assist at discharge as needed  - Consider post-discharge preferences of patient/family/discharge partner  - Complete POLST form as appropriate  - Assess patient's ability to be responsible for managing their own health  - Refer to Case Management Department for coordinating discharge planning if the patient needs post-hospital services based on physician/LIP order or complex needs related to functional status, cognitive ability or social support system  Outcome: Progressing     Problem: CARDIOVASCULAR - ADULT  Goal: Maintains optimal cardiac output and hemodynamic stability  Description: INTERVENTIONS:  - Monitor vital signs, rhythm, and trends  - Monitor for bleeding, hypotension and signs of decreased cardiac output  - Evaluate effectiveness of vasoactive medications to optimize hemodynamic stability  - Monitor arterial and/or venous puncture sites for bleeding and/or hematoma  - Assess quality of pulses, skin color and temperature  - Assess for signs of decreased coronary artery perfusion - ex. Angina  - Evaluate fluid balance, assess for edema, trend weights  Outcome: Progressing  Goal:  Absence of cardiac arrhythmias or at baseline  Description: INTERVENTIONS:  - Continuous cardiac monitoring, monitor vital signs, obtain 12 lead EKG if indicated  - Evaluate effectiveness of antiarrhythmic and heart rate control medications as ordered  - Initiate emergency measures for life threatening arrhythmias  - Monitor electrolytes and administer replacement therapy as ordered  Outcome: Progressing     Problem: METABOLIC/FLUID AND ELECTROLYTES - ADULT  Goal: Glucose maintained within prescribed range  Description: INTERVENTIONS:  - Monitor Blood Glucose as ordered  - Assess for signs and symptoms of hyperglycemia and hypoglycemia  - Administer ordered medications to maintain glucose within target range  - Assess barriers to adequate nutritional intake and initiate nutrition consult as needed  - Instruct patient on self management of diabetes  Outcome: Progressing  Goal: Electrolytes maintained within normal limits  Description: INTERVENTIONS:  - Monitor labs and rhythm and assess patient for signs and symptoms of electrolyte imbalances  - Administer electrolyte replacement as ordered  - Monitor response to electrolyte replacements, including rhythm and repeat lab results as appropriate  - Fluid restriction as ordered  - Instruct patient on fluid and nutrition restrictions as appropriate  Outcome: Progressing  Goal: Hemodynamic stability and optimal renal function maintained  Description: INTERVENTIONS:  - Monitor labs and assess for signs and symptoms of volume excess or deficit  - Monitor intake, output and patient weight  - Monitor urine specific gravity, serum osmolarity and serum sodium as indicated or ordered  - Monitor response to interventions for patient's volume status, including labs, urine output, blood pressure (other measures as available)  - Encourage oral intake as appropriate  - Instruct patient on fluid and nutrition restrictions as appropriate  Outcome: Progressing   Safety precautions on  place. Pt is been NPO.No complains of pain. Call light within reach.

## 2024-06-17 NOTE — ANESTHESIA PROCEDURE NOTES
Airway  Date/Time: 6/17/2024 5:27 PM  Urgency: elective    Airway not difficult    General Information and Staff    Patient location during procedure: OR  Anesthesiologist: Ijeoma Smith MD  Performed: anesthesiologist   Performed by: Ijeoma Smith MD  Authorized by: Ijeoma Smith MD      Indications and Patient Condition  Indications for airway management: anesthesia  Sedation level: deep  Preoxygenated: yes  Patient position: sniffing  Mask difficulty assessment: 1 - vent by mask    Final Airway Details  Final airway type: endotracheal airway      Successful airway: ETT  Cuffed: yes   Successful intubation technique: direct laryngoscopy  Endotracheal tube insertion site: oral    Placement verified by: capnometry   Measured from: lips  ETT to lips (cm): 22  Number of attempts at approach: 1

## 2024-06-17 NOTE — PROGRESS NOTES
[facility]  part of [facility] Infectious Disease  Progress Note    Liu Walker Patient Status:  Inpatient    1990 MRN [id number]   Location [facility] 5SW/SE Attending Jacqueline Jeter MD   Hosp Day # 7 PCP Ashleigh Murray     Subjective:    Patient seen and examined resting in bed,tolerating the iV abx. Plans for graft excision with vascular this afternoon.    Review of Systems:  Review of systems reviewed and negative except as mentioned    Objective:  Blood pressure 154/83, pulse 67, temperature 97.5 °F (36.4 °C), temperature source Oral, resp. rate 18, weight 199 lb 11.2 oz (90.6 kg), last menstrual period 2024, SpO2 97%, not currently breastfeeding.      Physical Exam:  General: Awake, alert, non-tox, NAD.  HEENT:  Oropharynx clear, trachea ML.  Heart: RRR S1S2 no murmurs.  Lungs: Essentially CTA b/l, no rhonchi, rales, wheezes.  Abdomen: Soft, NT/ND.  BS present.    Extremity: No edema. +LUE AV fistula   Neurological: No focal deficits.  Derm:  Warm, dry, free from rashes.  Right chest HD cath     Lab Data Review:        Cultures:  Hospital Encounter on 24   1. Blood Culture     Status: None    Collection Time: 06/10/24  2:20 PM    Specimen: Blood,peripheral   Result Value Ref Range    Blood Culture Result No Growth 5 Days N/A        Radiology:  US ARM LEFT LIMITED (CPT=76882)    Result Date: 2024  PROCEDURE: ULTRASOUND ARM LEFT  LIMITED (CPT=76882)  COMPARISON: None.  INDICATIONS: Swelling  Findings and impression:  There is a 2 x 1 x 0.7 centimeter mildly hypoechoic fluid collection adjacent to the fistula.     Dictated by (CST): Gennaro Godoy MD on 2024 at 1:52 PM     Finalized by (CST): Gennaro Godoy MD on 2024 at 1:54 PM          NM INFECTION INDIUM  WB MULTI AREA SNGL DAY(CPT=78802)    Result Date: 2024  CONCLUSION:  Imaging findings concerning for infection of the left upper extremity AV  graft.    Dictated by (CST): Natanael Burnette MD on 6/12/2024 at 9:57 AM     Finalized by (CST): Natanael Burnette MD on 6/12/2024 at 9:59 AM          XR CHEST AP PORTABLE  (CPT=71045)    Result Date: 6/9/2024  CONCLUSION:   Patchy bibasilar opacities, which may reflect atelectasis or pulmonary edema with or without superimposed pneumonia.  Mild prominence of the interstitial markings, which may reflect mild pulmonary edema.  Redemonstrated enlarged cardiomediastinal silhouette.    Dictated by (CST): Chuy Harris MD on 6/09/2024 at 9:30 PM     Finalized by (CST): Chuy Harris MD on 6/09/2024 at 9:33 PM          XR CHEST AP PORTABLE  (CPT=71045)    Result Date: 6/3/2024  CONCLUSION:  1. CHF/fluid overload with pulmonary interstitial edema.    Dictated by (CST): Alexys Sun MD on 6/03/2024 at 9:12 PM     Finalized by (CST): Alexys Sun MD on 6/03/2024 at 9:13 PM             Assessment and Plan:    MSSA sepsis in this woman who presented with fevers, lightheadedness, cough  - 2/2 blood cultures positive for MSSA on 6/9/24, repeats negative  - h/o LUE AV fistula with drainage in April 2024 s/p treatment  - Current NM scan with uptake concerning for infection in the AV graft  - TTE without vegetations  - IV ancef ongoing     ESRD on HD with R chest HD cath in place  - Per nephrology  - Given concern for AV fistula source, would not advise accessing fistula at this time  - If any further positive blood cultures will need HD line removed as well  - Plans for resection of fistula today     Abdominal pain, cramping, tenderness, and diarrhea  - Also on menstrual cycle  - C.diff negative    Recommendations  - Continue IV ancef as Rx, will need IV abx at discharge with HD   - Repeat labs tomorrow  - Further recommendations to follow    Plan of care discussed with Dr. Gu, she is in agreement with the plan of care.      If you have any questions or concerns please call [facility] at  [phone].     TONO Vargas    6/17/2024  9:21 AM

## 2024-06-17 NOTE — DIETARY NOTE
Brief Nutrition Note         6/17/2024:  Patient screened at no nutrition risk by RN upon visit. Rescreened due to length of stay.   Pt eating well, % recorded intake, however, pt reports appetite adequate but eating smaller meals. States she glad she is losing wt via smaller portion meals. Wt history noted, variable wt r/t fluids (dialysis, variable weighing scale used). 6/15  3 Liters removed post HD.  Pt reports usual wt of 215#.  Current wt 199#. Pt states she would like to lose more weight to prepare for her [birth year]   Pt is not currently at nutrition risk. Anticipate intentional wt loss given reported  wt management goals     Will continue to monitor po intake.   Will follow up at length of stay ( LOS) per protocol. Otherwise, please consult RD if patient Nutrition status change or nutrition issues arise.           Diet: Cardiac diet.    NPO post mn     Percent Meals Eaten (last 6 days)       Date/Time Percent Meals Eaten (%)    06/11/24 1100 100 %    06/11/24 1700 60 %    06/12/24 1403 75 %    06/12/24 1801 75 %    06/13/24 1530 100 %    06/13/24 1845 50 %    06/14/24 1100 80 %    06/14/24 1500 80 %    06/15/24 1219 80 %    06/15/24 1849 60 %    06/17/24 0936 0 %     Percent Meals Eaten (%): NPO at 06/17/24 0936          Wt Readings from Last 6 Encounters:   06/17/24 90.6 kg (199 lb 11.2 oz)   06/03/24 93.4 kg (206 lb)   04/22/24 95.7 kg (211 lb)   01/03/24 102.1 kg (225 lb)   01/01/24 101.7 kg (224 lb 3.3 oz)   10/18/23 97.5 kg (215 lb)       Michelle Nguyen, RD, LDN, [facility]  Clinical Dietitian  [phone]  6/17/2024

## 2024-06-17 NOTE — PAYOR COMM NOTE
--------------  6/16 - 17 CONTINUED STAY REVIEW    Payor: [employer]  Subscriber #:  [id number]  Authorization Number: [id number]    6/16:     ID:    Patient seen/examined.  She is up in bed in NAD.  No F/C.  No new issues.     Objective:  Blood pressure 160/84, pulse 69, temperature 98.1 °F (36.7 °C), temperature source Oral, resp. rate 18, weight 200 lb 3.2 oz (90.8 kg), last menstrual period 05/14/2024, SpO2 97%, not currently breastfeeding.    Physical Exam:  General: Awake, alert, non-tox, NAD.  HEENT:  Oropharynx clear, trachea ML.  Heart: RRR S1S2 no murmurs.  Lungs: Essentially CTA b/l, no rhonchi, rales, wheezes.  Abdomen: Soft, NT/ND.  BS present.  No organomegaly.  Extremity: No edema.  Neurological: No focal deficits.  Derm:  Warm, dry, free from rashes.      Cultures:  2/2 blood cultures + MSSA 6/9/24  Repeat blood cultures negative 6/10/24     Radiology:  CONCLUSION:   Imaging findings concerning for infection of the left upper extremity AV graft.      Antibiotics Reviewed:  Ancef     Assessment and Plan:     MSSA sepsis in this woman who presented with fevers, lightheadedness, cough  - 2/2 blood cultures positive for MSSA on 6/9/24, repeats negative  - h/o LUE AV fistula with drainage in April 2024 s/p treatment  - Current NM scan with uptake concerning for infection in the AV graft  - TTE without vegetations  - IV ancef ongoing     2.  ESRD on HD with R chest HD cath in place  - Per nephrology  - Given concern for AV fistula source, would not advise accessing fistula at this time  - If any further positive blood cultures will need HD line removed as well  - Plans for resection of fistula on Monday     3.  Abdominal pain, cramping, tenderness, and diarrhea  - Also on menstrual cycle  - C.diff negative     4.  Disposition - inpatient.  Will need long course IV Rx for MSSA sepsis in the setting of likely endovascular seeding.  Plans for resection of fistula on Monday.  Maintain HD line for now but will  need to be removed if any further positive cultures.  Anticipate 6 weeks of IV Rx through 7/22/24 with definitive EOT to be determined if any further complication.  D/w patient.  Will follow.     VASCULAR:     No acute events overnight. Patient states she was seen by Dr. Mcclain yesterday and was told she did not need the graft removed because it can be treated with antibiotics. There is no documentation from him. Discussed findings again in detail with patient. WBC scan shows infection in graft. This is correlating with the ultrasound which shows fluid around the graft. I discussed with patient that she can try to treat this with long term IV antibiotics and follow-up with the surgeon who placed the graft. However, given that it is a foreign body with fluid around it on ultrasound, this is unlikely to resolve with antibiotics. She does not want to delay surgery if it is inevitable that her graft will need to come out. At this time she feels comfortable moving forward with graft excision. NPO order and consent order placed for tomorrow       Scheduled Medications    calcium carbonate  500 mg Oral TID CC    bumetanide  3 mg Oral Once per day on Sunday Monday Wednesday Friday    ceFAZolin  1 g Intravenous Q24H    atorvastatin  40 mg Oral Nightly    carvedilol  25 mg Oral BID    heparin  5,000 Units Subcutaneous Q8H [facility]          6/17:    HOSPITALIST:          Assessment/Plan:   Ms. Walker is a 33 year old female with PMH sig for ESRD on HD (t/t/s), HTN, HLD, obesity, HFpEF, who presented with fever, found to have bacteremia.      Sepsis   Bacteremia   Diarrhea  - MSSA bacteremia  - source fistula graft  - was treated few months back with oral antibiotics for possible drainage around or near fistula by surgeon   - WBC scan positive for possible infection in area of fistula  - consulted vascular surgery appreciate eval. Plan for graft removal today, 6/17  - ID on consult      ESRD  - on HD t/t/s  - renal consulted      Anemia  - likely ACD 2/2 ESRD  - monitor      HFpEF  - poss infiltrates vs fluid  - volume removal with HD  - bumex on non dialysis days as needed     HTN  - continue coreg  - does not tolerate nifedipine any more she states     HLD  - statin      PPX: pt declining hep subcutaneous       Feels ok. No new symptoms. Awaiting surgery       Blood pressure 154/83, pulse 67, temperature 97.5 °F (36.4 °C), temperature source Oral, resp. rate 18, weight 199 lb 11.2 oz (90.6 kg), last menstrual period 05/14/2024, SpO2 97%, not currently breastfeeding.     Temp:  [97.5 °F (36.4 °C)-97.8 °F (36.6 °C)] 97.5 °F (36.4 °C)  Pulse:  [67-89] 67  Resp:  [16-18] 18  BP: (146-160)/(81-89) 154/83  SpO2:  [97 %-99 %] 97 %     Lab 06/15/24  0653 06/16/24  0914 06/17/24  0919   GLU 84 81 85   BUN 69* 48* 66*   CREATSERUM 11.21* 8.65* 11.07*   EGFRCR 4* 6* 4*   CA 9.0 9.3 9.0    139 140   K 4.1 4.3 4.7   CL 98 102 104   CO2 27.0 24.0 24.0     MEDICATIONS ADMINISTERED IN LAST 1 DAY:  ceFAZolin (Ancef) 1 g in dextrose 5% 100mL IVPB-ADD       Date Action Dose Route User    6/16/2024 1804 New Bag 1 g Intravenous Cynthia Aparicio, RN       hydrALAzine (Apresoline) 20 mg/mL injection 10 mg       Date Action Dose Route User    6/17/2024 1548 Given 10 mg Intravenous Cynthia Aparicio, RN            Vitals (last day)       Date/Time Temp Pulse Resp BP SpO2 Weight O2 Device O2 Flow Rate (L/min) [facility]    06/17/24 1548 -- -- -- 184/100 -- -- -- --     06/17/24 1513 98 °F (36.7 °C) 71 18 171/90 100 % -- None (Room air) --     06/17/24 0845 97.5 °F (36.4 °C) 67 18 154/83 97 % -- None (Room air) --     06/17/24 0600 -- -- -- -- -- 199 lb 11.2 oz -- -- MN    06/17/24 0558 97.6 °F (36.4 °C) 73 18 146/81 98 % -- None (Room air) -- MN    06/16/24 2100 97.8 °F (36.6 °C) 89 16 149/82 98 % -- None (Room air) -- MN    06/16/24 1700 97.5 °F (36.4 °C) 75 18 160/89 99 % -- None (Room air) --     06/16/24 0835 98.1 °F (36.7 °C) 69 18 160/84 97 % --  None (Room air) -- YC    06/16/24 0537 -- -- -- -- -- 200 lb 3.2 oz -- -- SB    06/16/24 0500 97.8 °F (36.6 °C) 65 18 138/83 99 % -- None (Room air) -- VG

## 2024-06-18 LAB
ALBUMIN SERPL-MCNC: 4.3 G/DL (ref 3.2–4.8)
ANION GAP SERPL CALC-SCNC: 15 MMOL/L (ref 0–18)
BASOPHILS # BLD AUTO: 0.03 X10(3) UL (ref 0–0.2)
BASOPHILS NFR BLD AUTO: 0.4 %
BUN BLD-MCNC: 80 MG/DL (ref 9–23)
BUN/CREAT SERPL: 6.2 (ref 10–20)
CALCIUM BLD-MCNC: 9.2 MG/DL (ref 8.7–10.4)
CHLORIDE SERPL-SCNC: 104 MMOL/L (ref 98–112)
CO2 SERPL-SCNC: 19 MMOL/L (ref 21–32)
CREAT BLD-MCNC: 12.89 MG/DL
DEPRECATED RDW RBC AUTO: 41.7 FL (ref 35.1–46.3)
EGFRCR SERPLBLD CKD-EPI 2021: 4 ML/MIN/1.73M2 (ref 60–?)
EOSINOPHIL # BLD AUTO: 0 X10(3) UL (ref 0–0.7)
EOSINOPHIL NFR BLD AUTO: 0 %
ERYTHROCYTE [DISTWIDTH] IN BLOOD BY AUTOMATED COUNT: 13.3 % (ref 11–15)
GLUCOSE BLD-MCNC: 99 MG/DL (ref 70–99)
HCT VFR BLD AUTO: 26.8 %
HGB BLD-MCNC: 8.8 G/DL
IMM GRANULOCYTES # BLD AUTO: 0.07 X10(3) UL (ref 0–1)
IMM GRANULOCYTES NFR BLD: 0.9 %
LYMPHOCYTES # BLD AUTO: 1.3 X10(3) UL (ref 1–4)
LYMPHOCYTES NFR BLD AUTO: 16.5 %
MCH RBC QN AUTO: 28.5 PG (ref 26–34)
MCHC RBC AUTO-ENTMCNC: 32.8 G/DL (ref 31–37)
MCV RBC AUTO: 86.7 FL
MONOCYTES # BLD AUTO: 0.34 X10(3) UL (ref 0.1–1)
MONOCYTES NFR BLD AUTO: 4.3 %
NEUTROPHILS # BLD AUTO: 6.15 X10 (3) UL (ref 1.5–7.7)
NEUTROPHILS # BLD AUTO: 6.15 X10(3) UL (ref 1.5–7.7)
NEUTROPHILS NFR BLD AUTO: 77.9 %
OSMOLALITY SERPL CALC.SUM OF ELEC: 310 MOSM/KG (ref 275–295)
PHOSPHATE SERPL-MCNC: 7.4 MG/DL (ref 2.4–5.1)
PLATELET # BLD AUTO: 395 10(3)UL (ref 150–450)
POTASSIUM SERPL-SCNC: 4.9 MMOL/L (ref 3.5–5.1)
RBC # BLD AUTO: 3.09 X10(6)UL
SODIUM SERPL-SCNC: 138 MMOL/L (ref 136–145)
VIT B12 SERPL-MCNC: >2000 PG/ML (ref 211–911)
VIT D+METAB SERPL-MCNC: 47.8 NG/ML (ref 30–100)
WBC # BLD AUTO: 7.9 X10(3) UL (ref 4–11)

## 2024-06-18 PROCEDURE — 85025 COMPLETE CBC W/AUTO DIFF WBC: CPT | Performed by: SURGERY

## 2024-06-18 PROCEDURE — 82306 VITAMIN D 25 HYDROXY: CPT | Performed by: HOSPITALIST

## 2024-06-18 PROCEDURE — 90935 HEMODIALYSIS ONE EVALUATION: CPT

## 2024-06-18 PROCEDURE — 82607 VITAMIN B-12: CPT | Performed by: HOSPITALIST

## 2024-06-18 PROCEDURE — 80069 RENAL FUNCTION PANEL: CPT | Performed by: SURGERY

## 2024-06-18 RX ORDER — HYDROCODONE BITARTRATE AND ACETAMINOPHEN 5; 325 MG/1; MG/1
1 TABLET ORAL EVERY 4 HOURS PRN
Qty: 10 TABLET | Refills: 0 | Status: SHIPPED | OUTPATIENT
Start: 2024-06-18 | End: 2024-06-19

## 2024-06-18 NOTE — BRIEF OP NOTE
Patients Name: Liu Walker  Attending Physician: Jacqueline Jeter MD  Operating Physician: Peg Huddleston MD  CSN: [id number]     Location:  OR  MRN: [id number]    YOB: 1990  Admission Date: 6/9/2024  Operation Date: 6/17/2024    Brief Operative Report   [facility]     Pre-Operative Diagnosis: AV graft infection    Post-Operative Diagnosis: Same as above.    Procedure Performed: Excision of left upper extremity AV graft    Anesthesia: General    Assistants:  Isaac Butler CSA     EBL: 50mL     Specimens:   ID Type Source Tests Collected by Time Destination   A : 1)Chyna-graft fluid Tissue Tissue ANAEROBIC CULTURE, TISSUE AEROBIC CULTURE Peg Huddleston MD 6/17/2024 1837    B : B. Chyna graft  fluid #2 Tissue Tissue ANAEROBIC CULTURE, TISSUE AEROBIC CULTURE Peg Huddleston MD 6/17/2024 1918    C : C. AV graft Tissue Tissue ANAEROBIC CULTURE, TISSUE AEROBIC CULTURE Peg Huddleston MD 6/17/2024 1907        Complications: None    Summary of Case: Murky appearing perigraft fluid in the upper arm. Graft and fluid sent for culture. Palpable left radial pulse     Peg Huddleston MD  6/17/2024  7:30 PM

## 2024-06-18 NOTE — PROGRESS NOTES
Vascular Surgery Note    No acute events. Pain well controlled with current regimen. RENA drain low output. Arm warm, palpable left radial pulse. Cultures so far with WBCs, no organisms. Discussed operative findings with patient. Will plan to remove RENA drain tomorrow and ok for discharge likely tomorrow.     Peg Huddleston MD  06/18/24  3:30 PM

## 2024-06-18 NOTE — PROGRESS NOTES
[facility]  part of [facility]    Progress Note      Subjective:     Denies SOB or leg swelling   Reports some left arm pain from recent surgery          Objective:   Vital Signs:  Blood pressure 141/75, pulse 78, temperature 97.9 °F (36.6 °C), temperature source Oral, resp. rate 18, weight 199 lb 11.2 oz (90.6 kg), last menstrual period 05/14/2024, SpO2 95%, not currently breastfeeding.     General: No acute distress.  HEENT: NCAT   Respiratory: CTAB   Cardiovascular: normal S1S2   Abdomen: Soft, NT/ND   Ext: no LE edema, LUE bandaged - drain noted    Neuro: awake, alert   Access: RIJ tunneled HD catheter      Results:     Recent Labs   Lab 06/15/24  0653 06/16/24  0914 06/17/24  0919   GLU 84 81 85   BUN 69* 48* 66*   CREATSERUM 11.21* 8.65* 11.07*   EGFRCR 4* 6* 4*   CA 9.0 9.3 9.0    139 140   K 4.1 4.3 4.7   CL 98 102 104   CO2 27.0 24.0 24.0     Recent Labs   Lab 06/12/24  0912 06/13/24  0648   RBC 3.26* 3.19*   HGB 9.4* 9.2*   HCT 28.4* 27.2*   MCV 87.1 85.3   MCH 28.8 28.8   MCHC 33.1 33.8   RDW 13.3 13.1   NEPRELIM 5.22 4.86   WBC 7.9 7.9   .0 187.0             Assessment and Plan:     This is 33 year old female with PMH of ESRD on HD TTS, HTN, HL, HFpEF admitted with fevers, SOB and bacteremia. Nephrology is consulted for dialysis      ESRD on HD TTS:  - dialysis today per usual schedule   - await labs from today - labs to be drawn via HD catheter per patient request    - receiving dialysis via RIJ tunneled HD catheter   - followed by Dr. Cleveland at [facility] dialysis     Bacteremia:  - per ID   - infected AV graft removed 6/17      Anemia   - holding on venofer in light of active infection  - blood transfusions per primary   - may need GEOFFREY if worsening      Hyperphosphatemia    - currently receiving tums 500 TID   - patient to let us know what phos binder she is taking at home   - renal diet     HTN:  - Continue coreg  - Bumex on non HD days      Dw RN     We will  continue to follow     Irma Aponte MD  [facility] - Nephrology

## 2024-06-18 NOTE — PAYOR COMM NOTE
--------------  6/17- 18 CONTINUED STAY REVIEW    Payor: [employer]  Subscriber #:  [id number]  Authorization Number: [id number]    6/17:    Brief Operative Report      Pre-Operative Diagnosis: AV graft infection     Post-Operative Diagnosis: Same as above.     Procedure Performed: Excision of left upper extremity AV graft    6/18:    RENAL:    Assessment and Plan:      This is 33 year old female with PMH of ESRD on HD TTS, HTN, HL, HFpEF admitted with fevers, SOB and bacteremia. Nephrology is consulted for dialysis      ESRD on HD TTS:  - dialysis today per usual schedule   - await labs from today - labs to be drawn via HD catheter per patient request    - receiving dialysis via RIJ tunneled HD catheter   - followed by Dr. Cleveland at [facility] dialysis      Bacteremia:  - per ID   - infected AV graft removed 6/17      Anemia   - holding on venofer in light of active infection  - blood transfusions per primary   - may need GEOFFREY if worsening       Hyperphosphatemia    - currently receiving tums 500 TID   - patient to let us know what phos binder she is taking at home   - renal diet      HTN:  - Continue coreg  - Bumex on non HD days         ID:    Liu Walker getting HD  No pain,   Feels well,   Dressing in place on LUE  Drain in place too,      Temp:  [97.4 °F (36.3 °C)-98.1 °F (36.7 °C)] 97.9 °F (36.6 °C)  Pulse:  [71-84] 83  Resp:  [10-25] 18  BP: (118-184)/() 141/75  SpO2:  [95 %-100 %] 95 %    Lab Results   Component Value Date     WBC 7.9 06/18/2024     HGB 8.8 06/18/2024     HCT 26.8 06/18/2024     .0 06/18/2024       Assessment and Plan:     MSSA Sepsis with fever, lightheadedness, Cough,   - Blood cul with MSSA in 2/2 bottles admission on 6/09, Repeats Negative on 6/10,   - Clean HD catheter, not being used for a week as now getting HD through AV Fistula,   - LUE AV Fistula with drainage in April, S/P treatment, NM Scan with increased uptake too,   - UA is mildly abn, CXR with Patchy  bibasilar opacities, atelectasis or pulmonary edema with or without superimposed pneumonia.   - FiO2 requirement is improving,    - TTE with out vegetation,   - On IV Ancef ongoing, will plan with HD,      2.   Diarrhea: some abd tenderness too,   - If any persistent diarrhea will get C diff,   - May need CT scan if persistent tenderness,      3.   Leukocytosis: sec to above, Resolved,      4.    ESRD: On HD with R sided Catheter, also AV Fistula, S/P resection,      5.   Disposition: in house, a middle aged female admitted with MSSA sepsis with infected AV Fistula, ? Pneumonia and associated UTI, will be treated as an endovascular infection for six weeks from the day of negative blood cul,    - Follow Pending cul,   - Continue IV Cefazolin with HD, Plan for six weeks of IV abx through 7/22/24.  - Wean O2 off as tolerated      MEDICATIONS ADMINISTERED IN LAST 1 DAY:    heparin (Porcine) 5,000 units in 500 mL sodium chloride 0.9% irrigation       Date Action Dose Route User    6/17/2024 1805 Given 5,000 Units Irrigation Peg Huddleston MD          heparin (Porcine) 1000 UNIT/ML injection       Date Action Dose Route User    6/17/2024 1845 Given 7,000 Units Intravenous Ijeoma Smith MD          hydrALAzine (Apresoline) 20 mg/mL injection 10 mg       Date Action Dose Route User    6/17/2024 1548 Given 10 mg Intravenous Cynthia Aparicio, AJ          hydrALAzine (Apresoline) 20 mg/mL injection       Date Action Dose Route User    6/17/2024 1902 Given 5 mg Intravenous Ijeoma Smith MD    6/17/2024 1853 Given 5 mg Intravenous Ijeoma Smith MD       labetalol (Trandate) 5 mg/mL injection       Date Action Dose Route User    6/17/2024 1853 Given 10 mg Intravenous Ijeoma Smith MD    6/17/2024 1851 Given 10 mg Intravenous Ijeoma Smith MD          lactated ringers infusion       Date Action Dose Route User    6/17/2024 1725 New Bag (none) Intravenous Ijeoma Smith MD       ondansetron (Zofran) 4 MG/2ML injection       Date Action  Dose Route User    6/17/2024 1934 Given 4 mg Intravenous Ijeoma Smith MD       vancomycin (Vancocin) 1 g injection       Date Action Dose Route User    6/17/2024 1808 Given 1 g Topical (Left Upper Arm) Peg Huddleston MD            Vitals (last day)       Date/Time Temp Pulse Resp BP SpO2 Weight O2 Device O2 Flow Rate (L/min) [facility]    06/18/24 0810 97.9 °F (36.6 °C) 83 18 141/75 95 % -- None (Room air) -- CM    06/18/24 0318 98.1 °F (36.7 °C) 78 19 118/69 98 % -- Nasal cannula 1 L/min     06/17/24 2115 97.5 °F (36.4 °C) 84 20 156/97 100 % -- Nasal cannula 2 L/min     06/17/24 2031 97.8 °F (36.6 °C) 75 22 139/83 100 % -- Nasal cannula 3 L/min     06/17/24 2021 -- 74 25 140/79 100 % -- Nasal cannula 3 L/min     06/17/24 2011 -- 75 10 151/88 99 % -- Nasal cannula 3 L/min     06/17/24 2001 -- 77 10 151/84 100 % -- Nasal cannula 3 L/min     06/17/24 1951 -- 80 12 162/92 100 % -- Nasal cannula 3 L/min     06/17/24 1951 97.6 °F (36.4 °C) -- -- -- -- -- -- --     06/17/24 1701 97.4 °F (36.3 °C) 76 18 180/94 97 % -- None (Room air) -- SP    06/17/24 1548 -- -- -- 184/100 -- -- -- --     06/17/24 1513 98 °F (36.7 °C) 71 18 171/90 100 % -- None (Room air) --     06/17/24 0845 97.5 °F (36.4 °C) 67 18 154/83 97 % -- None (Room air) --     06/17/24 0600 -- -- -- -- -- 199 lb 11.2 oz -- -- MN    06/17/24 0558 97.6 °F (36.4 °C) 73 18 146/81 98 % -- None (Room air) -- MN

## 2024-06-18 NOTE — ANESTHESIA POSTPROCEDURE EVALUATION
Patient: Liu Walker    Procedure Summary       Date: 06/17/24 Room / Location: [facility] MAIN OR 16, [facility] MAIN OR 21 CANCELED CASE ROOM / [facility] MAIN OR    Anesthesia Start: 1724 Anesthesia Stop: 1952    Procedures:       Removal left lower extremity arteriorvenous graft (Left)      Removal left lower extremity arteriorvenous graft (Left: Upper Arm) Diagnosis:       (AV graft infection)      (AV graft infection)    Surgeons: Peg Huddleston MD Anesthesiologist: Ijeoma Smith MD    Anesthesia Type: general ASA Status: 3            Anesthesia Type: general    Vitals Value Taken Time   /92 06/17/24 1951   Temp 97.6 °F (36.4 °C) 06/17/24 1951   Pulse 80 06/17/24 1951   Resp 18 06/17/24 1952   SpO2 95 % 06/17/24 1951   Vitals shown include unfiled device data.    EM AN Post Evaluation:   Patient Evaluated in PACU  Patient Participation: complete - patient participated  Level of Consciousness: awake  Pain Score: 0  Pain Management: adequate  Airway Patency:patent  Yes    Nausea/Vomiting: none  Cardiovascular Status: acceptable  Respiratory Status: acceptable  Postoperative Hydration acceptable  18    Ijeoma Smith MD  6/17/2024 7:52 PM

## 2024-06-18 NOTE — CDS QUERY
CLINICAL DOCUMENTATION CLARIFICATION FORM  Dear Doctor: [place]     Please clarify the acuity of the Diastolic Heart Failure? Thank you     PLEASE (X) ALL DIAGNOSES THAT APPLY.    (    ) Acute Diastolic Heart Failure  (    ) Acute on Chronic Diastolic Heart Failure   (    ) Chronic Diastolic Heart Failure      (    ) Other - please specify:       Documentation from the Medical Record:     Admitted for Sepsis due to AV Fistula infection   6/9/24--   CXR- mild pulmonary edema   6/11/24-ECHO with EF 60-65%   Risk factors- ESRD      6/16 PN- HFpEF  - poss infiltrates vs fluid  - volume removal with HD  - bumex on non dialysis days as needed     If you have any questions, please contact Clinical :  Fabiana DEL VALLE RN at [phone]     Thank You!     THIS FORM IS A PERMANENT PART OF THE MEDICAL RECORD

## 2024-06-18 NOTE — DISCHARGE INSTRUCTIONS
You may shower with mild soap and water starting tomorrow. Wash the incision gently and pat dry; do not scrub.   No soaking in bath or pool for 2 weeks.    You should resume all home medications as previously taking unless otherwise instructed.    Take prescribed pain medications as needed for pain.    No heavy lifting or straining for 2 weeks. You may then resume to normal activity.    Drink plenty of fluids to stay well hydrated.     You will need to followup with Dr. Huddleston in the vascular clinic in 2 weeks    If you have any fevers, chills, chest pain, shortness of breath, drainage, swelling or bleeding, please call the office in order to return to clinic sooner for evaluation.             Vit B12 level is high.  Stop all vit B12 supplements

## 2024-06-18 NOTE — CM/SW NOTE
SW followed up on discharge planning.    SW spoke to [facility] HD nurse on patient's scheduled shift.    Per RN, they can provide IV Cefazolin on her scheduled shift.    SW to fax prescription to [phone] when available.    SW asked [facility] ID to enter script.    Facility is aware patient may DC today or tomorrow and will resume her HD treatments Thursday 6/20.    PLAN: DC home w/outpatient HD and IV abx [facility] TTS    / to remain available for support and/or discharge planning.     Cici Kumari MSW, LSW [id number]

## 2024-06-18 NOTE — PROGRESS NOTES
[facility] Hospitalist Progress Note     CC: Hospital Follow up    PCP: Ashleigh Murray       Assessment/Plan:   Ms. Walker is a 33 year old female with PMH sig for ESRD on HD (t/t/s), HTN, HLD, obesity, HFpEF, who presented with fever, found to have bacteremia.  Found to have infected graft.  Excision of left upper extremity AV graft on 6/17/2024.  Plan for IV Ancef after dialysis through 7/22/2024.  Okay to DC once antibiotics arranged and vascular clears.     Sepsis   Bacteremia   Diarrhea  - MSSA bacteremia  - source fistula graft  - was treated few months back with oral antibiotics for possible drainage around or near fistula by surgeon   - WBC scan positive for possible infection in area of fistula  - consulted vascular surgery appreciate eval.   graft removal today, 6/17  - ID on consult.  Plan for IV Ancef after dialysis through 7/22/2024     ESRD  - on HD t/t/s  - renal consulted     Anemia  - likely ACD 2/2 ESRD  - monitor      HFpEF  - poss infiltrates vs fluid  - volume removal with HD  - bumex on non dialysis days as needed     HTN  - continue coreg  - does not tolerate nifedipine any more she states     HLD  - statin     PPX: pt declining hep subcutaneous    Note: This chart was prepared using voice recognition software and may contain unintended word substitution errors.     Jacqueline Jeter MD  Hospitalist  [facility]   Answering service: [phone]\     Subjective:     Seen during HD. Tired, otherwise pain controlled       OBJECTIVE:    Blood pressure 141/75, pulse 83, temperature 97.9 °F (36.6 °C), temperature source Oral, resp. rate 18, weight 199 lb 11.2 oz (90.6 kg), last menstrual period 05/14/2024, SpO2 95%, not currently breastfeeding.    Temp:  [97.4 °F (36.3 °C)-98.1 °F (36.7 °C)] 97.9 °F (36.6 °C)  Pulse:  [71-84] 83  Resp:  [10-25] 18  BP: (118-184)/() 141/75  SpO2:  [95 %-100 %] 95 %      Intake/Output:    Intake/Output Summary (Last 24 hours) at  6/18/2024 1414  Last data filed at 6/18/2024 1405  Gross per 24 hour   Intake 570 ml   Output 2010 ml   Net -1440 ml       Last 3 Weights   06/17/24 0600 199 lb 11.2 oz (90.6 kg)   06/16/24 0537 200 lb 3.2 oz (90.8 kg)   06/15/24 0552 202 lb 4.8 oz (91.8 kg)   06/14/24 0441 199 lb 8 oz (90.5 kg)   06/13/24 0438 204 lb 3.2 oz (92.6 kg)   06/12/24 0514 199 lb 9.6 oz (90.5 kg)   06/11/24 1657 200 lb 4.8 oz (90.9 kg)   06/10/24 0435 207 lb 8 oz (94.1 kg)   06/10/24 0127 207 lb 9.6 oz (94.2 kg)   06/10/24 0117 207 lb 9.6 oz (94.2 kg)   06/03/24 2010 206 lb (93.4 kg)   04/22/24 1518 211 lb (95.7 kg)       /75 (BP Location: Right arm)   Pulse 83   Temp 97.9 °F (36.6 °C) (Oral)   Resp 18   Wt 199 lb 11.2 oz (90.6 kg)   LMP 05/14/2024 (Exact Date)   SpO2 95%   BMI 33.23 kg/m²   General: Alert, no acute distress  Lungs: clear to ausculation bilaterally  Heart: Regular rate and rhythm  Abdomen: soft, non tender  Extremities: No edema  -arm wrapped     Data Review:       Labs:     Recent Labs   Lab 06/12/24  0912 06/13/24  0648 06/18/24  0822   RBC 3.26* 3.19* 3.09*   HGB 9.4* 9.2* 8.8*   HCT 28.4* 27.2* 26.8*   MCV 87.1 85.3 86.7   MCH 28.8 28.8 28.5   MCHC 33.1 33.8 32.8   RDW 13.3 13.1 13.3   NEPRELIM 5.22 4.86 6.15   WBC 7.9 7.9 7.9   .0 187.0 395.0         Recent Labs   Lab 06/16/24  0914 06/17/24  0919 06/18/24  0822   GLU 81 85 99   BUN 48* 66* 80*   CREATSERUM 8.65* 11.07* 12.89*   EGFRCR 6* 4* 4*   CA 9.3 9.0 9.2    140 138   K 4.3 4.7 4.9    104 104   CO2 24.0 24.0 19.0*       Recent Labs   Lab 06/12/24  0746 06/13/24  0648 06/15/24  0653 06/17/24  0919 06/18/24  0822   ALB 4.1 4.0 4.3 4.2 4.3         Imaging:  No results found.      Meds:      ceFAZolin  2 g Intravenous Once per day on Monday Wednesday Friday    heparin  5,000 Units Subcutaneous Q8H MANISHA    calcium carbonate  500 mg Oral TID CC    bumetanide  3 mg Oral Once per day on Sunday Monday Wednesday Friday    ceFAZolin  1 g  Intravenous Q24H    atorvastatin  40 mg Oral Nightly    carvedilol  25 mg Oral BID      sodium chloride 50 mL/hr at 06/17/24 2148       sodium chloride    hydrALAzine    labetalol    ondansetron    acetaminophen **OR** HYDROcodone-acetaminophen **OR** HYDROcodone-acetaminophen    HYDROmorphone    loperamide    prochlorperazine    polyethylene glycol (PEG 3350)    sennosides    bisacodyl    acetaminophen

## 2024-06-18 NOTE — PLAN OF CARE
Patient alert and oriented on 1L oxygen for morning. Minor complaints of pain to LUE this AM. PRN given last night and patient declined intervention this morning. IVF continued. Call light in reach and no needs noted at this time  Problem: Patient Centered Care  Goal: Patient preferences are identified and integrated in the patient's plan of care  Description: Interventions:  - What would you like us to know as we care for you?   - Provide timely, complete, and accurate information to patient/family  - Incorporate patient and family knowledge, values, beliefs, and cultural backgrounds into the planning and delivery of care  - Encourage patient/family to participate in care and decision-making at the level they choose  - Honor patient and family perspectives and choices  Outcome: Progressing     Problem: Patient/Family Goals  Goal: Patient/Family Long Term Goal  Description: Patient's Long Term Goal:     Interventions:  - See additional Care Plan goals for specific interventions  Outcome: Progressing  Goal: Patient/Family Short Term Goal  Description: Patient's Short Term Goal:     Interventions:   - See additional Care Plan goals for specific interventions  Outcome: Progressing     Problem: PAIN - ADULT  Goal: Verbalizes/displays adequate comfort level or patient's stated pain goal  Description: INTERVENTIONS:  - Encourage pt to monitor pain and request assistance  - Assess pain using appropriate pain scale  - Administer analgesics based on type and severity of pain and evaluate response  - Implement non-pharmacological measures as appropriate and evaluate response  - Consider cultural and social influences on pain and pain management  - Manage/alleviate anxiety  - Utilize distraction and/or relaxation techniques  - Monitor for opioid side effects  - Notify MD/LIP if interventions unsuccessful or patient reports new pain  - Anticipate increased pain with activity and pre-medicate as appropriate  Outcome:  Progressing     Problem: SAFETY ADULT - FALL  Goal: Free from fall injury  Description: INTERVENTIONS:  - Assess pt frequently for physical needs  - Identify cognitive and physical deficits and behaviors that affect risk of falls.  - [place] fall precautions as indicated by assessment.  - Educate pt/family on patient safety including physical limitations  - Instruct pt to call for assistance with activity based on assessment  - Modify environment to reduce risk of injury  - Provide assistive devices as appropriate  - Consider OT/PT consult to assist with strengthening/mobility  - Encourage toileting schedule  Outcome: Progressing     Problem: DISCHARGE PLANNING  Goal: Discharge to home or other facility with appropriate resources  Description: INTERVENTIONS:  - Identify barriers to discharge w/pt and caregiver  - Include patient/family/discharge partner in discharge planning  - Arrange for needed discharge resources and transportation as appropriate  - Identify discharge learning needs (meds, wound care, etc)  - Arrange for interpreters to assist at discharge as needed  - Consider post-discharge preferences of patient/family/discharge partner  - Complete POLST form as appropriate  - Assess patient's ability to be responsible for managing their own health  - Refer to Case Management Department for coordinating discharge planning if the patient needs post-hospital services based on physician/LIP order or complex needs related to functional status, cognitive ability or social support system  Outcome: Progressing     Problem: CARDIOVASCULAR - ADULT  Goal: Maintains optimal cardiac output and hemodynamic stability  Description: INTERVENTIONS:  - Monitor vital signs, rhythm, and trends  - Monitor for bleeding, hypotension and signs of decreased cardiac output  - Evaluate effectiveness of vasoactive medications to optimize hemodynamic stability  - Monitor arterial and/or venous puncture sites for bleeding and/or  hematoma  - Assess quality of pulses, skin color and temperature  - Assess for signs of decreased coronary artery perfusion - ex. Angina  - Evaluate fluid balance, assess for edema, trend weights  Outcome: Progressing  Goal: Absence of cardiac arrhythmias or at baseline  Description: INTERVENTIONS:  - Continuous cardiac monitoring, monitor vital signs, obtain 12 lead EKG if indicated  - Evaluate effectiveness of antiarrhythmic and heart rate control medications as ordered  - Initiate emergency measures for life threatening arrhythmias  - Monitor electrolytes and administer replacement therapy as ordered  Outcome: Progressing     Problem: METABOLIC/FLUID AND ELECTROLYTES - ADULT  Goal: Glucose maintained within prescribed range  Description: INTERVENTIONS:  - Monitor Blood Glucose as ordered  - Assess for signs and symptoms of hyperglycemia and hypoglycemia  - Administer ordered medications to maintain glucose within target range  - Assess barriers to adequate nutritional intake and initiate nutrition consult as needed  - Instruct patient on self management of diabetes  Outcome: Progressing  Goal: Electrolytes maintained within normal limits  Description: INTERVENTIONS:  - Monitor labs and rhythm and assess patient for signs and symptoms of electrolyte imbalances  - Administer electrolyte replacement as ordered  - Monitor response to electrolyte replacements, including rhythm and repeat lab results as appropriate  - Fluid restriction as ordered  - Instruct patient on fluid and nutrition restrictions as appropriate  Outcome: Progressing  Goal: Hemodynamic stability and optimal renal function maintained  Description: INTERVENTIONS:  - Monitor labs and assess for signs and symptoms of volume excess or deficit  - Monitor intake, output and patient weight  - Monitor urine specific gravity, serum osmolarity and serum sodium as indicated or ordered  - Monitor response to interventions for patient's volume status, including  labs, urine output, blood pressure (other measures as available)  - Encourage oral intake as appropriate  - Instruct patient on fluid and nutrition restrictions as appropriate  Outcome: Progressing

## 2024-06-18 NOTE — PROGRESS NOTES
[facility]  part of [facility] Infectious Disease Progress note    Liu Walker Patient Status:  Inpatient    1990 MRN [id number]   Location [facility] 1W Attending Catracho Warner MD   Hosp Day # 8 PCP Ashleigh Murray       Liu Walker getting HD  No pain,   Feels well,   Dressing in place on LUE  Drain in place too,       History:  Past Medical History:    Anxiety state    Arrhythmia    Congestive heart disease (HCC)    Depression    Diabetes (HCC)    Esophageal reflux    Essential hypertension    Hidradenitis    bilateral axilla    High blood pressure    High cholesterol    Kidney failure    Migraines    Polycystic ovarian syndrome    treated with Metformin    PONV (postoperative nausea and vomiting)    Renal disorder    Visual impairment    Wears glasses     Past Surgical History:   Procedure Laterality Date    Appendectomy      Other Bilateral     excision of axilla hydradenitis     Family History   Problem Relation Age of Onset    Hypertension Father     Lipids Father     Obesity Father     Diabetes Mother     Hypertension Mother     Lipids Mother     Obesity Mother     Psychiatric Sister     Psychiatric Brother       reports that she has never smoked. She has never used smokeless tobacco. She reports that she does not drink alcohol and does not use drugs.    Allergies:  Allergies   Allergen Reactions    Citrus C Vit [Ascorbate] HIVES    Grapefruit HIVES    Other OTHER (SEE COMMENTS)     Muscle relaxants-paralysis    Latex RASH    Shellfish-Derived Products Tightness in Throat       Medications:    Current Facility-Administered Medications:     sodium chloride 0.9 % IV bolus 100 mL, 100 mL, Intravenous, Q30 Min PRN    hydrALAzine (Apresoline) 20 mg/mL injection 10 mg, 10 mg, Intravenous, Q4H PRN    labetalol (Trandate) 5 mg/mL injection 10 mg, 10 mg, Intravenous, Q4H PRN    ondansetron (Zofran) 4 MG/2ML injection 4 mg, 4 mg, Intravenous, Q6H PRN    sodium  chloride 0.9% infusion, , Intravenous, Continuous    heparin (Porcine) 5000 UNIT/ML injection 5,000 Units, 5,000 Units, Subcutaneous, Q8H MANISHA    acetaminophen (Tylenol) tab 650 mg, 650 mg, Oral, Q4H PRN **OR** HYDROcodone-acetaminophen (Norco) 5-325 MG per tab 1 tablet, 1 tablet, Oral, Q4H PRN **OR** HYDROcodone-acetaminophen (Norco) 5-325 MG per tab 2 tablet, 2 tablet, Oral, Q4H PRN    HYDROmorphone (Dilaudid) 1 MG/ML injection 0.2 mg, 0.2 mg, Intravenous, Q2H PRN    calcium carbonate (Tums) chewable tab 500 mg, 500 mg, Oral, TID CC    bumetanide (Bumex) tab 3 mg, 3 mg, Oral, Once per day on Sunday Monday Wednesday Friday    loperamide (Imodium) cap 2 mg, 2 mg, Oral, QID PRN    ceFAZolin (Ancef) 1 g in dextrose 5% 100mL IVPB-ADD, 1 g, Intravenous, Q24H    atorvastatin (Lipitor) tab 40 mg, 40 mg, Oral, Nightly    carvedilol (Coreg) tab 25 mg, 25 mg, Oral, BID    prochlorperazine (Compazine) 10 MG/2ML injection 5 mg, 5 mg, Intravenous, Q8H PRN    polyethylene glycol (PEG 3350) (Miralax) 17 g oral packet 17 g, 17 g, Oral, Daily PRN    sennosides (Senokot) tab 17.2 mg, 17.2 mg, Oral, Nightly PRN    bisacodyl (Dulcolax) 10 MG rectal suppository 10 mg, 10 mg, Rectal, Daily PRN    acetaminophen (Tylenol Extra Strength) tab 1,000 mg, 1,000 mg, Oral, Q6H PRN    Review of Systems:   Constitutional: Negative for anorexia, chills, fatigue, fevers, malaise, night sweats and weight loss.  Eyes: Negative for visual disturbance, irritation and redness.  Ears, nose, mouth, throat, and face: Negative for hearing loss, tinnitus, nasal congestion, snoring, sore throat, hoarseness and voice change.  Respiratory: Negative for cough, sputum, hemoptysis, chest pain, wheezing, dyspnea on exertion, or stridor.  Cardiovascular: Negative for chest pain, palpitations, irregular heart beats, syncope, fatigue, orthopnea, paroxysmal nocturnal dyspnea, lower extremity edema.  Gastrointestinal: Negative for dysphagia, odynophagia, reflux symptoms,  nausea, vomiting, change in bowel habits, diarrhea, constipation and abdominal pain.  Integument/breast: Negative for rash, skin lesions, and pruritus.  Hematologic/lymphatic: Negative for easy bruising, bleeding, and lymphadenopathy.  Musculoskeletal: Negative for myalgias, arthralgias, muscle weakness.  Neurological: Negative for headaches, dizziness, seizures, memory problems, trouble swallowing, speech problems, gait problems and weakness.  Behavioral/Psych: Negative for active tobacco use.  Endocrine: No history of of diabetes, thyroid disorder.  All other review of systems are negative.    Vital signs in last 24 hours:  Patient Vitals for the past 24 hrs:   BP Temp Temp src Pulse Resp SpO2 Weight   06/10/24 1500 128/65 (!) 101.5 °F (38.6 °C) Oral 94 20 96 % --   06/10/24 1225 -- (!) 102.4 °F (39.1 °C) Oral -- -- -- --   06/10/24 0916 132/77 -- -- 85 -- -- --   06/10/24 0730 126/75 99.1 °F (37.3 °C) Oral 84 20 95 % --   06/10/24 0700 -- -- -- 83 -- -- --   06/10/24 0435 125/78 100.1 °F (37.8 °C) Axillary 90 22 94 % 207 lb 8 oz (94.1 kg)   06/10/24 0300 -- -- -- 88 -- -- --   06/10/24 0159 -- -- -- 89 -- -- --   06/10/24 0127 -- -- -- -- -- -- 207 lb 9.6 oz (94.2 kg)   06/10/24 0117 116/70 (!) 100.7 °F (38.2 °C) Oral 92 -- 95 % 207 lb 9.6 oz (94.2 kg)   06/10/24 0103 -- (!) 102.9 °F (39.4 °C) Oral -- -- -- --   06/10/24 0045 131/61 -- -- 93 12 92 % --   06/09/24 2330 (!) 174/87 -- -- 99 22 98 % --   06/09/24 2212 155/80 -- -- 100 20 95 % --   06/09/24 2132 -- (!) 101.4 °F (38.6 °C) Oral -- -- -- --   06/09/24 2028 -- -- -- 94 -- 95 % --   06/09/24 2027 113/62 -- -- 93 -- -- --   06/09/24 2009 127/70 (!) 102.2 °F (39 °C) -- 96 20 96 % --       Physical Exam:   General: alert, cooperative, oriented.  No respiratory distress.   Head: Normocephalic, without obvious abnormality, atraumatic.   Eyes: Conjunctivae/corneas clear.  No scleral icterus.  No conjunctival     hemorrhage.   Nose: Nares normal.   Throat: Lips,  mucosa, and tongue normal.  No thrush noted.   Neck: Soft, supple neck; trachea midline, no adenopathy, no thyromegaly.   Lungs: CTAB, normal and equal bilateral chest rise   Chest wall: No tenderness or deformity. Clean HD catheter,    Heart: Regular rate and rhythm, normal S1S2, no murmur.   Abdomen: soft, non-tender, non-distended, no masses, no guarding, no     rebound, positive BS.   Extremity: no edema, no cyanosis   Skin: No rashes or lesions.   Neurological: Alert, interactive, no focal deficits    Labs:  Lab Results   Component Value Date    WBC 7.9 06/18/2024    HGB 8.8 06/18/2024    HCT 26.8 06/18/2024    .0 06/18/2024       Radiology:  CXR- Patchy bibasilar opacities, which may reflect atelectasis or pulmonary edema with or without superimposed pneumonia.    Mild prominence of the interstitial markings, which may reflect mild pulmonary edema.     Cultures:  Reviewed,     Assessment and Plan:    MSSA Sepsis with fever, lightheadedness, Cough,   - Blood cul with MSSA in 2/2 bottles admission on 6/09, Repeats Negative on 6/10,   - Clean HD catheter, not being used for a week as now getting HD through AV Fistula,   - LUE AV Fistula with drainage in April, S/P treatment, NM Scan with increased uptake too,   - UA is mildly abn, CXR with Patchy bibasilar opacities, atelectasis or pulmonary edema with or without superimposed pneumonia.   - FiO2 requirement is improving,    - TTE with out vegetation,   - On IV Ancef ongoing, will plan with HD,     2.   Diarrhea: some abd tenderness too,   - If any persistent diarrhea will get C diff,   - May need CT scan if persistent tenderness,     3.   Leukocytosis: sec to above, Resolved,     4.    ESRD: On HD with R sided Catheter, also AV Fistula, S/P resection,     5.   Disposition: in house, a middle aged female admitted with MSSA sepsis with infected AV Fistula, ? Pneumonia and associated UTI, will be treated as an endovascular infection for six weeks from the day  of negative blood cul,    - Follow Pending cul,   - Continue IV Cefazolin with HD, Plan for six weeks of IV abx through 7/22/24.  - Wean O2 off as tolerated,     Discussed with patient, RN, all questions answered further recommendations to follow, Thanks,   Thank you for consulting [facility] for Liu Walker.  If you have any questions or concerns please call [facility] Infectious Disease at [phone].     Darrick Gu MD  6/10/2024  5:54 PM

## 2024-06-18 NOTE — PLAN OF CARE
Problem: Patient Centered Care  Goal: Patient preferences are identified and integrated in the patient's plan of care  Description: Interventions:  - What would you like us to know as we care for you? From home alone  - Provide timely, complete, and accurate information to patient/family  - Incorporate patient and family knowledge, values, beliefs, and cultural backgrounds into the planning and delivery of care  - Encourage patient/family to participate in care and decision-making at the level they choose  - Honor patient and family perspectives and choices  Outcome: Progressing    Problem: PAIN - ADULT  Goal: Verbalizes/displays adequate comfort level or patient's stated pain goal  Description: INTERVENTIONS:  - Encourage pt to monitor pain and request assistance  - Assess pain using appropriate pain scale  - Administer analgesics based on type and severity of pain and evaluate response  - Implement non-pharmacological measures as appropriate and evaluate response  - Consider cultural and social influences on pain and pain management  - Manage/alleviate anxiety  - Utilize distraction and/or relaxation techniques  - Monitor for opioid side effects  - Notify MD/LIP if interventions unsuccessful or patient reports new pain  - Anticipate increased pain with activity and pre-medicate as appropriate  Outcome: Progressing     Problem: SAFETY ADULT - FALL  Goal: Free from fall injury  Description: INTERVENTIONS:  - Assess pt frequently for physical needs  - Identify cognitive and physical deficits and behaviors that affect risk of falls.  - [place] fall precautions as indicated by assessment.  - Educate pt/family on patient safety including physical limitations  - Instruct pt to call for assistance with activity based on assessment  - Modify environment to reduce risk of injury  - Provide assistive devices as appropriate  - Consider OT/PT consult to assist with strengthening/mobility  - Encourage toileting  schedule  Outcome: Progressing     Problem: DISCHARGE PLANNING  Goal: Discharge to home or other facility with appropriate resources  Description: INTERVENTIONS:  - Identify barriers to discharge w/pt and caregiver  - Include patient/family/discharge partner in discharge planning  - Arrange for needed discharge resources and transportation as appropriate  - Identify discharge learning needs (meds, wound care, etc)  - Arrange for interpreters to assist at discharge as needed  - Consider post-discharge preferences of patient/family/discharge partner  - Complete POLST form as appropriate  - Assess patient's ability to be responsible for managing their own health  - Refer to Case Management Department for coordinating discharge planning if the patient needs post-hospital services based on physician/LIP order or complex needs related to functional status, cognitive ability or social support system  Outcome: Progressing     Problem: CARDIOVASCULAR - ADULT  Goal: Maintains optimal cardiac output and hemodynamic stability  Description: INTERVENTIONS:  - Monitor vital signs, rhythm, and trends  - Monitor for bleeding, hypotension and signs of decreased cardiac output  - Evaluate effectiveness of vasoactive medications to optimize hemodynamic stability  - Monitor arterial and/or venous puncture sites for bleeding and/or hematoma  - Assess quality of pulses, skin color and temperature  - Assess for signs of decreased coronary artery perfusion - ex. Angina  - Evaluate fluid balance, assess for edema, trend weights  Outcome: Progressing  Goal: Absence of cardiac arrhythmias or at baseline  Description: INTERVENTIONS:  - Continuous cardiac monitoring, monitor vital signs, obtain 12 lead EKG if indicated  - Evaluate effectiveness of antiarrhythmic and heart rate control medications as ordered  - Initiate emergency measures for life threatening arrhythmias  - Monitor electrolytes and administer replacement therapy as  ordered  Outcome: Progressing     Problem: METABOLIC/FLUID AND ELECTROLYTES - ADULT  Goal: Glucose maintained within prescribed range  Description: INTERVENTIONS:  - Monitor Blood Glucose as ordered  - Assess for signs and symptoms of hyperglycemia and hypoglycemia  - Administer ordered medications to maintain glucose within target range  - Assess barriers to adequate nutritional intake and initiate nutrition consult as needed  - Instruct patient on self management of diabetes  Outcome: Progressing  Goal: Electrolytes maintained within normal limits  Description: INTERVENTIONS:  - Monitor labs and rhythm and assess patient for signs and symptoms of electrolyte imbalances  - Administer electrolyte replacement as ordered  - Monitor response to electrolyte replacements, including rhythm and repeat lab results as appropriate  - Fluid restriction as ordered  - Instruct patient on fluid and nutrition restrictions as appropriate  Outcome: Progressing  Goal: Hemodynamic stability and optimal renal function maintained  Description: INTERVENTIONS:  - Monitor labs and assess for signs and symptoms of volume excess or deficit  - Monitor intake, output and patient weight  - Monitor urine specific gravity, serum osmolarity and serum sodium as indicated or ordered  - Monitor response to interventions for patient's volume status, including labs, urine output, blood pressure (other measures as available)  - Encourage oral intake as appropriate  - Instruct patient on fluid and nutrition restrictions as appropriate  Outcome: Progressing

## 2024-06-19 VITALS
TEMPERATURE: 98 F | OXYGEN SATURATION: 95 % | RESPIRATION RATE: 20 BRPM | SYSTOLIC BLOOD PRESSURE: 154 MMHG | BODY MASS INDEX: 34 KG/M2 | DIASTOLIC BLOOD PRESSURE: 87 MMHG | WEIGHT: 204.19 LBS | HEART RATE: 67 BPM

## 2024-06-19 RX ORDER — HYDROCODONE BITARTRATE AND ACETAMINOPHEN 5; 325 MG/1; MG/1
1 TABLET ORAL EVERY 4 HOURS PRN
Qty: 10 TABLET | Refills: 0 | Status: SHIPPED | OUTPATIENT
Start: 2024-06-19

## 2024-06-19 NOTE — PLAN OF CARE
Problem: Patient Centered Care  Goal: Patient preferences are identified and integrated in the patient's plan of care  Description: Interventions:  - What would you like us to know as we care for you? From home alone   - Provide timely, complete, and accurate information to patient/family  - Incorporate patient and family knowledge, values, beliefs, and cultural backgrounds into the planning and delivery of care  - Encourage patient/family to participate in care and decision-making at the level they choose  - Honor patient and family perspectives and choices  Outcome: Progressing     Problem: Patient/Family Goals  Goal: Patient/Family Long Term Goal  Description: Patient's Long Term Goal: return home     Interventions:  -   - See additional Care Plan goals for specific interventions  Outcome: Progressing  Goal: Patient/Family Short Term Goal  Description: Patient's Short Term Goal:     Interventions:   -   - See additional Care Plan goals for specific interventions  Outcome: Progressing     Problem: PAIN - ADULT  Goal: Verbalizes/displays adequate comfort level or patient's stated pain goal  Description: INTERVENTIONS:  - Encourage pt to monitor pain and request assistance  - Assess pain using appropriate pain scale  - Administer analgesics based on type and severity of pain and evaluate response  - Implement non-pharmacological measures as appropriate and evaluate response  - Consider cultural and social influences on pain and pain management  - Manage/alleviate anxiety  - Utilize distraction and/or relaxation techniques  - Monitor for opioid side effects  - Notify MD/LIP if interventions unsuccessful or patient reports new pain  - Anticipate increased pain with activity and pre-medicate as appropriate  Outcome: Progressing     Problem: SAFETY ADULT - FALL  Goal: Free from fall injury  Description: INTERVENTIONS:  - Assess pt frequently for physical needs  - Identify cognitive and physical deficits and behaviors  that affect risk of falls.  - [place] fall precautions as indicated by assessment.  - Educate pt/family on patient safety including physical limitations  - Instruct pt to call for assistance with activity based on assessment  - Modify environment to reduce risk of injury  - Provide assistive devices as appropriate  - Consider OT/PT consult to assist with strengthening/mobility  - Encourage toileting schedule  Outcome: Progressing     Problem: DISCHARGE PLANNING  Goal: Discharge to home or other facility with appropriate resources  Description: INTERVENTIONS:  - Identify barriers to discharge w/pt and caregiver  - Include patient/family/discharge partner in discharge planning  - Arrange for needed discharge resources and transportation as appropriate  - Identify discharge learning needs (meds, wound care, etc)  - Arrange for interpreters to assist at discharge as needed  - Consider post-discharge preferences of patient/family/discharge partner  - Complete POLST form as appropriate  - Assess patient's ability to be responsible for managing their own health  - Refer to Case Management Department for coordinating discharge planning if the patient needs post-hospital services based on physician/LIP order or complex needs related to functional status, cognitive ability or social support system  Outcome: Progressing     Problem: CARDIOVASCULAR - ADULT  Goal: Maintains optimal cardiac output and hemodynamic stability  Description: INTERVENTIONS:  - Monitor vital signs, rhythm, and trends  - Monitor for bleeding, hypotension and signs of decreased cardiac output  - Evaluate effectiveness of vasoactive medications to optimize hemodynamic stability  - Monitor arterial and/or venous puncture sites for bleeding and/or hematoma  - Assess quality of pulses, skin color and temperature  - Assess for signs of decreased coronary artery perfusion - ex. Angina  - Evaluate fluid balance, assess for edema, trend weights  Outcome:  Progressing  Goal: Absence of cardiac arrhythmias or at baseline  Description: INTERVENTIONS:  - Continuous cardiac monitoring, monitor vital signs, obtain 12 lead EKG if indicated  - Evaluate effectiveness of antiarrhythmic and heart rate control medications as ordered  - Initiate emergency measures for life threatening arrhythmias  - Monitor electrolytes and administer replacement therapy as ordered  Outcome: Progressing     Problem: METABOLIC/FLUID AND ELECTROLYTES - ADULT  Goal: Glucose maintained within prescribed range  Description: INTERVENTIONS:  - Monitor Blood Glucose as ordered  - Assess for signs and symptoms of hyperglycemia and hypoglycemia  - Administer ordered medications to maintain glucose within target range  - Assess barriers to adequate nutritional intake and initiate nutrition consult as needed  - Instruct patient on self management of diabetes  Outcome: Progressing  Goal: Electrolytes maintained within normal limits  Description: INTERVENTIONS:  - Monitor labs and rhythm and assess patient for signs and symptoms of electrolyte imbalances  - Administer electrolyte replacement as ordered  - Monitor response to electrolyte replacements, including rhythm and repeat lab results as appropriate  - Fluid restriction as ordered  - Instruct patient on fluid and nutrition restrictions as appropriate  Outcome: Progressing  Goal: Hemodynamic stability and optimal renal function maintained  Description: INTERVENTIONS:  - Monitor labs and assess for signs and symptoms of volume excess or deficit  - Monitor intake, output and patient weight  - Monitor urine specific gravity, serum osmolarity and serum sodium as indicated or ordered  - Monitor response to interventions for patient's volume status, including labs, urine output, blood pressure (other measures as available)  - Encourage oral intake as appropriate  - Instruct patient on fluid and nutrition restrictions as appropriate  Outcome: Progressing   No  acute changes , discharge home when medically stable

## 2024-06-19 NOTE — PROGRESS NOTES
Vascular Surgery Progress Note    No acute events overnight. Pain well controlled. RENA drain with minimal output, removed. Incisions c/d/I with stiches in place.   Ok for discharge home  Would recommend IV antibiotics x6 weeks   Follow-up in two weeks for suture removal     Peg Huddleston MD  06/19/24  7:51 AM

## 2024-06-19 NOTE — CM/SW NOTE
06/19/24 1200   Discharge disposition   Expected discharge disposition Home or Self   Outpatient services Dialysis  ([facility])   Discharge transportation Private car     RICHY confirmed with RN Radha who stated pt is medically ready for discharge today.  Patient has a DC order.    RICHY let Lit from [facility] HD clinic know that patient will discharge today and be at regular HD shift tomorrow.    PLAN: DC home with outpatient HD at [facility] w/IV abx with HD    Cici Kumari MSW, LSW [id number]

## 2024-06-19 NOTE — PROGRESS NOTES
[facility]  part of [facility] Infectious Disease  Progress Note    Liu Walker Patient Status:  Inpatient    1990 MRN [id number]   Location [facility] 5SW/SE Attending Jacqueline Jeter MD   Hosp Day # 9 PCP Ashleigh Murray     Subjective:    Patient seen and examined sitting at the edge of the bed, she is tolerating the IV abx without any issues. Had RENA drain removed today. She is hoping to go home today.    Review of Systems:  Review of systems reviewed and negative except as mentioned    Objective:  Blood pressure (!) 180/97, pulse 87, temperature 97.4 °F (36.3 °C), temperature source Oral, resp. rate 18, weight 204 lb 3.2 oz (92.6 kg), last menstrual period 2024, SpO2 97%, not currently breastfeeding.        Physical Exam:  General: Awake, alert, non-tox, NAD.  HEENT:  Oropharynx clear, trachea ML.  Heart: RRR S1S2 no murmurs.  Lungs: Essentially CTA b/l, no rhonchi, rales, wheezes.  Abdomen: Soft, NT/ND.  BS present.  No organomegaly.  Extremity: No edema. +LUE with dressing/ace wrap in place  Neurological: No focal deficits.  Derm:  Warm, dry, free from rashes.  Right chest HD cath    Lab Data Review:  Lab Results   Component Value Date    WBC 7.9 2024    HGB 8.8 2024    HCT 26.8 2024    .0 2024    CREATSERUM 12.89 2024    BUN 80 2024     2024    K 4.9 2024     2024    CO2 19.0 2024    GLU 99 2024    CA 9.2 2024    ALB 4.3 2024    PHOS 7.4 2024    B12 >2,000 2024        Cultures:  Hospital Encounter on 24   1. Tissue Aerobic Culture     Status: None (Preliminary result)    Collection Time: 24  7:18 PM    Specimen: Tissue   Result Value Ref Range    Tissue Culture Result No Growth 2 Days N/A    Tissue Smear 4+ WBCs seen N/A    Tissue Smear No organisms seen N/A   2. Anaerobic Culture     Status: None (Preliminary  result)    Collection Time: 06/17/24  7:18 PM    Specimen: Tissue   Result Value Ref Range    Anaerobic Culture Pending N/A   3. Blood Culture     Status: None    Collection Time: 06/10/24  2:20 PM    Specimen: Blood,peripheral   Result Value Ref Range    Blood Culture Result No Growth 5 Days N/A        Radiology:  US ARM LEFT LIMITED (CPT=76882)    Result Date: 6/14/2024  PROCEDURE: ULTRASOUND ARM LEFT  LIMITED (CPT=76882)  COMPARISON: None.  INDICATIONS: Swelling  Findings and impression:  There is a 2 x 1 x 0.7 centimeter mildly hypoechoic fluid collection adjacent to the fistula.     Dictated by (CST): Gennaro Godoy MD on 6/14/2024 at 1:52 PM     Finalized by (CST): Gennaro Godoy MD on 6/14/2024 at 1:54 PM          NM INFECTION INDIUM  WB MULTI AREA SNGL DAY(CPT=78802)    Result Date: 6/12/2024  CONCLUSION:  Imaging findings concerning for infection of the left upper extremity AV graft.    Dictated by (CST): Natanael Burnette MD on 6/12/2024 at 9:57 AM     Finalized by (CST): Natanael Burnette MD on 6/12/2024 at 9:59 AM          XR CHEST AP PORTABLE  (CPT=71045)    Result Date: 6/9/2024  CONCLUSION:   Patchy bibasilar opacities, which may reflect atelectasis or pulmonary edema with or without superimposed pneumonia.  Mild prominence of the interstitial markings, which may reflect mild pulmonary edema.  Redemonstrated enlarged cardiomediastinal silhouette.    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 Dc med rec previously completed  - Weekly labs while on IV abx  - post op care per vascular  - stable to dc from ID standpoint once opt IV abx set up and when okay with others  - Follow up with ID 1-2 weeks, sooner if needed, okay for VV.       If you have any questions or concerns please call [facility] Infectious Disease at [phone].     TONO Vargas    6/19/2024  8:05 AM

## 2024-06-19 NOTE — DISCHARGE SUMMARY
[facility]  part of [facility] Internal Medicine Discharge Summary   Patient ID:  Liu Walker  [id number]  33 year old  9/9/1990    Admit date: 6/9/2024    Discharge date and time: 6/19/2024  4:57 PM      Attending Physician: No att. providers found     Primary Care Physician: Ashleigh Murray     Reason for admission bacteremia, infected fistula (see HPI on HP for further detail)    Discharged Condition: stable    Disposition: home    Risk of Readmission Lace+ Score: 75  59-90 High Risk  29-58 Medium Risk  0-28   Low Risk    Important follow up:  -Follow-up per ID and vascular.  No  lifting for 2 weeks.  Per ID continue Ancef with dialysis.  Arranged by social work.  Follow-up with PCP  Discharge meds  I reviewed and reconciled current and discharge medications on the day of discharge with the changes reflected below.       Medication List        START taking these medications      ceFAZolin 2 g/10mL Soln  Commonly known as: Ancef  Cefazolin 2 gm IVPB post HD through 7/22/24, CBC CMP CRP weekly while on IV abx            CHANGE how you take these medications      HYDROcodone-acetaminophen 5-325 MG Tabs  Commonly known as: Norco  Take 1 tablet by mouth every 4 (four) hours as needed.  What changed:   when to take this  reasons to take this  additional instructions            CONTINUE taking these medications      acetaminophen 500 MG Tabs  Commonly known as: Tylenol Extra Strength     atorvastatin 40 MG Tabs  Commonly known as: Lipitor  Take 1 tablet (40 mg total) by mouth nightly.     bumetanide 1 MG Tabs  Commonly known as: Bumex     carvedilol 25 MG Tabs  Commonly known as: Coreg     EPINEPHrine 0.3 MG/0.3ML Soaj  Commonly known as: EpiPen     Sennosides 17.2 MG Tabs  Take 1 tablet (17.2 mg total) by mouth nightly as needed (constipation, as needed if no bowel movement that day).            STOP taking these medications      cefadroxil 500 MG Caps  Commonly known as:  DURICEF     doxycycline 100 MG Caps  Commonly known as: Vibramycin     NIFEdipine ER 90 MG Tb24  Commonly known as: ADALAT CC     ondansetron 4 MG Tbdp  Commonly known as: Zofran-ODT               Where to Get Your Medications        You can get these medications from any pharmacy    Bring a paper prescription for each of these medications  ceFAZolin 2 g/10mL Soln  HYDROcodone-acetaminophen 5-325 MG Tabs       HPI per chart  Ms. Walker is a 33 year old female with PMH sig for ESRD on HD (t/t/s), HTN, HLD, obesity, HFpEF, who presents with fever, found to have bacteremia.  Patient states on Saturday she felt tired and after HD she noted feeling chills, and generally weak, symptoms progressed on Sunday with cough, nausea, lightheadedness as well as fevers and chills, so she came to ER.  She denies CP or sob, no vomiting, but has had diarrhea.  No other complaints.       Hospital Course  Ms. Walker is a 33 year old female with PMH sig for ESRD on HD (t/t/s), HTN, HLD, obesity, HFpEF, who presented with fever, found to have bacteremia.  Found to have infected graft.  Excision of left upper extremity AV graft on 6/17/2024.  Plan for IV Ancef after dialysis through 7/22/2024.  Okay to DC once antibiotics arranged and vascular clears.   Discharge Diagnoses:   Sepsis   Bacteremia   Diarrhea  - MSSA bacteremia  - source fistula graft  - was treated few months back with oral antibiotics for possible drainage around or near fistula by surgeon   - WBC scan positive for possible infection in area of fistula  - consulted vascular surgery appreciate eval.   graft removal today, 6/17  - ID on consult.  Plan for IV Ancef after dialysis through 7/22/2024     ESRD  - on HD t/t/s  - renal consulted     Anemia  - likely ACD 2/2 ESRD  - monitor      HFpEF  - poss infiltrates vs fluid  - volume removal with HD  - bumex on non dialysis days as needed     HTN  - continue coreg  - does not tolerate nifedipine any more she states     HLD  -  statin      PPX: pt declining hep subcutaneous    Consults: IP CONSULT TO NEPHROLOGY  IP CONSULT TO PHARMACY  IP CONSULT TO INFECTIOUS DISEASE  IP CONSULT TO PHARMACY  IP CONSULT TO VASCULAR SURGERY  IP CONSULT TO RESPIRATORY CARE    Radiology: US ARM LEFT LIMITED (CPT=76882)    Result Date: 6/14/2024         PROCEDURE: ULTRASOUND ARM LEFT  LIMITED (CPT=76882)  COMPARISON: None.  INDICATIONS: Swelling  Findings and impression:  There is a 2 x 1 x 0.7 centimeter mildly hypoechoic fluid collection adjacent to the fistula.     Dictated by (CST): Gennaro Godoy MD on 6/14/2024 at 1:52 PM     Finalized by (CST): Gennaro Godoy MD on 6/14/2024 at 1:54 PM          NM INFECTION INDIUM  WB MULTI AREA SNGL DAY(CPT=78802)    Result Date: 6/12/2024  PROCEDURE: NM INFECTION INDIUM  COMPLETE (CPT=78806)  COMPARISON: None available.  INDICATIONS: MSSA bacteremia; clinical concern for AV fistula infection.  TECHNIQUE: This is an Indium 111 labeled white blood cell scan, whole body, for infection, performed 20-24 hours after the intravenous injection of 200 microcuries of Indium 111 labeled autologous white blood cells injected into the left antecubital vein.  Whole body automated images were performed anteriorly and posteriorly followed by spot views of the left upper extremity.   FINDINGS:  REGION IMAGED: Whole-body and left upper extremity.  ABNORMAL FOCI: There is increased uptake in the region of the left upper extremity. OTHER: Prominent activity is demonstrated in the right antecubital region, presumably related to the injection site. Otherwise, there is physiologic distribution of the radiotracer.          CONCLUSION:  Imaging findings concerning for infection of the left upper extremity AV graft.    Dictated by (CST): Natanael Burnette MD on 6/12/2024 at 9:57 AM     Finalized by (CST): Natanael Burnette MD on 6/12/2024 at 9:59 AM          CARD ECHO 2D DOPPLER (CPT=93306)    Result Date: 6/11/2024  Transthoracic  Echocardiogram Name:Liu Walker Date: 2024 :  1990 Ht:  (65in)  BP: 151 / 85 MRN:  [id number]    Age:  33years    Wt:  (207lb) HR: 77bpm Loc:  [facility]       Gndr: F          BSA: 2.01m^2 Sonographer: Baldev BARNETT, RVT Ordering:    Beverly Vazquez Consulting:  Essie Walter ---------------------------------------------------------------------------- History/Indications:  MSSA bacteremia. ---------------------------------------------------------------------------- Procedure information:  A transthoracic complete 2D study was performed. Additional evaluation included M-mode, complete spectral Doppler, and color Doppler.  Patient status:  Inpatient.  Location:  Bedside.    This was a routine study. Transthoracic echocardiography for ventricular function evaluation and assessment of valvular function. Image quality was adequate. ECG rhythm:   Normal sinus ---------------------------------------------------------------------------- Conclusions: 1. Left ventricle: The cavity size was normal. Wall thickness was moderately    increased. Systolic function was normal. The estimated ejection fraction    was 60-65%, by 3D assessment. No diagnostic evidence for regional wall    motion abnormalities. Features are consistent with a pseudonormal left    ventricular filling pattern, with concomitant abnormal relaxation and    increased filling pressure - grade 2 diastolic dysfunction. 2. Left atrium: The left atrial volume was mildly increased. 3. Mitral valve: There was mild regurgitation. 4. Vegetation is not seen. * ---------------------------------------------------------------------------- * Findings: Left ventricle:  The cavity size was normal. Wall thickness was moderately increased. Systolic function was normal. The estimated ejection fraction was 60-65%, by 3D assessment. No diagnostic evidence for regional wall motion abnormalities. Features are consistent with a pseudonormal left ventricular  filling pattern, with concomitant abnormal relaxation and increased filling pressure - grade 2 diastolic dysfunction. Left atrium:  The left atrial volume was mildly increased. Right ventricle:  The cavity size was normal. Systolic function was normal. Right atrium:  The atrium was normal in size. Mitral valve:  The valve was structurally normal. Leaflet separation was normal.  Doppler:  Transvalvular velocity was within the normal range. There was no evidence for stenosis. There was mild regurgitation. Aortic valve:  The valve was structurally normal. The valve was trileaflet. Cusp separation was normal.  Doppler:  Transvalvular velocity was within the normal range. There was no evidence for stenosis. There was no significant regurgitation. Tricuspid valve:  The valve is structurally normal. Leaflet separation was normal.  Doppler:  Transvalvular velocity was within the normal range. There was no evidence for stenosis. There was mild regurgitation. Pulmonic valve:   The valve is structurally normal. Cusp separation was normal.  Doppler:  Transvalvular velocity was within the normal range. There was no evidence for stenosis. There was trivial regurgitation. Pericardium:  A trivial pericardial effusion was identified. Aorta: Aortic root: The aortic root was normal. Ascending aorta: The ascending aorta was normal. Pulmonary arteries: Systolic pressure was within the normal range, estimated to be 29mm Hg. Estimated pulmonary artery diastolic pressure was 9mm Hg. Systemic veins:  Central venous respirophasic diameter changes are in the normal range (>50%). Inferior vena cava: The IVC was normal-sized. ---------------------------------------------------------------------------- Measurements  Left ventricle                    Value        Ref  LV end-diastolic volume, 3D       189   ml     ---------  LV end-systolic volume, 3D        76    ml     ---------  LV ejection fraction, 3D          60    %      54 - 74  LV  end-diastolic volume/bsa,  (H) 94    ml/m^2 <=71  3D  LV end-systolic volume/bsa,   (H) 38    ml/m^2 <=28  3D  LV wall mass, 3D                  228   g      ---------  LV wall mass/bsa, 3D              114   g/m^2  ---------  IVS thickness, ED, PLAX       (H) 1.4   cm     0.6 - 0.9  LV ID, ED, PLAX                   5.2   cm     3.8 - 5.2  LV ID, ES, PLAX               (H) 3.7   cm     2.2 - 3.5  LV PW thickness, ED, PLAX     (H) 1.4   cm     0.6 - 0.9  IVS/LV PW ratio, ED, PLAX         1.00         ---------  LV PW/LV ID ratio, ED, PLAX       0.27         ---------  LV ejection fraction              55    %      54 - 74  Stroke volume/bsa, 2D             62    ml/m^2 ---------  LV e', lateral                (L) 6.5   cm/sec >=10.0  LV E/e', lateral              (H) 15           <=13  LV e', medial                 (L) 6.8   cm/sec >=7.0  LV E/e', medial                   14           ---------  LV e', average                    6.7   cm/sec ---------  LV E/e', average                  14           <=14  LVOT                              Value        Ref  LVOT ID                           2.3   cm     ---------  LVOT peak velocity, S             1.63  m/sec  ---------  LVOT VTI, S                       30.0  cm     ---------  LVOT peak gradient, S             11    mm Hg  ---------  LVOT mean gradient, S             6     mm Hg  ---------  Stroke volume (SV), LVOT DP       125   ml     ---------  Stroke index (SV/bsa), LVOT       62    ml/m^2 ---------  DP  Aortic root                       Value        Ref  Aortic root ID                    3.2   cm     2.7 - 3.7  Ascending aorta                   Value        Ref  Ascending aorta ID                3.5   cm     1.9 - 3.5  Left atrium                       Value        Ref  LA ID, A-P, ES                (H) 4.7   cm     2.7 - 3.8  LA volume, S                  (H) 72    ml     22 - 52  LA volume/bsa, S              (H) 36    ml/m^2 16 - 34  LA volume, ES, 1-p A4C         (H) 73    ml     22 - 52  LA volume, ES, 1-p A2C        (H) 68    ml     22 - 52  LA volume, ES, A/L                76    ml     ---------  LA volume/bsa, ES, A/L        (H) 38    ml/m^2 16 - 34  LA/aortic root ratio              1.47         ---------  LA volume, ES, 3D             (H) 68    ml     22 - 52  LA volume/bsa, ES, 3D             34    ml/m^2 ---------  Mitral valve                      Value        Ref  Mitral E-wave peak velocity       0.96  m/sec  ---------  Mitral A-wave peak velocity       0.78  m/sec  ---------  Mitral deceleration time          185   ms     ---------  Mitral peak gradient, D           4     mm Hg  ---------  Mitral E/A ratio, peak            1.2          ---------  Pulmonary artery                  Value        Ref  PA pressure, S, DP                29    mm Hg  ---------  PA pressure, ED, DP               9     mm Hg  ---------  Tricuspid valve                   Value        Ref  Tricuspid regurg peak             2.54  m/sec  <=2.8  velocity  Tricuspid peak RV-RA gradient     26    mm Hg  ---------  Systemic veins                    Value        Ref  Estimated CVP                     3     mm Hg  ---------  Right ventricle                   Value        Ref  TAPSE, MM                         2.75  cm     >=1.70  RV pressure, S, DP                29    mm Hg  ---------  RV s', lateral                    16.2  cm/sec >=9.5  Pulmonic valve                    Value        Ref  Pulmonic regurg velocity, ED      1.19  m/sec  ---------  Pulmonic regurg gradient, ED      6     mm Hg  --------- Legend: (L)  and  (H)  oral values outside specified reference range. ---------------------------------------------------------------------------- Prepared and electronically signed by Kuldeep Xiao 06/11/2024 13:18     XR CHEST AP PORTABLE  (CPT=71045)    Result Date: 6/9/2024  PROCEDURE: XR CHEST AP PORTABLE  (CPT=71045) TIME: 20:37.   COMPARISON: [facility], CT CHEST PE  AORTA (IV ONLY) (CPT=71260), 1/03/2024, 9:58 AM.  [facility], XR CHEST AP PORTABLE (CPT=71045), 6/03/2024, 8:13 PM.  [facility], XR CHEST AP PORTABLE (CPT=71045), 1/03/2024, 7:30 AM.  [facility], XR CHEST AP PORTABLE (CPT=71045), 7/08/2022, 7:44 PM.  INDICATIONS: Fever  TECHNIQUE:   Single view.   FINDINGS:  CARDIAC/VASC: The cardiomediastinal silhouette is enlarged and unchanged in size. MEDIAST/FLYNN:   The mediastinum and hilum are unchanged. LUNGS/PLEURA: There is prominence of the interstitial markings.  There are patchy bibasilar opacities.  No significant pleural effusion.  No pneumothorax. BONES: Scattered degenerative changes of the thoracic spine. OTHER: The tunneled right IJ central venous catheter is in unchanged position.         CONCLUSION:   Patchy bibasilar opacities, which may reflect atelectasis or pulmonary edema with or without superimposed pneumonia.  Mild prominence of the interstitial markings, which may reflect mild pulmonary edema.  Redemonstrated enlarged cardiomediastinal silhouette.    Dictated by (CST): Chuy Harris MD on 6/09/2024 at 9:30 PM     Finalized by (CST): Chuy Harris MD on 6/09/2024 at 9:33 PM          XR CHEST AP PORTABLE  (CPT=71045)    Result Date: 6/3/2024  PROCEDURE: XR CHEST AP PORTABLE  (CPT=71045) TIME: 2018 hours  COMPARISON: [facility], XR CHEST AP PORTABLE (CPT=71045), 1/03/2024, 7:30 AM.  INDICATIONS: Shortness of breath today  TECHNIQUE:   Single view.   FINDINGS:  CARDIAC/VASC: Cardiomegaly. Pulmonary venous congestion.  MEDIAST/FLYNN:   No visible mass or adenopathy. LUNGS/PLEURA: Pulmonary interstitial edema. BONES: No fracture or visible bony lesion. OTHER: Right-sided double lumen dialysis catheter with tip at the RA SVC junction.         CONCLUSION:  1. CHF/fluid overload with pulmonary interstitial edema.    Dictated by (CST): Alexys Sun MD on 6/03/2024 at 9:12 PM     Finalized by  (CST): Alexys Sun MD on 6/03/2024 at 9:13 PM             Operative Procedures: Procedure(s) (LRB):  Removal left upper extremity arteriorvenous graft (Left)     Day of discharge pain controlled, no CP or SOB.  Tolerating diet, HD tomorrow     Exam  Vitals:    06/19/24 1555   BP: 154/87   Pulse:    Resp:    Temp:      No acute distress, alert and orientedx3  Lungs Clear  Heart Regular  Abdomen Benign    Total Time Coordinating Care: > than 30 minutes  Note: This chart was prepared using voice recognition software and may contain unintended word substitution errors.     Patient had opportunity to ask questions and state understand and agree with therapeutic plan as outlined

## 2024-06-19 NOTE — PLAN OF CARE
Discharge instructions given, all questions answered. IV taken out. Tele monitor discharged. Patients discharged home with family.   Problem: Patient Centered Care  Goal: Patient preferences are identified and integrated in the patient's plan of care  Description: Interventions:  - What would you like us to know as we care for you? From home alone  - Provide timely, complete, and accurate information to patient/family  - Incorporate patient and family knowledge, values, beliefs, and cultural backgrounds into the planning and delivery of care  - Encourage patient/family to participate in care and decision-making at the level they choose  - Honor patient and family perspectives and choices  6/19/2024 1519 by Radha Dolan RN  Outcome: Adequate for Discharge  6/19/2024 1158 by Radha Dolan RN  Outcome: Progressing     Problem: Patient/Family Goals  Goal: Patient/Family Long Term Goal  Description: Patient's Long Term Goal: No signs of infection    Interventions:  - Vitals, labs, follow up care   - See additional Care Plan goals for specific interventions  6/19/2024 1519 by Radha Dolan RN  Outcome: Adequate for Discharge  6/19/2024 1158 by Radha Dolan RN  Outcome: Progressing  Goal: Patient/Family Short Term Goal  Description: Patient's Short Term Goal: Relief of pain    Interventions:   - Pain medication  - See additional Care Plan goals for specific interventions  6/19/2024 1519 by Radha Dolan RN  Outcome: Adequate for Discharge  6/19/2024 1158 by Radha Dolan RN  Outcome: Progressing     Problem: PAIN - ADULT  Goal: Verbalizes/displays adequate comfort level or patient's stated pain goal  Description: INTERVENTIONS:  - Encourage pt to monitor pain and request assistance  - Assess pain using appropriate pain scale  - Administer analgesics based on type and severity of pain and evaluate response  - Implement non-pharmacological measures as appropriate and evaluate response  - Consider cultural and social influences  on pain and pain management  - Manage/alleviate anxiety  - Utilize distraction and/or relaxation techniques  - Monitor for opioid side effects  - Notify MD/LIP if interventions unsuccessful or patient reports new pain  - Anticipate increased pain with activity and pre-medicate as appropriate  6/19/2024 1519 by Radha Dolan RN  Outcome: Adequate for Discharge  6/19/2024 1158 by Radha Dolan RN  Outcome: Progressing     Problem: SAFETY ADULT - FALL  Goal: Free from fall injury  Description: INTERVENTIONS:  - Assess pt frequently for physical needs  - Identify cognitive and physical deficits and behaviors that affect risk of falls.  - Jackson fall precautions as indicated by assessment.  - Educate pt/family on patient safety including physical limitations  - Instruct pt to call for assistance with activity based on assessment  - Modify environment to reduce risk of injury  - Provide assistive devices as appropriate  - Consider OT/PT consult to assist with strengthening/mobility  - Encourage toileting schedule  6/19/2024 1519 by Radha Dolan, RN  Outcome: Adequate for Discharge  6/19/2024 1158 by Radha Dolan RN  Outcome: Progressing     Problem: DISCHARGE PLANNING  Goal: Discharge to home or other facility with appropriate resources  Description: INTERVENTIONS:  - Identify barriers to discharge w/pt and caregiver  - Include patient/family/discharge partner in discharge planning  - Arrange for needed discharge resources and transportation as appropriate  - Identify discharge learning needs (meds, wound care, etc)  - Arrange for interpreters to assist at discharge as needed  - Consider post-discharge preferences of patient/family/discharge partner  - Complete POLST form as appropriate  - Assess patient's ability to be responsible for managing their own health  - Refer to Case Management Department for coordinating discharge planning if the patient needs post-hospital services based on physician/LIP order or complex  needs related to functional status, cognitive ability or social support system  6/19/2024 1519 by Radha Dolan RN  Outcome: Adequate for Discharge  6/19/2024 1158 by Radha Dolan RN  Outcome: Progressing     Problem: METABOLIC/FLUID AND ELECTROLYTES - ADULT  Goal: Glucose maintained within prescribed range  Description: INTERVENTIONS:  - Monitor Blood Glucose as ordered  - Assess for signs and symptoms of hyperglycemia and hypoglycemia  - Administer ordered medications to maintain glucose within target range  - Assess barriers to adequate nutritional intake and initiate nutrition consult as needed  - Instruct patient on self management of diabetes  6/19/2024 1519 by Radha Dolan RN  Outcome: Adequate for Discharge  6/19/2024 1158 by Radha Dolan RN  Outcome: Progressing  Goal: Electrolytes maintained within normal limits  Description: INTERVENTIONS:  - Monitor labs and rhythm and assess patient for signs and symptoms of electrolyte imbalances  - Administer electrolyte replacement as ordered  - Monitor response to electrolyte replacements, including rhythm and repeat lab results as appropriate  - Fluid restriction as ordered  - Instruct patient on fluid and nutrition restrictions as appropriate  6/19/2024 1519 by Radha Dolan RN  Outcome: Adequate for Discharge  6/19/2024 1158 by Radha Dolan RN  Outcome: Progressing  Goal: Hemodynamic stability and optimal renal function maintained  Description: INTERVENTIONS:  - Monitor labs and assess for signs and symptoms of volume excess or deficit  - Monitor intake, output and patient weight  - Monitor urine specific gravity, serum osmolarity and serum sodium as indicated or ordered  - Monitor response to interventions for patient's volume status, including labs, urine output, blood pressure (other measures as available)  - Encourage oral intake as appropriate  - Instruct patient on fluid and nutrition restrictions as appropriate  6/19/2024 1519 by Radha Dolan RN  Outcome:  Adequate for Discharge  6/19/2024 1158 by Radha Dolan RN  Outcome: Progressing     Problem: CARDIOVASCULAR - ADULT  Goal: Maintains optimal cardiac output and hemodynamic stability  Description: INTERVENTIONS:  - Monitor vital signs, rhythm, and trends  - Monitor for bleeding, hypotension and signs of decreased cardiac output  - Evaluate effectiveness of vasoactive medications to optimize hemodynamic stability  - Monitor arterial and/or venous puncture sites for bleeding and/or hematoma  - Assess quality of pulses, skin color and temperature  - Assess for signs of decreased coronary artery perfusion - ex. Angina  - Evaluate fluid balance, assess for edema, trend weights  6/19/2024 1519 by Radha Dolan, RN  Outcome: Adequate for Discharge  6/19/2024 1158 by Radha Dolan RN  Outcome: Progressing  Goal: Absence of cardiac arrhythmias or at baseline  Description: INTERVENTIONS:  - Continuous cardiac monitoring, monitor vital signs, obtain 12 lead EKG if indicated  - Evaluate effectiveness of antiarrhythmic and heart rate control medications as ordered  - Initiate emergency measures for life threatening arrhythmias  - Monitor electrolytes and administer replacement therapy as ordered  6/19/2024 1519 by Radha Dolan, RN  Outcome: Adequate for Discharge  6/19/2024 1158 by Radha Dolan RN  Outcome: Progressing

## 2024-06-19 NOTE — PROGRESS NOTES
[facility]  part of [facility]    Progress Note      Subjective:     Tolerated dialysis yesterday   Denies SOB or swelling   Reports arm soreness  Refused AM labs        Objective:   Vital Signs:  Blood pressure 137/75, pulse 87, temperature 97.4 °F (36.3 °C), temperature source Oral, resp. rate 18, weight 204 lb 3.2 oz (92.6 kg), last menstrual period 05/14/2024, SpO2 97%, not currently breastfeeding.     General: No acute distress.  HEENT: NCAT   Respiratory: CTAB   Cardiovascular: normal S1S2   Ext: no LE edema, LUE bandaged  Neuro: awake, alert   Access: RIJ tunneled HD catheter      Results:     Recent Labs   Lab 06/16/24  0914 06/17/24  0919 06/18/24  0822   GLU 81 85 99   BUN 48* 66* 80*   CREATSERUM 8.65* 11.07* 12.89*   EGFRCR 6* 4* 4*   CA 9.3 9.0 9.2    140 138   K 4.3 4.7 4.9    104 104   CO2 24.0 24.0 19.0*     Recent Labs   Lab 06/13/24  0648 06/18/24  0822   RBC 3.19* 3.09*   HGB 9.2* 8.8*   HCT 27.2* 26.8*   MCV 85.3 86.7   MCH 28.8 28.5   MCHC 33.8 32.8   RDW 13.1 13.3   NEPRELIM 4.86 6.15   WBC 7.9 7.9   .0 395.0             Assessment and Plan:     This is 33 year old female with PMH of ESRD on HD TTS, HTN, HL, HFpEF admitted with fevers, SOB and bacteremia. Nephrology is consulted for dialysis      ESRD on HD TTS:  - no labs drawn today   - anticipate next dialysis tomorrow per usual schedule    - receiving dialysis via RIJ tunneled HD catheter   - followed by Dr. Cleveland at [facility] dialysis     Bacteremia:  - per ID   - infected AV graft removed 6/17      Anemia   - holding on venofer in light of active infection  - blood transfusions per primary   - Hb 8.8, anticipate will need GEOFFREY with dialysis     Hyperphosphatemia    - currently receiving tums 500 TID   - taking velphoro at home which is not on formulary.  If patient to remain admitted, patient to ask family to bring medication to hospital   - renal diet     HTN:  - Continue coreg  - Bumex on non  HD days      Dispo: plan for dialysis tomorrow if not discharged     Irma Aponte MD  [facility] - Nephrology

## 2024-06-24 NOTE — OPERATIVE REPORT
Patients Name: Liu Walker  Attending Physician: Jacqueline Jeter MD  Operating Physician: Peg Huddleston MD  CSN: [id number]                                                                Location:  OR  MRN: [id number]                                             YOB: 1990  Admission Date: 6/9/2024                            Operation Date: 6/17/2024     Brief Operative Report   [facility]      Pre-Operative Diagnosis: AV graft infection     Post-Operative Diagnosis: Same as above.     Procedure Performed: Excision of left upper extremity AV graft     Anesthesia: General     Assistants:  Isaac Butler CSA      EBL: 50mL      Specimens:   ID Type Source Tests Collected by Time Destination   A : 1)Chyna-graft fluid Tissue Tissue ANAEROBIC CULTURE, TISSUE AEROBIC CULTURE Peg Huddleston MD 6/17/2024 1837     B : B. Chyna graft  fluid #2 Tissue Tissue ANAEROBIC CULTURE, TISSUE AEROBIC CULTURE Peg Huddleston MD 6/17/2024 1918     C : C. AV graft Tissue Tissue ANAEROBIC CULTURE, TISSUE AEROBIC CULTURE Peg Huddleston MD 6/17/2024 1907           Complications: None     Summary of Case: Murky appearing perigraft fluid in the upper arm. Graft and fluid sent for culture. Palpable left radial pulse     Indication for Procedure: Patient is a 33-year-old female who presents with fevers and evidence of infection of her left upper extremity graft.  She presents for excision of the graft.  Risk and benefits were discussed and she elected to proceed.    Description of Procedure: The patient was brought to the operating room, she was placed supine on the operating table.  General anesthesia was induced.  Her left upper extremity was prepped and draped in usual sterile fashion.  The incision was created through her previous brachiocephalic incision.  This was deepened down using Bovie electrocautery.  The graft was identified.  The brachial artery was also identified proximally and distally.  It was doubly  looped with Silastic Vesseloops.  Attention was then turned to the axillary incision.  The incision was created through her previous incision.  This was deepened down using Bovie electrocautery.  The graft was identified along with the vein.  The vein was doubly looped with Silastic Vesseloops.  This was done proximally and distally.  The patient was then fully heparinized.  The graft was clamped proximally and distally at the brachial artery and transected.  An extremely small cuff was left at the brachial artery.  This was oversewed using a 5-0 Prolene suture.  The graft was then clamped in the axilla.  It was divided.  Again an extremely small cuff was left on the vein.  This was oversewed using a 5-0 Prolene suture.  The graft was then clamped and removed from the arm.  There was an area of murky fluid in the upper arm associated with the fluid collection seen on ultrasound.  The graft was removed in its entirety in 1 piece.  A RENA drain was then placed along the tract.  The tract was excessively irrigated prior to placement of the drain.  The incisions was were copiously irrigated with antibiotic saline solution.  They were then closed in multiple layers of 3-0 and 4-0 Vicryl.  3-0 nylon vertical mattress sutures were used to close the skin incisions.  A sterile dressing was applied.  The patient tolerated the procedure well, there were no immediate complications.  She was taken to the recovery room in good condition.    Peg Huddleston MD  06/24/24  12:22 PM

## 2024-06-26 ENCOUNTER — HOSPITAL ENCOUNTER (EMERGENCY)
Facility: HOSPITAL | Age: 34
Discharge: HOME | End: 2024-06-27
Attending: EMERGENCY MEDICINE

## 2024-06-26 ENCOUNTER — APPOINTMENT (OUTPATIENT)
Dept: CT IMAGING | Facility: HOSPITAL | Age: 34
End: 2024-06-26
Attending: EMERGENCY MEDICINE

## 2024-06-26 DIAGNOSIS — R30.0 DYSURIA: ICD-10-CM

## 2024-06-26 DIAGNOSIS — R03.0 ELEVATED BLOOD PRESSURE READING: ICD-10-CM

## 2024-06-26 DIAGNOSIS — R10.30 LOWER ABDOMINAL PAIN: Primary | ICD-10-CM

## 2024-06-26 LAB
ALBUMIN SERPL-MCNC: 4.6 G/DL (ref 3.2–4.8)
ALBUMIN/GLOB SERPL: 1.3 {RATIO} (ref 1–2)
ALP LIVER SERPL-CCNC: 80 U/L
ALT SERPL-CCNC: <7 U/L
ANION GAP SERPL CALC-SCNC: 10 MMOL/L (ref 0–18)
AST SERPL-CCNC: 18 U/L (ref ?–34)
B-HCG UR QL: NEGATIVE
BASOPHILS # BLD AUTO: 0.05 X10(3) UL (ref 0–0.2)
BASOPHILS NFR BLD AUTO: 0.7 %
BILIRUB SERPL-MCNC: 0.2 MG/DL (ref 0.3–1.2)
BILIRUB UR QL: NEGATIVE
BUN BLD-MCNC: 44 MG/DL (ref 9–23)
BUN/CREAT SERPL: 5.3 (ref 10–20)
CALCIUM BLD-MCNC: 8.7 MG/DL (ref 8.7–10.4)
CHLORIDE SERPL-SCNC: 102 MMOL/L (ref 98–112)
CLARITY UR: CLEAR
CO2 SERPL-SCNC: 30 MMOL/L (ref 21–32)
COLOR UR: COLORLESS
CREAT BLD-MCNC: 8.29 MG/DL
DEPRECATED RDW RBC AUTO: 43.4 FL (ref 35.1–46.3)
EGFRCR SERPLBLD CKD-EPI 2021: 6 ML/MIN/1.73M2 (ref 60–?)
EOSINOPHIL # BLD AUTO: 0.16 X10(3) UL (ref 0–0.7)
EOSINOPHIL NFR BLD AUTO: 2.4 %
ERYTHROCYTE [DISTWIDTH] IN BLOOD BY AUTOMATED COUNT: 13.3 % (ref 11–15)
GLOBULIN PLAS-MCNC: 3.5 G/DL (ref 2–3.5)
GLUCOSE BLD-MCNC: 86 MG/DL (ref 70–99)
GLUCOSE UR-MCNC: 100 MG/DL
HCT VFR BLD AUTO: 25.4 %
HGB BLD-MCNC: 8.3 G/DL
IMM GRANULOCYTES # BLD AUTO: 0.02 X10(3) UL (ref 0–1)
IMM GRANULOCYTES NFR BLD: 0.3 %
KETONES UR-MCNC: NEGATIVE MG/DL
LEUKOCYTE ESTERASE UR QL STRIP.AUTO: 75
LYMPHOCYTES # BLD AUTO: 2.16 X10(3) UL (ref 1–4)
LYMPHOCYTES NFR BLD AUTO: 32.1 %
MCH RBC QN AUTO: 29 PG (ref 26–34)
MCHC RBC AUTO-ENTMCNC: 32.7 G/DL (ref 31–37)
MCV RBC AUTO: 88.8 FL
MONOCYTES # BLD AUTO: 0.43 X10(3) UL (ref 0.1–1)
MONOCYTES NFR BLD AUTO: 6.4 %
NEUTROPHILS # BLD AUTO: 3.91 X10 (3) UL (ref 1.5–7.7)
NEUTROPHILS # BLD AUTO: 3.91 X10(3) UL (ref 1.5–7.7)
NEUTROPHILS NFR BLD AUTO: 58.1 %
NITRITE UR QL STRIP.AUTO: NEGATIVE
OSMOLALITY SERPL CALC.SUM OF ELEC: 304 MOSM/KG (ref 275–295)
PH UR: 8 [PH] (ref 5–8)
PLATELET # BLD AUTO: 273 10(3)UL (ref 150–450)
POTASSIUM SERPL-SCNC: 3.4 MMOL/L (ref 3.5–5.1)
PROT SERPL-MCNC: 8.1 G/DL (ref 5.7–8.2)
PROT UR-MCNC: 300 MG/DL
RBC # BLD AUTO: 2.86 X10(6)UL
SODIUM SERPL-SCNC: 142 MMOL/L (ref 136–145)
SP GR UR STRIP: 1.01 (ref 1–1.03)
UROBILINOGEN UR STRIP-ACNC: NORMAL
WBC # BLD AUTO: 6.7 X10(3) UL (ref 4–11)

## 2024-06-26 PROCEDURE — 87086 URINE CULTURE/COLONY COUNT: CPT | Performed by: EMERGENCY MEDICINE

## 2024-06-26 PROCEDURE — 81025 URINE PREGNANCY TEST: CPT

## 2024-06-26 PROCEDURE — 99285 EMERGENCY DEPT VISIT HI MDM: CPT

## 2024-06-26 PROCEDURE — 85025 COMPLETE CBC W/AUTO DIFF WBC: CPT | Performed by: EMERGENCY MEDICINE

## 2024-06-26 PROCEDURE — 96375 TX/PRO/DX INJ NEW DRUG ADDON: CPT

## 2024-06-26 PROCEDURE — 81001 URINALYSIS AUTO W/SCOPE: CPT | Performed by: EMERGENCY MEDICINE

## 2024-06-26 PROCEDURE — 74176 CT ABD & PELVIS W/O CONTRAST: CPT | Performed by: EMERGENCY MEDICINE

## 2024-06-26 PROCEDURE — 93005 ELECTROCARDIOGRAM TRACING: CPT

## 2024-06-26 PROCEDURE — 80053 COMPREHEN METABOLIC PANEL: CPT | Performed by: EMERGENCY MEDICINE

## 2024-06-26 PROCEDURE — 87077 CULTURE AEROBIC IDENTIFY: CPT | Performed by: EMERGENCY MEDICINE

## 2024-06-26 PROCEDURE — 93010 ELECTROCARDIOGRAM REPORT: CPT

## 2024-06-26 PROCEDURE — 87186 SC STD MICRODIL/AGAR DIL: CPT | Performed by: EMERGENCY MEDICINE

## 2024-06-27 VITALS
SYSTOLIC BLOOD PRESSURE: 194 MMHG | RESPIRATION RATE: 16 BRPM | OXYGEN SATURATION: 100 % | TEMPERATURE: 99 F | DIASTOLIC BLOOD PRESSURE: 94 MMHG | HEART RATE: 92 BPM

## 2024-06-27 LAB
ATRIAL RATE: 81 BPM
BV BACTERIA DNA VAG QL NAA+PROBE: NEGATIVE
C GLABRATA DNA VAG QL NAA+PROBE: NEGATIVE
C KRUSEI DNA VAG QL NAA+PROBE: NEGATIVE
C TRACH DNA SPEC QL NAA+PROBE: NEGATIVE
CANDIDA DNA VAG QL NAA+PROBE: NEGATIVE
N GONORRHOEA DNA SPEC QL NAA+PROBE: NEGATIVE
P AXIS: 55 DEGREES
P-R INTERVAL: 158 MS
Q-T INTERVAL: 418 MS
QRS DURATION: 98 MS
QTC CALCULATION (BEZET): 485 MS
R AXIS: 14 DEGREES
T AXIS: 152 DEGREES
T VAGINALIS DNA VAG QL NAA+PROBE: NEGATIVE
VENTRICULAR RATE: 81 BPM

## 2024-06-27 PROCEDURE — 96365 THER/PROPH/DIAG IV INF INIT: CPT

## 2024-06-27 PROCEDURE — 81514 NFCT DS BV&VAGINITIS DNA ALG: CPT | Performed by: EMERGENCY MEDICINE

## 2024-06-27 PROCEDURE — 87591 N.GONORRHOEAE DNA AMP PROB: CPT | Performed by: EMERGENCY MEDICINE

## 2024-06-27 PROCEDURE — 87491 CHLMYD TRACH DNA AMP PROBE: CPT | Performed by: EMERGENCY MEDICINE

## 2024-06-27 RX ORDER — HYDRALAZINE HYDROCHLORIDE 20 MG/ML
20 INJECTION INTRAMUSCULAR; INTRAVENOUS ONCE
Status: COMPLETED | OUTPATIENT
Start: 2024-06-27 | End: 2024-06-27

## 2024-06-27 NOTE — ED PROVIDER NOTES
Patient Seen in: [facility] Emergency Department      History     Chief Complaint   Patient presents with    Eval-G    Abdomen/Flank Pain     Stated Complaint: Eval-G    Subjective:   HPI    Patient is a 33-year-old female with a history of diabetes hypertension end-stage renal disease on hemodialysis recently admitted for infected AV fistula which was removed surgically.  She is currently receiving 6 weeks of IV Ancef after dialysis.  She presents today for 2 days of dysuria urgency frequency and lower abdominal pain and pressure.  No nausea or vomiting.  No fevers or chills.  No diarrhea.  She has had some thin white nonodorous vaginal discharge.  She is not sexually active currently.  No vaginal itching    Objective:   Past Medical History:    Anxiety state    Arrhythmia    Congestive heart disease (HCC)    Depression    Diabetes (HCC)    Esophageal reflux    Essential hypertension    Hidradenitis    bilateral axilla    High blood pressure    High cholesterol    Kidney failure    Migraines    Polycystic ovarian syndrome    treated with Metformin    PONV (postoperative nausea and vomiting)    Renal disorder    Visual impairment    Wears glasses              Past Surgical History:   Procedure Laterality Date    Appendectomy      Other Bilateral     excision of axilla hydradenitis                Social History     Socioeconomic History    Marital status: Single    Number of children: 0   Occupational History    Occupation: [employer]    Tobacco Use    Smoking status: Never    Smokeless tobacco: Never   Vaping Use    Vaping status: Former   Substance and Sexual Activity    Alcohol use: No    Drug use: No     Social Determinants of Health     Food Insecurity: No Food Insecurity (6/10/2024)    Food Insecurity     Food Insecurity: Never true   Transportation Needs: No Transportation Needs (6/10/2024)    Transportation Needs     Lack of Transportation: No   Housing Stability: Low Risk  (6/10/2024)     Housing Stability     Housing Instability: No              Review of Systems    Positive for stated Chief Complaint: Eval-G and Abdomen/Flank Pain    Other systems are as noted in HPI.  Constitutional and vital signs reviewed.      All other systems reviewed and negative except as noted above.    Physical Exam     ED Triage Vitals [06/26/24 2158]   BP (!) 209/114   Pulse 73   Resp 18   Temp 98.7 °F (37.1 °C)   Temp src Temporal   SpO2 99 %   O2 Device None (Room air)       Current Vitals:   Vital Signs  BP: (!) 194/94  Pulse: 92  Resp: 16  Temp: 98.7 °F (37.1 °C)  Temp src: Temporal  MAP (mmHg): (!) 118    Oxygen Therapy  SpO2: 100 %  O2 Device: None (Room air)            Physical Exam  Vitals and nursing note reviewed.   Constitutional:       General: She is not in acute distress.     Appearance: Normal appearance. She is well-developed. She is not ill-appearing.   HENT:      Head: Normocephalic and atraumatic.      Mouth/Throat:      Mouth: Mucous membranes are moist.   Eyes:      Conjunctiva/sclera: Conjunctivae normal.      Pupils: Pupils are equal, round, and reactive to light.   Neck:      Vascular: No JVD.   Cardiovascular:      Rate and Rhythm: Normal rate and regular rhythm.      Heart sounds: Normal heart sounds. No murmur heard.  Pulmonary:      Effort: Pulmonary effort is normal.      Breath sounds: Normal breath sounds.   Abdominal:      General: Bowel sounds are normal. There is no distension.      Palpations: Abdomen is soft. There is no mass.      Tenderness: There is abdominal tenderness in the suprapubic area. There is no right CVA tenderness, left CVA tenderness, guarding or rebound.   Genitourinary:     Comments: Left upper arm healing well no erythema  Musculoskeletal:         General: Normal range of motion.      Cervical back: Normal range of motion and neck supple.   Skin:     General: Skin is warm and dry.      Capillary Refill: Capillary refill takes less than 2 seconds.      Findings: No  erythema or rash.   Neurological:      General: No focal deficit present.      Mental Status: She is alert and oriented to person, place, and time.      Deep Tendon Reflexes: Reflexes are normal and symmetric.   Psychiatric:         Judgment: Judgment normal.       Differential diagnosis includes UTI, vaginal infection, constipation, appendicitis colitis        ED Course     Labs Reviewed   COMP METABOLIC PANEL (14) - Abnormal; Notable for the following components:       Result Value    Potassium 3.4 (*)     BUN 44 (*)     Creatinine 8.29 (*)     BUN/CREA Ratio 5.3 (*)     Calculated Osmolality 304 (*)     eGFR-Cr 6 (*)     ALT <7 (*)     Bilirubin, Total 0.2 (*)     All other components within normal limits   URINALYSIS WITH CULTURE REFLEX - Abnormal; Notable for the following components:    Urine Color Colorless (*)     Glucose Urine 100 (*)     Blood Urine 1+ (*)     Protein Urine 300 (*)     Leukocyte Esterase Urine 75 (*)     WBC Urine 11-20 (*)     Bacteria Urine Rare (*)     Squamous Epi. Cells Few (*)     All other components within normal limits   CBC W/ DIFFERENTIAL - Abnormal; Notable for the following components:    RBC 2.86 (*)     HGB 8.3 (*)     HCT 25.4 (*)     All other components within normal limits   POCT PREGNANCY URINE - Normal   CBC WITH DIFFERENTIAL WITH PLATELET    Narrative:     The following orders were created for panel order CBC With Differential With Platelet.  Procedure                               Abnormality         Status                     ---------                               -----------         ------                     CBC W/ DIFFERENTIAL[[id number]]          Abnormal            Final result                 Please view results for these tests on the individual orders.   RAINBOW DRAW LAVENDER   RAINBOW DRAW LIGHT GREEN   RAINBOW DRAW BLUE   RAINBOW DRAW GOLD   URINE CULTURE, ROUTINE   CHLAMYDIA/GONOCOCCUS, KRISTAL   VAGINITIS VAGINOSIS PCR PANEL     EKG    Rate, intervals and  axes as noted on EKG Report.  Rate: 81  Rhythm: Sinus Rhythm  Reading: Nonspecific ST-T wave changes                          MDM      Pulse ox 100% on room air, normal                                   Medical Decision Making  Patient with mild leuk esterase and mild WBCs in urine, GC chlamydia and vaginosis culture sent  Discussed with infectious disease who saw patient in the hospital who felt 1 dose of ceftriaxone could be given and then wait for urine culture as she is still receiving Ancef IV after dialysis  Vital signs stable prior to discharge  Patient did have elevated blood pressure but did not take her evening medications.  Dose of hydralazine was given in the ED and she will take her carvedilol when she gets home no headache chest pain shortness of breath  Patient will follow-up with her doctor and return if symptoms worsen    Problems Addressed:  Dysuria: acute illness or injury  Elevated blood pressure reading: chronic illness or injury with exacerbation, progression, or side effects of treatment  Lower abdominal pain: acute illness or injury    Amount and/or Complexity of Data Reviewed  External Data Reviewed: notes.     Details: From infectious disease and discharged from hospital 1 week ago reviewed  Labs: ordered.  Radiology: ordered and independent interpretation performed.     Details: No free air other results per radiologist  ECG/medicine tests: ordered and independent interpretation performed. Decision-making details documented in ED Course.  Discussion of management or test interpretation with external provider(s): Case discussed with Dr. Gu with infectious disease who recommended 1 dose of IV ceftriaxone and wait for urine culture        Disposition and Plan     Clinical Impression:  1. Lower abdominal pain    2. Dysuria    3. Elevated blood pressure reading         Disposition:  Discharge  6/27/2024  3:19 am    Follow-up:  [facility] [place]  [id number]  [phone]    Schedule an appointment as soon as possible for a visit in 2 day(s)            Medications Prescribed:  Current Discharge Medication List

## 2024-06-27 NOTE — DISCHARGE INSTRUCTIONS
Return if increased pain fever vomiting  Continue outpatient IV antibiotics as previously prescribed  Follow-up with your doctor this week

## 2024-06-27 NOTE — ED INITIAL ASSESSMENT (HPI)
Patient presents to ED with abdominal and pelvic pain that started yesterday, worsening today. Patient endorses vaginal discharge, urinary frequency, and dysuria.

## 2024-06-29 RX ORDER — SULFAMETHOXAZOLE AND TRIMETHOPRIM 400; 80 MG/1; MG/1
1 TABLET ORAL 2 TIMES DAILY
Qty: 28 TABLET | Refills: 0 | Status: SHIPPED | OUTPATIENT
Start: 2024-06-29 | End: 2024-07-13

## 2024-06-29 NOTE — PROGRESS NOTES
ED Culture Callback Results Review    Pharmacist reviewed culture results from ED visit .    Final urine culture positive for Enterobacter cloacae complex that is not susceptible to previously prescribed  cefazolin  therapy.     A new prescription for bactrim was electronically sent to [facility] as discussed with Dr. Macdonald. The patient was contacted by phone, informed of the results, and educated regarding the change in therapy. The patient verbalized understanding of the treatment plan and all questions were answered.    Baldev Dennison, Jose   Emergency Medicine Pharmacist Specialist  06/29/24; 2:50 PM

## 2024-07-02 ENCOUNTER — OFFICE VISIT (OUTPATIENT)
Dept: CARDIOLOGY CLINIC | Facility: HOSPITAL | Age: 34
End: 2024-07-02
Attending: NURSE PRACTITIONER
Payer: COMMERCIAL

## 2024-07-02 VITALS
DIASTOLIC BLOOD PRESSURE: 91 MMHG | OXYGEN SATURATION: 99 % | TEMPERATURE: 98 F | HEART RATE: 72 BPM | BODY MASS INDEX: 36 KG/M2 | SYSTOLIC BLOOD PRESSURE: 185 MMHG | WEIGHT: 216.63 LBS | RESPIRATION RATE: 16 BRPM

## 2024-07-02 DIAGNOSIS — I10 ESSENTIAL HYPERTENSION: ICD-10-CM

## 2024-07-02 DIAGNOSIS — A41.9 SEPSIS, DUE TO UNSPECIFIED ORGANISM, UNSPECIFIED WHETHER ACUTE ORGAN DYSFUNCTION PRESENT (HCC): Primary | ICD-10-CM

## 2024-07-02 DIAGNOSIS — I50.9 HEART FAILURE, UNSPECIFIED HF CHRONICITY, UNSPECIFIED HEART FAILURE TYPE (HCC): ICD-10-CM

## 2024-07-02 DIAGNOSIS — N18.6 ESRD (END STAGE RENAL DISEASE) (HCC): ICD-10-CM

## 2024-07-02 PROCEDURE — 99213 OFFICE O/P EST LOW 20 MIN: CPT

## 2024-07-02 RX ORDER — ALBUTEROL SULFATE 90 UG/1
1 AEROSOL, METERED RESPIRATORY (INHALATION) EVERY 6 HOURS PRN
COMMUNITY

## 2024-07-02 NOTE — PATIENT INSTRUCTIONS
Have influenza vaccine if appropriate     Continue all your same medications     Call if having any dizziness, lightheadedness, heart racing, palpitations, chest pain, shortness of breath, coughing, swelling, weight gain, fever, chills or worsening symptoms.      Follow 2000 mg sodium restricted , heart healthy diet, limit daily fluids to 48-64 ounces per day      Follow up with the specialty care clinic as needed or as directed     [facility] V[facility] [phone] - Infectious disease    Please establish care with a cardiologist    Follow up with vascular surgery and infectious disease

## 2024-07-02 NOTE — PROGRESS NOTES
Specialty Care Clinic    Liu Walker Patient Status:  Outpatient    1990 MRN [id number]   Location [facility] [place] [facility] Ashleigh Walker is a 33 year old female who presents to clinic after recent hospitalization for sepsis.     Hospitalized 24-24 for sepsis. Originally admitted for concern for pneumonia but found to have infected AV graft. Seeing by infectious disease. S/p graft excision . Patient has ESRD on dialysis.     Problem List:  Sepsis  ESRD on HD  HTN  HLD  HFpEF     Subjective:  Patient arrives to clinic alone. Reports feeling better. Does admit to drinking a lot of fluids recently and knows her weight is up. She is going to dialysis today. Currently does not have a cardiologist. Denies shortness of breath. Denies cough. Denies chest pain, palpitations, lightheadedness, dizziness.     Review of Systems:  Constitutional: negative for fatigue, chills or fever  Respiratory: negative for cough,  negative hemoptysis and wheezing  Cardiovascular: negative for chest pain, exertional chest pressure/discomfort, near-syncope, orthopnea and palpitations  Gastrointestinal: negative for abdominal pain, diarrhea, melena, nausea and vomiting  Hematologic/lymphatic: negative  Musculoskeletal: negative for muscle weakness and myalgias    Objective:  Lab Results   Component Value Date/Time    WBC 6.7 2024 10:31 PM    HGB 8.3 (L) 2024 10:31 PM    HCT 25.4 (L) 2024 10:31 PM    .0 2024 10:31 PM    CREATSERUM 8.29 (H) 2024 10:31 PM    BUN 44 (H) 2024 10:31 PM     2024 10:31 PM    K 3.4 (L) 2024 10:31 PM     2024 10:31 PM    CO2 30.0 2024 10:31 PM    GLU 86 2024 10:31 PM    CA 8.7 2024 10:31 PM    ALB 4.6 2024 10:31 PM    ALKPHO 80 2024 10:31 PM    BILT 0.2 (L) 2024 10:31 PM    TP 8.1 2024 10:31 PM    AST 18 2024 10:31 PM    ALT  <7 (L) 06/26/2024 10:31 PM    PTT 31.8 02/17/2017 03:37 AM    TSH 2.910 02/21/2022 02:37 PM    LIP 69 12/30/2023 02:46 PM    DDIMER 2.89 (H) 01/03/2024 07:11 AM    MG 2.6 06/15/2024 06:53 AM    PHOS 7.4 (H) 06/18/2024 08:22 AM    TROP <0.045 12/03/2021 01:17 PM    B12 >2,000 (H) 06/18/2024 08:22 AM    PGLU 137 (H) 06/17/2024 09:59 PM       Clinical labs drawn by MA: BMP, CBC-Results reviewed with patient and family    BP (!) 185/91 (BP Location: Right arm)   Pulse 72   Temp 97.9 °F (36.6 °C)   Resp 16   Wt 216 lb 9.6 oz (98.2 kg)   LMP 06/14/2024 (Exact Date)   SpO2 99%   BMI 36.04 kg/m²     Clinic weights: 1) 216    General appearance: alert, appears stated age, and cooperative  Neck: no JVD  Lungs: diminished breath sounds bibasilar  Chest wall: no tenderness  Heart: regular rate and rhythm  Abdomen: soft, non-tender; bowel sounds normal; no masses,  no organomegaly  Extremities: extremities normal, atraumatic, no cyanosis or edema  Pulses: 2+ and symmetric  Neurologic: Grossly normal    Diagnostic Tests:  CXR    6 Minute Walk     Results at Rest  Resting SpO2 (minimum):   Resting HR (max):   Resting Oxygen Device:      Number of laps made:   Distance Ambulated (ft):         Recovery Results  Minutes required to return to resting level:   Recovery SpO2:   Recovery HR:   Recovery oxygen flow (liters per minute):      Repeat walk:      Exertion Scale:   Angina Scale:   Dyspnea Scale:       Vaccines:  Flu:  PNA:    Education:  Patient and family instructed at length regarding clinic procedures, hours, purpose of clinic visits, sodium restricted diet, low sodium foods, fluid restrictions, daily weights, medication regimen s/s of pneumonia and copd exacerbation and when to call APN/clinic.Patient and family receptive.      Assessment:  Sepsis  -source fistula graft --> removed 6/17  -follow with ID  -provided information with who to follow up with    ESRD  -on HD  -concern for hypervolemia, has dialysis  today    HFpEF  -concern for hypervolemia, dialysis today  -on bumex  -establish care with cardiologist    HTN  -on coreg    HLD  -on statin     Plan:    Have influenza vaccine if appropriate     Continue all your same medications     Call if having any dizziness, lightheadedness, heart racing, palpitations, chest pain, shortness of breath, coughing, swelling, weight gain, fever, chills or worsening symptoms.      Follow 2000 mg sodium restricted , heart healthy diet, limit daily fluids to 48-64 ounces per day      Follow up with the specialty care clinic as needed or as directed     [facility] [phone] - Infectious disease    Please establish care with a cardiologist        I spent 44 minutes with this patient providing counseling, coordination of care and education related specifically to heart failure.      Leatha Kaiser, APRN  7/2/2024

## 2024-07-02 NOTE — PROGRESS NOTES
Subjective:  Liu is here today and feels that she is back to her normal wellbeing. Denies cough. No SOB. Going to dialysis today.     Home equipment:   oxygen - no   IS - has one at home , acupella-  no ,  inhalers- albuterol PRN   Intervention:  6MW- no   Nebulizer treatment - no ,      Patient and medication assessed. Discussed with APN. Disease management teaching completed. Reviewed AVS instructions. Patient verbalized understanding.

## 2024-07-07 PROBLEM — A41.9 SEPSIS (HCC): Status: ACTIVE | Noted: 2024-07-07

## 2024-07-07 PROBLEM — N18.6 ESRD (END STAGE RENAL DISEASE) (HCC): Status: ACTIVE | Noted: 2024-07-07

## 2024-08-15 NOTE — PLAN OF CARE
O2 prn. P.o diuretics continues. Problem: Diabetes/Glucose Control  Goal: Glucose maintained within prescribed range  Description: INTERVENTIONS:  - Monitor Blood Glucose as ordered  - Assess for signs and symptoms of hyperglycemia and hypoglycemia  - Administer ordered medications to maintain glucose within target range  - Assess barriers to adequate nutritional intake and initiate nutrition consult as needed  - Instruct patient on self management of diabetes  Outcome: Progressing     Problem: Patient Centered Care  Goal: Patient preferences are identified and integrated in the patient's plan of care  Description: Interventions:  - What would you like us to know as we care for you?  Home alone    - Provide timely, complete, and accurate information to patient/family  - Incorporate patient and family knowledge, values, beliefs, and cultural backgrounds into the planning and delivery of care  - Encourage patient/family to participate in care and decision-making at the level they choose  - Honor patient and family perspectives and choices  Outcome: Progressing     Problem: PAIN - ADULT  Goal: Verbalizes/displays adequate comfort level or patient's stated pain goal  Description: INTERVENTIONS:  - Encourage pt to monitor pain and request assistance  - Assess pain using appropriate pain scale  - Administer analgesics based on type and severity of pain and evaluate response  - Implement non-pharmacological measures as appropriate and evaluate response  - Consider cultural and social influences on pain and pain management  - Manage/alleviate anxiety  - Utilize distraction and/or relaxation techniques  - Monitor for opioid side effects  - Notify MD/LIP if interventions unsuccessful or patient reports new pain  - Anticipate increased pain with activity and pre-medicate as appropriate  Outcome: Progressing     Problem: SAFETY ADULT - FALL  Goal: Free from fall injury  Description: INTERVENTIONS:  - Assess pt frequently for Pt will drop off form today   physical needs  - Identify cognitive and physical deficits and behaviors that affect risk of falls. - [place] fall precautions as indicated by assessment.  - Educate pt/family on patient safety including physical limitations  - Instruct pt to call for assistance with activity based on assessment  - Modify environment to reduce risk of injury  - Provide assistive devices as appropriate  - Consider OT/PT consult to assist with strengthening/mobility  - Encourage toileting schedule  Outcome: Progressing     Problem: DISCHARGE PLANNING  Goal: Discharge to home or other facility with appropriate resources  Description: INTERVENTIONS:  - Identify barriers to discharge w/pt and caregiver  - Include patient/family/discharge partner in discharge planning  - Arrange for needed discharge resources and transportation as appropriate  - Identify discharge learning needs (meds, wound care, etc)  - Arrange for interpreters to assist at discharge as needed  - Consider post-discharge preferences of patient/family/discharge partner  - Complete POLST form as appropriate  - Assess patient's ability to be responsible for managing their own health  - Refer to Case Management Department for coordinating discharge planning if the patient needs post-hospital services based on physician/LIP order or complex needs related to functional status, cognitive ability or social support system  Outcome: Progressing     Problem: CARDIOVASCULAR - ADULT  Goal: Maintains optimal cardiac output and hemodynamic stability  Description: INTERVENTIONS:  - Monitor vital signs, rhythm, and trends  - Monitor for bleeding, hypotension and signs of decreased cardiac output  - Evaluate effectiveness of vasoactive medications to optimize hemodynamic stability  - Monitor arterial and/or venous puncture sites for bleeding and/or hematoma  - Assess quality of pulses, skin color and temperature  - Assess for signs of decreased coronary artery perfusion - ex.  Angina  - Evaluate fluid balance, assess for edema, trend weights  Outcome: Progressing  Goal: Absence of cardiac arrhythmias or at baseline  Description: INTERVENTIONS:  - Continuous cardiac monitoring, monitor vital signs, obtain 12 lead EKG if indicated  - Evaluate effectiveness of antiarrhythmic and heart rate control medications as ordered  - Initiate emergency measures for life threatening arrhythmias  - Monitor electrolytes and administer replacement therapy as ordered  Outcome: Progressing

## 2024-08-23 ENCOUNTER — HOSPITAL ENCOUNTER (OUTPATIENT)
Dept: ULTRASOUND IMAGING | Facility: HOSPITAL | Age: 34
Discharge: HOME | End: 2024-08-23
Payer: COMMERCIAL

## 2024-08-23 DIAGNOSIS — Z99.2 ESRD ON DIALYSIS (HCC): ICD-10-CM

## 2024-08-23 DIAGNOSIS — N18.6 ESRD ON DIALYSIS (HCC): ICD-10-CM

## 2024-08-23 DIAGNOSIS — T82.7XXA INFECTION OF AV GRAFT FOR DIALYSIS (HCC): ICD-10-CM

## 2024-08-23 DIAGNOSIS — Z48.02 ENCOUNTER FOR REMOVAL OF SUTURES: ICD-10-CM

## 2024-08-23 PROCEDURE — 93970 EXTREMITY STUDY: CPT

## 2024-09-17 RX ORDER — EMPAGLIFLOZIN 25 MG/1
1 TABLET, FILM COATED ORAL DAILY
COMMUNITY
End: 2024-09-17

## 2024-09-25 RX ORDER — HYDRALAZINE HYDROCHLORIDE 25 MG/1
50 TABLET, FILM COATED ORAL 3 TIMES DAILY
COMMUNITY

## 2024-10-01 NOTE — DISCHARGE INSTRUCTIONS
HOME INSTRUCTIONS  [facility] HOME CARE INSTRUCTIONS: POST-OP ANESTHESIA  The medication that you received for sedation or general anesthesia can last up to 24 hours. Your judgment and reflexes may be altered, even if you feel like your normal self.      We Recommend:   Do not drive any motor vehicle or bicycle   Avoid mowing the lawn, playing sports, or working with power tools/applicances (power saws, electric knives or mixers)   That you have someone stay with you on your first night home   Do not drink alcohol or take sleeping pills or tranquilizers   Do not sign legal documents within 24 hours of your procedure   If you had a nerve block for your surgery, take extra care not to put any pressure on your arm or hand for 24 hours    It is normal:  For you to have a sore throat if you had a breathing tube during surgery (while you were asleep!). The sore throat should get better within 48 hours. You can gargle with warm salt water (1/2 tsp in 4 oz warm water) or use a throat lozenge for comfort  To feel muscle aches or soreness especially in the abdomen, chest or neck. The achy feeling should go away in the next 24 hours  To feel weak, sleepy or \"wiped out\". Your should start feeling better in the next 24 hours.   To experience mild discomforts such as sore lip or tongue, headache, cramps, gas pains or a bloated feeling in your abdomen.   To experience mild back pain or soreness for a day or two if you had spinal or epidural anesthesia.   If you had laparoscopic surgery, to feel shoulder pain or discomfort on the day of surgery.   For some patients to have nausea after surgery/anesthesia    If you feel nausea or experience vomiting:   Try to move around less.   Eat less than usual or drink only liquids until the next morning   Nausea should resolve in about 24 hours    If you have a problem when you are at home:    Call your surgeons office   Discharge Instructions: After Your Surgery  You’ve just had surgery. During  surgery, you were given medicine called anesthesia to keep you relaxed and free of pain. After surgery, you may have some pain or nausea. This is common. Here are some tips for feeling better and getting well after surgery.   Going home  Your healthcare provider will show you how to take care of yourself when you go home. They'll also answer your questions. Have an adult family member or friend drive you home. For the first 24 hours after your surgery:   Don't drive or use heavy equipment.  Don't make important decisions or sign legal papers.  Take medicines as directed.  Don't drink alcohol.  Have someone stay with you, if needed. They can watch for problems and help keep you safe.  Be sure to go to all follow-up visits with your healthcare provider. And rest after your surgery for as long as your provider tells you to.   Coping with pain  If you have pain after surgery, pain medicine will help you feel better. Take it as directed, before pain becomes severe. Also, ask your healthcare provider or pharmacist about other ways to control pain. This might be with heat, ice, or relaxation. And follow any other instructions your surgeon or nurse gives you.      Stay on schedule with your medicine.     Tips for taking pain medicine  To get the best relief possible, remember these points:   Pain medicines can upset your stomach. Taking them with a little food may help.  Most pain relievers taken by mouth need at least 20 to 30 minutes to start to work.  Don't wait till your pain becomes severe before you take your medicine. Try to time your medicine so that you can take it before starting an activity. This might be before you get dressed, go for a walk, or sit down for dinner.  Constipation is a common side effect of some pain medicines. Call your healthcare provider before taking any medicines such as laxatives or stool softeners to help ease constipation. Also ask if you should skip any foods. Drinking lots of fluids and  eating foods such as fruits and vegetables that are high in fiber can also help. Remember, don't take laxatives unless your surgeon has prescribed them.  Drinking alcohol and taking pain medicine can cause dizziness and slow your breathing. It can even be deadly. Don't drink alcohol while taking pain medicine.  Pain medicine can make you react more slowly to things. Don't drive or run machinery while taking pain medicine.  Your healthcare provider may tell you to take acetaminophen to help ease your pain. Ask them how much you're supposed to take each day. Acetaminophen or other pain relievers may interact with your prescription medicines or other over-the-counter (OTC) medicines. Some prescription medicines have acetaminophen and other ingredients in them. Using both prescription and OTC acetaminophen for pain can cause you to accidentally overdose. Read the labels on your OTC medicines with care. This will help you to clearly know the list of ingredients, how much to take, and any warnings. It may also help you not take too much acetaminophen. If you have questions or don't understand the information, ask your pharmacist or healthcare provider to explain it to you before you take the OTC medicine.   Managing nausea  Some people have an upset stomach (nausea) after surgery. This is often because of anesthesia, pain, or pain medicine, less movement of food in the stomach, or the stress of surgery. These tips will help you handle nausea and eat healthy foods as you get better. If you were on a special food plan before surgery, ask your healthcare provider if you should follow it while you get better. Check with your provider on how your eating should progress. It may depend on the surgery you had. These general tips may help:   Don't push yourself to eat. Your body will tell you when to eat and how much.  Start off with clear liquids and soup. They're easier to digest.  Next try semi-solid foods as you feel ready.  These include mashed potatoes, applesauce, and gelatin.  Slowly move to solid foods. Don’t eat fatty, rich, or spicy foods at first.  Don't force yourself to have 3 large meals a day. Instead eat smaller amounts more often.  Take pain medicines with a small amount of solid food, such as crackers or toast. This helps prevent nausea.  When to call your healthcare provider  Call your healthcare provider right away if you have any of these:   You still have too much pain, or the pain gets worse, after taking the medicine. The medicine may not be strong enough. Or there may be a complication from the surgery.  You feel too sleepy, dizzy, or groggy. The medicine may be too strong.  Side effects such as nausea or vomiting. Your healthcare provider may advise taking other medicines to .  Skin changes such as rash, itching, or hives. This may mean you have an allergic reaction. Your provider may advise taking other medicines.  The incision looks different (for instance, part of it opens up).  Bleeding or fluid leaking from the incision site, and weren't told to expect that.  Fever of 100.4°F (38°C) or higher, or as directed by your provider.  Call 911  Call 911 right away if you have:   Trouble breathing  Facial swelling    If you have obstructive sleep apnea   You were given anesthesia medicine during surgery to keep you comfortable and free of pain. After surgery, you may have more apnea spells because of this medicine and other medicines you were given. The spells may last longer than normal.    At home:  Keep using the continuous positive airway pressure (CPAP) device when you sleep. Unless your healthcare provider tells you not to, use it when you sleep, day or night. CPAP is a common device used to treat obstructive sleep apnea.  Talk with your provider before taking any pain medicine, muscle relaxants, or sedatives. Your provider will tell you about the possible dangers of taking these medicines.  Contact your  provider if your sleeping changes a lot even when taking medicines as directed.  Lidia last reviewed this educational content on 10/1/2021  © 6933-5634 [employer]. All rights reserved. This information is not intended as a substitute for professional medical care. Always follow your healthcare professional's instructions.      You may shower with mild soap and water starting on Friday. Wash the incision gently and pat dry; do not scrub.   No soaking in bath or pool for 2 weeks.    You should resume all home medications as previously taking unless otherwise instructed.    Take Tramadol as needed for pain.    No heavy lifting or straining for 2 weeks. You may then resume to normal activity.    Drink plenty of fluids to stay well hydrated.     You will need to followup with Dr. Huddleston in the vascular clinic in 2 weeks    If you have any fevers, chills, chest pain, shortness of breath, drainage, swelling or bleeding, please call the office in order to return to clinic sooner for evaluation.

## 2024-10-02 ENCOUNTER — HOSPITAL ENCOUNTER (OUTPATIENT)
Facility: HOSPITAL | Age: 34
Setting detail: HOSPITAL OUTPATIENT SURGERY
Discharge: HOME | End: 2024-10-02
Attending: SURGERY | Admitting: SURGERY
Payer: COMMERCIAL

## 2024-10-02 ENCOUNTER — ANESTHESIA EVENT (OUTPATIENT)
Dept: SURGERY | Facility: HOSPITAL | Age: 34
End: 2024-10-02
Payer: COMMERCIAL

## 2024-10-02 ENCOUNTER — ANESTHESIA (OUTPATIENT)
Dept: SURGERY | Facility: HOSPITAL | Age: 34
End: 2024-10-02
Payer: COMMERCIAL

## 2024-10-02 VITALS
BODY MASS INDEX: 36.65 KG/M2 | DIASTOLIC BLOOD PRESSURE: 66 MMHG | SYSTOLIC BLOOD PRESSURE: 109 MMHG | RESPIRATION RATE: 12 BRPM | OXYGEN SATURATION: 98 % | WEIGHT: 220 LBS | HEART RATE: 81 BPM | HEIGHT: 65 IN | TEMPERATURE: 99 F

## 2024-10-02 DIAGNOSIS — N18.6 ESRD (END STAGE RENAL DISEASE) (HCC): Primary | ICD-10-CM

## 2024-10-02 LAB
B-HCG UR QL: NEGATIVE
POTASSIUM SERPL-SCNC: 3.8 MMOL/L (ref 3.5–5.1)

## 2024-10-02 PROCEDURE — 81025 URINE PREGNANCY TEST: CPT

## 2024-10-02 PROCEDURE — 84132 ASSAY OF SERUM POTASSIUM: CPT | Performed by: SURGERY

## 2024-10-02 PROCEDURE — 03180ZD BYPASS LEFT BRACHIAL ARTERY TO UPPER ARM VEIN, OPEN APPROACH: ICD-10-PCS | Performed by: SURGERY

## 2024-10-02 PROCEDURE — 64415 NJX AA&/STRD BRCH PLXS IMG: CPT | Performed by: SURGERY

## 2024-10-02 RX ORDER — SODIUM CHLORIDE, SODIUM LACTATE, POTASSIUM CHLORIDE, CALCIUM CHLORIDE 600; 310; 30; 20 MG/100ML; MG/100ML; MG/100ML; MG/100ML
INJECTION, SOLUTION INTRAVENOUS CONTINUOUS
Status: DISCONTINUED | OUTPATIENT
Start: 2024-10-02 | End: 2024-10-02

## 2024-10-02 RX ORDER — DEXAMETHASONE SODIUM PHOSPHATE 4 MG/ML
VIAL (ML) INJECTION AS NEEDED
Status: DISCONTINUED | OUTPATIENT
Start: 2024-10-02 | End: 2024-10-02 | Stop reason: SURG

## 2024-10-02 RX ORDER — HYDROMORPHONE HYDROCHLORIDE 1 MG/ML
0.4 INJECTION, SOLUTION INTRAMUSCULAR; INTRAVENOUS; SUBCUTANEOUS EVERY 5 MIN PRN
Status: DISCONTINUED | OUTPATIENT
Start: 2024-10-02 | End: 2024-10-02

## 2024-10-02 RX ORDER — METOCLOPRAMIDE HYDROCHLORIDE 5 MG/ML
10 INJECTION INTRAMUSCULAR; INTRAVENOUS ONCE
Status: COMPLETED | OUTPATIENT
Start: 2024-10-02 | End: 2024-10-02

## 2024-10-02 RX ORDER — LIDOCAINE HYDROCHLORIDE 10 MG/ML
INJECTION, SOLUTION INFILTRATION; PERINEURAL
Status: COMPLETED | OUTPATIENT
Start: 2024-10-02 | End: 2024-10-02

## 2024-10-02 RX ORDER — ROPIVACAINE HYDROCHLORIDE 5 MG/ML
INJECTION, SOLUTION EPIDURAL; INFILTRATION; PERINEURAL
Status: COMPLETED | OUTPATIENT
Start: 2024-10-02 | End: 2024-10-02

## 2024-10-02 RX ORDER — HEPARIN SODIUM 1000 [USP'U]/ML
INJECTION, SOLUTION INTRAVENOUS; SUBCUTANEOUS AS NEEDED
Status: DISCONTINUED | OUTPATIENT
Start: 2024-10-02 | End: 2024-10-02 | Stop reason: SURG

## 2024-10-02 RX ORDER — TRAMADOL HYDROCHLORIDE 50 MG/1
50 TABLET ORAL EVERY 6 HOURS PRN
Qty: 20 TABLET | Refills: 0 | Status: SHIPPED | OUTPATIENT
Start: 2024-10-02

## 2024-10-02 RX ORDER — MIDAZOLAM HYDROCHLORIDE 1 MG/ML
INJECTION INTRAMUSCULAR; INTRAVENOUS
Status: COMPLETED | OUTPATIENT
Start: 2024-10-02 | End: 2024-10-02

## 2024-10-02 RX ORDER — PROTAMINE SULFATE 10 MG/ML
INJECTION, SOLUTION INTRAVENOUS AS NEEDED
Status: DISCONTINUED | OUTPATIENT
Start: 2024-10-02 | End: 2024-10-02 | Stop reason: SURG

## 2024-10-02 RX ORDER — MORPHINE SULFATE 4 MG/ML
4 INJECTION, SOLUTION INTRAMUSCULAR; INTRAVENOUS EVERY 10 MIN PRN
Status: DISCONTINUED | OUTPATIENT
Start: 2024-10-02 | End: 2024-10-02

## 2024-10-02 RX ORDER — ACETAMINOPHEN 500 MG
1000 TABLET ORAL ONCE
Status: COMPLETED | OUTPATIENT
Start: 2024-10-02 | End: 2024-10-02

## 2024-10-02 RX ORDER — METOCLOPRAMIDE 10 MG/1
10 TABLET ORAL ONCE
Status: COMPLETED | OUTPATIENT
Start: 2024-10-02 | End: 2024-10-02

## 2024-10-02 RX ORDER — METOPROLOL TARTRATE 25 MG/1
25 TABLET, FILM COATED ORAL ONCE AS NEEDED
Status: DISCONTINUED | OUTPATIENT
Start: 2024-10-02 | End: 2024-10-02 | Stop reason: HOSPADM

## 2024-10-02 RX ORDER — ONDANSETRON 2 MG/ML
INJECTION INTRAMUSCULAR; INTRAVENOUS AS NEEDED
Status: DISCONTINUED | OUTPATIENT
Start: 2024-10-02 | End: 2024-10-02 | Stop reason: SURG

## 2024-10-02 RX ORDER — MORPHINE SULFATE 4 MG/ML
2 INJECTION, SOLUTION INTRAMUSCULAR; INTRAVENOUS EVERY 10 MIN PRN
Status: DISCONTINUED | OUTPATIENT
Start: 2024-10-02 | End: 2024-10-02

## 2024-10-02 RX ORDER — MORPHINE SULFATE 10 MG/ML
6 INJECTION, SOLUTION INTRAMUSCULAR; INTRAVENOUS EVERY 10 MIN PRN
Status: DISCONTINUED | OUTPATIENT
Start: 2024-10-02 | End: 2024-10-02

## 2024-10-02 RX ORDER — HYDROMORPHONE HYDROCHLORIDE 1 MG/ML
0.2 INJECTION, SOLUTION INTRAMUSCULAR; INTRAVENOUS; SUBCUTANEOUS EVERY 5 MIN PRN
Status: DISCONTINUED | OUTPATIENT
Start: 2024-10-02 | End: 2024-10-02

## 2024-10-02 RX ORDER — TRAMADOL HYDROCHLORIDE 50 MG/1
50 TABLET ORAL EVERY 6 HOURS SCHEDULED
Status: DISCONTINUED | OUTPATIENT
Start: 2024-10-02 | End: 2024-10-02

## 2024-10-02 RX ORDER — GLYCOPYRROLATE 0.2 MG/ML
INJECTION, SOLUTION INTRAMUSCULAR; INTRAVENOUS AS NEEDED
Status: DISCONTINUED | OUTPATIENT
Start: 2024-10-02 | End: 2024-10-02 | Stop reason: SURG

## 2024-10-02 RX ORDER — FAMOTIDINE 10 MG/ML
20 INJECTION, SOLUTION INTRAVENOUS ONCE
Status: COMPLETED | OUTPATIENT
Start: 2024-10-02 | End: 2024-10-02

## 2024-10-02 RX ORDER — FAMOTIDINE 20 MG/1
20 TABLET, FILM COATED ORAL ONCE
Status: COMPLETED | OUTPATIENT
Start: 2024-10-02 | End: 2024-10-02

## 2024-10-02 RX ORDER — SODIUM CHLORIDE 9 MG/ML
INJECTION, SOLUTION INTRAVENOUS CONTINUOUS
Status: DISCONTINUED | OUTPATIENT
Start: 2024-10-02 | End: 2024-10-02

## 2024-10-02 RX ORDER — NALOXONE HYDROCHLORIDE 0.4 MG/ML
0.08 INJECTION, SOLUTION INTRAMUSCULAR; INTRAVENOUS; SUBCUTANEOUS AS NEEDED
Status: DISCONTINUED | OUTPATIENT
Start: 2024-10-02 | End: 2024-10-02

## 2024-10-02 RX ORDER — HYDROMORPHONE HYDROCHLORIDE 1 MG/ML
0.6 INJECTION, SOLUTION INTRAMUSCULAR; INTRAVENOUS; SUBCUTANEOUS EVERY 5 MIN PRN
Status: DISCONTINUED | OUTPATIENT
Start: 2024-10-02 | End: 2024-10-02

## 2024-10-02 RX ORDER — DEXAMETHASONE SODIUM PHOSPHATE 10 MG/ML
INJECTION, SOLUTION INTRAMUSCULAR; INTRAVENOUS
Status: COMPLETED | OUTPATIENT
Start: 2024-10-02 | End: 2024-10-02

## 2024-10-02 RX ADMIN — DEXAMETHASONE SODIUM PHOSPHATE 4 MG: 4 MG/ML VIAL (ML) INJECTION at 12:35:00

## 2024-10-02 RX ADMIN — PROTAMINE SULFATE 30 MG: 10 INJECTION, SOLUTION INTRAVENOUS at 13:33:00

## 2024-10-02 RX ADMIN — HEPARIN SODIUM 7000 UNITS: 1000 INJECTION, SOLUTION INTRAVENOUS; SUBCUTANEOUS at 13:04:00

## 2024-10-02 RX ADMIN — ONDANSETRON 4 MG: 2 INJECTION INTRAMUSCULAR; INTRAVENOUS at 12:35:00

## 2024-10-02 RX ADMIN — DEXAMETHASONE SODIUM PHOSPHATE 10 MG: 10 INJECTION, SOLUTION INTRAMUSCULAR; INTRAVENOUS at 12:08:00

## 2024-10-02 RX ADMIN — ROPIVACAINE HYDROCHLORIDE 30 ML: 5 INJECTION, SOLUTION EPIDURAL; INFILTRATION; PERINEURAL at 12:08:00

## 2024-10-02 RX ADMIN — SODIUM CHLORIDE: 9 INJECTION, SOLUTION INTRAVENOUS at 12:22:00

## 2024-10-02 RX ADMIN — LIDOCAINE HYDROCHLORIDE 5 ML: 10 INJECTION, SOLUTION INFILTRATION; PERINEURAL at 12:08:00

## 2024-10-02 RX ADMIN — GLYCOPYRROLATE 0.2 MG: 0.2 INJECTION, SOLUTION INTRAMUSCULAR; INTRAVENOUS at 12:35:00

## 2024-10-02 RX ADMIN — MIDAZOLAM HYDROCHLORIDE 2 MG: 1 INJECTION INTRAMUSCULAR; INTRAVENOUS at 12:08:00

## 2024-10-02 NOTE — ANESTHESIA PROCEDURE NOTES
Peripheral Block    Date/Time: 10/2/2024 12:08 PM    Performed by: Roly Blackwell MD  Authorized by: Roly Blackwell MD      General Information and Staff    Start Time:  10/2/2024 12:08 PM  End Time:  10/2/2024 12:13 PM  Anesthesiologist:  Roly Blackwell MD  Performed by:  Anesthesiologist  Patient Location:  PACU    Block Placement: Pre Induction  Site Identification: real time ultrasound guided, nerve stimulator, surface landmarks and image stored and retrievable    Block site/laterality marked before start: site marked  Reason for Block: at surgeon's request, post-op pain management and surgical anesthesia    Preanesthetic Checklist: 2 patient identifers, IV checked, risks and benefits discussed, monitors and equipment checked, pre-op evaluation, timeout performed, anesthesia consent, sterile technique used, no prohibitive neurological deficits and no local skin infection at insertion site      Procedure Details    Patient Position:  Sitting  Prep: Betadine and patient draped    Monitoring:  Heart rate, cardiac monitor, continuous pulse ox and blood pressure cuff  Block Type:  Supraclavicular  Laterality:  Right  Injection Technique:  Single-shot    Needle    Needle Type:  Echogenic  Needle Gauge:  22 G  Needle Length:  50 mm  Needle Localization:  Anatomical landmarks, nerve stimulator and ultrasound guidance  Reason for Ultrasound Use: appropriate spread of the medication was noted in real time and no ultrasound evidence of intravascular and/or intraneural injection    Nerve Stimulator: 0.5 amps  Muscle Twitch Response: distal twitch    Needle Insertion Depth:  5    Assessment    Injection Assessment:  Good spread noted, incremental injection, local visualized surrounding nerve on ultrasound, low pressure, negative aspiration for heme, negative resistance, no pain on injection and transient paresthesias  Paresthesia Pain:  None  Heart Rate Change: No    - Patient tolerated block procedure well  without evidence of immediate block related complications.     Medications  10/2/2024 12:08 PM  midazolam (VERSED)injection 2mg/2ml - Intravenous   2 mg - 10/2/2024 12:08:00 PM  lidocaine injection 1% - Intradermal   5 mL - 10/2/2024 12:08:00 PM  ropivacaine (NAROPIN) injection 0.5% - Infiltration   30 mL - 10/2/2024 12:08:00 PM  dexamethasone (DECADRON) PF injection 10 mg/ml - Injection   10 mg - 10/2/2024 12:08:00 PM    Additional Comments

## 2024-10-02 NOTE — ANESTHESIA PREPROCEDURE EVALUATION
Anesthesia PreOp Note    HPI:     Liu Walker is a 34 year old female who presents for preoperative consultation requested by: Peg Huddleston MD    Date of Surgery: 10/2/2024    Procedure(s):  Left upper extremity arteriorvenous fistula creation versus graft  Indication: ESRD (End stage renal disease) on dyalisis    Relevant Problems   No relevant active problems       NPO:  Last Liquid Consumption Date: 10/01/24  Last Liquid Consumption Time: 2130  Last Solid Consumption Date: 10/01/24  Last Solid Consumption Time: 2130  Last Liquid Consumption Date: 10/01/24          History Review:  Patient Active Problem List    Diagnosis Date Noted    ESRD (end stage renal disease) (Formerly [facility]) 07/07/2024    Sepsis (Formerly [facility]) 07/07/2024    Community acquired pneumonia, unspecified laterality 06/09/2024    Hypoxia 01/03/2024    Acute renal failure superimposed on chronic kidney disease, unspecified acute renal failure type, unspecified CKD stage ([facility]) 01/03/2024    Heart failure, unspecified HF chronicity, unspecified heart failure type (Formerly [facility]) 01/03/2024    Anemia 12/30/2023    Acute renal failure (ARF) (Formerly [facility]) 12/30/2023    Acute kidney injury (Formerly [facility]) 12/30/2023    Abdominal pain of unknown etiology 12/30/2023    Urinary tract infection with hematuria, site unspecified 12/30/2023    Acute on chronic renal insufficiency 07/08/2022    Peripheral edema 07/08/2022    LETHA (acute kidney injury) (Formerly [facility]) 02/21/2022    Hypertension, unspecified type 02/21/2022    NSTEMI (non-ST elevated myocardial infarction) (Formerly [facility]) 02/21/2022    Essential hypertension 03/29/2018    Migraine without status migrainosus, not intractable, unspecified migraine type 03/29/2018    Severe depressed bipolar I disorder without psychotic features (Formerly [facility])     Elevated troponin 02/16/2017    Dizziness 02/16/2017    Bipolar depression (Formerly [facility]) 10/07/2016    Acute chest pain 10/05/2016    Hypertensive urgency 10/05/2016    Hypokalemia 10/05/2016       Past Medical History:    Anxiety  state    Arrhythmia    Congestive heart disease (HCC)    Depression    Esophageal reflux    Essential hypertension    Hidradenitis    bilateral axilla    High blood pressure    High cholesterol    Kidney failure    Migraines    Polycystic ovarian syndrome    treated with Metformin    PONV (postoperative nausea and vomiting)    Renal disorder    Sleep apnea    Visual impairment    Wears glasses       Past Surgical History:   Procedure Laterality Date    Appendectomy      Av fistula revision, open      Other Bilateral     excision of axilla hydradenitis       Medications Prior to Admission   Medication Sig Dispense Refill Last Dose    hydrALAZINE 25 MG Oral Tab Take 2 tablets (50 mg total) by mouth 3 (three) times daily. 2 25mg tablets to equal 50   10/2/2024 at 0900    albuterol 108 (90 Base) MCG/ACT Inhalation Aero Soln Inhale 1 puff into the lungs every 6 (six) hours as needed for Wheezing.   Past Week    bumetanide 2 MG Oral Tab Take 1 tablet (2 mg total) by mouth daily.   10/1/2024 at 0800    acetaminophen 500 MG Oral Tab Take 1 tablet (500 mg total) by mouth every 6 (six) hours as needed for Fever (equal to or greater than 100.4).   10/1/2024 at 0800    carvedilol 25 MG Oral Tab Take 1 tablet (25 mg total) by mouth 2 (two) times daily.   10/2/2024 at 0900    atorvastatin 40 MG Oral Tab Take 1 tablet (40 mg total) by mouth nightly. 30 tablet 0 10/1/2024 at 2100    [] sulfamethoxazole-trimethoprim (BACTRIM) 400-80 MG Oral Tab Take 1 tablet by mouth 2 (two) times daily for 14 days. 28 tablet 0     ceFAZolin 2 g/10mL Intravenous Solution Cefazolin 2 gm IVPB post HD through 24, CBC CMP CRP weekly while on IV abx 150 mL 0     EPINEPHrine 0.3 MG/0.3ML Injection Solution Auto-injector    Unknown     Current Facility-Administered Medications Ordered in Epic   Medication Dose Route Frequency Provider Last Rate Last Admin    metoprolol tartrate (Lopressor) tab 25 mg  25 mg Oral Once PRN Peg Huddleston MD         sodium chloride 0.9% infusion   Intravenous Continuous Peg Huddleston MD 20 mL/hr at 10/02/24 0926 New Bag at 10/02/24 0926    ceFAZolin (Ancef) 2g in 10mL IV syringe premix  2 g Intravenous Once Peg Huddleston MD         No current Epic-ordered outpatient medications on file.       Allergies   Allergen Reactions    Citrus C Vit [Ascorbate] HIVES    Grapefruit HIVES    Other OTHER (SEE COMMENTS)     Muscle relaxants-paralysis    Latex RASH    Shellfish-Derived Products Tightness in Throat       Family History   Problem Relation Age of Onset    Hypertension Father     Lipids Father     Obesity Father     Diabetes Mother     Hypertension Mother     Lipids Mother     Obesity Mother     Psychiatric Sister     Psychiatric Brother      Social History     Socioeconomic History    Marital status: Single    Number of children: 0   Occupational History    Occupation: [employer]    Tobacco Use    Smoking status: Never    Smokeless tobacco: Never   Vaping Use    Vaping status: Former   Substance and Sexual Activity    Alcohol use: No    Drug use: No       Available pre-op labs reviewed.  Lab Results   Component Value Date    URINEPREG Negative 10/02/2024     Lab Results   Component Value Date    K 3.8 10/02/2024          Vital Signs:  Body mass index is 36.61 kg/m².   height is 1.651 m (5' 5\") and weight is 99.8 kg (220 lb). Her oral temperature is 97.3 °F (36.3 °C). Her blood pressure is 167/91 (abnormal) and her pulse is 70. Her respiration is 16 and oxygen saturation is 99%.   Vitals:    09/25/24 1544 10/02/24 0927   BP:  (!) 167/91   Pulse:  70   Resp:  16   Temp:  97.3 °F (36.3 °C)   TempSrc:  Oral   SpO2:  99%   Weight: 88.9 kg (196 lb) 99.8 kg (220 lb)   Height: 1.651 m (5' 5\")         Anesthesia Evaluation     Patient summary reviewed and Nursing notes reviewed    History of anesthetic complications   Airway   Mallampati: II  TM distance: >3 FB  Neck ROM: full  Dental - Dentition appears grossly intact      Pulmonary - normal exam   (+) sleep apnea  Cardiovascular - normal exam  (+) hypertension, CHF    Neuro/Psych    (+)  anxiety/panic attacks,  bipolar disorder, depression      GI/Hepatic/Renal    (+) GERD, chronic renal disease ESRD and dialysisLiver disease: [place] Thu Sat.    Endo/Other      Comments: obesity  Abdominal                  Anesthesia Plan:   ASA:  3  Plan:   General  Airway:  LMA  Post-op Pain Management: Local and IV analgesics  Informed Consent Plan and Risks Discussed With:  Patient  Discussed plan with:  Surgeon      I have informed Liu Walker and/or legal guardian or family member of the nature of the anesthetic plan, benefits, risks including possible dental damage if relevant, major complications, and any alternative forms of anesthetic management.   All of the patient's questions were answered to the best of my ability. The patient desires the anesthetic management as planned.  Roly Blackwell MD  10/2/2024 9:47 AM  Present on Admission:  **None**

## 2024-10-02 NOTE — H&P
Peg Huddleston MD.  [facility]   Vascular and Endovascular Surgery     VASCULAR SURGERY   CONSULT NOTE      Name: Liu Walker   :   1990  [id number]     10/2/2024       REFERRING PHYSICIAN: Peg Huddleston MD  PRIMARY CARE PHYSICIAN:  Ashleigh Murray    HISTORY OF PRESENT ILLNESS: Liu is a 34 year old female whom I have been asked to see regarding creation of permanent dialysis access. She is right handed and currently has a RIJ permacath. She presents today for fistula creation. She has not had any changes in her health since she last saw me in the office.     PAST MEDICAL HISTORY:    Past Medical History:    Anxiety state    Arrhythmia    Congestive heart disease (HCC)    Depression    Esophageal reflux    Essential hypertension    Hidradenitis    bilateral axilla    High blood pressure    High cholesterol    Kidney failure    Migraines    Polycystic ovarian syndrome    treated with Metformin    PONV (postoperative nausea and vomiting)    Renal disorder    Sleep apnea    Visual impairment    Wears glasses     PAST SURGICAL HISTORY:   Past Surgical History:   Procedure Laterality Date    Appendectomy      Av fistula revision, open      Other Bilateral     excision of axilla hydradenitis     MEDICATIONS:     Current Facility-Administered Medications:     metoprolol tartrate (Lopressor) tab 25 mg, 25 mg, Oral, Once PRN    sodium chloride 0.9% infusion, , Intravenous, Continuous    ceFAZolin (Ancef) 2g in 10mL IV syringe premix, 2 g, Intravenous, Once    ALLERGIES:    She is allergic to citrus c vit [ascorbate], grapefruit, other, latex, and shellfish-derived products.    SOCIAL HISTORY:    Patient  reports that she has never smoked. She has never used smokeless tobacco. She reports that she does not drink alcohol and does not use drugs.    FAMILY HISTORY:    Patient's family history includes Diabetes in her mother; Hypertension in her father and mother; Lipids in her father and  mother; Obesity in her father and mother; Psychiatric in her brother and sister.    ROS:    Comprehensive ROS reviewed and negative except for what's stated above.  Including negative for chest pain, shortness of breath, syncope.     EXAM:  BP (!) 167/91 (BP Location: Right arm)   Pulse 70   Temp 97.3 °F (36.3 °C) (Oral)   Resp 16   Ht 5' 5\" (1.651 m)   Wt 220 lb (99.8 kg)   LMP 09/12/2024 (Approximate)   SpO2 99%   BMI 36.61 kg/m²   GENERAL: alert and oriented x3, in no apparent distress  PSYCH: Normal mood and affect  NEURO: Cranial nerves grossly intact. No sensory or motor deficits  HEENT: Ears and throat are clear  NECK: Supple  RESPIRATORY: Normal work of breathing  CARDIO: RRR   ABDOMEN: Soft, non-tender, non-distended  BACK: Normal, no tenderness  SKIN: No rashes, warm and dry  MUSCULOSKELETAL: Strength 5/5 throughout, sensation intact  EXTREMITIES: No edema  VASCULAR: Palpable left radial pulse        Diagnostic Data:      LABS:  No results for input(s): \"WBC\", \"HGB\", \"MCV\", \"PLT\", \"BAND\", \"INR\" in the last 168 hours.    Invalid input(s): \"LYM#\", \"MONO#\", \"BASOS#\", \"EOSIN#\"    Recent Labs   Lab 10/02/24  0913   K 3.8     No results for input(s): \"PTP\", \"INR\", \"PTT\" in the last 168 hours.  No results for input(s): \"ALT\", \"AST\", \"ALB\", \"AMYLASE\", \"LIPASE\", \"LDH\" in the last 168 hours.    Invalid input(s): \"ALPHOS\", \"TBIL\", \"DBIL\", \"TPROT\"  No results for input(s): \"TROP\" in the last 168 hours.  No results found for: \"ANAS\", \"ERASMO\", \"ANASCRN\"  No results for input(s): \"PCACT\", \"PSACT\", \"AT3ACT\", \"HIPAB\", \"PATHI\", \"STALA\", \"DRVVTRATIO\", \"DRVVT\", \"STACLOT\", \"CARIGG\", \"K4FB1ZIRQZ\", \"V9YF5GKPMG\", \"RA\", \"HAVIGM\", \"HBCIGM\", \"HCVAB\", \"HBSAG\", \"HBCAB\", \"HBVDNAINTERP\", \"ANAS\", \"C3\", \"C4\" in the last 168 hours.    Radiology: Vein Mapping Reviewed       ASSESSMENT AND PLAN:     The patient is a 34 year old female who presents with need for permanent dialysis access. Will plan for left upper extremity fistula vs  graft under regional block. We discussed risks and benefits and she elects to proceed.    The patient indicated an understanding of these issues and agreed to the plan and all questions were answered.     Thank you for allowing me to participate in the patient's care.     Sincerely,  Peg Huddleston MD  10/02/24   11:39 AM

## 2024-10-02 NOTE — ANESTHESIA PROCEDURE NOTES
Airway  Date/Time: 10/2/2024 12:35 AM  Urgency: elective    Airway not difficult    General Information and Staff    Patient location during procedure: OR  Anesthesiologist: Roly Blackwell MD  Performed: anesthesiologist   Performed by: Roly Blackwell MD  Authorized by: Roly Blackwell MD      Indications and Patient Condition  Indications for airway management: anesthesia  Spontaneous Ventilation: absent  Sedation level: deep  Preoxygenated: yes  Patient position: sniffing    Planned trial extubation    Final Airway Details  Final airway type: supraglottic airway      Successful airway: classic  Size 4       Number of attempts at approach: 1  Number of other approaches attempted: 0

## 2024-10-02 NOTE — ANESTHESIA POSTPROCEDURE EVALUATION
Patient: Liu Walker    Procedure Summary       Date: 10/02/24 Room / Location: [facility] MAIN OR [address] [place] [address] MAIN OR    Anesthesia Start: 1219 Anesthesia Stop:     Procedure: Left upper extremity arteriorvenous fistula creation (Left: Lower Arm) Diagnosis: (ESRD (End stage renal disease) on dyalisis)    Surgeons: Peg Huddleston MD Anesthesiologist: Roly Blackwell MD    Anesthesia Type: general ASA Status: 3            Anesthesia Type: general    Vitals Value Taken Time   /82 10/02/24 1359   Temp 97.3 °F (36.3 °C) 10/02/24 1359   Pulse 80 10/02/24 1400   Resp 15 10/02/24 1400   SpO2 92 % 10/02/24 1400   Vitals shown include unfiled device data.    [facility] AN Post Evaluation:   Patient Evaluated in PACU  Patient Participation: complete - patient participated  Level of Consciousness: awake  Pain Management: adequate  Airway Patency:patent  Dental exam unchanged from preop  Yes    Cardiovascular Status: acceptable  Respiratory Status: acceptable  Postoperative Hydration acceptable      Roly Blackwell MD  10/2/2024 2:01 PM

## 2024-10-02 NOTE — OPERATIVE REPORT
[facility]     PATIENTS NAME: Liu Walker  ATTENDING PHYSICIAN: Peg Huddleston MD  OPERATING PHYSICIAN: Peg Huddleston MD  CSN: [id number]     LOCATION:  OR  MRN: [id number]    YOB: 1990  ADMISSION DATE: 10/2/2024  OPERATION DATE: 10/2/2024                OPERATIVE REPORT     PRE-OPERATIVE DIAGNOSIS:  End Stage Renal Disease in need of a more permanent access.     POST-OPERATIVE DIAGNOSIS: Same as above.    PROCEDURE PERFORMED: left brachio-cephalic arteriovenous fistula creation    ANESTHESIA: Regional    SURGEON: Peg Huddleston MD    ASSISTANT: Gaudencio Gonzales SA    EBL: 30 ml    SPECIMENS: * No specimens in log *    COMPLICATIONS: None    SUMMARY OF CASE: Palpable thrill in the fistula. Palpable radial pulse     INDICATIONS: The patient is a 34 year old female with ESRD in need of a more permanent access. Preoperative mapping revealed a usable left upper arm cephalic vein. We have discussed the risks and benefits of the fistula in detail including the risk of nerve injury, ischemic nerve injury, thrombosis, with need for angioplasty or revision, infection, and steal syndrome among other complications.    DESCRIPTION OF PROCEDURE: The patient was brought to the operating room and placed in the supine position. The left upper extremity was prepped and draped in the usual sterile fashion. The skin over the antecubital fossa was infiltrated with 1% lidocaine solution. A 6-cm transverse skin incision was then performed over the antecubital fossa. The cephalic vein was identified and encircled with a vessel loop. The vein was dissected for a segment of 5 cm. The dissection was carried as distally as possible through that incision. Attention was then directed to the brachial artery. The tendonous aponeurosis of the biceps muscle was incised. The location of the brachial artery was identified by palpation. The soft tissue over the brachial artery was then incised and the brachial artery was identified  and encircled with a vessel loop. Care was taken to avoid injury to the nerve. The patient was then systemically heparinized. The cephalic vein was then ligated at its most distal end. Two small profunda clamps were applied to the brachial artery and a 6-mm anterolateral arteriotomy was made using a #11 blade scalpel. The artery was then locally heparinized with concentrated heparinized saline solution. The cephalic vein was then gently curved and allowed to lie over the arteriotomy. The end of the vein was spatulated to match the size of the arteriotomy.  The anastomosis was then performed using a 6-0 prolene running suture. At the completion of the suture line, the brachial artery was forward-flushed and then allowed to backbleed. The cephalic vein was also allowed to backbleed. The anastomosis was irrigated with heparinized solution. The sutures were then tied and the suture line evaluated for hemostasis, which was adequate. There was an excellent thrill in the cephalic vein. The patient maintained having a palpable pulse in the brachial and radial arteries. There was no evidence of any kinks.   Hemostasis was achieved with pressure and thrombin-gel foam. The wound was then irrigated. The subcutaneous tissues were reapproximated using a running 3-0 Vicryl sutures. The skin was closed using a running 4-0 monocryl subcuticular stitch. A sterile dressing was applied. The patient tolerated the procedure well and was taken to the postanesthesia care unit in a stable condition.

## 2024-11-01 ENCOUNTER — TELEPHONE (OUTPATIENT)
Dept: OBGYN CLINIC | Facility: CLINIC | Age: 34
End: 2024-11-01

## 2024-11-01 NOTE — TELEPHONE ENCOUNTER
LMP 9/12 at home positive pregnancy    Patient aware to continue present dose Labs already ordered for Feb 2023.

## 2024-11-01 NOTE — TELEPHONE ENCOUNTER
Pt calling to report +HPT and LMP of 9/12. Pt reports cycles every 28 days. Pt advised to start pnv. Pt informed we have male and female providers and she would see all of them throughout care.     Pt stated she is on dialysis for kidney failure. Message to BERNARDINO (on call)---is it appropriate for pt to start PNC with our office or should she be referred to tertiary care?

## 2024-11-02 NOTE — TELEPHONE ENCOUNTER
Jay Koch, DO  You20 hours ago (11:51 AM)     Tertiary care. We wouldn’t be able to care for her.     Lmtcb

## 2024-11-05 NOTE — DISCHARGE INSTRUCTIONS
Hold Losartan and Lasix- discuss with your nephrologist at appointment tomorrow.   Outpatient Oncology Nutrition Consultation   Type of Consult: Follow Up  Care Location: [facility]  - met w/pt & wife (Sherri)    Reason for referral: notification  Lashell Lew MA  on 9/11/24 that pt is appropriate for oncology nutrition care (reason for referral: Ambulatory referral for struggling with eating and drinking ).     Nutrition Assessment:   Oncology Diagnosis & Treatments:   Cancer of the pancreas diagnosed 8/20/24.   Began chemotherapy 9/24/24; Modified FOLFIRINOX every 14 days   Oncology History   Adenocarcinoma of pancreas (HCC)   8/13/2024 Biopsy    A.-B.  Pancreas, Body, FNA (Cell block and smear preparations):   Malignant  Adenocarcinoma.     8/20/2024 Initial Diagnosis    Adenocarcinoma of pancreas (HCC)     8/26/2024 -  Cancer Staged    Staging form: Pancreas, AJCC 8th Edition  - Clinical: Stage IB (cT2, cN0, cM0) - Signed by Roz Faulkner MD on 8/26/2024  Total positive nodes: 0       9/24/2024 -  Chemotherapy    alteplase (CATHFLO), 2 mg, Intracatheter, Every 1 Minute as needed, 4 of 12 cycles  pegfilgrastim (NEULASTA ONPRO), 6 mg, Subcutaneous, Once, 4 of 12 cycles  Administration: 6 mg (9/26/2024), 6 mg (10/10/2024), 6 mg (10/24/2024)  fosaprepitant (EMEND) IVPB, 150 mg, Intravenous, Once, 4 of 12 cycles  Administration: 150 mg (9/24/2024), 150 mg (10/8/2024), 150 mg (10/22/2024)  irinotecan (CAMPTOSAR) chemo infusion, 150 mg/m2 = 294 mg, Intravenous, Once, 4 of 12 cycles  Dose modification: 112.5 mg/m2 (75 % of original dose 150 mg/m2, Cycle 2, Reason: Dose Not Tolerated), 120 mg/m2 (80 % of original dose 150 mg/m2, Cycle 3, Reason: Original Dose Changed)  Administration: 294 mg (9/24/2024), 227 mg (10/8/2024), 240 mg (10/22/2024)  oxaliplatin (ELOXATIN) chemo infusion, 65 mg/m2 = 127.4 mg, Intravenous, Once, 4 of 12 cycles  Dose modification: 48.75 mg/m2 (75 % of original dose 65 mg/m2, Cycle 2, Reason: Dose Not Tolerated), 52 mg/m2 (80 % of original dose 65 mg/m2, Cycle 3,  Reason: Original Dose Changed)  Administration: 127.4 mg (9/24/2024), 98.5 mg (10/8/2024), 100 mg (10/22/2024)  fluorouracil (ADRUCIL) ambulatory infusion Soln, 1,200 mg/m2/day = 4,705 mg, Intravenous, Over 46 hours, 4 of 12 cycles  Dose modification: 900 mg/m2/day (75 % of original dose 1,200 mg/m2/day, Cycle 3, Reason: Dose Not Tolerated), 960 mg/m2/day (80 % of original dose 1,200 mg/m2/day, Cycle 4, Reason: Other (see comments), Comment: provider request)       Past Medical & Surgical Hx:   Patient Active Problem List   Diagnosis    Atrial fibrillation (HCC)    Psoriatic arthritis (HCC)    NDPH (new daily persistent headache)    Atrial flutter (HCC)    CATY (obstructive sleep apnea)    Type 2 diabetes mellitus with hyperglycemia, without long-term current use of insulin (HCC)    Elevated PSA    Platelets decreased (HCC)    Adenocarcinoma of pancreas (HCC)    Pulmonary HTN (HCC)    Pre-op testing    Liver masses    Port-A-Cath in place    Chemotherapy induced neutropenia (HCC)    Palliative care by specialist    Medical marijuana use    Nausea    Cancer-related pain    Insomnia    Loss of appetite     Past Medical History:   Diagnosis Date    A-fib (HCC)     Arthritis     BPH (benign prostatic hyperplasia)     Cancer (HCC)     Colon polyp     Diabetes mellitus (HCC)     Fatty liver     Fracture, foot     High cholesterol     Hyperlipidemia     Irregular heart beat     Non-ischemic cardiomyopathy (HCC)     Osteoarthritis     Psoriasis     Psoriatic arthritis (HCC)     Pulmonary HTN (HCC)      Past Surgical History:   Procedure Laterality Date    CARDIAC ELECTROPHYSIOLOGY PROCEDURE N/A 02/22/2023    Procedure: Cardiac eps/afib ablation   comprehensive posterior wall;  Surgeon: Mynor Andrews MD;  Location: [facility];  Service: Cardiology    COLONOSCOPY      ELBOW SURGERY Right     FL GUIDED CENTRAL VENOUS ACCESS DEVICE INSERTION  9/5/2024    IR BIOPSY LIVER MASS  9/18/2024    KIDNEY STONE SURGERY       LAPAROSCOPY N/A 9/5/2024    Procedure: DIAGNOSTIC LAPAROSCOPY;  Surgeon: Roz Faulkner MD;  Location: [facility];  Service: Surgical Oncology    TRANSURETHRAL RESECTION OF PROSTATE      last assessed 7/21/15    TUNNELED VENOUS PORT PLACEMENT N/A 9/5/2024    Procedure: INSERTION VENOUS PORT (PORT-A-CATH);  Surgeon: Roz Faulkner MD;  Location: [facility];  Service: Surgical Oncology       Review of Medications:   Vitamins, Supplements and Herbals: No, pt denies taking supplements    Current Outpatient Medications:     adalimumab (HUMIRA) 40 mg/0.8 mL PSKT, Inject 40 mg under the skin every 14 (fourteen) days  (Patient not taking: Reported on 9/23/2024), Disp: , Rfl:     Alcohol Swabs 70 % PADS, May substitute brand based on insurance coverage. Check glucose TID. (Patient not taking: Reported on 9/23/2024), Disp: 100 each, Rfl: 0    Benzocaine-Isopropyl Alcohol 6-70 % PADS, Use, Disp: , Rfl:     Blood Glucose Monitoring Suppl ([employer] Blood Glucose Meter) w/Device KIT, Use, Disp: , Rfl:     Blood Glucose Monitoring Suppl (OneTouch Verio Reflect) w/Device KIT, Check blood sugars once daily. Please substitute with appropriate alternative as covered by patient's insurance. Dx: E11.65, Disp: 1 kit, Rfl: 0    Continuous Blood Gluc  (FreeStyle Livia 2 [place]) LEANA, 14 DAY READER NOT MADE, Disp: 1 each, Rfl: 1    Continuous Glucose Sensor (FreeStyle Livia 14 Day Sensor) MISC, USE 1 DEVICE 3 (THREE) TIMES A DAY BEFORE MEALS, Disp: , Rfl:     Continuous Glucose Sensor (FreeStyle Livia 14 Day Sensor) MISC, Use 1 Device 3 (three) times a day before meals, Disp: 2 each, Rfl: 5    fluorouracil 3,800 mg in CADD/Elastomeric Infusion Device, Infuse 3,800 mg (960 mg/m2/day x 1.98 m2) into a catheter in a vein via infusion device over 46 hours for 2 days  Infusion planned for November 5, 2024., Disp: 1 each, Rfl: 3    Glucose Blood (BLOOD GLUCOSE TEST STRIPS 333 VI), Use, Disp: , Rfl:     glucose blood (OneTouch Verio) test  strip, Check blood sugars once daily. Please substitute with appropriate alternative as covered by patient's insurance. Dx: E11.65, Disp: 100 each, Rfl: 3    insulin glargine (LANTUS) 100 units/mL subcutaneous injection, Inject 20 Units under the skin daily at bedtime, Disp: 10 mL, Rfl: 3    Insulin Pen Needle (BD Pen Needle Mallory 2nd Gen) 32G X 4 MM MISC, For use with insulin pen. Pharmacy may dispense brand covered by insurance., Disp: 100 each, Rfl: 0    Insulin Pen Needle (BD Pen Needle Mallory U/F) 32G X 4 MM MISC, Use daily as directed with insulin pen, Disp: 100 each, Rfl: 3    Lancets 33G MISC, Use, Disp: , Rfl:     Lantus SoloStar 100 units/mL SOPN, Inject 0.2 mL (20 Units total) under the skin daily at bedtime, Disp: 6 mL, Rfl: 3    lidocaine-prilocaine (EMLA) cream, Apply topically as needed for mild pain Apply to port area 45-60min prior to treatment and cover with dressing or plastic wrap, Disp: 30 g, Rfl: 1    metFORMIN (GLUCOPHAGE-XR) 500 mg 24 hr tablet, TAKE 2 TABLETS BY MOUTH EVERY DAY WITH BREAKFAST, Disp: 180 tablet, Rfl: 1    ondansetron (ZOFRAN) 4 mg tablet, Take 1 tablet (4 mg total) by mouth every 8 (eight) hours as needed for nausea or vomiting, Disp: 20 tablet, Rfl: 2    OneTouch Delica Lancets 33G MISC, Check blood sugars once daily. Please substitute with appropriate alternative as covered by patient's insurance. Dx: E11.65, Disp: 100 each, Rfl: 3    oxyCODONE (Roxicodone) 5 immediate release tablet, Take 1 tablet (5 mg total) by mouth every 6 (six) hours as needed for moderate pain Max Daily Amount: 20 mg, Disp: 90 tablet, Rfl: 0    pantoprazole (PROTONIX) 40 mg tablet, Take 1 tablet (40 mg total) by mouth daily before breakfast, Disp: 30 tablet, Rfl: 5    rosuvastatin (CRESTOR) 5 mg tablet, TAKE 1 TABLET BY MOUTH EVERY DAY IN THE EVENING, Disp: 90 tablet, Rfl: 1    sildenafil (VIAGRA) 100 mg tablet, TAKE ONE (1) TABLET BY MOUTH DAILY AS NEEDED, Disp: , Rfl:   No current  "facility-administered medications for this visit.    Facility-Administered Medications Ordered in Other Visits:     alteplase (CATHFLO) injection 2 mg, 2 mg, Intracatheter, Q1MIN PRN, Butch Peña MD    Atropine Sulfate injection 0.25 mg, 0.25 mg, Intravenous, Once, Butch Peña MD    Atropine Sulfate injection 0.25 mg, 0.25 mg, Intravenous, Once PRN, Butch Peña MD    dextrose 5 % infusion, 20 mL/hr, Intravenous, Once, Butch Peña MD    fosaprepitant (EMEND) 150 mg in sodium chloride 0.9 % 250 mL IVPB, 150 mg, Intravenous, Once, Butch Peña MD, Last Rate: 500 mL/hr at 11/05/24 0925, 150 mg at 11/05/24 0925    irinotecan (CAMPTOSAR) 240 mg in dextrose 5 % 500 mL chemo infusion, 240 mg, Intravenous, Once, Butch Peña MD    oxaliplatin (ELOXATIN) 100 mg in dextrose 5 % 250 mL chemo infusion, 100 mg, Intravenous, Once, Butch Peña MD    sodium chloride 0.9 % infusion, 20 mL/hr, Intravenous, Once PRN, Butch Peña MD, Last Rate: 20 mL/hr at 11/05/24 0859, 20 mL/hr at 11/05/24 0859    Most Recent Lab Results:   Lab Results   Component Value Date    WBC 11.83 (H) 11/04/2024    NEUTROABS 9.20 (H) 11/04/2024    CHOLESTEROL 212 (H) 02/20/2024    CHOL 190 11/15/2016    TRIG 369 (H) 02/20/2024    HDL 40 02/20/2024    LDLCALC 98 02/20/2024    ALT 22 11/04/2024    AST 17 11/04/2024    ALB 4.0 11/04/2024     03/15/2016    SODIUM 138 11/04/2024    SODIUM 140 10/21/2024    K 3.6 11/04/2024    K 4.0 10/21/2024     11/04/2024    BUN 15 11/04/2024    BUN 18 10/21/2024    CREATININE 0.77 11/04/2024    CREATININE 0.75 10/21/2024    EGFR 94 11/04/2024    GLUCOSE 106 (H) 11/15/2016    POCGLU 97 09/18/2024    GLUF 110 (H) 09/23/2024    GLUF 96 09/18/2024    GLUC 228 (H) 11/04/2024    HGBA1C 6.6 (A) 08/27/2024    HGBA1C 5.9 05/24/2024    HGBA1C 10.8 (A) 02/20/2024    CALCIUM 9.0 11/04/2024    FOLATE >20.0 (H) 06/10/2021    MG 1.9 02/23/2023     Anthropometric Measurements:   Height: 71\"  Ht " "Readings from Last 1 Encounters:   11/05/24 5' 10.98\" (1.803 m)     Wt Readings from Last 20 Encounters:   11/05/24 77 kg (169 lb 12.8 oz)   11/04/24 79.4 kg (175 lb)   10/22/24 78 kg (172 lb)   10/21/24 78.2 kg (172 lb 8 oz)   10/08/24 78.1 kg (172 lb 3.2 oz)   10/07/24 79.4 kg (175 lb)   09/24/24 81.6 kg (180 lb)   09/23/24 78.5 kg (173 lb)   09/18/24 78.7 kg (173 lb 8 oz)   09/09/24 78.9 kg (174 lb)   09/05/24 79.4 kg (175 lb)   08/27/24 78.8 kg (173 lb 12.8 oz)   08/26/24 80.3 kg (177 lb)   08/26/24 79.4 kg (175 lb)   08/26/24 79.4 kg (175 lb)   08/13/24 78.9 kg (174 lb)   07/15/24 80.4 kg (177 lb 3.2 oz)   06/24/24 81.8 kg (180 lb 6.4 oz)   05/24/24 83.9 kg (185 lb)   04/09/24 86.6 kg (191 lb)     Weight History:   Usual Weight: 200# in December 2023; weight loss after diagnosed with DM and started on Ozempic   Varian: n/a  Home Scale: (10/8/24) 165#; (11/5/24) home weight maintaining in the [facility]     Oncology Nutrition-Anthropometrics      Flowsheet [facility] Nutrition from 11/5/2024 in [facility] Oncology Dietitian [place] Nutrition from 10/8/2024 in [facility] Oncology Dietitian [place]   Patient age (years): 65 years 65 years   Patient (male) height (in): 71 in 71 in   Current weight (lbs): 169.8 lbs 172.2 lbs   Current weight to be used for anthropometric calculations (kg) 77.2 kg 78.3 kg   BMI: 23.7 24   IBW male 172 lb 172 lb   IBW (kg) male 78.2 kg 78.2 kg   IBW % (male) 98.7 % 100.1 %   Adjusted BW (male): 171.5 lbs 172.1 lbs   Adjusted BW in kg (male): 78 kg 78.2 kg   % weight change after 1 month: -3 % -1 %   Weight change after 1 month (lbs) -5.2 lbs -1.8 lbs   % weight change after 3 months: -2.4 % -2.8 %   Weight change after 3 months (lbs) -4.2 lbs -5 lbs   % weight change after 6 months: -8.2 % -9.8 %   Weight change after 6 months (lbs) -15.2 lbs -18.8 lbs            Nutrition-Focused Physical Findings: none observed    Food/Nutrition-Related History & Client/Social History:    Current " Nutrition Impact Symptoms:  [] Nausea  [] Reduced Appetite   -decreased Thursday -  after chemo, then returns to normal  [] Acid Reflux    [] Vomiting  [x] Unintended Wt Loss   -about 15# in 6 months  [] Malabsorption    [] Diarrhea  [] Unintended Wt Gain  [] Dumping Syndrome    [] Constipation  [] Thick Mucous/Secretions  [] Abdominal Pain    [] Dysgeusia (Altered Taste)  [] Xerostomia (Dry Mouth)  [] Gas    [] Dysosmia (Altered Smell)  [] Thrush  [] Difficulty Chewing    [] Oral Mucositis (Sore Mouth)  [] Fatigue  [x] Hyperglycemia   -T2DM   -metformin, ozempic  Lab Results   Component Value Date    HGBA1C 6.6 (A) 2024      [] Odynophagia  [] Esophagitis  [] Other:    [] Dysphagia  [] Early Satiety  [] No Problems Eating      Food Allergies & Intolerances: yes: lactose intolerant     Current Diet: CHO-Controlled and Lactose-Free  Current Nutrition Intake: Unchanged from usual  Appetite: Fair ; poor appetite Thursday through  after chemo   Nutrition Route: PO  Oral Care: brushes BID  Activity level: ADL, ambulates independently.     24 Hr Diet Recall:   Breakfast: cereal with milk  Snack: muffin or fruit   Lunch: sandwich   Dinner: fish or chicken; sometimes soup      Beverages: water with crystal light flavoring (16oz x2-3)  Supplements:   Ensure Original (8 oz, 220 kcal, 9 g pro) 1 every other day       Oncology Nutrition-Estimated Needs      Flowsheet Row Nutrition from 2024 in [facility] Oncology Dietitian [place] Nutrition from 10/8/2024 in [facility] Oncology Dietitian [place]   Weight type used Actual weight Actual weight   Weight in kilograms (kg) used for estimated needs 77.2 kg 78.3 kg   Energy needs formula:  30-35 kcal/kg 30-35 kcal/kg   Energy needs based on 30 kcal/k kcal 2348 kcal   Energy needs based on 35 kcal/k kcal 2740 kcal   Protein needs formula: 1.2-1.5 g/kg 1.2-1.5 g/kg   Protein needs based on 1.2 g/k g 94 g   Protein needs based on 1.5 g/kg  116 g 117 g   Fluid needs formula: 30-35 mL/kg 30-35 mL/kg   Fluid needs based on 30 mL/kg 2319 mL 2346 mL   Fluid needs in ounces 78 oz 79 oz   Fluid needs based on 35 mL/kg 2706 mL 2737 mL   Fluid needs in ounces 91 oz 93 oz             Discussion & Intervention:   Abhishek was evaluated today for an RD follow up regarding unintended weight loss.  Abhishek is currently undergoing tx for Pancreatic Cancer.  He is here for infusion today and doing okay at this time. His appetite is good today, but typically decreases Thursday through Sunday after chemotherapy. He reports a stable home weight. Reviewed 24 hour recall, which revealed an suboptimal po intake, and discussed ways to increase kcal, protein, and fluid intakes.  Based on today's assessment, discussion included: MNT for: DM, Pancreatic cancer, weight loss, BG management, how to modify foods for anticipated nutrition impact symptoms pt may experience during CA tx, adequate hydration & fluid choices, having consistent and planned snacks between meals, and continuing oral nutrition supplements.   Moving forward, Abhishek was encouraged to increase kcal, protein, and fluid intakes.  Materials Provided: not applicable  All questions and concerns addressed during today’s visit.  Abhishek has RD contact information.    Nutrition Diagnosis:   Inadequate Energy Intake related to physiological causes, disease state and treatment related issues as evidenced by food recall, wt loss and discussion with pt and/or family.  Increased Nutrient Needs (kcal & pro) related to increased demand for nutrients and disease state as evidenced by cancer dx and pt undergoing tx for cancer.  Monitoring & Evaluation:   Goals:  weight maintenance/stabilization  adequate nutrition impact symptom management  pt to meet >/=75% estimated nutrition needs daily  adequate glycemic control  increase protein intake  increase fluid intake  increase calorie intake    Progress Towards Goals:  Progressing    Nutrition Rx & Recommendations:  Diet: High Calorie, High Protein (for high calorie foods see pages 52-53, and for high protein foods see pages 49-51 in your Eating Hints book)  Small, frequent meals/snacks may be easier to tolerate than 3 large daily meals.  Aim for 5-6 small meals per day (every 2-3 hours).  Include protein at all meals/snacks.  Include a variety of foods (as tolerated/allowed by diet).  Incorporate physical activity as able/allowed.  Stay hydrated by sipping fluids of choice/tolerance throughout the day.  Liquid nutrition may be better tolerated than solids at times.  Alter food choices and eating patterns to accommodate changing needs.  Refer to Eating Hints book for other meal/snack ideas and symptom management.  Weigh yourself regularly. If you notice weight loss, make an effort to increase your daily food/calorie intake. If you continue to notice loss after these efforts, reach out to your dietitian to establish a plan to stabilize weight.  Increase intake of Ensure when appetite is low the days following chemotherapy.     Nutrition therapy for Carbohydrate Controlled Diet:     Foods with carbohydrates make your blood glucose level go up. Learning how to count carbohydrates can help you control your blood glucose levels. First, identify the foods you eat that contain carbohydrates.   Foods that commonly contain carbohydrates include: grains (breads, pasta, cereal, rice), fruit, starchy vegetables (potatoes, corn, peas), snack foods (chips, pretzels, crackers), dairy products, sweets/desserts.   Foods that do not usually contain carbohydrates: chicken, pork, beef, fish, seafood, eggs, tofu, cheese, butter, sour cream, avocado, nuts, seeds, olives, mayonnaise, water, black coffee, unsweetened tea, and zero-calorie drinks. Vegetables with no or low carbohydrate include green beans, cauliflower, tomatoes, and onions.     Follow Up Plan:  12/3/24 during infusion  Recommend Referral  to Other Providers: none at this time

## 2024-12-09 ENCOUNTER — LAB ENCOUNTER (OUTPATIENT)
Dept: LAB | Facility: HOSPITAL | Age: 34
End: 2024-12-09
Attending: SURGERY
Payer: COMMERCIAL

## 2024-12-09 DIAGNOSIS — Z01.818 PRE-OP TESTING: ICD-10-CM

## 2024-12-09 LAB
ANTIBODY SCREEN: NEGATIVE
RH BLOOD TYPE: POSITIVE

## 2024-12-09 PROCEDURE — 36415 COLL VENOUS BLD VENIPUNCTURE: CPT

## 2024-12-09 PROCEDURE — 86850 RBC ANTIBODY SCREEN: CPT

## 2024-12-09 PROCEDURE — 86901 BLOOD TYPING SEROLOGIC RH(D): CPT

## 2024-12-09 PROCEDURE — 86900 BLOOD TYPING SEROLOGIC ABO: CPT

## 2025-02-20 ENCOUNTER — ORDER TRANSCRIPTION (OUTPATIENT)
Dept: PHYSICAL THERAPY | Facility: HOSPITAL | Age: 35
End: 2025-02-20

## 2025-02-20 ENCOUNTER — TELEPHONE (OUTPATIENT)
Dept: PHYSICAL THERAPY | Age: 35
End: 2025-02-20

## 2025-02-20 DIAGNOSIS — M54.6 THORACIC BACK PAIN: Primary | ICD-10-CM

## 2025-02-20 DIAGNOSIS — M25.511 RIGHT SHOULDER PAIN: Primary | ICD-10-CM

## 2025-03-19 ENCOUNTER — APPOINTMENT (OUTPATIENT)
Dept: PHYSICAL THERAPY | Age: 35
End: 2025-03-19
Payer: COMMERCIAL

## 2025-03-21 ENCOUNTER — APPOINTMENT (OUTPATIENT)
Dept: PHYSICAL THERAPY | Age: 35
End: 2025-03-21
Payer: COMMERCIAL

## 2025-03-25 ENCOUNTER — APPOINTMENT (OUTPATIENT)
Dept: PHYSICAL THERAPY | Age: 35
End: 2025-03-25
Payer: COMMERCIAL

## 2025-03-28 ENCOUNTER — APPOINTMENT (OUTPATIENT)
Dept: PHYSICAL THERAPY | Age: 35
End: 2025-03-28
Payer: COMMERCIAL

## 2025-04-01 ENCOUNTER — APPOINTMENT (OUTPATIENT)
Dept: PHYSICAL THERAPY | Age: 35
End: 2025-04-01
Payer: COMMERCIAL

## 2025-07-07 ENCOUNTER — APPOINTMENT (OUTPATIENT)
Dept: GENERAL RADIOLOGY | Facility: HOSPITAL | Age: 35
End: 2025-07-07
Attending: EMERGENCY MEDICINE
Payer: COMMERCIAL

## 2025-07-07 ENCOUNTER — HOSPITAL ENCOUNTER (EMERGENCY)
Facility: HOSPITAL | Age: 35
Discharge: HOME | End: 2025-07-07
Attending: EMERGENCY MEDICINE
Payer: COMMERCIAL

## 2025-07-07 VITALS
RESPIRATION RATE: 20 BRPM | HEART RATE: 87 BPM | DIASTOLIC BLOOD PRESSURE: 86 MMHG | HEIGHT: 63 IN | OXYGEN SATURATION: 100 % | SYSTOLIC BLOOD PRESSURE: 166 MMHG | BODY MASS INDEX: 44.3 KG/M2 | WEIGHT: 250 LBS | TEMPERATURE: 98 F

## 2025-07-07 DIAGNOSIS — J45.41 MODERATE PERSISTENT REACTIVE AIRWAY DISEASE WITH ACUTE EXACERBATION (HCC): Primary | ICD-10-CM

## 2025-07-07 DIAGNOSIS — N18.6 ESRD (END STAGE RENAL DISEASE) (HCC): ICD-10-CM

## 2025-07-07 LAB
ANION GAP SERPL CALC-SCNC: 18 MMOL/L (ref 0–18)
ATRIAL RATE: 86 BPM
BASOPHILS # BLD AUTO: 0.05 X10(3) UL (ref 0–0.2)
BASOPHILS NFR BLD AUTO: 0.6 %
BUN BLD-MCNC: 66 MG/DL (ref 9–23)
BUN/CREAT SERPL: 5.3 (ref 10–20)
CALCIUM BLD-MCNC: 8.9 MG/DL (ref 8.7–10.4)
CHLORIDE SERPL-SCNC: 101 MMOL/L (ref 98–112)
CO2 SERPL-SCNC: 21 MMOL/L (ref 21–32)
CREAT BLD-MCNC: 12.45 MG/DL (ref 0.55–1.02)
DEPRECATED RDW RBC AUTO: 52.6 FL (ref 35.1–46.3)
EGFRCR SERPLBLD CKD-EPI 2021: 4 ML/MIN/1.73M2 (ref 60–?)
EOSINOPHIL # BLD AUTO: 0.18 X10(3) UL (ref 0–0.7)
EOSINOPHIL NFR BLD AUTO: 2.3 %
ERYTHROCYTE [DISTWIDTH] IN BLOOD BY AUTOMATED COUNT: 15.7 % (ref 11–15)
FLUAV + FLUBV RNA SPEC NAA+PROBE: NEGATIVE
FLUAV + FLUBV RNA SPEC NAA+PROBE: NEGATIVE
GLUCOSE BLD-MCNC: 143 MG/DL (ref 70–99)
HCT VFR BLD AUTO: 33.5 % (ref 35–48)
HGB BLD-MCNC: 10.9 G/DL (ref 12–16)
IMM GRANULOCYTES # BLD AUTO: 0.17 X10(3) UL (ref 0–1)
IMM GRANULOCYTES NFR BLD: 2.2 %
LYMPHOCYTES # BLD AUTO: 1.52 X10(3) UL (ref 1–4)
LYMPHOCYTES NFR BLD AUTO: 19.3 %
MCH RBC QN AUTO: 30.2 PG (ref 26–34)
MCHC RBC AUTO-ENTMCNC: 32.5 G/DL (ref 31–37)
MCV RBC AUTO: 92.8 FL (ref 80–100)
MONOCYTES # BLD AUTO: 0.36 X10(3) UL (ref 0.1–1)
MONOCYTES NFR BLD AUTO: 4.6 %
NEUTROPHILS # BLD AUTO: 5.61 X10 (3) UL (ref 1.5–7.7)
NEUTROPHILS # BLD AUTO: 5.61 X10(3) UL (ref 1.5–7.7)
NEUTROPHILS NFR BLD AUTO: 71 %
OSMOLALITY SERPL CALC.SUM OF ELEC: 312 MOSM/KG (ref 275–295)
P AXIS: 65 DEGREES
P-R INTERVAL: 170 MS
PLATELET # BLD AUTO: 317 10(3)UL (ref 150–450)
POTASSIUM SERPL-SCNC: 4 MMOL/L (ref 3.5–5.1)
Q-T INTERVAL: 402 MS
QRS DURATION: 100 MS
QTC CALCULATION (BEZET): 481 MS
R AXIS: -17 DEGREES
RBC # BLD AUTO: 3.61 X10(6)UL (ref 3.8–5.3)
RSV RNA SPEC NAA+PROBE: NEGATIVE
SARS-COV-2 RNA RESP QL NAA+PROBE: NOT DETECTED
SODIUM SERPL-SCNC: 140 MMOL/L (ref 136–145)
T AXIS: 96 DEGREES
TROPONIN I SERPL HS-MCNC: 16 NG/L (ref ?–34)
VENTRICULAR RATE: 86 BPM
WBC # BLD AUTO: 7.9 X10(3) UL (ref 4–11)

## 2025-07-07 PROCEDURE — 94644 CONT INHLJ TX 1ST HOUR: CPT

## 2025-07-07 PROCEDURE — 80048 BASIC METABOLIC PNL TOTAL CA: CPT | Performed by: EMERGENCY MEDICINE

## 2025-07-07 PROCEDURE — 85025 COMPLETE CBC W/AUTO DIFF WBC: CPT | Performed by: EMERGENCY MEDICINE

## 2025-07-07 PROCEDURE — 93005 ELECTROCARDIOGRAM TRACING: CPT

## 2025-07-07 PROCEDURE — 0241U SARS-COV-2/FLU A AND B/RSV BY PCR (GENEXPERT): CPT | Performed by: EMERGENCY MEDICINE

## 2025-07-07 PROCEDURE — 93010 ELECTROCARDIOGRAM REPORT: CPT

## 2025-07-07 PROCEDURE — 71045 X-RAY EXAM CHEST 1 VIEW: CPT | Performed by: RADIOLOGY

## 2025-07-07 PROCEDURE — 84484 ASSAY OF TROPONIN QUANT: CPT | Performed by: EMERGENCY MEDICINE

## 2025-07-07 PROCEDURE — 99291 CRITICAL CARE FIRST HOUR: CPT

## 2025-07-07 PROCEDURE — 96374 THER/PROPH/DIAG INJ IV PUSH: CPT

## 2025-07-07 PROCEDURE — 99285 EMERGENCY DEPT VISIT HI MDM: CPT

## 2025-07-07 RX ORDER — FUROSEMIDE 10 MG/ML
100 INJECTION INTRAMUSCULAR; INTRAVENOUS ONCE
Status: COMPLETED | OUTPATIENT
Start: 2025-07-07 | End: 2025-07-07

## 2025-07-07 RX ORDER — ALBUTEROL SULFATE 5 MG/ML
15 SOLUTION RESPIRATORY (INHALATION) ONCE
Status: COMPLETED | OUTPATIENT
Start: 2025-07-07 | End: 2025-07-07

## 2025-07-07 RX ORDER — PREDNISONE 20 MG/1
60 TABLET ORAL ONCE
Status: COMPLETED | OUTPATIENT
Start: 2025-07-07 | End: 2025-07-07

## 2025-07-07 RX ORDER — PREDNISONE 20 MG/1
40 TABLET ORAL DAILY
Qty: 8 TABLET | Refills: 0 | Status: SHIPPED | OUTPATIENT
Start: 2025-07-07 | End: 2025-07-11

## 2025-07-07 RX ORDER — FLUTICASONE PROPIONATE AND SALMETEROL 55; 14 UG/1; UG/1
1 POWDER, METERED RESPIRATORY (INHALATION) 2 TIMES DAILY
Qty: 1 EACH | Refills: 0 | Status: SHIPPED | OUTPATIENT
Start: 2025-07-07

## 2025-07-07 RX ORDER — DOXYCYCLINE 100 MG/1
100 CAPSULE ORAL 2 TIMES DAILY
Qty: 10 CAPSULE | Refills: 0 | Status: SHIPPED | OUTPATIENT
Start: 2025-07-07 | End: 2025-07-12

## 2025-07-07 NOTE — ED QUICK NOTES
Rounding Completed    Plan of Care reviewed. Waiting for Results.  Elimination needs assessed.  Provided blanket. Receiving respiratory tx.     Bed is locked and in lowest position. Call light within reach.

## 2025-07-07 NOTE — ED INITIAL ASSESSMENT (HPI)
Pt arrives through triage with       complaints of SOB, chest heaviness. Pt is on dialysis- is scheduled to go later today but is here d/t the SOB- started earlier today.  +Wheezing  Recent pregnancy, delivery on May 21st      Dialysis MWF    Hx of dialysis, htn

## 2025-07-07 NOTE — ED PROVIDER NOTES
Patient Seen in: [facility]    History     Chief Complaint   Patient presents with    Shortness Of Breath    Wheezing       HPI    34-year-old female who presents to the emergency department given she has had 12 hours now of dyspnea, mild cough.  She has dialysis already scheduled for later today.  Denies any new chest pain, reports the chest heaviness sensation that she reported at triage is been the same that she has had for years now.  No fevers.    Still making urine.     History reviewed. Past Medical History[1]    History reviewed. Past Surgical History[2]      Medications :  Prescriptions Prior to Admission[3]     Family History[4]    Smoking Status: Social Hx on file[5]    Constitutional and vital signs reviewed.      Social History and Family History elements reviewed from today, pertinent positives to the presenting problem noted.    Physical Exam     ED Triage Vitals [07/07/25 1246]   BP (!) 163/90   Pulse 87   Resp 22   Temp 98.3 °F (36.8 °C)   Temp src    SpO2 93 %   O2 Device None (Room air)       All measures to prevent infection transmission during my interaction with the patient were taken. The patient was already wearing a droplet mask on my arrival to the room. Personal protective equipment was worn throughout the duration of the exam.  Handwashing was performed prior to and after the exam.  Stethoscope and any equipment used during my examination was cleaned with super sani-cloth germicidal wipes following the exam.     Physical Exam    General: NAD  Head: Normocephalic and atraumatic.  Mouth/Throat/Ears/Nose: Oropharynx is clear    Eyes: Conjunctivae and EOM are normal.   Neck: Normal range of motion. Supple.   Cardiovascular: Normal rate, regular rhythm, normal heart sounds.  Respiratory/Chest: Wheezing in all lung fields.  No tachypnea. Exhibits no tenderness.  Gastrointestinal: Soft, non-tender, non-distended. Bowel sounds are normal.   Musculoskeletal:No   deformity.   Neurological: Alert and appropriate. No focal deficits.   Skin: Skin is warm and dry. No pallor.  Psychiatric: Has a normal mood and affect.      ED Course        Labs Reviewed   BASIC METABOLIC PANEL (8) - Abnormal; Notable for the following components:       Result Value    Glucose 143 (*)     BUN 66 (*)     Creatinine 12.45 (*)     BUN/CREA Ratio 5.3 (*)     Calculated Osmolality 312 (*)     eGFR-Cr 4 (*)     All other components within normal limits   CBC WITH DIFFERENTIAL WITH PLATELET - Abnormal; Notable for the following components:    RBC 3.61 (*)     HGB 10.9 (*)     HCT 33.5 (*)     RDW-SD 52.6 (*)     RDW 15.7 (*)     All other components within normal limits   TROPONIN I HIGH SENSITIVITY - Normal   SARS-COV-2/FLU A AND B/RSV BY PCR (GENEXPERT) - Normal    Narrative:     This test is intended for the qualitative detection and differentiation of SARS-CoV-2, influenza A, influenza B, and respiratory syncytial virus (RSV) viral RNA in nasopharyngeal or nares swabs from individuals suspected of respiratory viral infection consistent with COVID-19 by their healthcare provider. Signs and symptoms of respiratory viral infection due to SARS-CoV-2, influenza, and RSV can be similar.                                    Test performed using the Xpert Xpress SARS-CoV-2/FLU/RSV (real time RT-PCR)  assay on the [employer] instrument, [employer], [place] [address].                   This test is being used under the Food and Drug Administration's Emergency Use Authorization.                                    The authorized Fact Sheet for Healthcare Providers for this assay is available upon request from the laboratory.     EKG    Rate, intervals and axes as noted on EKG Report.  Rate: 86  Rhythm: Sinus Rhythm  Reading: no STEMI.            As Interpreted by me    Imaging Results Available and Reviewed while in ED: XR CHEST AP PORTABLE  (CPT=71045)  Result Date: 7/7/2025  CONCLUSION: Slight cardiac enlargement  with secondary signs of slight to moderate fluid overload. Electronically Verified and Signed by Attending Radiologist: Dean Guzmán MD 7/7/2025 3:07 PM Workstation: [id number]    ED Medications Administered:   Medications   ipratropium (Atrovent) 0.02 % nebulizer solution 1.5 mg (0 mg Nebulization Stopped 7/7/25 1523)   albuterol (Ventolin) (5 MG/ML) 0.5% nebulizer solution 15 mg (15 mg Nebulization Given 7/7/25 1410)   furosemide (Lasix) 10 mg/mL injection 100 mg (100 mg Intravenous Given 7/7/25 1519)   predniSONE (Deltasone) tab 60 mg (60 mg Oral Given 7/7/25 1519)         MDM     Vitals:    07/07/25 1330 07/07/25 1415 07/07/25 1500 07/07/25 1515   BP: 156/86 (!) 154/103 (!) 163/91 (!) 166/86   Pulse: 85 82 85 87   Resp: 13 20 (!) 27 20   Temp:       SpO2: 92% 96% 100% 100%   Weight:       Height:         *I personally reviewed and interpreted all ED vitals.    Pulse Ox: 100%, Room air, Normal     Monitor Interpretation:   normal sinus rhythm as interpreted by me.  The cardiac monitor was ordered given dyspnea.      Medical Decision Making      Differential Diagnosis/ Diagnostic Considerations: Pneumonia, end-stage renal disease, pulmonary edema, reactive airway disease exacerbation    Complicating Factors: The patient already has end-stage renal disease to contribute to the complexity of this ED evaluation.    I reviewed prior chart records including office note from April 11, 2025.  Chest x-ray with mild pulmonary edema my interpretation.  Considered admission however patient is normoxic on room air and demonstrates no signs of respiratory failure.  I did provide her with a hour-long nebulizer given the wheezing on exam and she feels significantly improved.  Prednisone course started in the emergency department.  Doxycycline added.  She already has a dialysis session scheduled for an hour and a half from now and she was instructed to go directly to the dialysis session.  Dose of Lasix provided in the  emergency department, she still makes urine.    Dc In stable condition.  Patient is comfortable with the plan.  Prescriptions: As above    I spent 45 minutes of critical care time caring for this patient. This does not include time spent on separately reported billable procedures.       Disposition and Plan     Clinical Impression:  1. Moderate persistent reactive airway disease with acute exacerbation (HCC)    2. ESRD (end stage renal disease) ([facility])        Disposition:  Discharge    Follow-up:  Sofia Murray Ashleigh FINN  [address] [facility] [address] [phone]    Schedule an appointment as soon as possible for a visit in 1 day(s)        Medications Prescribed:  Current Discharge Medication List        START taking these medications    Details   doxycycline 100 MG Oral Cap Take 1 capsule (100 mg total) by mouth 2 (two) times daily for 5 days.  Qty: 10 capsule, Refills: 0      predniSONE 20 MG Oral Tab Take 2 tablets (40 mg total) by mouth daily for 4 days.  Qty: 8 tablet, Refills: 0      Fluticasone-Salmeterol 55-14 MCG/ACT Inhalation Aerosol Powder, Breath Activated Inhale 1 Inhalation into the lungs in the morning and 1 Inhalation before bedtime.  Qty: 1 each, Refills: 0                              [1]   Past Medical History:   Anxiety state    Arrhythmia    Congestive heart disease (HCC)    Depression    Dialysis patient    Esophageal reflux    Essential hypertension    Hidradenitis    bilateral axilla    High blood pressure    High cholesterol    History of blood transfusion    Hx of motion sickness    Kidney failure    Migraines    Polycystic ovarian syndrome    treated with Metformin    PONV (postoperative nausea and vomiting)    Renal disorder    Sleep apnea    Visual impairment    Wears glasses   [2]   Past Surgical History:  Procedure Laterality Date    Appendectomy      Av fistula revision, open      Gastric bypass,obesity,sb reconstruc  2022    Other Bilateral     excision of axilla hydradenitis    [3] (Not in a hospital admission)   [4]   Family History  Problem Relation Age of Onset    Hypertension Father     Lipids Father     Obesity Father     Diabetes Mother     Hypertension Mother     Lipids Mother     Obesity Mother     Psychiatric Sister     Psychiatric Brother    [5]   Social History  Socioeconomic History    Marital status: Single    Number of children: 0   Occupational History    Occupation: [employer]    Tobacco Use    Smoking status: Never    Smokeless tobacco: Never   Vaping Use    Vaping status: Former   Substance and Sexual Activity    Alcohol use: No    Drug use: No

## 2025-07-31 ENCOUNTER — LAB ENCOUNTER (OUTPATIENT)
Dept: LAB | Facility: HOSPITAL | Age: 35
End: 2025-07-31
Attending: SURGERY

## 2025-07-31 RX ORDER — SUMATRIPTAN SUCCINATE 25 MG/1
25 TABLET ORAL EVERY 2 HOUR PRN
COMMUNITY
Start: 2025-06-29

## 2025-07-31 RX ORDER — HYDRALAZINE HYDROCHLORIDE 50 MG/1
75 TABLET, FILM COATED ORAL 2 TIMES DAILY
COMMUNITY

## 2025-07-31 RX ORDER — NIFEDIPINE 90 MG/1
90 TABLET, EXTENDED RELEASE ORAL DAILY
COMMUNITY
Start: 2025-07-07

## 2025-08-03 ENCOUNTER — LAB ENCOUNTER (OUTPATIENT)
Dept: LAB | Facility: HOSPITAL | Age: 35
End: 2025-08-03
Attending: SURGERY

## 2025-08-03 DIAGNOSIS — Z01.818 PRE-OP TESTING: ICD-10-CM

## 2025-08-03 DIAGNOSIS — Z01.818 PREOP TESTING: Primary | ICD-10-CM

## 2025-08-03 LAB
ANTIBODY SCREEN: NEGATIVE
RH BLOOD TYPE: POSITIVE

## 2025-08-03 PROCEDURE — 86850 RBC ANTIBODY SCREEN: CPT

## 2025-08-03 PROCEDURE — 36415 COLL VENOUS BLD VENIPUNCTURE: CPT

## 2025-08-03 PROCEDURE — 86901 BLOOD TYPING SEROLOGIC RH(D): CPT

## 2025-08-03 PROCEDURE — 86900 BLOOD TYPING SEROLOGIC ABO: CPT

## 2025-08-06 ENCOUNTER — ANESTHESIA EVENT (OUTPATIENT)
Dept: SURGERY | Facility: HOSPITAL | Age: 35
End: 2025-08-06

## 2025-08-06 ENCOUNTER — HOSPITAL ENCOUNTER (OUTPATIENT)
Facility: HOSPITAL | Age: 35
Discharge: HOME | End: 2025-08-07
Attending: SURGERY | Admitting: SURGERY

## 2025-08-06 ENCOUNTER — ANESTHESIA (OUTPATIENT)
Dept: SURGERY | Facility: HOSPITAL | Age: 35
End: 2025-08-06

## 2025-08-06 DIAGNOSIS — Z01.818 PRE-OP TESTING: Primary | ICD-10-CM

## 2025-08-06 DIAGNOSIS — N18.6 ESRD (END STAGE RENAL DISEASE) (HCC): ICD-10-CM

## 2025-08-06 LAB
B-HCG UR QL: NEGATIVE
C DIFF GDH + TOXINS A+B STL QL IA.RAPID: NOT DETECTED
C DIFF TOX B STL QL: POSITIVE
HBV CORE AB SERPL QL IA: NONREACTIVE
HBV SURFACE AB SER QL: REACTIVE
HBV SURFACE AB SERPL IA-ACNC: 860.45 MIU/ML
HBV SURFACE AG SER-ACNC: <0.1
HBV SURFACE AG SERPL QL IA: NONREACTIVE
POTASSIUM SERPL-SCNC: 4.9 MMOL/L (ref 3.5–5.1)

## 2025-08-06 PROCEDURE — 99223 1ST HOSP IP/OBS HIGH 75: CPT | Performed by: INTERNAL MEDICINE

## 2025-08-06 RX ORDER — CARVEDILOL 25 MG/1
25 TABLET ORAL 2 TIMES DAILY
Status: DISCONTINUED | OUTPATIENT
Start: 2025-08-06 | End: 2025-08-07

## 2025-08-06 RX ORDER — EPHEDRINE SULFATE 50 MG/ML
INJECTION INTRAVENOUS AS NEEDED
Status: DISCONTINUED | OUTPATIENT
Start: 2025-08-06 | End: 2025-08-06 | Stop reason: SURG

## 2025-08-06 RX ORDER — METOPROLOL TARTRATE 25 MG/1
25 TABLET, FILM COATED ORAL ONCE AS NEEDED
Status: COMPLETED | OUTPATIENT
Start: 2025-08-06 | End: 2025-08-06

## 2025-08-06 RX ORDER — HYDROMORPHONE HYDROCHLORIDE 1 MG/ML
0.4 INJECTION, SOLUTION INTRAMUSCULAR; INTRAVENOUS; SUBCUTANEOUS EVERY 5 MIN PRN
Status: DISCONTINUED | OUTPATIENT
Start: 2025-08-06 | End: 2025-08-06 | Stop reason: HOSPADM

## 2025-08-06 RX ORDER — ACETAMINOPHEN 500 MG
1000 TABLET ORAL ONCE
Status: COMPLETED | OUTPATIENT
Start: 2025-08-06 | End: 2025-08-06

## 2025-08-06 RX ORDER — FAMOTIDINE 10 MG/ML
20 INJECTION, SOLUTION INTRAVENOUS ONCE
Status: COMPLETED | OUTPATIENT
Start: 2025-08-06 | End: 2025-08-06

## 2025-08-06 RX ORDER — VANCOMYCIN HYDROCHLORIDE 125 MG/1
125 CAPSULE ORAL 4 TIMES DAILY
Status: DISCONTINUED | OUTPATIENT
Start: 2025-08-06 | End: 2025-08-07

## 2025-08-06 RX ORDER — MORPHINE SULFATE 4 MG/ML
2 INJECTION, SOLUTION INTRAMUSCULAR; INTRAVENOUS EVERY 10 MIN PRN
Status: DISCONTINUED | OUTPATIENT
Start: 2025-08-06 | End: 2025-08-06 | Stop reason: HOSPADM

## 2025-08-06 RX ORDER — BUMETANIDE 1 MG/1
2 TABLET ORAL DAILY
Status: DISCONTINUED | OUTPATIENT
Start: 2025-08-06 | End: 2025-08-07

## 2025-08-06 RX ORDER — DEXAMETHASONE SODIUM PHOSPHATE 4 MG/ML
VIAL (ML) INJECTION AS NEEDED
Status: DISCONTINUED | OUTPATIENT
Start: 2025-08-06 | End: 2025-08-06 | Stop reason: SURG

## 2025-08-06 RX ORDER — METOCLOPRAMIDE 10 MG/1
10 TABLET ORAL ONCE
Status: COMPLETED | OUTPATIENT
Start: 2025-08-06 | End: 2025-08-06

## 2025-08-06 RX ORDER — SODIUM CHLORIDE 9 MG/ML
INJECTION, SOLUTION INTRAVENOUS CONTINUOUS
Status: DISCONTINUED | OUTPATIENT
Start: 2025-08-06 | End: 2025-08-06 | Stop reason: HOSPADM

## 2025-08-06 RX ORDER — MIDAZOLAM HYDROCHLORIDE 1 MG/ML
INJECTION INTRAMUSCULAR; INTRAVENOUS AS NEEDED
Status: DISCONTINUED | OUTPATIENT
Start: 2025-08-06 | End: 2025-08-06 | Stop reason: SURG

## 2025-08-06 RX ORDER — HYDROCODONE BITARTRATE AND ACETAMINOPHEN 5; 325 MG/1; MG/1
1 TABLET ORAL EVERY 4 HOURS PRN
Status: DISCONTINUED | OUTPATIENT
Start: 2025-08-06 | End: 2025-08-07

## 2025-08-06 RX ORDER — FAMOTIDINE 20 MG/1
20 TABLET, FILM COATED ORAL ONCE
Status: COMPLETED | OUTPATIENT
Start: 2025-08-06 | End: 2025-08-06

## 2025-08-06 RX ORDER — NALOXONE HYDROCHLORIDE 0.4 MG/ML
80 INJECTION, SOLUTION INTRAMUSCULAR; INTRAVENOUS; SUBCUTANEOUS AS NEEDED
Status: DISCONTINUED | OUTPATIENT
Start: 2025-08-06 | End: 2025-08-06 | Stop reason: HOSPADM

## 2025-08-06 RX ORDER — ALBUMIN (HUMAN) 12.5 G/50ML
25 SOLUTION INTRAVENOUS
Status: DISCONTINUED | OUTPATIENT
Start: 2025-08-06 | End: 2025-08-07

## 2025-08-06 RX ORDER — HYDROMORPHONE HYDROCHLORIDE 1 MG/ML
0.6 INJECTION, SOLUTION INTRAMUSCULAR; INTRAVENOUS; SUBCUTANEOUS EVERY 5 MIN PRN
Status: DISCONTINUED | OUTPATIENT
Start: 2025-08-06 | End: 2025-08-06 | Stop reason: HOSPADM

## 2025-08-06 RX ORDER — SODIUM CHLORIDE, SODIUM LACTATE, POTASSIUM CHLORIDE, CALCIUM CHLORIDE 600; 310; 30; 20 MG/100ML; MG/100ML; MG/100ML; MG/100ML
INJECTION, SOLUTION INTRAVENOUS CONTINUOUS
Status: DISCONTINUED | OUTPATIENT
Start: 2025-08-06 | End: 2025-08-06 | Stop reason: HOSPADM

## 2025-08-06 RX ORDER — MORPHINE SULFATE 10 MG/ML
6 INJECTION, SOLUTION INTRAMUSCULAR; INTRAVENOUS EVERY 10 MIN PRN
Status: DISCONTINUED | OUTPATIENT
Start: 2025-08-06 | End: 2025-08-06 | Stop reason: HOSPADM

## 2025-08-06 RX ORDER — LIDOCAINE HYDROCHLORIDE 10 MG/ML
INJECTION, SOLUTION EPIDURAL; INFILTRATION; INTRACAUDAL; PERINEURAL AS NEEDED
Status: DISCONTINUED | OUTPATIENT
Start: 2025-08-06 | End: 2025-08-06

## 2025-08-06 RX ORDER — ONDANSETRON 2 MG/ML
INJECTION INTRAMUSCULAR; INTRAVENOUS AS NEEDED
Status: DISCONTINUED | OUTPATIENT
Start: 2025-08-06 | End: 2025-08-06 | Stop reason: SURG

## 2025-08-06 RX ORDER — HYDROCODONE BITARTRATE AND ACETAMINOPHEN 5; 325 MG/1; MG/1
2 TABLET ORAL EVERY 4 HOURS PRN
Status: DISCONTINUED | OUTPATIENT
Start: 2025-08-06 | End: 2025-08-07

## 2025-08-06 RX ORDER — HYDROMORPHONE HYDROCHLORIDE 1 MG/ML
0.2 INJECTION, SOLUTION INTRAMUSCULAR; INTRAVENOUS; SUBCUTANEOUS EVERY 5 MIN PRN
Status: DISCONTINUED | OUTPATIENT
Start: 2025-08-06 | End: 2025-08-06 | Stop reason: HOSPADM

## 2025-08-06 RX ORDER — MORPHINE SULFATE 4 MG/ML
4 INJECTION, SOLUTION INTRAMUSCULAR; INTRAVENOUS EVERY 10 MIN PRN
Status: DISCONTINUED | OUTPATIENT
Start: 2025-08-06 | End: 2025-08-06 | Stop reason: HOSPADM

## 2025-08-06 RX ORDER — METOCLOPRAMIDE HYDROCHLORIDE 5 MG/ML
10 INJECTION INTRAMUSCULAR; INTRAVENOUS ONCE
Status: COMPLETED | OUTPATIENT
Start: 2025-08-06 | End: 2025-08-06

## 2025-08-06 RX ORDER — HEPARIN SODIUM 1000 [USP'U]/ML
1.5 INJECTION, SOLUTION INTRAVENOUS; SUBCUTANEOUS
Status: COMPLETED | OUTPATIENT
Start: 2025-08-06 | End: 2025-08-06

## 2025-08-06 RX ORDER — SODIUM CHLORIDE 9 MG/ML
INJECTION, SOLUTION INTRAVENOUS CONTINUOUS
Status: DISCONTINUED | OUTPATIENT
Start: 2025-08-06 | End: 2025-08-06

## 2025-08-06 RX ADMIN — ONDANSETRON 4 MG: 2 INJECTION INTRAMUSCULAR; INTRAVENOUS at 09:13:00

## 2025-08-06 RX ADMIN — SODIUM CHLORIDE: 9 INJECTION, SOLUTION INTRAVENOUS at 08:52:00

## 2025-08-06 RX ADMIN — DEXAMETHASONE SODIUM PHOSPHATE 4 MG: 4 MG/ML VIAL (ML) INJECTION at 09:13:00

## 2025-08-06 RX ADMIN — MIDAZOLAM HYDROCHLORIDE 2 MG: 1 INJECTION INTRAMUSCULAR; INTRAVENOUS at 08:59:00

## 2025-08-06 RX ADMIN — EPHEDRINE SULFATE 5 MG: 50 INJECTION INTRAVENOUS at 09:46:00

## 2025-08-07 ENCOUNTER — APPOINTMENT (OUTPATIENT)
Dept: INTERVENTIONAL RADIOLOGY/VASCULAR | Facility: HOSPITAL | Age: 35
End: 2025-08-07
Attending: SURGERY

## 2025-08-07 VITALS
BODY MASS INDEX: 38.65 KG/M2 | HEART RATE: 72 BPM | TEMPERATURE: 97 F | WEIGHT: 232 LBS | DIASTOLIC BLOOD PRESSURE: 89 MMHG | HEIGHT: 65 IN | OXYGEN SATURATION: 97 % | SYSTOLIC BLOOD PRESSURE: 135 MMHG | RESPIRATION RATE: 16 BRPM

## 2025-08-07 PROCEDURE — 99232 SBSQ HOSP IP/OBS MODERATE 35: CPT | Performed by: INTERNAL MEDICINE

## 2025-08-07 RX ORDER — PROTAMINE SULFATE 10 MG/ML
INJECTION, SOLUTION INTRAVENOUS
Status: COMPLETED
Start: 2025-08-07 | End: 2025-08-07

## 2025-08-07 RX ORDER — HEPARIN SODIUM 1000 [USP'U]/ML
INJECTION, SOLUTION INTRAVENOUS; SUBCUTANEOUS
Status: COMPLETED
Start: 2025-08-07 | End: 2025-08-07

## 2025-08-07 RX ORDER — NITROGLYCERIN 20 MG/100ML
INJECTION INTRAVENOUS
Status: COMPLETED
Start: 2025-08-07 | End: 2025-08-07

## 2025-08-07 RX ORDER — HYDROCODONE BITARTRATE AND ACETAMINOPHEN 5; 325 MG/1; MG/1
2 TABLET ORAL EVERY 8 HOURS PRN
Qty: 30 TABLET | Refills: 0 | Status: SHIPPED | OUTPATIENT
Start: 2025-08-07 | End: 2025-08-12

## 2025-08-07 RX ORDER — IOPAMIDOL 612 MG/ML
100 INJECTION, SOLUTION INTRAVASCULAR
Status: COMPLETED | OUTPATIENT
Start: 2025-08-07 | End: 2025-08-07

## 2025-08-07 RX ORDER — DIPHENHYDRAMINE HYDROCHLORIDE 50 MG/ML
INJECTION, SOLUTION INTRAMUSCULAR; INTRAVENOUS
Status: COMPLETED
Start: 2025-08-07 | End: 2025-08-07

## 2025-08-07 RX ORDER — VANCOMYCIN HYDROCHLORIDE 125 MG/1
125 CAPSULE ORAL 4 TIMES DAILY
Qty: 36 CAPSULE | Refills: 0 | Status: SHIPPED | OUTPATIENT
Start: 2025-08-07 | End: 2025-08-16

## 2025-08-07 RX ORDER — VERAPAMIL HYDROCHLORIDE 2.5 MG/ML
INJECTION INTRAVENOUS
Status: COMPLETED
Start: 2025-08-07 | End: 2025-08-07

## 2025-08-07 RX ORDER — LIDOCAINE HYDROCHLORIDE 20 MG/ML
INJECTION, SOLUTION EPIDURAL; INFILTRATION; INTRACAUDAL; PERINEURAL
Status: COMPLETED
Start: 2025-08-07 | End: 2025-08-07

## 2025-08-07 RX ORDER — MIDAZOLAM HYDROCHLORIDE 1 MG/ML
INJECTION INTRAMUSCULAR; INTRAVENOUS
Status: COMPLETED
Start: 2025-08-07 | End: 2025-08-07

## (undated) DEVICE — PROXIMATE SKIN STAPLERS (35 WIDE) CONTAINS 35 STAINLESS STEEL STAPLES (FIXED HEAD): Brand: PROXIMATE

## (undated) DEVICE — APPLICATOR PREP 26ML CHG 2% ISO ALC 70%

## (undated) DEVICE — SUTURE MONOCRYL 2-0 Y945H

## (undated) DEVICE — MINOR GENERAL: Brand: MEDLINE INDUSTRIES, INC.

## (undated) DEVICE — DECANTER BAG 9": Brand: MEDLINE INDUSTRIES, INC.

## (undated) DEVICE — SUT PERMA- 4-0 18IN NABSRB BLK TIE SILK

## (undated) DEVICE — SUTURE MONOCRYL 3-0 Y936H

## (undated) DEVICE — SOLUTION IV 1000ML 0.9% NACL PRESERVATIVE

## (undated) DEVICE — SUTURE MONOCRYL 3-0 Y497G

## (undated) DEVICE — SUT MCRYL 4-0 18IN PS-2 ABSRB UD 19MM 3/8 CIR

## (undated) DEVICE — SYSTEM SEMI RIG LNR 3000CC W/ EL AND SELF

## (undated) DEVICE — CHLORAPREP 26ML APPLICATOR

## (undated) DEVICE — SUTURE CHROMIC 2-0 801H

## (undated) DEVICE — CAUTERY: TIP CLEANER XR 100/CS: Brand: MEDICAL ACTION INDUSTRIES

## (undated) DEVICE — DRAPE,EXTREMITY,89X128,STERILE: Brand: MEDLINE

## (undated) DEVICE — Device

## (undated) DEVICE — DRAPE SRG U 124X80IN U REINF

## (undated) DEVICE — GAMMEX® PI HYBRID SIZE 6, STERILE POWDER-FREE SURGICAL GLOVE, POLYISOPRENE AND NEOPRENE BLEND: Brand: GAMMEX

## (undated) DEVICE — HEMOSTAT KIT SURGIFLO THROM 8ML

## (undated) DEVICE — SUCTION CANISTER, 3000CC,SAFELINER: Brand: DEROYAL

## (undated) DEVICE — BANDAGE,GAUZE,BULKEE II,4.5"X4.1YD,STRL: Brand: MEDLINE

## (undated) DEVICE — GAMMEX® PI HYBRID SIZE 6.5, STERILE POWDER-FREE SURGICAL GLOVE, POLYISOPRENE AND NEOPRENE BLEND: Brand: GAMMEX

## (undated) DEVICE — CONMED ACCESSORY ELECTRODE, NEEDLE ELECTRODE

## (undated) DEVICE — ANTIBACTERIAL UNDYED BRAIDED (POLYGLACTIN 910), SYNTHETIC ABSORBABLE SUTURE: Brand: COATED VICRYL

## (undated) DEVICE — SUT MCRYL 4-0 27IN ABSRB UD 19MM PS-2 3/8

## (undated) DEVICE — SUT PROL 6-0 30IN BV-1 NABSRB BLU 9.3MM 3/8 C

## (undated) DEVICE — ABDOMINAL PAD: Brand: CURITY

## (undated) DEVICE — KIT HEMSTAT MTRX 8ML PORCINE GEL HUM THROM

## (undated) DEVICE — SUT PERMA- 0 30IN SH NABSRB BLK 26MM 1/2 C

## (undated) DEVICE — AS-911 ANAEROBIC TRANSPORT MEDIUM (ATM) IS A MINERAL SALT BASED SEMI-SOLID MEDIA WITH REDUCING AGENTS. DESIGNED AS A HOLDING MEDIUM TO MAINTAIN THE VIABILITY OF MICROORGANISMS, BOTH ANAEROBIC AND AEROBIC, THROUGH COLLECTION, TRANSPORTATION, AND SHIPMENT OF CLINICAL SPECIMENS. PRE-REDUCED ANAEROBICALLY STERILIZED (PRAS). 6 ML FILL VOLUME. 16 X 100 MM HUNGATE-CAPPED TUBE. TRANSPARENT COLORLESS SEMI-SOLID MEDIUM. 10 TUBES PER PACK.: Brand: PRAS ANAEROBIC TRANSPORT MEDIUM (ATM)

## (undated) DEVICE — ABSORBABLE HEMOSTAT (OXIDIZED REGENERATED CELLULOSE): Brand: SURGICEL

## (undated) DEVICE — ADHESIVE SKIN TOP FOR WND CLSR DERMBND ADV

## (undated) DEVICE — SUT PROL 7-0 30IN BV-1 NABSRB BLU 9.3MM 3/8 C

## (undated) DEVICE — CLIP INT USE SM TI LIG HORZ

## (undated) DEVICE — BNDG,ELSTC,MATRIX,STRL,4"X5YD,LF,HOOK&LP: Brand: MEDLINE

## (undated) DEVICE — INTENDED TO BE USED TO OCCLUDE, RETRACT AND IDENTIFY ARTERIES, VEINS, TENDONS AND NERVES IN SURGICAL PROCEDURES: Brand: STERION®  VESSEL LOOP

## (undated) DEVICE — SUT PROL 5-0 24IN NABSRB BLU 13MM C-1 3/8

## (undated) DEVICE — CV: Brand: MEDLINE INDUSTRIES, INC.

## (undated) DEVICE — CONTAINER SPEC 4OZ POLYPR GRAD SCR LID LEAK

## (undated) DEVICE — BLAKE SILICONE DRAIN, 19 FR ROUND, HUBLESS: Brand: BLAKE

## (undated) DEVICE — Device: Brand: JELCO

## (undated) DEVICE — STANDARD HYPODERMIC NEEDLE,POLYPROPYLENE HUB: Brand: MONOJECT

## (undated) DEVICE — CONTAINER,SPECIMEN,OR STERILE,4OZ: Brand: MEDLINE

## (undated) DEVICE — DRAPE SHEET LAPAROTOMY

## (undated) DEVICE — SUT PERMA- 2-0 18IN NABSRB BLK TIE SILK

## (undated) DEVICE — SUTURE SILK 2-0 SA85H

## (undated) DEVICE — EVACUATOR SUR 100CC SIL BLB WND

## (undated) DEVICE — SUT ETHLN 2-0 18IN FS NABSRB BLK 26MM 3/8 CIR

## (undated) DEVICE — SUT ETHLN 3-0 30IN FS-1 NABSRB BLK 24MM 3/8 C

## (undated) DEVICE — 3M™ TEGADERM™ TRANSPARENT FILM DRESSING FRAME STYLE, 1626W, 4 IN X 4-3/4 IN (10 CM X 12 CM), 50/CT 4CT/CASE: Brand: 3M™ TEGADERM™

## (undated) DEVICE — SUT PERMA- 2-0 30IN SH NABSRB BLK L26MM 1/

## (undated) NOTE — IP AVS SNAPSHOT
[address]  [address] [facility], [facility], [facility] ~ [phone]                Discharge Summary   2/16/2017    Lakisha Johnson           Admission Information        Provider Department    2/16/2017 Edmond Cope MD [facility] 3w/Sw Take 1 tablet (50 mg total) by mouth daily.     Ella Xavier taking these medications        Instructions Authorizing Provider    Morning Afternoon Evening As Needed    Atorvastatin Calcium 20 MG Tabs   Last marva 32. 7 -- (02/16/17)  324 (02/16/17)  8.2      Recent Hematology Lab Results (cont.)  (Last 3 results in the past 90 days)    Neutrophil % Lymphocyte % Monocyte % Eosinophil % Basophil % Prelim Neut Abs Final Neut Abs Lymphocyte Abso Monocyte Absolu Eosinoph information, go to [email or web address] and click on the Sign Up Now link in the [employer] box. Enter your [employer] Activation Code exactly as it appears below along with your Zip Code and Date of Birth to complete the sign-up process.  If you do What to report to your healthcare team: Dizziness, decreased urine amount           Cholesterol Lowering Medications     Atorvastatin Calcium 20 MG Oral Tab       Use: Lower cholesterol, protect your heart   Most common side effects: Dizziness, constipatio

## (undated) NOTE — LETTER
To Whom It May Concern:    Yohan Garcia has been under our care regarding ongoing medical issues. Because of this, she has been required to restrict her physical activities.     She may resume her usual activities, including work, on March 17, 2021 wi

## (undated) NOTE — LETTER
Date & Time: 11/8/2018, 7:07 PM  Patient: Saint Nailer  Encounter Provider(s):    Emma Mayberry MD       To Whom It May Concern:    Saint Nailer was seen and treated in our department on 11/8/2018. She can return to work 11/8/18.     If you have

## (undated) NOTE — LETTER
[facility]  [address], [place], [place]    Authorization for Surgical Operation and Procedure                               I hereby authorize Peg Huddleston MD, my physician and his/her assistants (if applicable), which may include medical students, residents, and/or fellows, to perform the following surgical operation/ procedure and administer such anesthesia as may be determined necessary by my physician: Operation/Procedure name (s)Excision of infected left arm AV graft   on Liu Walker   2.   I recognize that during the surgical operation/procedure, unforeseen conditions may necessitate additional or different procedures than those listed above.  I, therefore, further authorize and request that the above-named surgeon, assistants, or designees perform such procedures as are, in their judgment, necessary and desirable.    3.   My surgeon/physician has discussed prior to my surgery the potential benefits, risks and side effects of this procedure; the likelihood of achieving goals; and potential problems that might occur during recuperation.  They also discussed reasonable alternatives to the procedure, including risks, benefits, and side effects related to the alternatives and risks related to not receiving this procedure.  I have had all my questions answered and I acknowledge that no guarantee has been made as to the result that may be obtained.    4.   Should the need arise during my operation/procedure, which includes change of level of care prior to discharge, I also consent to the administration of blood and/or blood products.  Further, I understand that despite careful testing and screening of blood or blood products by collecting agencies, I may still be subject to ill effects as a result of receiving a blood transfusion and/or blood products.  The following are some, but not all, of the potential risks that can occur: fever and allergic reactions, hemolytic reactions,  transmission of diseases such as Hepatitis, AIDS and Cytomegalovirus (CMV) and fluid overload.  In the event that I wish to have an autologous transfusion of my own blood, or a directed donor transfusion, I will discuss this with my physician.  Check only if Refusing Blood or Blood Products  I understand refusal of blood or blood products as deemed necessary by my physician may have serious consequences to my condition to include possible death. I hereby assume responsibility for my refusal and release the hospital, its personnel, and my physicians from any responsibility for the consequences of my refusal.    o  Refuse   5.   I authorize the use of any specimen, organs, tissues, body parts or foreign objects that may be removed from my body during the operation/procedure for diagnosis, research or teaching purposes and their subsequent disposal by hospital authorities.  I also authorize the release of specimen test results and/or written reports to my treating physician on the hospital medical staff or other referring or consulting physicians involved in my care, at the discretion of the Pathologist or my treating physician.    6.   I consent to the photographing or videotaping of the operations or procedures to be performed, including appropriate portions of my body for medical, scientific, or educational purposes, provided my identity is not revealed by the pictures or by descriptive texts accompanying them.  If the procedure has been photographed/videotaped, the surgeon will obtain the original picture, image, videotape or CD.  The hospital will not be responsible for storage, release or maintenance of the picture, image, tape or CD.    7.   I consent to the presence of a  or observers in the operating room as deemed necessary by my physician or their designees.    8.   I recognize that in the event my procedure results in extended X-Ray/fluoroscopy time, I may develop a skin reaction.    9. If  I have a Do Not Attempt Resuscitation (DNAR) order in place, that status will be suspended while in the operating room, procedural suite, and during the recovery period unless otherwise explicitly stated by me (or a person authorized to consent on my behalf). The surgeon or my attending physician will determine when the applicable recovery period ends for purposes of reinstating the DNAR order.  10. Patients having a sterilization procedure: I understand that if the procedure is successful the results will be permanent and it will therefore be impossible for me to inseminate, conceive, or bear children.  I also understand that the procedure is intended to result in sterility, although the result has not been guaranteed.   11. I acknowledge that my physician has explained sedation/analgesia administration to me including the risk and benefits I consent to the administration of sedation/analgesia as may be necessary or desirable in the judgment of my physician.    I CERTIFY THAT I HAVE READ AND FULLY UNDERSTAND THE ABOVE CONSENT TO OPERATION and/or OTHER PROCEDURE.     ____________________________________  _________________________________        ______________________________  Signature of Patient    Signature of Responsible Person                Printed Name of Responsible Person                                      ____________________________________  _____________________________                ________________________________  Signature of Witness        Date  Time         Relationship to Patient    STATEMENT OF PHYSICIAN My signature below affirms that prior to the time of the procedure; I have explained to the patient and/or his/her legal representative, the risks and benefits involved in the proposed treatment and any reasonable alternative to the proposed treatment. I have also explained the risks and benefits involved in refusal of the proposed treatment and alternatives to the proposed treatment and have  answered the patient's questions. If I have a significant financial interest in a co-management agreement or a significant financial interest in any product or implant, or other significant relationship used in this procedure/surgery, I have disclosed this and had a discussion with my patient.     _____________________________________________________              _____________________________  (Signature of Physician)                                                                                         (Date)                                   (Time)  Patient Name: Liu SALDANA Aaron      : 1990      Printed: 2024     Medical Record #: [id number]                                      Page 1 of 1

## (undated) NOTE — LETTER
[facility]  [address], [place], [place]    Authorization for Surgical Operation and Procedure                               I hereby authorize Peg Huddleston MD, my physician and his/her assistants (if applicable), which may include medical students, residents, and/or fellows, to perform the following surgical operation/ procedure and administer such anesthesia as may be determined necessary by my physician: Operation/Procedure name (s) Left upper extremity arteriovenous fistula versus graft on Brianmontrell Walker   2.   I recognize that during the surgical operation/procedure, unforeseen conditions may necessitate additional or different procedures than those listed above.  I, therefore, further authorize and request that the above-named surgeon, assistants, or designees perform such procedures as are, in their judgment, necessary and desirable.    3.   My surgeon/physician has discussed prior to my surgery the potential benefits, risks and side effects of this procedure; the likelihood of achieving goals; and potential problems that might occur during recuperation.  They also discussed reasonable alternatives to the procedure, including risks, benefits, and side effects related to the alternatives and risks related to not receiving this procedure.  I have had all my questions answered and I acknowledge that no guarantee has been made as to the result that may be obtained.    4.   Should the need arise during my operation/procedure, which includes change of level of care prior to discharge, I also consent to the administration of blood and/or blood products.  Further, I understand that despite careful testing and screening of blood or blood products by collecting agencies, I may still be subject to ill effects as a result of receiving a blood transfusion and/or blood products.  The following are some, but not all, of the potential risks that can occur: fever and allergic reactions, hemolytic  reactions, transmission of diseases such as Hepatitis, AIDS and Cytomegalovirus (CMV) and fluid overload.  In the event that I wish to have an autologous transfusion of my own blood, or a directed donor transfusion, I will discuss this with my physician.  Check only if Refusing Blood or Blood Products  I understand refusal of blood or blood products as deemed necessary by my physician may have serious consequences to my condition to include possible death. I hereby assume responsibility for my refusal and release the hospital, its personnel, and my physicians from any responsibility for the consequences of my refusal.    o  Refuse   5.   I authorize the use of any specimen, organs, tissues, body parts or foreign objects that may be removed from my body during the operation/procedure for diagnosis, research or teaching purposes and their subsequent disposal by hospital authorities.  I also authorize the release of specimen test results and/or written reports to my treating physician on the hospital medical staff or other referring or consulting physicians involved in my care, at the discretion of the Pathologist or my treating physician.    6.   I consent to the photographing or videotaping of the operations or procedures to be performed, including appropriate portions of my body for medical, scientific, or educational purposes, provided my identity is not revealed by the pictures or by descriptive texts accompanying them.  If the procedure has been photographed/videotaped, the surgeon will obtain the original picture, image, videotape or CD.  The hospital will not be responsible for storage, release or maintenance of the picture, image, tape or CD.    7.   I consent to the presence of a  or observers in the operating room as deemed necessary by my physician or their designees.    8.   I recognize that in the event my procedure results in extended X-Ray/fluoroscopy time, I may develop a skin  reaction.    9. If I have a Do Not Attempt Resuscitation (DNAR) order in place, that status will be suspended while in the operating room, procedural suite, and during the recovery period unless otherwise explicitly stated by me (or a person authorized to consent on my behalf). The surgeon or my attending physician will determine when the applicable recovery period ends for purposes of reinstating the DNAR order.  10. Patients having a sterilization procedure: I understand that if the procedure is successful the results will be permanent and it will therefore be impossible for me to inseminate, conceive, or bear children.  I also understand that the procedure is intended to result in sterility, although the result has not been guaranteed.   11. I acknowledge that my physician has explained sedation/analgesia administration to me including the risk and benefits I consent to the administration of sedation/analgesia as may be necessary or desirable in the judgment of my physician.    I CERTIFY THAT I HAVE READ AND FULLY UNDERSTAND THE ABOVE CONSENT TO OPERATION and/or OTHER PROCEDURE.     ____________________________________  _________________________________        ______________________________  Signature of Patient    Signature of Responsible Person                Printed Name of Responsible Person                                      ____________________________________  _____________________________                ________________________________  Signature of Witness        Date  Time         Relationship to Patient    STATEMENT OF PHYSICIAN My signature below affirms that prior to the time of the procedure; I have explained to the patient and/or his/her legal representative, the risks and benefits involved in the proposed treatment and any reasonable alternative to the proposed treatment. I have also explained the risks and benefits involved in refusal of the proposed treatment and alternatives to the proposed  treatment and have answered the patient's questions. If I have a significant financial interest in a co-management agreement or a significant financial interest in any product or implant, or other significant relationship used in this procedure/surgery, I have disclosed this and had a discussion with my patient.     _____________________________________________________              _____________________________  (Signature of Physician)                                                                                         (Date)                                   (Time)  Patient Name: Liu SALDANA Aaron      : 1990      Printed: 2024     Medical Record #: [id number]                                      Page 1 of 1

## (undated) NOTE — LETTER
2/17/2021              [id number]       To whom it may concern:     Our patient Marino Jaramillo is scheduled for surgery on 2- at the [facility].  She will require medical leave

## (undated) NOTE — LETTER
To Whom It May Concern:    Gurjit Silvestre has been under our care regarding ongoing medical issues. Because of this, she has been required to restrict her physical activities.     She may resume her usual activities, including work, on Monday, March 15,

## (undated) NOTE — LETTER
[place] [address]  Authorization for Invasive Procedures  Date: January 3, 2024          Time: 1542    I hereby authorize Dr Oliva, my physician and his/her assistants (if applicable), which may include medical students, residents, and/or fellows, to perform the following surgical operation/ procedure and administer such anesthesia as may be determined necessary by my physician: Tunneled Catheter Insertion on Liu Walker  2.   I recognize that during the surgical operation/procedure, unforeseen conditions may necessitate additional or different procedures than those listed above.  I, therefore, further authorize and request that the above-named surgeon, assistants, or designees perform such procedures as are, in their judgment, necessary and desirable.    3.   My surgeon/physician has discussed prior to my surgery the potential benefits, risks and side effects of this procedure; the likelihood of achieving goals; and potential problems that might occur during recuperation.  They also discussed reasonable alternatives to the procedure, including risks, benefits, and side effects related to the alternatives and risks related to not receiving this procedure.  I have had all my questions answered and I acknowledge that no guarantee has been made as to the result that may be obtained.    4.   Should the need arise during my operation/procedure, which includes change of level of care prior to discharge, I also consent to the administration of blood and/or blood products.  Further, I understand that despite careful testing and screening of blood or blood products by collecting agencies, I may still be subject to ill effects as a result of receiving a blood transfusion and/or blood products.  The following are some, but not all, of the potential risks that can occur: fever and allergic reactions, hemolytic reactions, transmission of diseases such as Hepatitis, AIDS and Cytomegalovirus  (CMV) and fluid overload.  In the event that I wish to have an autologous transfusion of my own blood, or a directed donor transfusion, I will discuss this with my physician.   Check only if Refusing Blood or Blood Products  I understand refusal of blood or blood products as deemed necessary by my physician may have serious consequences to my condition to include possible death. I hereby assume responsibility for my refusal and release the hospital, its personnel, and my physicians from any responsibility for the consequences of my refusal.         o  Refuse         5.   I authorize the use of any specimen, organs, tissues, body parts or foreign objects that may be removed from my body during the operation/procedure for diagnosis, research or teaching purposes and their subsequent disposal by hospital authorities.  I also authorize the release of specimen test results and/or written reports to my treating physician on the hospital medical staff or other referring or consulting physicians involved in my care, at the discretion of the Pathologist or my treating physician.    6.   I consent to the photographing or videotaping of the operations or procedures to be performed, including appropriate portions of my body for medical, scientific, or educational purposes, provided my identity is not revealed by the pictures or by descriptive texts accompanying them.  If the procedure has been photographed/videotaped, the surgeon will obtain the original picture, image, videotape or CD.  The hospital will not be responsible for storage, release or maintenance of the picture, image, tape or CD.    7.   I consent to the presence of a  or observers in the operating room as deemed necessary by my physician or their designees.    8.   I recognize that in the event my procedure results in extended X-Ray/fluoroscopy time, I may develop a skin reaction.    9. If I have a Do Not Attempt Resuscitation (DNAR) order in  place, that status will be suspended while in the operating room, procedural suite, and during the recovery period unless otherwise explicitly stated by me (or a person authorized to consent on my behalf). The surgeon or my attending physician will determine when the applicable recovery period ends for purposes of reinstating the DNAR order.  10. Patients having a sterilization procedure: I understand that if the procedure is successful the results will be permanent and it will therefore be impossible for me to inseminate, conceive, or bear children.  I also understand that the procedure is intended to result in sterility, although the result has not been guaranteed.   11. I acknowledge that my physician has explained sedation/analgesia administration to me including the risk and benefits I consent to the administration of sedation/analgesia as may be necessary or desirable in the judgment of my physician.    I CERTIFY THAT I HAVE READ AND FULLY UNDERSTAND THE ABOVE CONSENT TO OPERATION and/or OTHER PROCEDURE.        ____________________________________       _________________________________      ______________________________  Signature of Patient         Signature of Responsible Person        Printed Name of Responsible Person        ____________________________________      _________________________________      ______________________________       Signature of Witness          Relationship to Patient                       Date                                       Time    Patient Name: Liu Walker     : 1990                 Printed: January 3, 2024      Medical Record #: [id number]                      Page 1 of 2          STATEMENT OF PHYSICIAN My signature below affirms that prior to the time of the procedure; I have explained to the patient and/or his/her legal representative, the risks and benefits involved in the proposed treatment and any reasonable alternative to the proposed treatment. I  have also explained the risks and benefits involved in refusal of the proposed treatment and alternatives to the proposed treatment and have answered the patient's questions. If I have a significant financial interest in a co-management agreement or a significant financial interest in any product or implant, or other significant relationship used in this procedure/surgery, I have disclosed this and had a discussion with my patient.     _______________________________________________________________ _____________________________  (Signature of Physician)                                                                                         (Date)                                   (Time)    Patient Name: Liu Walker     : 1990                 Printed: January 3, 2024      Medical Record #: [id number]                      Page 2 of 2

## (undated) NOTE — LETTER
Date & Time: 4/21/2022, 1:28 PM  Patient: Ihsan Serrano  Encounter Provider(s):    Prasad Carey MD       To Whom It May Concern:    Marita Iqbal was seen and treated in our department on 4/21/2022. She can return to work on 4/22/22.     If you have any questions or concerns, please do not hesitate to call.        _____________________________  Physician/APC Signature

## (undated) NOTE — ED AVS SNAPSHOT
Arianna Henry   MRN: [id number]    Department:  [facility] Emergency Department   Date of Visit:  11/8/2018           Disclosure     Insurance plans vary and the physician(s) referred by the ER may not be covered by your plan.  Please contact y CARE PHYSICIAN AT ONCE OR RETURN IMMEDIATELY TO THE EMERGENCY DEPARTMENT. If you have been prescribed any medication(s), please fill your prescription right away and begin taking the medication(s) as directed.   If you believe that any of the medications